# Patient Record
Sex: MALE | Race: WHITE
[De-identification: names, ages, dates, MRNs, and addresses within clinical notes are randomized per-mention and may not be internally consistent; named-entity substitution may affect disease eponyms.]

---

## 2017-04-09 ENCOUNTER — HOSPITAL ENCOUNTER (INPATIENT)
Dept: HOSPITAL 68 - ERH | Age: 67
LOS: 5 days | Discharge: NURSING, REHAB OR LONG-TERM CARE | DRG: 563 | End: 2017-04-14
Attending: INTERNAL MEDICINE | Admitting: INTERNAL MEDICINE
Payer: COMMERCIAL

## 2017-04-09 VITALS — WEIGHT: 300 LBS | BODY MASS INDEX: 44.43 KG/M2 | HEIGHT: 69 IN

## 2017-04-09 DIAGNOSIS — F32.9: ICD-10-CM
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
## 2017-04-09 DIAGNOSIS — J45.909: ICD-10-CM

## 2017-04-09 DIAGNOSIS — J96.11: ICD-10-CM

## 2017-04-09 DIAGNOSIS — N31.9: ICD-10-CM

## 2017-04-09 DIAGNOSIS — R73.9: ICD-10-CM

## 2017-04-09 DIAGNOSIS — Z87.891: ICD-10-CM

## 2017-04-09 DIAGNOSIS — Z86.718: ICD-10-CM

## 2017-04-09 DIAGNOSIS — G47.33: ICD-10-CM
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
## 2017-04-09 DIAGNOSIS — G82.20: ICD-10-CM

## 2017-04-09 DIAGNOSIS — Z86.711: ICD-10-CM

## 2017-04-09 DIAGNOSIS — E66.01: ICD-10-CM

## 2017-04-09 DIAGNOSIS — G62.9: ICD-10-CM

## 2017-04-09 DIAGNOSIS — I48.92: ICD-10-CM

## 2017-04-09 DIAGNOSIS — S82.841A: Primary | ICD-10-CM

## 2017-04-09 DIAGNOSIS — J44.9: ICD-10-CM

## 2017-04-09 DIAGNOSIS — I48.0: ICD-10-CM

## 2017-04-09 DIAGNOSIS — Z99.81: ICD-10-CM

## 2017-04-09 DIAGNOSIS — Z95.5: ICD-10-CM

## 2017-04-09 DIAGNOSIS — E78.5: ICD-10-CM

## 2017-04-09 DIAGNOSIS — I25.10: ICD-10-CM

## 2017-04-09 DIAGNOSIS — I10: ICD-10-CM

## 2017-04-09 DIAGNOSIS — I47.1: ICD-10-CM

## 2017-04-09 DIAGNOSIS — K21.9: ICD-10-CM

## 2017-04-09 LAB
ABSOLUTE GRANULOCYTE CT: 12.8 /CUMM (ref 1.4–6.5)
APTT BLD: 31 SEC (ref 25–37)
BASOPHILS # BLD: 0 /CUMM (ref 0–0.2)
BASOPHILS NFR BLD: 0 % (ref 0–2)
EOSINOPHIL # BLD: 0.4 /CUMM (ref 0–0.7)
EOSINOPHIL NFR BLD: 2.5 % (ref 0–5)
ERYTHROCYTE [DISTWIDTH] IN BLOOD BY AUTOMATED COUNT: 15.2 % (ref 11.5–14.5)
GRANULOCYTES NFR BLD: 81.6 % (ref 42.2–75.2)
HCT VFR BLD CALC: 43 % (ref 42–52)
LYMPHOCYTES # BLD: 1.4 /CUMM (ref 1.2–3.4)
MCH RBC QN AUTO: 29.1 PG (ref 27–31)
MCHC RBC AUTO-ENTMCNC: 32.4 G/DL (ref 33–37)
MCV RBC AUTO: 90.1 FL (ref 80–94)
MONOCYTES # BLD: 1.1 /CUMM (ref 0.1–0.6)
PLATELET # BLD: 213 /CUMM (ref 130–400)
PMV BLD AUTO: 8 FL (ref 7.4–10.4)
PROTHROMBIN TIME: 12.5 SEC (ref 9.4–12.5)
RED BLOOD CELL CT: 4.77 /CUMM (ref 4.7–6.1)
WBC # BLD AUTO: 15.7 /CUMM (ref 4.8–10.8)

## 2017-04-09 NOTE — ED UPPER/LOWER EXTREMITY COMPL
History of Present Illness
 
General
Chief Complaint: Foot or Ankle Injury
Stated Complaint: BIBA RIGHT ANKLE INJURY
Source: patient
Exam Limitations: no limitations
 
Vital Signs & Intake/Output
Vital Signs & Intake/Output
 Vital Signs
 
 
Date Time Temp Pulse Resp B/P Pulse O2 O2 Flow FiO2
 
     Ox Delivery Rate 
 
2239 98.0 83 18 149/86 95 Nasal 2.0L 
 
      Cannula  
 
9     95 Nasal 2.0L 
 
      Cannula  
 
2001 97.8 117 18 154/95 94   
 
 
 ED Intake and Output
 
 
 04/10 0000  1200
 
Intake Total 60 
 
Output Total  
 
Balance 60 
 
   
 
Intake, Oral 60 
 
Patient 300 lb 
 
Weight  
 
 
Allergies
Coded Allergies:
haloperidol (From HALDOL) (SWELLING 16)
heparin (SWELLING 16)
vancomycin (HIVES, SKIN CRACKES, TURNS RED, SWELLS AND PEELS 16)
warfarin (SWELLS, TURNS, RED, HIVES, SKIN CRACKS AND PEELS 16)
 
Reconcile Medications
Albuterol Sulfate (Ventolin Hfa) 90 MCG HFA.AER.AD   2 PUF INH Q4H RESPIRATORY  
(Reported)
Ascorbate Calcium (Vitamin C) 500 MG TABLET   1 TAB PO BID SUPPLEMENT  (Reported
)
Atomoxetine HCl (Strattera) 40 MG CAPSULE   1 CAP PO QAM MENTAL HEALTH  (
Reported)
Baclofen 20 MG TABLET   1 TAB PO BID MUSCLE RELAXER  (Reported)
Bisacodyl (Dulcolax) 10 MG SUPP.RECT   1 SUP RC EOD GI  (Reported)
Diltiazem HCl (Diltiazem 24HR ER) 120 MG CAP.ER.24H   1 CAP PO DAILY HEART/BP  (
Reported)
Evolocumab (Repatha Sureclick) 140 MG/ML PEN.INJCTR   1 ML SC Q2W CHOLESTEROL  (
Reported)
Fentanyl (Duragesic) 25 MCG/HOUR PATCH.TD72   1 PAT TOP Q3D BACK PAIN  (Reported
)
Furosemide 20 MG TABLET   1 TAB PO EOD DIURETIC  (Reported)
Gabapentin 600 MG TABLET   1 TAB PO TID NEUROPATHY  (Reported)
Guaifenesin (Mucinex) 600 MG TAB.ER.12H   2 TAB PO BID MUCUS  (Reported)
Lactobacillus Acidophilus (Acidophilus) 1 EACH CAPSULE   1 CAP PO BID PROBIOTIC 
(Reported)
Lorazepam (Ativan) 1 MG TABLET   1 TAB PO BID ANXIETY  (Reported)
Magnesium Oxide (Magnesium) 400 MG CAPSULE   1 CAP PO 0600 SUPPLEMENT  (Reported
)
Montelukast Sodium (Singulair) 10 MG TABLET   1 TAB PO DAILY ASTHMA  (Reported)
Multivitamin (Multi-Day Vitamins) 1 EACH TABLET   1 TAB PO DAILY SUPPLEMENT  (
Reported)
Nortriptyline HCl 10 MG CAPSULE   1 CAP PO QPM MENTAL HEALTH  (Reported)
Oxycodone HCl/Acetaminophen (Oxycodone-Acetaminophen 5-325) 5 MG-325 MG TABLET  
1-2 TAB PO Q4H PRN PAIN  (Reported)
Pantoprazole Sodium (Protonix) 40 MG TABLET.DR   1 TAB PO DAILY ACID REFLUX  (
Reported)
Potassium Chloride 20 MEQ TAB.ER.PRT   1 TAB PO DAILY SUPPLEMENT  (Reported)
Rifampin (Rifadin) 300 MG CAPSULE   1 CAP PO BID MRSA PROPHYLAXIS  (Reported)
Tiotropium Bromide (Spiriva) 18 MCG CAP.W.DEV   1 CAP INH DAILY BREATHING  (
Reported)
Vitamin E Mixed (Vitamin E) (Unknown Strength) TABLET   (Unknown Dose) PO DAILY 
SUPPLEMENT  (Reported)
 
Triage Nurses Notes Reviewed? yes
Onset: Abrupt
Duration: constant
Timing: single episode today
Severity: severe
Severity Numbers: 10
Pain/Injury Location:
Right: Ankle. 
HPI:
Patient is a 66-year-old male with past medical history of COPD on 2 L of oxygen
at all times, myocardial infarction and a MRSA spinal infection with residual 
lower extremity hemiparesis in which the patient does partially weight-bear with
assistance of a walker and uses a wheelchair on occasion due to his hemiparesis 
of his lower extremities and which TODAY he was in his normal state of health 
patient was transferring from the toilet to his wheelchair where he subsequently
fell twisting his right ankle resulting acute onset of sharp stabbing severe 
pain. 
Patient was brought in by ambulance
 Patient denies any head strike and pain is localized to the right ankle.
 
(LYLE OWENS)
 
Past History
 
Travel History
Traveled to [place] past 21 day No
 
Medical History
Any Pertinent Medical History? see below for history
Neurological: NONE
EENT: NONE
Cardiovascular: AFIB, hypertension, hyperlipidemia
Respiratory: asthma, COPD
Gastrointestinal: NONE
Hepatic: NONE
Renal: neurogenic bladder
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
Psychiatric: NONE
Endocrine: NONE
Blood Disorders: NONE
Cancer(s): NONE
GYN/Reproductive: NONE
History of MRSA: Yes
History of VRE: No
History of CDIFF: No
 
Surgical History
Surgical History: non-contributory
 
Psychosocial History
Who do you live with Significant Other
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
What is your primary language English
 
Family History
Family History, If Any:
Relation not specified for:
  *No pertinent family history
 
Hx Contributory? No
(LYLE OWENS)
 
Review of Systems
 
Review of Systems
Constitutional:
Reports: no symptoms. 
EENTM:
Reports: no symptoms. 
Respiratory:
Reports: no symptoms. 
Cardiovascular:
Reports: no symptoms. 
Gastrointestinal/Abdominal:
Reports: no symptoms. 
Genitourinary:
Reports: no symptoms. 
Musculoskeletal:
Reports: see HPI, joint pain, joint swelling. 
Skin:
Reports: no symptoms. 
Neurological/Psychological:
Reports: no symptoms. 
Hematologic/Endocrine:
Reports: no symptoms. 
Immunological:
Reports: no symptoms. 
All Other Systems: Reviewed and Negative
(LYLE OWENS)
 
Physical Exam
 
Physical Exam
General Appearance: mild distress
Neurologic/Tendon: normal sensation, normal motor functions, normal tendon 
functions, responds to pain, no evidence tendon injury, no pulse deficit
Skin: intact, normal color, warm/dry
Comments:
HEENT: Normal EENT exam,
Neck: Supple, no lymphadenopathy, normal range of motion without pain or 
tenderness
Back: Nontender, no CVA tenderness.
Cardiovascular: Tachycardia no murmurs rubs or gallops, normal JVP
Respiratory: Chest nontender. No respiratory distress.breath sounds clear to 
auscultation bilaterally
Abdomen: Soft, nontender nondistended, no appreciable organomegaly. Normal bowel
sounds. No ascites
Extremity: 
Right hip nontender normal inspection straight leg raise performed
Right knee nontender normal inspection
Right ankle noted mild deformity and swelling and point tenderness
Right foot nontender pedal pulse +2 capillary refill less than 2 seconds
Right lower extremity dermatomes intact
Neuro: Alert oriented x3, motor sensory normal, 
Skin: No appreciable rash on exposed skin, skin is warm and dry.
Psych: Mood and affect is normal, memory and judgment is normal.
(LYLE OWENS)
 
Progress
Differential Diagnosis: arterial insufficiency, compartment syndrome, contusion,
dislocation, DVT, fracture, gout, septic arthritis, sprain, tendon injury
Plan of Care:
 Orders
 
 
Procedure Date/time Status
 
Nothing by Mouth 04/10 B Active
 
Vital Signs 04/10 0038 Complete
 
Teach/Educate 04/10 0038 Active
 
Pain Treatment and Response 04/10 0038 Active
 
Nutritional Intake, Monitor 04/10 0038 Active
 
Isolation 04/10 0038 Active
 
Intake & Output 04/10 0038 Complete
 
Patient Care Conference 04/10 0038 Active
 
Activity/Ambulation 04/10 0038 Active
 
Pathway - chart 04/10 0008 Active
 
Heat/Cold Therapy 04/10 0008 Active
 
PT Evaluate & Treat 04/10 0003 Active
 
Code Status  2304 Active
 
TRC EVALUATION (GEN) 2251 Active
 
OXYGEN SETUP (GEN) 2251 Active
 
Pathway - chart 2251 Active
 
House Staff 2251 Active
 
Patient Data 2251 Active
 
Saline Lock 2239 Active
 
Misc Message 2239 Active
 
ED Holding Orders 2239 Active
 
Admit to inpatient 2239 Active
 
Vital Signs 2239 Active
 
Code Status 2239 Complete
 
Patient Data 2229 Active
 
Intake & Output 2209 Active
 
EKG 2109 Active
 
PARTIAL THROMBOPLASTIN TIME  2005 Complete
 
PROTHROMBIN TIME  2005 Complete
 
COMPREHENSIVE METABOLIC PANEL 2004 Complete
 
CBC WITHOUT DIFFERENTIAL 2003 Complete
 
TYPE & SCREEN (NOT X-MATCH) 2003 Complete
 
VTE Mechanical Prophylaxis   UNK Active
 
Vital Signs   UNK Complete
 
MISTAKE   UNK Active
 
Hemoccult   UNK Active
 
 
 Current Medications
 
 
  Sig/Viet Start time  Last
 
Medication Dose  Stop Time Status Admin
 
Acetaminophen 325 MG Q6P PRN 04/10 0015 AC 
 
(Tylenol)     
 
Hydromorphone HCl 0.4 MG Q6P PRN 04/10 0015 AC 
 
(Dilaudid)     
 
Oxycodone/ 1 TAB Q6P PRN 04/10 0015 AC 
 
Acetaminophen     
 
(Percocet)     
 
 
 Laboratory Tests
 
 
 
171:
Anion Gap 8, Estimated GFR > 60, BUN/Creatinine Ratio 26.7  H, Glucose 209  H, 
Calcium 10.1, Total Bilirubin 0.5, AST 44, ALT 80  H, Alkaline Phosphatase 77, 
Total Protein 7.1, Albumin 3.6, Globulin 3.5, Albumin/Globulin Ratio 1.0  L, PT 
12.5, INR 1.19  H, APTT 31, CBC w Diff NO MAN DIFF REQ, RBC 4.77, MCV 90.1, MCH 
29.1, RDW 15.2  H, MPV 8.0, Gran % 81.6  H, Lymphocytes % 8.7  L, Monocytes % 
7.2, Eosinophils % 2.5, Basophils % 0  L, Absolute Granulocytes 12.8  H, 
Absolute Lymphocytes 1.4, Absolute Monocytes 1.1  H, Absolute Eosinophils 0.4, 
Absolute Basophils 0, PUBS MCHC 32.4  L
No concerns of right lower extremity neurovascular compromise.  No concerns of 
dislocation, patient does have bimalleolar fracture on x-ray findings.
I discussed patient with Dr. FLORES in which no emergent surgical intervention 
is warranted in this case
Patient due to significant gait assistance necessities and fall risk and due to 
bimalleolar fracture that return to private residence in the emergency room with
be a significant detriment to the patient and further fall risk may occur.  
Discussed admission with case management who approved of admission in which 
patient will require a three-day stay and most likely have short-term 
rehabilitation
 
Posterior ankle and sugar tong splint were applied by me per he and post 
neurovascular was intact.
Patient was given IV Dilaudid for pain which he had significant resolution of 
his pain
(LAURA DOWNEY,LYLE)
Diagnostic Imaging:
Viewed by Me: Radiology Read. 
Radiology Impression: acute abnormality
Initial ED EKG: SINUS TACHYCARDIA 109 BPM
Comments:
PATIENT: DAYDAY HOOVER  MEDICAL RECORD NO: [id number]
PRESENT AGE: 66  PATIENT ACCOUNT NO: [id number]
: [birth year]  LOCATION: [facility]
ORDERING PHYSICIAN: LYLE DOWNEY  
 
  SERVICE DATE: 
EXAM TYPE: RAD - XRY-ANKLE 3 OR MORE VIEWS R
 
EXAMINATION:
XR ANKLE, RIGHT
 
CLINICAL INFORMATION:
Trauma
 
COMPARISON:
None
 
TECHNIQUE:
AP, lateral, and mortise views of the right ankle.
 
FINDINGS:
There is a displaced oblique fracture involving the distal right fibula.
There is lateral displacement of the distal fracture fragments. There is
widening of the lateral aspect of the ankle mortise. The talar dome appears
intact. There are a few fragments of bone within the ankle mortise likely
related to acute injury. There is a mildly displaced fracture involving the
medial malleolus with medial and inferior displacement of the distal fracture
fragment. There is extensive soft tissue swelling surrounding the base of the
right ankle which appears relatively hyperdense suggesting hematoma. No other
definite fractures visualized.
 
IMPRESSION:
Bimalleolar comminuted mildly fractures as described with several tiny bony
fragments within the ankle mortise.
 
DICTATED BY: FABIÁN ALVARENGA MD 
DATE/TIME DICTATED:17
:PAPITO 
(LYLE OWENS)
 
Departure
 
Departure
Disposition: STILL A PATIENT
Condition: Stable
Clinical Impression
Primary Impression: Bimalleolar fracture of right ankle
Referrals:
JUAQUIN JONES,RIGOBERTO YANG (PCP/Family)
 
Departure Forms:
Customer Survey
General Discharge Information
 
Admission Note
Spoke With:
MARK SILVEIRA MD
Documentation of Exam:
Documentation of any treatments & extenuating circumstances including Concerns 
Regarding Discharge (functional status, medication knowledge or non-compliance, 
living conditions, etc.) that warrant an admission rather than observation: [
Discussed admission with Dr. SILVEIRA who agrees with general medicine admission 
for concerns of bimalleolar fracture and fall risk.  Patient will require with 
orthopedic consultation, IV pain medication and possible short-term placement to
rehabilitation facility.  Outpatient treatment at this time would be medically 
harmful]
 
(LYLE OWENS)
 
PA/NP Co-Sign Statement
Statement:
ED Attending supervision documentation-
 
[] I saw and evaluated the patient. I have also reviewed all the pertinent lab 
results and diagnostic results. I agree with the findings and the plan of care 
as documented in the PA's/NP's documentation. 
 
[x] I have reviewed the ED Record and agree with the PA's/NP's documentation.
 
[] Additions or exceptions (if any) to the PAs/NP's note and plan are 
summarized below:
[]
 
(SUGEY JONES,GELACIO CAMPOS)
 
Procedures
 
Splinting
Location: RIGHT ANKLE
Manual Alignment Performed: No
Hand-Made Type: orthoglass
Splint: SUGAR TONG AND POSTERIOR ANKLE
Splint Applied By: splint applied by me
Pre-Proc Neuro Vasc Exam: normal
Post-Proc Neuro Vasc Exam: normal
(LYLE OWENS)
 
Critical Care Note
 
Critical Care Note
Critical Care Time: 30-74 min
(LYLE OWENS)

## 2017-04-09 NOTE — RADIOLOGY REPORT
EXAMINATION:
XR ANKLE, RIGHT
 
CLINICAL INFORMATION:
Trauma
 
COMPARISON:
None
 
TECHNIQUE:
AP, lateral, and mortise views of the right ankle.
 
FINDINGS:
There is a displaced oblique fracture involving the distal right fibula.
There is lateral displacement of the distal fracture fragments. There is
widening of the lateral aspect of the ankle mortise. The talar dome appears
intact. There are a few fragments of bone within the ankle mortise likely
related to acute injury. There is a mildly displaced fracture involving the
medial malleolus with medial and inferior displacement of the distal fracture
fragment. There is extensive soft tissue swelling surrounding the base of the
right ankle which appears relatively hyperdense suggesting hematoma. No other
definite fractures visualized.
 
IMPRESSION:
Bimalleolar comminuted mildly fractures as described with several tiny bony
fragments within the ankle mortise.

## 2017-04-10 VITALS — SYSTOLIC BLOOD PRESSURE: 160 MMHG | DIASTOLIC BLOOD PRESSURE: 83 MMHG

## 2017-04-10 VITALS — DIASTOLIC BLOOD PRESSURE: 64 MMHG | SYSTOLIC BLOOD PRESSURE: 144 MMHG

## 2017-04-10 VITALS — SYSTOLIC BLOOD PRESSURE: 174 MMHG | DIASTOLIC BLOOD PRESSURE: 88 MMHG

## 2017-04-10 VITALS — SYSTOLIC BLOOD PRESSURE: 150 MMHG | DIASTOLIC BLOOD PRESSURE: 82 MMHG

## 2017-04-10 LAB
ABSOLUTE GRANULOCYTE CT: 11.9 /CUMM (ref 1.4–6.5)
BASOPHILS # BLD: 0 /CUMM (ref 0–0.2)
BASOPHILS NFR BLD: 0.1 % (ref 0–2)
EOSINOPHIL # BLD: 0.6 /CUMM (ref 0–0.7)
EOSINOPHIL NFR BLD: 3.9 % (ref 0–5)
ERYTHROCYTE [DISTWIDTH] IN BLOOD BY AUTOMATED COUNT: 14.9 % (ref 11.5–14.5)
GRANULOCYTES NFR BLD: 77.7 % (ref 42.2–75.2)
HCT VFR BLD CALC: 38.6 % (ref 42–52)
LYMPHOCYTES # BLD: 1.8 /CUMM (ref 1.2–3.4)
MCH RBC QN AUTO: 29.4 PG (ref 27–31)
MCHC RBC AUTO-ENTMCNC: 32.7 G/DL (ref 33–37)
MCV RBC AUTO: 89.9 FL (ref 80–94)
MONOCYTES # BLD: 1 /CUMM (ref 0.1–0.6)
PLATELET # BLD: 205 /CUMM (ref 130–400)
PMV BLD AUTO: 8.4 FL (ref 7.4–10.4)
RED BLOOD CELL CT: 4.29 /CUMM (ref 4.7–6.1)
WBC # BLD AUTO: 15.3 /CUMM (ref 4.8–10.8)

## 2017-04-10 NOTE — PN- HOUSESTAFF
Subjective
Follow-up For:
Right ankle pain status post mechanical fall
Subjective:
Patient reports that his pain is better.  Currently the leg is Ace bandaged.  
Denies chest pain, palpitations, shortness of breath, dizziness, syncope
 
Patient reports he has been off of a Eliquis, for the last 3 months and his 
primary cardiologist aware. 
 
 
 
Review of Systems
Constitutional:
Reports: see HPI. 
 
Objective
Last 24 Hrs of Vital Signs/I&O
 Vital Signs
 
 
Date Time Temp Pulse Resp B/P Pulse O2 O2 Flow FiO2
 
     Ox Delivery Rate 
 
04/10 1035     93 Nasal 2.0L 
 
      Cannula  
 
04/10 08      Nasal 2.0L 
 
      Cannula  
 
04/10 08     96 Nasal 2.0L 
 
      Cannula  
 
04/10 0800     94 Nasal 2.0L 
 
      Cannula  
 
04/10 0651 97.7 104 24 160/83 96 Nasal 2.0L 
 
      Cannula  
 
04/10 0208     95 Nasal 2.0L 
 
      Cannula  
 
04/10 0142     95 Nasal 2.0L 
 
      Cannula  
 
04/10 0115 97.8 100 24 174/88 95 Nasal 2.0L 
 
      Cannula  
 
 2239 98.0 83 18 149/86 95 Nasal 2.0L 
 
      Cannula  
 
 2209     95 Nasal 2.0L 
 
      Cannula  
 
2001 97.8 117 18 154/95 94   
 
 
 Intake & Output
 
 
 04/10 1600 04/10 0800 04/10 0000
 
Intake Total  150 60
 
Output Total   
 
Balance  150 60
 
    
 
Intake, Oral  150 60
 
Patient  300 lb 300 lb
 
Weight   
 
 
 
 
Physical Exam
General Appearance: Alert, Oriented X3, Cooperative, No Acute Distress, Morbidly
obese
Skin: No Rashes
Cardiovascular: Regular Rate, Normal S1, Normal S2, No Murmurs
Lungs: Clear to Auscultation
Abdomen: Normal Bowel Sounds, Soft, No Tenderness
Neurological: Normal Speech
Extremities: No Clubbing, No Cyanosis, No Edema, right ankle Ace bandage in 
place. 
Current Medications:
 Current Medications
 
 
  Sig/Viet Start time  Last
 
Medication Dose Route Stop Time Status Admin
 
Acetaminophen 325 MG Q6P PRN 04/10 0015 AC 
 
  PO   
 
Albuterol Sulfate 3 ML EVERY 4 HRS/AWAKE 04/10 1200 AC 04/10
 
  INH   1031
 
Albuterol Sulfate 3 ML ONCE ONE 04/10 0215 DC 04/10
 
  INH 04/10 0216  0205
 
Aspirin Buffered 81 MG DAILY 04/10 1000 AC 04/10
 
  PO   09
 
Baclofen 20 MG BID 04/10 0114 AC 04/10
 
  PO   0914
 
Diltiazem HCl 120 MG DAILY 04/10 1000 AC 04/10
 
  PO   0914
 
Doxycycline Hyclate 100 MG BID 04/10 0117 AC 04/10
 
  PO   09
 
Gabapentin 600 MG Q8 04/10 0600 AC 04/10
 
  PO   09
 
Hydromorphone HCl 0.4 MG Q6P PRN 04/10 0015 DC 
 
  IV   
 
Hydromorphone HCl 1 MG ONCE ONE 2015 DC 
 
  IV 
 
Hydromorphone HCl 0 .STK-MED ONE 2009 DC 
 
  .ROUTE   
 
Lorazepam 1 MG BID 04/10 1000 AC 
 
  PO   
 
Montelukast Sodium 10 MG DAILY 04/10 1000 AC 04/10
 
  PO   09
 
Morphine Sulfate 2 MG Q6P PRN 04/10 0200 AC 04/10
 
  IV   1101
 
Nortriptyline HCl 10 MG QPM 04/10 2200 AC 
 
  PO   
 
Omeprazole 40 MG DAILY AC 04/10 0700 AC 04/10
 
  PO   0914
 
Oxycodone/ 1 TAB Q6P PRN 04/10 0015 AC 04/10
 
Acetaminophen  PO   0720
 
Rifampin 300 MG BID 04/10 011 AC 04/10
 
  PO   914
 
 
 
 
Last 24 Hrs of Lab/Stalin Results
Last 24 Hrs of Labs/Mics:
 Laboratory Tests
 
04/10/17 0800:
Anion Gap 9, Estimated GFR > 60, BUN/Creatinine Ratio 40.0  H, CBC w Diff NO MAN
DIFF REQ, RBC 4.29  L, MCV 89.9, MCH 29.4, RDW 14.9  H, MPV 8.4, Gran % 77.7  H,
Lymphocytes % 11.5  L, Monocytes % 6.8, Eosinophils % 3.9, Basophils % 0.1, 
Absolute Granulocytes 11.9  H, Absolute Lymphocytes 1.8, Absolute Monocytes 1.0 
H, Absolute Eosinophils 0.6, Absolute Basophils 0, PUBS MCHC 32.7  L
 
17:
Anion Gap 8, Estimated GFR > 60, BUN/Creatinine Ratio 26.7  H, Glucose 209  H, 
Hemoglobin A1c 5.9  H, Calcium 10.1, Total Bilirubin 0.5, AST 44, ALT 80  H, 
Alkaline Phosphatase 77, Total Protein 7.1, Albumin 3.6, Globulin 3.5, Albumin/
Globulin Ratio 1.0  L, PT 12.5, INR 1.19  H, APTT 31, CBC w Diff NO MAN DIFF REQ
, RBC 4.77, MCV 90.1, MCH 29.1, RDW 15.2  H, MPV 8.0, Gran % 81.6  H, 
Lymphocytes % 8.7  L, Monocytes % 7.2, Eosinophils % 2.5, Basophils % 0  L, 
Absolute Granulocytes 12.8  H, Absolute Lymphocytes 1.4, Absolute Monocytes 1.1 
H, Absolute Eosinophils 0.4, Absolute Basophils 0, PUBS MCHC 32.4  L
 
 
Assessment/Plan
Assessment:
66-year-old morbidly obese demand with history of stage IV COPD on 2 L oxygen, 
CAD status post stents, hypertension, MRSA bacteremia/endocarditis with T6 7 
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 
suppressive therapy with doxycycline and rifampin, PE status post IVC filter, 
obstructive sleep apnea not using CPAP, presented to the ED after a mechanical 
fall.  X-ray of the ankle showed a bimalleolar culminated fracture of ankle 
mortise. 
 
1.  Right ankle fracture status post mechanical fall
-Patient was evaluated by orthopedics today who is planning for an inpatient 
procedure probably been stable
-Cardiology and pulmonary clear the patient for procedure,suggested that he is 
at a moderate risk for complications. 
- Per cardiology he is not ambulating to calculate METS, but patient is able to 
change positions, and able to wheel himslef in the wheel chair.Unclear if he 
will need a stress test to obtain a cardiac clearance.We will await final 
recommendations from cardiology. Per his offfice records patient does not  have 
a documented arrythmia in the past and there is no indication for him to be on 
AC. 
- Continue pain management as mentioned in med list
-Patient is allergic to subcutaneous heparin (has a rash and itching) and 
warfarin.  He reports they were never able to attain a therapeutic range.  
History unclear.  He has been okay with Lovenox shots.  Patient likewise 
anticoagulation postop.  Would consider Lovenox.
 
2.  Leukocytosis: 
- Patient has been afebrile. 
- if  WBC continues to increase we will consider checking UA/chest xray if he 
has symptoms. 
 
3.  Chronic respiratory failure secondary to COPD on home oxygen
-Stable we will continue his Singulair.  Will follow-up pulmonary 
recommendation.
 
4.  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
-Currently on chronic suppression therapy with rifampin and doxycycline.  Will 
consult with primary care physician and confirm his home medications including 
his antibiotics
- Continue straight cath protocol
 
5.  Bilateral lower extremity edema
- Patient takes 20 mg of Lasix every other day.
- We will consider starting 20 mg Lasix every other day
 
6.  Hypertension
- Continue home medication
 
DVT prophylaxis with Alps
DNR/DNI
Problem List:
 1. Bimalleolar fracture of right ankle
 
 2. Paraplegia
 
 3. Hypertension
 
 4. COPD
 
Pain Ratin
Pain Location:
right ankle 
Pain Goal: Pain 4 or less
Pain Plan:
as mentioned in pain meds pathway 
Tomorrow's Labs & Rationales:
Will need labs to follow up CBC

## 2017-04-10 NOTE — ULTRASOUND REPORT
EXAMINATION:
BILATERAL LOWER EXTREMITY VENOUS ULTRASOUND
 
CLINICAL INFORMATION:
Bilateral leg swelling in a chronic paraplegic patient. Evaluate for DVT.
 
COMPARISON:
Bilateral lower extremity venous Doppler ultrasound dated 11/22/2016.
 
TECHNIQUE:
Doppler spectral analysis and color flow Doppler imaging was performed of the
lower extremities. Compression and augmentation maneuvers were performed.
 
FINDINGS:
Right lower extremity venous Doppler ultrasound: The right common femoral
vein, greater saphenous vein takeoff, and femoral vein are all normally
compressible with normal phasic changes seen with Doppler imaging. The
popliteal vein and calf veins could not be studied due to a bandage covering
the lower extremity, extending from the knee down to the ankle.
 
Left lower extremity venous Doppler ultrasound: The left common femoral vein,
greater saphenous vein takeoff, femoral vein, and popliteal vein are normally
compressible with normal augmentation responses and phasic changes seen with
Doppler imaging. The midcalf peroneal and posterior tibial veins are patent
as well.
 
No left-sided popliteal cyst is seen.
 
IMPRESSION:
 
1. Incomplete assessment of the right lower extremity due to bandage not
allowing assessment of the popliteal and calf veins. Visualized portions of
the deep veins in the right thigh are patent.
2. No evidence of deep venous thrombosis in the left lower extremity.

## 2017-04-10 NOTE — RADIOLOGY REPORT
EXAMINATION:
XR CHEST
 
CLINICAL INFORMATION:
COPD, on home O2. Preoperative clearance. Status post fall with ankle
fracture.
 
COMPARISON:
Several prior chest x-rays, most recent of which is dated 11/21/2016. The
scan of the chest dated 06/11/2014.
 
TECHNIQUE:
2 views of the chest were obtained on 4 images.
 
FINDINGS:
The cardiac size is within normal limits. Ectasia and tortuosity of the aorta
is seen.
 
Low lung volumes are seen with bibasilar volume loss and opacities.
Associated small bilateral pleural effusions are also seen. There is mild
central vascular congestion. No overt pulmonary edema is noted. No
pneumothorax is seen.
 
Osteopenia is suspected with mild compression deformity in the midthoracic
spine, poorly seen, but similar to prior CT scan.
 
IMPRESSION:
 
1. Low lung volumes with bibasilar consolidation or atelectasis.
2. Small bilateral pleural effusions.
3. Mild central vascular congestion. No overt pulmonary edema.

## 2017-04-10 NOTE — CONS- PULMONARY
General Information and HPI
 
Consulting Request
Date of Consult: 04/10/17
Requested By:
Dr. Hathaway
Reason for Consult:
COPD management
Source of Information: patient, old records
Exam Limitations: no limitations
History of Present Illness:
 The patient is a 66-year-old male with a past medical history significant for 
COPD on 2 lpm, respiratory failure in the setting of sepsis requiring prolonged 
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
spinal abscess, MI, atrial fibrillation not on anticoagulation, reflux disease, 
angioedema, dyslipidemia, hypertension, neurogenic bladder, chronic constipation
, neuropathy, anxiety, depression and history of IVC filter placement.  The 
patient was admitted with a bi-malleoar commuted fractures with several tiny 
bone fragments within the ankle, following a witnessed mechanical fall.  The 
patient is anticipating surgical intervention.  At present, the patient is awake
, alert and oriented.  He has no shortness of breath.  He has an intermittent 
cough which improves with nebulizer treatments.  He denies any wheezing, chest 
congestion, chest pain, sputum production, fever or chills.  Overall, he feels 
his respiratory status has been stable and he denies any new complaints today 
whatsoever.
 
Allergies/Medications
Allergies:
Coded Allergies:
haloperidol (From HALDOL) (SWELLING 11/21/16)
heparin (SWELLING 11/21/16)
vancomycin (HIVES, SKIN CRACKES, TURNS RED, SWELLS AND PEELS 11/21/16)
warfarin (SWELLS, TURNS, RED, HIVES, SKIN CRACKS AND PEELS 11/21/16)
 
Home Med List:
Albuterol Sulfate (Ventolin Hfa) 90 MCG HFA.AER.AD   2 PUF INH Q4H RESPIRATORY  
(Reported)
Ascorbate Calcium (Vitamin C) 500 MG TABLET   1 TAB PO BID SUPPLEMENT  (Reported
)
Aspirin (Ecotrin*) 81 MG TABLET.DR   1 TAB PO DAILY HEART  (Reported)
Atomoxetine HCl (Strattera) 40 MG CAPSULE   1 CAP PO QAM MENTAL HEALTH  (
Reported)
Baclofen 20 MG TABLET   1 TAB PO BID MUSCLE RELAXER  (Reported)
Bisacodyl (Dulcolax) 10 MG SUPP.RECT   1 SUP RC EOD GI  (Reported)
Diltiazem HCl (Diltiazem 24HR ER) 120 MG CAP.ER.24H   1 CAP PO DAILY HEART/BP  (
Reported)
Doxycycline Hyclate 100 MG CAPSULE   1 CAP PO BID infection  (Reported)
Evolocumab (Repatha Sureclick) 140 MG/ML PEN.INJCTR   1 ML SC Q2W CHOLESTEROL  (
Reported)
Furosemide 20 MG TABLET   3 TAB PO EOD DIURETIC  (Reported)
Gabapentin 600 MG TABLET   1 TAB PO TID NEUROPATHY  (Reported)
Guaifenesin (Mucinex) 600 MG TAB.ER.12H   2 TAB PO BID MUCUS  (Reported)
Lactobacillus Acidophilus (Acidophilus) 1 EACH CAPSULE   1 CAP PO BID PROBIOTIC 
(Reported)
Lorazepam (Ativan) 1 MG TABLET   1 TAB PO BID ANXIETY  (Reported)
Magnesium Oxide (Magnesium) 400 MG CAPSULE   1 CAP PO 0600 SUPPLEMENT  (Reported
)
Montelukast Sodium (Singulair) 10 MG TABLET   1 TAB PO DAILY ASTHMA  (Reported)
Multivitamin (Multi-Day Vitamins) 1 EACH TABLET   1 TAB PO DAILY SUPPLEMENT  (
Reported)
Nortriptyline HCl 10 MG CAPSULE   1 CAP PO QPM MENTAL HEALTH  (Reported)
Oxycodone HCl/Acetaminophen (Oxycodone-Acetaminophen 5-325) 5 MG-325 MG TABLET  
1-2 TAB PO Q4H PRN PAIN  (Reported)
Pantoprazole Sodium (Protonix) 40 MG TABLET.DR   1 TAB PO DAILY ACID REFLUX  (
Reported)
Potassium Chloride 20 MEQ TAB.ER.PRT   1 TAB PO DAILY SUPPLEMENT  (Reported)
Rifampin (Rifadin) 300 MG CAPSULE   1 CAP PO BID MRSA PROPHYLAXIS  (Reported)
Tiotropium Bromide (Spiriva) 18 MCG CAP.W.DEV   1 CAP INH DAILY BREATHING  (
Reported)
 
 
Review of Systems
 
Review of Systems
Constitutional:
Denies: no symptoms. 
EENTM:
Denies: no symptoms. 
Cardiovascular:
Denies: no symptoms. 
Respiratory:
Reports: cough.  Denies: hemoptysis, orthopnea, short of breath, sputum 
production, stridor, wheezing. 
GI:
Reports: constipation.  Denies: diarrhea, distention, bowel incontinence, melena
, nausea, bloody stool, changes in stool, vomiting. 
Skin:
Denies: no symptoms. 
All Other Systems: Reviewed and Negative
 
Past History
 
Travel History
Traveled to [place] past 21 day No
 
Medical History
Blood Transfusion Hx: Yes
Neurological: SPINAL INFECTION
EENT: NONE
Cardiovascular: AFIB, hypertension, hyperlipidemia
Respiratory: asthma, COPD
Gastrointestinal: NONE
Hepatic: NONE
Renal: neurogenic bladder
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
Psychiatric: NONE
Endocrine: NONE
Blood Disorders: NONE
Cancer(s): NONE
GYN/Reproductive: NONE
 
Surgical History
Surgical History: non-contributory
 
Family History
Relations & Conditions If Any:
Relation not specified for:
  *No pertinent family history
 
 
Psychosocial History
Where Do You Live? Home
Who Do You Live With? partner
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
Primary Language: English
Smoking Status: Former Smoker
ETOH Use: denies use
Illicit Drug Use: denies illicit drug use
 
Functional Ability
ADLs
Needs Assist: dressing, eating, toileting, bathing. 
Ambulation: wheelchair
IADLs
Independent: shopping, housework, finances, food prep, telephone, transportation
, medication admin. 
 
Exam & Diagnostic Data
Last 24 Hrs of Vital Signs/I&O
 Vital Signs
 
 
Date Time Temp Pulse Resp B/P Pulse O2 O2 Flow FiO2
 
     Ox Delivery Rate 
 
04/10 1035     93 Nasal 2.0L 
 
      Cannula  
 
04/10 0820      Nasal 2.0L 
 
      Cannula  
 
04/10 0820     96 Nasal 2.0L 
 
      Cannula  
 
04/10 0800     94 Nasal 2.0L 
 
      Cannula  
 
04/10 0651 97.7 104 24 160/83 96 Nasal 2.0L 
 
      Cannula  
 
04/10 0208     95 Nasal 2.0L 
 
      Cannula  
 
04/10 0142     95 Nasal 2.0L 
 
      Cannula  
 
04/10 0115 97.8 100 24 174/88 95 Nasal 2.0L 
 
      Cannula  
 
04/09 2239 98.0 83 18 149/86 95 Nasal 2.0L 
 
      Cannula  
 
04/09 2209     95 Nasal 2.0L 
 
      Cannula  
 
04/09 2001 97.8 117 18 154/95 94   
 
 
 Intake & Output
 
 
 04/10 1600 04/10 0800 04/10 0000
 
Intake Total  150 60
 
Output Total   
 
Balance  150 60
 
    
 
Intake, Oral  150 60
 
Patient  300 lb 300 lb
 
Weight   
 
 
 
 
Physical Exam
General Appearance: no apparent distress, alert, awake, comfortable
Head: atraumatic, normal appearance
Eyes:
Bilateral: PERRL. 
Neck: supple
Respiratory: decreased breath sounds, no wheezes rhonchi or rales
Cardiovascular: S1 and S2 heard, heart sounds are distant
Gastrointestinal: normal bowel sounds, soft, non-tender
Extremities: right lower extremity in a splint and dressed
Skin: intact, normal color, warm/dry
Last 48 Hrs of Labs/Stalin:
 Laboratory Tests
 
04/10/17 0800:
Anion Gap 9, Estimated GFR > 60, BUN/Creatinine Ratio 40.0  H, CBC w Diff NO MAN
DIFF REQ, RBC 4.29  L, MCV 89.9, MCH 29.4, RDW 14.9  H, MPV 8.4, Gran % 77.7  H,
Lymphocytes % 11.5  L, Monocytes % 6.8, Eosinophils % 3.9, Basophils % 0.1, 
Absolute Granulocytes 11.9  H, Absolute Lymphocytes 1.8, Absolute Monocytes 1.0 
H, Absolute Eosinophils 0.6, Absolute Basophils 0, PUBS MCHC 32.7  L
 
04/09/17 2041:
Anion Gap 8, Estimated GFR > 60, BUN/Creatinine Ratio 26.7  H, Glucose 209  H, 
Hemoglobin A1c 5.9  H, Calcium 10.1, Total Bilirubin 0.5, AST 44, ALT 80  H, 
Alkaline Phosphatase 77, Total Protein 7.1, Albumin 3.6, Globulin 3.5, Albumin/
Globulin Ratio 1.0  L, PT 12.5, INR 1.19  H, APTT 31, CBC w Diff NO MAN DIFF REQ
, RBC 4.77, MCV 90.1, MCH 29.1, RDW 15.2  H, MPV 8.0, Gran % 81.6  H, 
Lymphocytes % 8.7  L, Monocytes % 7.2, Eosinophils % 2.5, Basophils % 0  L, 
Absolute Granulocytes 12.8  H, Absolute Lymphocytes 1.4, Absolute Monocytes 1.1 
H, Absolute Eosinophils 0.4, Absolute Basophils 0, PUBS MCHC 32.4  L
 
 
Assessment/Plan
Impression/Plan:
1.  Stable respiratory status, chronic COPD which is oxygen dependent.
2.  Right ankle fracture following mechanical witnessed fall.  This will require
surgical fixation.
3.  Paraplegia secondary to previous MRSA infection of the spine.
4.  History of atrial fibrillation, not on anticoagulation.
5.  History of hypertension and hyperlipidemia.
6.  Obstructive sleep apnea, on nasal CPAP.
Recommendations:
* The patient's respiratory status appears to be stable at present.  Because of 
his COPD, the patient is at moderate risk for pulmonary complications in the 
setting of general anesthesia.  I would recommend regional anesthesia if 
possible.
* Check a PA and lateral chest x-ray.
* Continue nebulizer treatments every 4 hours as needed.
* Continue singular 10 mg daily.
* Start Symbicort 80/4.5, 2 puffs every 12 hours.
* Start Spiriva 1 inhalation every morning.
* Continue with pain control.
* Check lower extremity Dopplers to rule out DVT.
* DVT prophylaxis at all times.
* Thank you, will follow.  Please call if any questions.
 
 
Consult Acknowledgment
- Thank you for your consult request.

## 2017-04-10 NOTE — EVENT NOTE
Event Note
Event Note:
Subjective:
Nursing staff called me for patient's complaint of "not feeling well". He had 
also complained of epigastric discomfort earlier to the nurse. He felt he was 
off and cloudy.
 
Objective:
65 yo M in bed, resting comfortably, alert, oriented x3, denied chest pain, 
shortness of breath, palpitation, with vitals as: BP- 170/80, pulse- 150s 
irregular, temp 99.4, resp- 20, SpO2- 92% on 2L/min (baseline).
Normal physical examination except: Right leg posterior slab on, irregular 
tachycardia.
Specifically, no neuro deficit noted.
 
Assessment and Plan:
66-year-old gentleman with history of chronic atrial fibrillation on Cardizem 
extended-release 120 mg daily, is having atrial flutter with some irregular 
waves suggestive of atrial fibrillation with rapid ventricular response, heart 
rate around 150s.  Although the patient denied symptoms when I asked, he did 
have symptoms of chest discomfort and confusion when the nurse was assessing the
patient prior to me.
 
-Place the patient on cardiac monitor for telemetry,
-We'll transfer the patient to telemetry,
-IV Cardizem 5 mg to be pushed right now and watch for changes in cardiac rhythm
-Will follow up with cardiology if the patient becomes symptomatic, his vitals 
changes or there is no control of his rate/rhythm with the 5 mg of IV Cardizem 
that we will be pushing right now.
-Resident Dr Low well aware and has been guiding the decisions.

## 2017-04-10 NOTE — HISTORY & PHYSICAL
ITALIA CAMPO 04/10/17 0043:
General Information and HPI
MD Statement:
I have seen and personally examined DAYDAY HOOVER and documented this H&P.
 
The patient is a 66 year old M who presented with a patient stated chief 
complaint of right ankle pain status post fall.
 
Source of Information: patient, old records
Exam Limitations: no limitations
History of Present Illness:
This is a 66-year-old male with past medical history significant for COPD on 2 L
home oxygen, history of intubation, bilateral lower extremity edema on Lasix, 
hypertension, hyperlipidemia, atrial fibrillation not on anticoagulants, history
of ASTHMA, neurogenic bladder, depression, constipation, coronary artery disease
status post stent placement, IVC filter placement, MRSA infection of spine with 
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 
back pain, neuropathy, anxiety, GERD, obstructive sleep apnea not on CPAP 
presented to emergency department with chief complaint of right ankle pain 
status post witnessed mechanical fall.
 
According to the patient he was in his normal state of health, patient was 
transferring from the toilet to his wheelchair where he subsequently fell and 
twisted his right ankle resulting in acute onset of sharp stabbing severe pain, 
10 out of 10 and nonradiating. 
Patient reports hitting his head to the floor, however denied loss of 
consciousness.  Denied any lightheadedness or dizziness before the event.  He 
remembers the whole event.  Denied any headache, weakness or sensory changes.
 
Patient denied any fever, chills, chest pain, racing of heart, difficulty 
breathing,  nausea, vomiting, abdominal pain, constipation or diarrhea.  He is 
under straight cath protocol every 3-4 hours.  He does have chronic cough from 
COPD.  Denies any sick contacts or travel history.  Denies smoking, alcohol 
abuse, illicit drug use.
 
He has 2 visiting nurse aids to help him. patient does partially weight-bear 
with assistance of a walker and uses a wheelchair. 
 
He follows NICOLE Hull MD pulmonologist and Dr. lopez cardiologist.  
According to the patient he was on eliqus 5 mg twice a day for atrial 
fibrillation.  However he stopped eliqus 3 months ago as he feels he doesn't 
have atrial fibrillation.  However he is on aspirin and Cardizem.
 
He was admitted at [facility] in November 2016 for acute hypoxic 
respiratory failure secondary to COPD.
 
Allergies/Medications
Allergies:
Coded Allergies:
haloperidol (From HALDOL) (SWELLING 11/21/16)
heparin (SWELLING 11/21/16)
vancomycin (HIVES, SKIN CRACKES, TURNS RED, SWELLS AND PEELS 11/21/16)
warfarin (SWELLS, TURNS, RED, HIVES, SKIN CRACKS AND PEELS 11/21/16)
 
Compliance With Home Meds: GOOD
 
Past History
 
Travel History
Traveled to [place] past 21 day No
 
Medical History
Neurological: NONE
EENT: NONE
Cardiovascular: AFIB, hypertension, hyperlipidemia
Respiratory: asthma, COPD
Gastrointestinal: NONE
Hepatic: NONE
Renal: neurogenic bladder
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
Psychiatric: NONE
Endocrine: NONE
Blood Disorders: NONE
Cancer(s): NONE
GYN/Reproductive: NONE
History of MRSA: Yes
History of VRE: No
History of CDIFF: No
 
Surgical History
Surgical History: non-contributory
 
Past Family/Social History
 
Family History
Relations & Conditions if any
Relation not specified for:
  *No pertinent family history
 
 
Psychosocial History
Who Do You Live With? partner
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
Primary Language: English
Smoking Status: Former Smoker
ETOH Use: denies use
Illicit Drug Use: denies illicit drug use
 
Functional Ability
ADLs
Needs Assist: dressing, eating, toileting, bathing. 
Ambulation: wheelchair
IADLs
Independent: shopping, housework, finances, food prep, telephone, transportation
, medication admin. 
 
Review of Systems
 
Review of Systems
Constitutional:
Denies: chills, diaphoresis, fever, malaise, weakness, unexplained weight loss. 
EENTM:
Denies: visual changes, hearing changes, nasal congestion, nasal pain, throat 
pain. 
Cardiovascular:
Reports: peripheral edema.  Denies: chest pain, orthopena, palpitations, 
syncope. 
Respiratory:
Reports: cough, orthopnea.  Denies: hemoptysis, short of breath, sputum 
production, stridor, wheezing. 
GI:
Denies: abdominal pain, bloating, constipation, diarrhea, nausea, vomiting. 
Genitourinary:
Denies: frequency, nocturia, pain, urgency (on straight cath protocol). 
Musculoskeletal:
Reports: back pain, joint pain. 
Skin:
Denies: no symptoms. 
Neurological/Psychological:
Denies: anxiety, ataxia, confusion, depressed, dementia, headache, numbness, 
tingling, tremors. 
 
Exam & Diagnostic Data
Last 24 Hrs of Vital Signs/I&O
 Vital Signs
 
 
Date Time Temp Pulse Resp B/P Pulse O2 O2 Flow FiO2
 
     Ox Delivery Rate 
 
04/10 0208     95 Nasal 2.0L 
 
      Cannula  
 
04/10 0142     95 Nasal 2.0L 
 
      Cannula  
 
04/10 0115 97.8 100 24 174/88 95 Nasal 2.0L 
 
      Cannula  
 
04/09 2239 98.0 83 18 149/86 95 Nasal 2.0L 
 
      Cannula  
 
04/09 2209     95 Nasal 2.0L 
 
      Cannula  
 
04/09 2001 97.8 117 18 154/95 94   
 
 
 Intake & Output
 
 
 04/10 0800 04/10 0000 04/09 1600
 
Intake Total  60 
 
Output Total   
 
Balance  60 
 
    
 
Intake, Oral  60 
 
Patient 136.078 kg 136.078 kg 
 
Weight   
 
 
 
 
Physical Exam
General Appearance Alert, Oriented X3, Cooperative, No Acute Distress
Skin No Rashes, No Breakdown
HEENT Atraumatic, PERRLA, EOMI, Mucous Membr. moist/pink
Neck Supple, No JVD
Lymphatic Axillary nl, Cervical nl
Cardiovascular Normal S1, Normal S2, No Murmurs
Lungs Normal Air Movement, b/l wheezes
Abdomen Normal Bowel Sounds, Soft, No Tenderness
Neurological Normal Speech, Cranial Nerves 3-12 NL, Reflexes 2+, couldnt move 
lower ext- paraplegia
Extremities No Clubbing, No Cyanosis, No Edema
Vascular Normal Pulses
Last 24 Hrs of Labs/Stalin:
 Laboratory Tests
 
04/09/17 2041:
Anion Gap 8, Estimated GFR > 60, BUN/Creatinine Ratio 26.7  H, Glucose 209  H, 
Calcium 10.1, Total Bilirubin 0.5, AST 44, ALT 80  H, Alkaline Phosphatase 77, 
Total Protein 7.1, Albumin 3.6, Globulin 3.5, Albumin/Globulin Ratio 1.0  L, PT 
12.5, INR 1.19  H, APTT 31, CBC w Diff NO MAN DIFF REQ, RBC 4.77, MCV 90.1, MCH 
29.1, RDW 15.2  H, MPV 8.0, Gran % 81.6  H, Lymphocytes % 8.7  L, Monocytes % 
7.2, Eosinophils % 2.5, Basophils % 0  L, Absolute Granulocytes 12.8  H, 
Absolute Lymphocytes 1.4, Absolute Monocytes 1.1  H, Absolute Eosinophils 0.4, 
Absolute Basophils 0, PUBS MCHC 32.4  L
 
 
Assessment/Plan
Assessment:
This is a 66-year-old male with past medical history significant for COPD on 2 L
home oxygen, history of intubation, bilateral lower extremity edema on Lasix, 
hypertension, hyperlipidemia, atrial fibrillation not on anticoagulants, history
of ASTHMA, neurogenic bladder, depression, constipation, coronary artery disease
status post stent placement, IVC filter placement, MRSA infection of spine with 
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 
back pain, neuropathy, anxiety, GERD, obstructive sleep apnea not on CPAP 
presented to emergency department with chief complaint of right ankle pain 
status post witnessed mechanical fall.
 
Vitals on admission- afebrile, tachycardic 117, respiratory rate 18, blood 
pressure 154/95, saturating at 94 on room air.
Labs-leukocytosis 15.7.  Glucose 209, alt 80.
Ankle x-ray
Bimalleolar comminuted mildly fractures as described with several tiny bony
fragments within the ankle mortise.
 
 
Problem list
1.  Right ankle bimalleolar comminuted fracture
2.  Leukocytosis
3.  COPD on 2 L oxygen
4.  Chronic lower extremity edema
5.  Atrial fibrillation not on anticoagulation
6.  Hypertension
7.  Hyperlipidemia
8.  Neurogenic bladder
9.  Depression
10.  Constipation
11 history of MRSA spine abscess
12.  Chronic back pain
13.  Neuropathy
14.  Anxiety
15.  GERD
16.  GARRY not on CPAP
 
Right ankle bimalleolar comminuted fracture
Patient presented with acute onset of right ankle pain status post witnessed a 
mechanical fall.  10 out of 10, sharp and stabbing, nonradiating.  
Ankle x-ray showed Bimalleolar comminuted mildly fractures as described with 
several tiny bony fragments within the ankle mortise.
* Patient was admitted to general medicine floor for further management and 
rehabilitation placement
* Monitor vitals closely
* Maintain oxygen saturation above 90%
* Monitor WBC count
* Leukocytosis 15.7 on admission most possibly from stress
* Afebrile
* Pain management
* Mild pain-Tylenol
* Moderate pain-Percocet
* Severe pain IV Dilaudid
* Ortho consultation
* PT evaluation
 
 
Leukocytosis
Most likely reactive
WBC 15.7
Will check in the morning
Monitor for fevers or chills
 
Chronic lower extremity edema
usually takes Lasix 60 every other day
 
COPD
History of COPD on 2 L oxygen at home
Total respiratory care
albuterol
nebs
montelukast
 
atrial fibrillation
According to the patient he was on eliqus 5 mg twice a day for atrial 
fibrillation.  However he stopped eliqus 3 months ago as he feels he doesn't 
have atrial fibrillation. 
* Continue home dose of Cardizem
* Continue aspirin
 
Hyperlipidemia
Patient usually takes evolocumab every 2 weeks.
 
Neurogenic bladder
On straight cath protocol every few hours
 
Muscle spasms
Continue baclofen
 
History of MRSA spine abscess
Patient is on prophylaxis
Continue rifampin and doxycycline prophylaxis
 
Neuropathy
Continue gabapentin
 
Anxiety
Takes Ativan when necessary
 
 
GERD
Takes pantoprazole
 
Obstructive sleep apnea
Not on CPAP now
 
Hyperglycemia 
Sugar 219 on admission
Accu-Cheks
Insulin sliding scale
Will check HbA1c
 
 
DVT prophylaxis
DNR/DNI
Pain management
Regular diet
 
As Ranked By This Provider
Problem List:
 1. ATRIAL FIBRILATION
 
 2. Bimalleolar fracture of right ankle
 
 
Core Measures/Miscellaneous
 
Acute Coronary Syndrome
ACS Diagnosis: No
 
Cerebrovascular Accident
CVA/TIA Diagnosis: No
 
Congestive Heart Failure
CHF Diagnosis: No
 
Venous Thromboembolism
VTE Risk Factors: Age > 40, Immobility, paresis
No Mech VTE prophylaxis d/t: No contraindications
No VTE Pharm Prophylaxis d/t: No contraindications
VTE Diagnosis: No
VTE Type: NONE
VTE Confirmed by (Test): NONE
 
Severe Sepsis
Severe Sepsis Present: No
 
Septic Shock
Septic Shock Present: No
 
Miscellaneous Documentation
Attending Case Discussed With:
MARK SILVEIRA MD
 
Primary Care Physician:
RIGOBERTO REZA MD
 
Patient sees these Specialists
Pulmonologist
Cardiologist
Level of Patient Care: General Medicine
 
KALIN DING 04/10/17 0103:
General Information and HPI
 
Allergies/Medications
Home Med list
Albuterol Sulfate (Ventolin Hfa) 90 MCG HFA.AER.AD   2 PUF INH Q4H RESPIRATORY  
(Reported)
Ascorbate Calcium (Vitamin C) 500 MG TABLET   1 TAB PO BID SUPPLEMENT  (Reported
)
Aspirin (Ecotrin*) 81 MG TABLET.DR   1 TAB PO DAILY HEART  (Reported)
Atomoxetine HCl (Strattera) 40 MG CAPSULE   1 CAP PO QAM MENTAL HEALTH  (
Reported)
Baclofen 20 MG TABLET   1 TAB PO BID MUSCLE RELAXER  (Reported)
Bisacodyl (Dulcolax) 10 MG SUPP.RECT   1 SUP RC EOD GI  (Reported)
Diltiazem HCl (Diltiazem 24HR ER) 120 MG CAP.ER.24H   1 CAP PO DAILY HEART/BP  (
Reported)
Doxycycline Hyclate 100 MG CAPSULE   1 CAP PO BID infection  (Reported)
Evolocumab (Repatha Sureclick) 140 MG/ML PEN.INJCTR   1 ML SC Q2W CHOLESTEROL  (
Reported)
Furosemide 20 MG TABLET   3 TAB PO EOD DIURETIC  (Reported)
Gabapentin 600 MG TABLET   1 TAB PO TID NEUROPATHY  (Reported)
Guaifenesin (Mucinex) 600 MG TAB.ER.12H   2 TAB PO BID MUCUS  (Reported)
Lactobacillus Acidophilus (Acidophilus) 1 EACH CAPSULE   1 CAP PO BID PROBIOTIC 
(Reported)
Lorazepam (Ativan) 1 MG TABLET   1 TAB PO BID ANXIETY  (Reported)
Magnesium Oxide (Magnesium) 400 MG CAPSULE   1 CAP PO 0600 SUPPLEMENT  (Reported
)
Montelukast Sodium (Singulair) 10 MG TABLET   1 TAB PO DAILY ASTHMA  (Reported)
Multivitamin (Multi-Day Vitamins) 1 EACH TABLET   1 TAB PO DAILY SUPPLEMENT  (
Reported)
Nortriptyline HCl 10 MG CAPSULE   1 CAP PO QPM MENTAL HEALTH  (Reported)
Oxycodone HCl/Acetaminophen (Oxycodone-Acetaminophen 5-325) 5 MG-325 MG TABLET  
1-2 TAB PO Q4H PRN PAIN  (Reported)
Pantoprazole Sodium (Protonix) 40 MG TABLET.DR   1 TAB PO DAILY ACID REFLUX  (
Reported)
Potassium Chloride 20 MEQ TAB.ER.PRT   1 TAB PO DAILY SUPPLEMENT  (Reported)
Rifampin (Rifadin) 300 MG CAPSULE   1 CAP PO BID MRSA PROPHYLAXIS  (Reported)
Tiotropium Bromide (Spiriva) 18 MCG CAP.W.DEV   1 CAP INH DAILY BREATHING  (
Reported)
 
 
 
Resident Review Statement
Resident Statement: examined this patient, discussed with intern, agreed with 
intern
Other Findings:
Chief complaint: "Right ankle pain"
 
This is a 66-year-old gentleman with past medical history significant for COPD 
on 2 L home oxygen, MRSA infection of the spine on rifampin and doxycycline, 
neuropathy, GERD, GARRY not on CPAP, CAD status post stent placement, hypertension
, hyperlipidemia, bilateral lower extremity edema, atrial fibrillation not on 
anticoagulation, asthma, depression, neurogenic bladder who presents to the 
hospital for right ankle pain status post mechanical fall.  Patient stopped his 
Eliquis 3 months ago by himself and mentions that his cardiologist Dr. Lopez is 
aware.  Please see above for more details.
 
 
Vital signs on admission: Temperature 97.8, pulse 800, respiratory rate 20, 
blood pressure 170/88, oxygen saturation 95% on 2 L nasal cannula oxygen.
 
Physical exam at the time of admission: NAD, AO 3.  HEENT: H NCAT, PERRLA, EOMI
, moist mucous membrane, normal pharynx.  Neck: Supple, no JVD, no carotid 
bruit.  CV: RRR, no murmur.  Lungs: CTA BL.  Abdomen: Normal bowel sounds, soft,
ND, NT.  Extremities: No edema, Left lower extremity on ankle plastic splint.  
Posterior ankle and sugar tong splint noted on right lower extremity.  Pulses 
intact.  Neurology: Normal speech, Cranial nerves III-12 intact, normal 
reflexes.
 
 
Right ankle x-ray on admission: Bimalleolar comminuted mildly fractures as 
described with several tiny bony fragments within the ankle mortise.
 
EKG on admission: Sinus tachycardia, rate 109, no ST-T wave abnormalities, QTC 
431.
 
Echocardiogram 11/15/2010: Normal ejection fraction, mild MR, mild TR, RV 
systolic pressure 64.  Trace CT.  Mildly dilated aortic root.
 
Problem list/plan:
 
#Bimalleolar right ankle fracture: Orthopedic was consulted in the ED who wants 
to operate on the patient after a week.  For now will admit the patient to 
general medicine floor.  Adequate pain management with Tylenol, Percocet, 
Dilaudid.  Orthopedic consult.  PT evaluation.
 
#Leukocytosis: Patient had WBC of 15.7 on admission.  There is no clinical 
indication for an infection.  Likely reactive.  We will recheck levels in the 
morning.
 
#Hyperglycemia: Patient presented with glucose of 209 on admission.  No symptoms
including polyuria, polydipsia.  Accu-Cheks twice a day, Will check hemoglobin 
A1c.  
 
#GERD: Will maintain the patient on PPIs.
 
#The patient is on straight cath every 3 hours at home.  Will continue the same.
 
#Will continue with PTA Cardizem, baclofen, rifampin, doxycycline, gabapentin, 
nortriptyline, lorazepam.
 
#Please confirm patient's home medications with his PCP.
 
#DVT prophylaxis: Alps.
 
#Patient is DNI/DNR
 
#Of note, patient is allergic to warfarin and heparin.
 
 
JORDANA JONES, [facility] 04/10/17 0643:
Attending MD Review Statement
 
Attending Statement
Attending MD Statement: examined this patient, discuss w/resident/PA/NP, agreed 
w/resident/PA/NP
Attending Assessment/Plan:
67 yo morbidly obese M with h/o chronic hypoxic respiratory failure, stage IV 
COPD on 2L O2, Afib not on Eliquis anymore, CAD s/p stent, HTN, MRSA bacteremia/
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 
paraparesis now on chronic suppressive therapy with doxycycline and rifampin, PE
s/p IVC filter, GARRY not using CPAP, recently admitted for COPDE (Nov 2016); is 
here s/p mechanical fall resulting in right ankle fracture. No syncope or head 
strike. Of note, patient has been off Eliquis and Dr. Lopez (Cardiologist) is 
aware, the reason being it is unclear if he has Afib. 
VSS. Labs: WBC 15.7, bicarb 37, glucose 209, ALT 80. EKG: Sinus tachycardia. 
Ankle Xray: bimalleolar comminuted fracture of ankle mortise. 
 
1. Mechanical fall with resultant right ankle mortise fracture. GM admit, pain 
management, Ortho consult, assess timing of repair. Patient will need Cardiology
and pulmonary clearance prior to ankle repair surgery. PT eval and placement. 
Case management consult.
2. Paraparesis. Continue straight cath protocol, and chronic suppression therapy
with doxycycline and rifampin. 
3. Leukocytosis likely reactive. 
DVT ppx Alps (patient allergic to heparin). DNR/I.

## 2017-04-10 NOTE — CONS- ORTHOPEDIC
General Information and HPI
 
Consulting Request
Date of Consult: 04/10/17
Requested By:
JORDANA JONES,MARK
 
Reason for Consult:
Right ankle pain
Source of Information: patient
Exam Limitations: no limitations
History of Present Illness:
Patient is a 66-year-old who had a fall last night while transferring from the 
toilet back to his chair.  He had an inversion injury to the right ankle.  He 
had immediate pain and swelling.  He had difficulty bearing weight and was 
brought emergently to [facility].
 
Allergies/Medications
Allergies:
Coded Allergies:
haloperidol (From HALDOL) (SWELLING 11/21/16)
heparin (SWELLING 11/21/16)
vancomycin (HIVES, SKIN CRACKES, TURNS RED, SWELLS AND PEELS 11/21/16)
warfarin (SWELLS, TURNS, RED, HIVES, SKIN CRACKS AND PEELS 11/21/16)
 
Home Med List:
Albuterol Sulfate (Ventolin Hfa) 90 MCG HFA.AER.AD   2 PUF INH Q4H RESPIRATORY  
(Reported)
Ascorbate Calcium (Vitamin C) 500 MG TABLET   1 TAB PO BID SUPPLEMENT  (Reported
)
Aspirin (Ecotrin*) 81 MG TABLET.DR   1 TAB PO DAILY HEART  (Reported)
Atomoxetine HCl (Strattera) 40 MG CAPSULE   1 CAP PO QAM MENTAL HEALTH  (
Reported)
Baclofen 20 MG TABLET   1 TAB PO BID MUSCLE RELAXER  (Reported)
Bisacodyl (Dulcolax) 10 MG SUPP.RECT   1 SUP RC EOD GI  (Reported)
Diltiazem HCl (Diltiazem 24HR ER) 120 MG CAP.ER.24H   1 CAP PO DAILY HEART/BP  (
Reported)
Doxycycline Hyclate 100 MG CAPSULE   1 CAP PO BID infection  (Reported)
Evolocumab (Repatha Sureclick) 140 MG/ML PEN.INJCTR   1 ML SC Q2W CHOLESTEROL  (
Reported)
Furosemide 20 MG TABLET   3 TAB PO EOD DIURETIC  (Reported)
Gabapentin 600 MG TABLET   1 TAB PO TID NEUROPATHY  (Reported)
Guaifenesin (Mucinex) 600 MG TAB.ER.12H   2 TAB PO BID MUCUS  (Reported)
Lactobacillus Acidophilus (Acidophilus) 1 EACH CAPSULE   1 CAP PO BID PROBIOTIC 
(Reported)
Lorazepam (Ativan) 1 MG TABLET   1 TAB PO BID ANXIETY  (Reported)
Magnesium Oxide (Magnesium) 400 MG CAPSULE   1 CAP PO 0600 SUPPLEMENT  (Reported
)
Montelukast Sodium (Singulair) 10 MG TABLET   1 TAB PO DAILY ASTHMA  (Reported)
Multivitamin (Multi-Day Vitamins) 1 EACH TABLET   1 TAB PO DAILY SUPPLEMENT  (
Reported)
Nortriptyline HCl 10 MG CAPSULE   1 CAP PO QPM MENTAL HEALTH  (Reported)
Oxycodone HCl/Acetaminophen (Oxycodone-Acetaminophen 5-325) 5 MG-325 MG TABLET  
1-2 TAB PO Q4H PRN PAIN  (Reported)
Pantoprazole Sodium (Protonix) 40 MG TABLET.DR   1 TAB PO DAILY ACID REFLUX  (
Reported)
Potassium Chloride 20 MEQ TAB.ER.PRT   1 TAB PO DAILY SUPPLEMENT  (Reported)
Rifampin (Rifadin) 300 MG CAPSULE   1 CAP PO BID MRSA PROPHYLAXIS  (Reported)
Tiotropium Bromide (Spiriva) 18 MCG CAP.W.DEV   1 CAP INH DAILY BREATHING  (
Reported)
 
 
Past History
 
Medical History
Blood Transfusion Hx: Yes
Neurological: SPINAL INFECTION
EENT: NONE
Cardiovascular: AFIB, hypertension, hyperlipidemia
Respiratory: asthma, COPD
Gastrointestinal: NONE
Hepatic: NONE
Renal: neurogenic bladder
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
Psychiatric: NONE
Endocrine: NONE
Blood Disorders: NONE
Cancer(s): NONE
GYN/Reproductive: NONE
 
Surgical History
Pertinent Surgical History: non-contributory
 
Family History
Relations & Conditions If Any:
Relation not specified for:
  *No pertinent family history
 
 
Psychosocial History
Where Do You Live? Home
Who Do You Live With? partner
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
Primary Language: English
Smoking Status: Former Smoker
ETOH Use: denies use
Illicit Drug Use: denies illicit drug use
 
Functional Ability
ADLs
Needs Assist: dressing, eating, toileting, bathing. 
Ambulation: wheelchair
IADLs
Independent: shopping, housework, finances, food prep, telephone, transportation
, medication admin. 
 
Exam & Diagnostic Data
Vital Signs and I&O
On physical exam the patient is awakeVital Signs
 
 
Date Time Temp Pulse Resp B/P Pulse O2 O2 Flow FiO2
 
     Ox Delivery Rate 
 
04/10 1035     93 Nasal 2.0L 
 
      Cannula  
 
04/10 0820     96 Nasal 2.0L 
 
      Cannula  
 
04/10 0651 97.7 104 24 160/83 96 Nasal 2.0L 
 
      Cannula  
 
04/10 0208     95 Nasal 2.0L 
 
      Cannula  
 
04/10 0142     95 Nasal 2.0L 
 
      Cannula  
 
04/10 0115 97.8 100 24 174/88 95 Nasal 2.0L 
 
      Cannula  
 
04/09 2239 98.0 83 18 149/86 95 Nasal 2.0L 
 
      Cannula  
 
04/09 2209     95 Nasal 2.0L 
 
      Cannula  
 
04/09 2001 97.8 117 18 154/95 94   
 
 
 Intake & Output
 
 
 04/10 1600 04/10 0800 04/10 0000 04/09 1600 04/09 0800 04/09 0000
 
Intake Total  150 60   
 
Output Total      
 
Balance  150 60   
 
       
 
Intake, Oral  150 60   
 
Patient  300 lb 300 lb   
 
Weight      
 
 
On physical exam the patient is awake and alert and oriented.  Head and neck 
exam reveals a normocephalic atraumatic skull.  Pupils are equal round react to 
light and accommodation.  Neck exam reveals no JVD in the neck is supple.  
Abdomen is rounded soft and nontender.  Neurovascular exam reveals S1 and S2.  
The bilateral shoulders are within normal limits with full range of motion 
without pain.  Bilateral upper shoulder maser her neuro-intact.  Left lower 
extremity is grossly neuro-intact.  There's no range of motion with the hip and 
knee or ankle.  The right lotion as a splint in place.  Capillary refill on the 
right foot is less than 2 seconds.  Sensation light touch is grossly intact in 
his toes.
 
X-rays of the right ankle show a displaced bimalleolar ankle fracture.
 
Assessment/Plan
Assessment/Plan
Right bimalleolar ankle fracture.  Patient will need surgical fixation.  The 
ankle can be fixed either this Wednesday morning while the patient still an 
inpatient or we can fix it as an outpatient later in the week or early next 
week.  I will discuss these options with the medical service and with the 
patient.
 
 
Consult Acknowledgment
- Thank you for your consult request.

## 2017-04-10 NOTE — CONS- CARDIOLOGY
General Information and HPI
 
Consulting Request
Date of Consult: 04/10/17
Requested By:
JORDANA JONES,MARK
 
Reason for Consult:
cad, pre-op
Source of Information: patient, old records
History of Present Illness:
 
65 y/o Male (Cardiologist is my partner Dr. Prieto) with an extensive PMH which 
includes COPD on O2 (followed by Dr. Hull), parapalegia due to spinal 
abscess on chronic Abx, CAD with prior remote PCIs (2004/2006), reported aortic 
valve endocarditis, PAF not on AC due to fall risk and low afib burden, reported
DVT/PE with IVC filter, angioedema, HTN, HLD on Repatha, neurogenic bladder, and
depression who presents to [place] s/p mechanical fall while transferring from 
toilet to wheelchair. Not associated chest pain, dyspnea, diaphoresis, or 
palpitations. No orthopnea. Was seen by Orthopedics who suggested surgical 
correction. Denies fevers. While he is unable to ambulate he reports that he can
roll his own wheelchair with no exertional symptoms and no change in his 
exertional tolerance. 
 
 
 
Allergies/Medications
Allergies:
Coded Allergies:
haloperidol (From HALDOL) (SWELLING 11/21/16)
heparin (SWELLING 11/21/16)
vancomycin (HIVES, SKIN CRACKES, TURNS RED, SWELLS AND PEELS 11/21/16)
warfarin (SWELLS, TURNS, RED, HIVES, SKIN CRACKS AND PEELS 11/21/16)
 
Home Med List:
Albuterol Sulfate (Ventolin Hfa) 90 MCG HFA.AER.AD   2 PUF INH Q4H RESPIRATORY  
(Reported)
Ascorbate Calcium (Vitamin C) 500 MG TABLET   1 TAB PO BID SUPPLEMENT  (Reported
)
Aspirin (Ecotrin*) 81 MG TABLET.DR   1 TAB PO DAILY HEART  (Reported)
Atomoxetine HCl (Strattera) 40 MG CAPSULE   1 CAP PO QAM MENTAL HEALTH  (
Reported)
Baclofen 20 MG TABLET   1 TAB PO BID MUSCLE RELAXER  (Reported)
Bisacodyl (Dulcolax) 10 MG SUPP.RECT   1 SUP RC EOD GI  (Reported)
Diltiazem HCl (Diltiazem 24HR ER) 120 MG CAP.ER.24H   1 CAP PO DAILY HEART/BP  (
Reported)
Doxycycline Hyclate 100 MG CAPSULE   1 CAP PO BID infection  (Reported)
Evolocumab (Repatha Sureclick) 140 MG/ML PEN.INJCTR   1 ML SC Q2W CHOLESTEROL  (
Reported)
Furosemide 20 MG TABLET   3 TAB PO EOD DIURETIC  (Reported)
Gabapentin 600 MG TABLET   1 TAB PO TID NEUROPATHY  (Reported)
Guaifenesin (Mucinex) 600 MG TAB.ER.12H   2 TAB PO BID MUCUS  (Reported)
Lactobacillus Acidophilus (Acidophilus) 1 EACH CAPSULE   1 CAP PO BID PROBIOTIC 
(Reported)
Lorazepam (Ativan) 1 MG TABLET   1 TAB PO BID ANXIETY  (Reported)
Magnesium Oxide (Magnesium) 400 MG CAPSULE   1 CAP PO 0600 SUPPLEMENT  (Reported
)
Montelukast Sodium (Singulair) 10 MG TABLET   1 TAB PO DAILY ASTHMA  (Reported)
Multivitamin (Multi-Day Vitamins) 1 EACH TABLET   1 TAB PO DAILY SUPPLEMENT  (
Reported)
Nortriptyline HCl 10 MG CAPSULE   1 CAP PO QPM MENTAL HEALTH  (Reported)
Oxycodone HCl/Acetaminophen (Oxycodone-Acetaminophen 5-325) 5 MG-325 MG TABLET  
1-2 TAB PO Q4H PRN PAIN  (Reported)
Pantoprazole Sodium (Protonix) 40 MG TABLET.DR   1 TAB PO DAILY ACID REFLUX  (
Reported)
Potassium Chloride 20 MEQ TAB.ER.PRT   1 TAB PO DAILY SUPPLEMENT  (Reported)
Rifampin (Rifadin) 300 MG CAPSULE   1 CAP PO BID MRSA PROPHYLAXIS  (Reported)
Tiotropium Bromide (Spiriva) 18 MCG CAP.W.DEV   1 CAP INH DAILY BREATHING  (
Reported)
 
Current Medications:
 Current Medications
 
 
  Sig/[place] Start time  Last
 
Medication Dose Route Stop Time Status Admin
 
Acetaminophen 325 MG Q6P PRN 04/10 0015 AC 
 
  PO   
 
Albuterol Sulfate 3 ML EVERY 4 HRS/AWAKE 04/10 1200 AC 04/10
 
  INH   1031
 
Albuterol Sulfate 3 ML ONCE ONE 04/10 0215 DC 04/10
 
  INH 04/10 0216  0205
 
Aspirin Buffered 81 MG DAILY 04/10 1000 AC 04/10
 
  PO   0914
 
Baclofen 20 MG BID 04/10 0114 AC 04/10
 
  PO   0914
 
Diltiazem HCl 120 MG DAILY 04/10 1000 AC 04/10
 
  PO   0914
 
Doxycycline Hyclate 100 MG BID 04/10 0117 AC 04/10
 
  PO   0914
 
Gabapentin 600 MG Q8 04/10 0600 AC 04/10
 
  PO   0914
 
Hydromorphone HCl 0.4 MG Q6P PRN 04/10 0015 DC 
 
  IV   
 
Hydromorphone HCl 1 MG ONCE ONE 04/09 2015 DC 04/09
 
  IV 04/09 2016 2008
 
Hydromorphone HCl 0 .STK-MED ONE 04/09 2009 DC 
 
  .ROUTE   
 
Lorazepam 1 MG BID 04/10 1000 AC 
 
  PO   
 
Montelukast Sodium 10 MG DAILY 04/10 1000 AC 04/10
 
  PO   0914
 
Morphine Sulfate 2 MG Q6P PRN 04/10 0200 AC 04/10
 
  IV   1101
 
Nortriptyline HCl 10 MG QPM 04/10 2200 AC 
 
  PO   
 
Omeprazole 40 MG DAILY AC 04/10 0700 AC 04/10
 
  PO   0914
 
Oxycodone/ 1 TAB Q6P PRN 04/10 0015 AC 04/10
 
Acetaminophen  PO   0720
 
Rifampin 300 MG BID 04/10 0116 AC 04/10
 
  PO   0914
 
 
 
 
Review of Systems
Review of Systems:
Review of systems as per HPI. The remainder of a 10 point review of systems was 
reviewed and was otherwise negative.
 
Past History
 
Travel History
Traveled to [place] past 21 day No
 
Medical History
Blood Transfusion Hx: Yes
Neurological: SPINAL INFECTION
EENT: NONE
Cardiovascular: AFIB, hypertension, hyperlipidemia
Respiratory: asthma, COPD
Gastrointestinal: NONE
Hepatic: NONE
Renal: neurogenic bladder
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
Psychiatric: NONE
Endocrine: NONE
Blood Disorders: NONE
Cancer(s): NONE
GYN/Reproductive: NONE
 
Surgical History
Surgical History: non-contributory
 
Family History
Relations & Conditions If Any:
Relation not specified for:
  *No pertinent family history
 
 
Psychosocial History
Where Do You Live? Home
Who Do You Live With? partner
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
Primary Language: English
Smoking Status: Former Smoker
ETOH Use: denies use
Illicit Drug Use: denies illicit drug use
 
Functional Ability
ADLs
Needs Assist: dressing, eating, toileting, bathing. 
Ambulation: wheelchair
IADLs
Independent: shopping, housework, finances, food prep, telephone, transportation
, medication admin. 
 
Exam & Diagnostic Data
Vital Signs and I&O
Vital Signs
 
 
Date Time Temp Pulse Resp B/P Pulse O2 O2 Flow FiO2
 
     Ox Delivery Rate 
 
04/10 1035     93 Nasal 2.0L 
 
      Cannula  
 
04/10 0820      Nasal 2.0L 
 
      Cannula  
 
04/10 0820     96 Nasal 2.0L 
 
      Cannula  
 
04/10 0800     94 Nasal 2.0L 
 
      Cannula  
 
04/10 0651 97.7 104 24 160/83 96 Nasal 2.0L 
 
      Cannula  
 
04/10 0208     95 Nasal 2.0L 
 
      Cannula  
 
04/10 0142     95 Nasal 2.0L 
 
      Cannula  
 
04/10 0115 97.8 100 24 174/88 95 Nasal 2.0L 
 
      Cannula  
 
04/09 2239 98.0 83 18 149/86 95 Nasal 2.0L 
 
      Cannula  
 
04/09 2209     95 Nasal 2.0L 
 
      Cannula  
 
04/09 2001 97.8 117 18 154/95 94   
 
 
 Intake & Output
 
 
 04/10 1600 04/10 0800 04/10 0000 04/09 1600 04/09 0800 04/09 0000
 
Intake Total  150 60   
 
Output Total      
 
Balance  150 60   
 
       
 
Intake, Oral  150 60   
 
Patient  300 lb 300 lb   
 
Weight      
 
 
 
Physical Exam:
 
General: no apparent distress. Alert. Laying flat.
Eyes: No obvious scleral icterus.
HEENT: No jugular venous distention or abnormal jugular venous pulsations.
Cardiovascular: Normal intensity S1/S2.  PMI not grossly displaced.
Respiratory: Lungs clear to auscultation bilaterally.
Abdomen: Mildly distended with no guarding or rebound tenderness.
Musculoskeletal: No clubbing or cyanosis noted, right lower leg immobilized
Skin: warm
Neurologic: Chronic paraplegia
 
Labs/Stalin Results:
 Laboratory Tests
 
 
 04/10 04/09
 
 0800 2041
 
Chemistry  
 
  Sodium (137 - 145 mmol/L) 139 141
 
  Potassium (3.5 - 5.1 mmol/L) 4.2 4.3
 
  Chloride (98 - 107 mmol/L) 98 97  L
 
  Carbon Dioxide (22 - 30 mmol/L) 32  H 37  H
 
  Anion Gap (5 - 16) 9 8
 
  BUN (9 - 20 mg/dL) 24  H 24  H
 
  Creatinine (0.7 - 1.2 mg/dL) 0.6  L 0.9
 
  Estimated GFR (>60 ml/min) > 60 > 60
 
  BUN/Creatinine Ratio (7 - 25 %) 40.0  H 26.7  H
 
  Glucose (65 - 99 mg/dL)  209  H
 
  Hemoglobin A1c (4.2 - 5.8 %)  5.9  H
 
  Calcium (8.4 - 10.2 mg/dL)  10.1
 
  Total Bilirubin (0.2 - 1.3 mg/dL)  0.5
 
  AST (17 - 59 U/L)  44
 
  ALT (21 - 72 U/L)  80  H
 
  Alkaline Phosphatase (< 127 U/L)  77
 
  Total Protein (6.3 - 8.2 g/dL)  7.1
 
  Albumin (3.5 - 5.0 g/dL)  3.6
 
  Globulin (1.9 - 4.2 gm/dL)  3.5
 
  Albumin/Globulin Ratio (1.1 - 2.2 %)  1.0  L
 
Coagulation  
 
  PT (9.4 - 12.5 SEC)  12.5
 
  INR (0.90 - 1.17)  1.19  H
 
  APTT (25 - 37 SEC)  31
 
Hematology  
 
  CBC w Diff NO MAN DIFF REQ NO MAN DIFF REQ
 
  WBC (4.8 - 10.8 /CUMM) 15.3  H 15.7  H
 
  RBC (4.70 - 6.10 /CUMM) 4.29  L 4.77
 
  Hgb (14.0 - 18.0 G/DL) 12.6  L 13.9  L
 
  Hct (42 - 52 %) 38.6  L 43.0
 
  MCV (80.0 - 94.0 FL) 89.9 90.1
 
  MCH (27.0 - 31.0 PG) 29.4 29.1
 
  RDW (11.5 - 14.5 %) 14.9  H 15.2  H
 
  Plt Count (130 - 400 /CUMM) 205 213
 
  MPV (7.4 - 10.4 FL) 8.4 8.0
 
  Gran % (42.2 - 75.2 %) 77.7  H 81.6  H
 
  Lymphocytes % (20.5 - 51.1 %) 11.5  L 8.7  L
 
  Monocytes % (1.7 - 9.3 %) 6.8 7.2
 
  Eosinophils % (0 - 5 %) 3.9 2.5
 
  Basophils % (0.0 - 2.0 %) 0.1 0  L
 
  Absolute Granulocytes (1.4 - 6.5 /CUMM) 11.9  H 12.8  H
 
  Absolute Lymphocytes (1.2 - 3.4 /CUMM) 1.8 1.4
 
  Absolute Monocytes (0.10 - 0.60 /CUMM) 1.0  H 1.1  H
 
  Absolute Eosinophils (0.0 - 0.7 /CUMM) 0.6 0.4
 
  Absolute Basophils (0.0 - 0.2 /CUMM) 0 0
 
  PUBS MCHC (33.0 - 37.0 G/DL) 32.7  L 32.4  L
 
 
 
 
Diagnostic Data
EKG Results
tracing personally reviewed, shows ST at 109 with st elevations/depressions
CXR Results
none on this admission 
 
CXR 11/2016: no acute disease per report
 
 
Other Results
ankle xray
IMPRESSION:
Bimalleolar comminuted mildly fractures as described with several tiny bony
fragments within the ankle mortise.
 
last echo in our office showed normal EF
 
Assessment/Plan
Assessment/Plan
 
1. S/p mechanical fall with malleolar fx, planned for surgical intervention per 
Ortho
2. CAD with remote PCI (2004/2006)
3. COPD on O2 (followed by Dr. Hull)
4. 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
5. PAF not on AC due to fall risk and low afib burden
6. Reported DVT/PE with reported prior IVC filter
7. HLD on Repatha (?statin intolerance)
8. Remote hx of endocarditis 
9. Hx HTN
 
Planned for orthopedic intervention. No evidence of ACS. Would need a CXR prior 
to surgery. Would increase the patient's extended release Cardizem to 180 mg 
daily. No CHF by lung exam, he is laying flat without dyspnea. No change in 
baseline exertional tolerance/denies exertional symptoms (wheelchair bound but 
rolls himself at times and transfers out of wheelchair regularly). No inpatient 
ischemic testing indicated at this time. Estimated to be at moderate 
cardiopulmonary risk for planned orthopedic intervention given medical hx. Would
recommend placing the patient on telemetry for 24 hours after surgery given hx 
of PAF. We are considering outpatient LINQ implant in the future for better 
assessment of AF burden. Continue daily ASA and resume Repatha on discharge. 
Post-op DVT prophylaxis per Ortho. Dr. Hull and I discussed today and I 
spoke to medical housestaff as well, we are in agreement. 
 
James Cornelius MD [facility] 
 
 
Consult Acknowledgment
- Thank you for your consult request.

## 2017-04-10 NOTE — PN- ATT ADDEND
Attending Addendum
Attending Brief Note
Patient seen and examined, feels ok. He has some ankle pain but says that pain 
medicine helps.  She currently denies any shortness of breath or chest pain 
which he has history of chronic respiratory failure and he is home oxygen 
dependent. Patient is admitted with mechanical fall and right ankle fracture.
 
Vital Signs
 
 
Date Time Temp Pulse Resp B/P Pulse O2 O2 Flow FiO2
 
     Ox Delivery Rate 
 
04/10 1035     93 Nasal 2.0L 
 
      Cannula  
 
04/10 0820     96 Nasal 2.0L 
 
      Cannula  
 
04/10 0651 97.7 104 24 160/83 96 Nasal 2.0L 
 
      Cannula  
 
04/10 0208     95 Nasal 2.0L 
 
      Cannula  
 
04/10 0142     95 Nasal 2.0L 
 
      Cannula  
 
04/10 0115 97.8 100 24 174/88 95 Nasal 2.0L 
 
      Cannula  
 
04/09 2239 98.0 83 18 149/86 95 Nasal 2.0L 
 
      Cannula  
 
04/09 2209     95 Nasal 2.0L 
 
      Cannula  
 
04/09 2001 97.8 117 18 154/95 94   
 
 
on exam; 
aox3, nad. 
cv; s1,s2, rrr
resp; clear
abd; soft, nt, bs+
ext; trace edema
ms: rle is warpped in cast and ace wrap 
 
 Laboratory Tests
 
 
 04/10 04/09
 
 0800 2041
 
Chemistry  
 
  Sodium (137 - 145 mmol/L) 139 141
 
  Potassium (3.5 - 5.1 mmol/L) 4.2 4.3
 
  Chloride (98 - 107 mmol/L) 98 97  L
 
  Carbon Dioxide (22 - 30 mmol/L) 32  H 37  H
 
  Anion Gap (5 - 16) 9 8
 
  BUN (9 - 20 mg/dL) 24  H 24  H
 
  Creatinine (0.7 - 1.2 mg/dL) 0.6  L 0.9
 
  Estimated GFR (>60 ml/min) > 60 > 60
 
  BUN/Creatinine Ratio (7 - 25 %) 40.0  H 26.7  H
 
  Glucose (65 - 99 mg/dL)  209  H
 
  Hemoglobin A1c (4.2 - 5.8 %)  5.9  H
 
  Calcium (8.4 - 10.2 mg/dL)  10.1
 
  Total Bilirubin (0.2 - 1.3 mg/dL)  0.5
 
  AST (17 - 59 U/L)  44
 
  ALT (21 - 72 U/L)  80  H
 
  Alkaline Phosphatase (< 127 U/L)  77
 
  Total Protein (6.3 - 8.2 g/dL)  7.1
 
  Albumin (3.5 - 5.0 g/dL)  3.6
 
  Globulin (1.9 - 4.2 gm/dL)  3.5
 
  Albumin/Globulin Ratio (1.1 - 2.2 %)  1.0  L
 
Coagulation  
 
  PT (9.4 - 12.5 SEC)  12.5
 
  INR (0.90 - 1.17)  1.19  H
 
  APTT (25 - 37 SEC)  31
 
Hematology  
 
  CBC w Diff NO MAN DIFF REQ NO MAN DIFF REQ
 
  WBC (4.8 - 10.8 /CUMM) 15.3  H 15.7  H
 
  RBC (4.70 - 6.10 /CUMM) 4.29  L 4.77
 
  Hgb (14.0 - 18.0 G/DL) 12.6  L 13.9  L
 
  Hct (42 - 52 %) 38.6  L 43.0
 
  MCV (80.0 - 94.0 FL) 89.9 90.1
 
  MCH (27.0 - 31.0 PG) 29.4 29.1
 
  RDW (11.5 - 14.5 %) 14.9  H 15.2  H
 
  Plt Count (130 - 400 /CUMM) 205 213
 
  MPV (7.4 - 10.4 FL) 8.4 8.0
 
  Gran % (42.2 - 75.2 %) 77.7  H 81.6  H
 
  Lymphocytes % (20.5 - 51.1 %) 11.5  L 8.7  L
 
  Monocytes % (1.7 - 9.3 %) 6.8 7.2
 
  Eosinophils % (0 - 5 %) 3.9 2.5
 
  Basophils % (0.0 - 2.0 %) 0.1 0  L
 
  Absolute Granulocytes (1.4 - 6.5 /CUMM) 11.9  H 12.8  H
 
  Absolute Lymphocytes (1.2 - 3.4 /CUMM) 1.8 1.4
 
  Absolute Monocytes (0.10 - 0.60 /CUMM) 1.0  H 1.1  H
 
  Absolute Eosinophils (0.0 - 0.7 /CUMM) 0.6 0.4
 
  Absolute Basophils (0.0 - 0.2 /CUMM) 0 0
 
  PUBS MCHC (33.0 - 37.0 G/DL) 32.7  L 32.4  L
 
 
A/P; 65 y/o M with pmh sig for ch resp failure, COPD on 2 L home oxygen, history
of intubation, bilateral lower extremity edema on Lasix, hypertension, 
hyperlipidemia, atrial fibrillation not on anticoagulants, history of ASTHMA, 
neurogenic bladder, depression, constipation, coronary artery disease status 
post stent placement, IVC filter placement, MRSA infection of spine with T6 to 
T7 spinal abscess and paraplegia on rifampin and doxycycline prophylaxis, back 
pain, neuropathy, anxiety, GERD, obstructive sleep apnea not on CPAP, admitted 
with a mechanical fall and sustained a right ankle fracture. 
 
Orthopedic evaluation is elevated.  We need to clarify if the plan for any 
inpatient or outpatient surgery.  Patient for need cardiology and pulmonology 
evaluation for the clearance.
Patient claims that he has stopped taking Eliquis about 3 months ago as he 
discussed with his cardiologist and it was not indicated.  The balloon is his 
cardiologist.
Continue all current medications.
Pain Mx: Oxycodone and Morphine and it is adequate. 
DVT px: ALPS, patient allergic to heparin, warfarin.

## 2017-04-10 NOTE — ADMISSION CERTIFICATION
Admission Certification
 
Certification Statement
- As attending physician, I certify that at the time of
- admission, based on clinical presentation, severity of
- symptoms, need for further diagnostic testing and
- therapeutic interventions, and risk of adverse outcomes
- without in-hospital treatment, in my clinical assessment,
- this patient requires an acute hospital stay for a minimum
- of two nights or longer. I have also considered psychsocial
- factors such as support system, advanced age, financial
- issues, cognitive issues, and failed out-patient treatments,
- past re-admission history, safety of patient, and lack of
- compliance as applicable.
Specific rationale supporting this admission is:
Mechanical fall, bimalleolar right ankle fracture needs Ortho consult, possible 
repair, PT and placement.

## 2017-04-10 NOTE — EVENT NOTE
Event Note
Event Note:
Patient converted to sinus rhythm at around 9:15 pm on the monitor, he endorses 
no complaints except for the pain in the ankle, denies chest pain, palpitation, 
headache, dizziness. , /64. T 100.1
 
We discussed the case with Cardiology, Dr. Mensah on the phone and Dr. Westfall was at bedside.
 
Plan: 
- EKG
- metoprolol 25 mg PO once 
- Hold off on IV heparin as the patient reports allergies to heparin in the form
of a rash
- UA, UC, BC x2
- consider MRI of the spine in am

## 2017-04-11 VITALS — DIASTOLIC BLOOD PRESSURE: 78 MMHG | SYSTOLIC BLOOD PRESSURE: 152 MMHG

## 2017-04-11 VITALS — SYSTOLIC BLOOD PRESSURE: 128 MMHG | DIASTOLIC BLOOD PRESSURE: 62 MMHG

## 2017-04-11 VITALS — DIASTOLIC BLOOD PRESSURE: 80 MMHG | SYSTOLIC BLOOD PRESSURE: 158 MMHG

## 2017-04-11 VITALS — SYSTOLIC BLOOD PRESSURE: 146 MMHG | DIASTOLIC BLOOD PRESSURE: 88 MMHG

## 2017-04-11 LAB
ABSOLUTE GRANULOCYTE CT: 8.7 /CUMM (ref 1.4–6.5)
BASOPHILS # BLD: 0 /CUMM (ref 0–0.2)
BASOPHILS NFR BLD: 0.1 % (ref 0–2)
EOSINOPHIL # BLD: 0.6 /CUMM (ref 0–0.7)
EOSINOPHIL NFR BLD: 5.1 % (ref 0–5)
ERYTHROCYTE [DISTWIDTH] IN BLOOD BY AUTOMATED COUNT: 14.7 % (ref 11.5–14.5)
GRANULOCYTES NFR BLD: 68.6 % (ref 42.2–75.2)
HCT VFR BLD CALC: 37 % (ref 42–52)
LYMPHOCYTES # BLD: 2 /CUMM (ref 1.2–3.4)
MCH RBC QN AUTO: 29.3 PG (ref 27–31)
MCHC RBC AUTO-ENTMCNC: 32.8 G/DL (ref 33–37)
MCV RBC AUTO: 89.5 FL (ref 80–94)
MONOCYTES # BLD: 1.3 /CUMM (ref 0.1–0.6)
PLATELET # BLD: 206 /CUMM (ref 130–400)
PMV BLD AUTO: 8.8 FL (ref 7.4–10.4)
RED BLOOD CELL CT: 4.13 /CUMM (ref 4.7–6.1)
WBC # BLD AUTO: 12.6 /CUMM (ref 4.8–10.8)

## 2017-04-11 NOTE — PN- PULMONARY
Subjective
HPI/Critical Care Issues:
The patient is awake and alert.  Overnight events noted.  The patient continues 
to have right lower extremity pain.  He denies any chest pain, palpitations, 
shortness of breath, cough, sputum production, fever or chills.
 
Objective
Current Medications:
 Current Medications
 
 
  Sig/[place] Start time  Last
 
Medication Dose Route Stop Time Status Admin
 
Acetaminophen 325 MG Q6P PRN 04/10 0015 AC 04/10
 
  PO   2304
 
Albuterol Sulfate 3 ML EVERY 4 HRS/AWAKE 04/10 1200 AC 04/11
 
  INH   0621
 
Aspirin Buffered 81 MG DAILY 04/10 1000 AC 04/11
 
  PO   0922
 
Baclofen 20 MG BID 04/10 0114 AC 04/11
 
  PO   0922
 
Budesonide/ 2 PUF BID 04/10 1400 AC 04/11
 
Formoterol Fumarate  INH   0921
 
Diltiazem HCl 180 MG DAILY 04/11 1000 AC 04/11
 
  PO   0922
 
Diltiazem HCl 5 MG ONCE ONE 04/10 2015 DC 
 
  IV 04/10 2016  
 
Diltiazem HCl 120 MG DAILY 04/10 1000 DC 04/10
 
  PO   0914
 
Doxycycline Hyclate 100 MG BID 04/10 0117 AC 04/11
 
  PO   0922
 
Enoxaparin Sodium 40 MG 1800 04/10 1800 AC 
 
  SC   
 
Gabapentin 600 MG Q8 04/10 0600 AC 04/11
 
  PO   0613
 
Lorazepam 1 MG BID 04/10 1000 AC 04/10
 
  PO   1836
 
Magnesium Sulfate 1 GM .STK-MED ONE 04/10 2132 DC 
 
  IM 04/10 2133  
 
Metoprolol Tartrate 25 MG ONCE ONE 04/10 2200 DC 04/10
 
  PO 04/10 2201  2217
 
Montelukast Sodium 10 MG DAILY 04/10 1000 AC 04/11
 
  PO   0922
 
Morphine Sulfate 2 MG Q6P PRN 04/10 0200 AC 04/11
 
  IV   0825
 
Nortriptyline HCl 10 MG QPM 04/10 2200 AC 04/11
 
  PO   0012
 
Omeprazole 40 MG DAILY AC 04/10 0700 AC 04/11
 
  PO   0613
 
Oxycodone/ 1 TAB Q6P PRN 04/10 0015 AC 04/11
 
Acetaminophen  PO   0613
 
Rifampin 300 MG BID 04/10 0116 AC 04/11
 
  PO   0922
 
Tiotropium [place] 1 PUF DAILY 04/10 1400 AC 04/11
 
  INH   0921
 
 
 
 
Vital Signs & I&O
Last 24 Hrs of Vitals and I&O:
 Vital Signs
 
 
Date Time Temp Pulse Resp B/P Pulse O2 O2 Flow FiO2
 
     Ox Delivery Rate 
 
04/11 0800 98.9 91 20 152/78 93 Nasal 2.0L 
 
      Cannula  
 
04/11 0635     96 Nasal 2.0L 
 
      Cannula  
 
04/11 0011 99.8       
 
04/11 0005 99.4 104 20 128/62 93 Room Air  
 
04/11 0000     97 Nasal 2.0L 
 
      Cannula  
 
04/10 2304 100.1       
 
04/10 2217  118  144/64    
 
04/10 2120 100.1 124 20 144/64 93 Room Air  
 
04/10 2020     91 Nasal 2.0L 
 
      Cannula  
 
04/10 1655      Nasal 2.0L 
 
      Cannula  
 
04/10 1600      Nasal 2.0L 
 
      Cannula  
 
04/10 1436 98.2 100 20 150/82 92   
 
04/10 1035     93 Nasal 2.0L 
 
      Cannula  
 
 
 Intake & Output
 
 
 04/11 1600 04/11 0800 04/11 0000
 
Intake Total  480 880
 
Output Total   700
 
Balance  480 180
 
    
 
Intake, IV   20
 
Intake, Oral  480 860
 
Output, Urine   700
 
 
Physical Exam
General Appearance: no apparent distress, alert, awake, comfortable
Head: atraumatic, normal appearance
Eyes:  Bilateral: PERRL. 
Neck: supple
Respiratory: decreased breath sounds, no wheezes rhonchi or rales
Cardiovascular: S1 and S2 heard, heart sounds are distant
Gastrointestinal: normal bowel sounds, soft, non-tender
Extremities: right lower extremity in a splint and dressed
Skin: intact, normal color, warm/dry
 
Results
Last 24 Hrs of Lab Results:
 Laboratory Tests
 
04/11/17 0610:
Anion Gap 9, Estimated GFR > 60, BUN/Creatinine Ratio 34.3  H, CBC w Diff NO MAN
DIFF REQ, RBC 4.13  L, MCV 89.5, MCH 29.3, RDW 14.7  H, MPV 8.8, Gran % 68.6, 
Lymphocytes % 16.0  L, Monocytes % 10.2  H, Eosinophils % 5.1  H, Basophils % 
0.1, Absolute Granulocytes 8.7  H, Absolute Lymphocytes 2.0, Absolute Monocytes 
1.3  H, Absolute Eosinophils 0.6, Absolute Basophils 0, PUBS MCHC 32.8  L
 
04/10/17 2347:
Urinalysis MOD  H, Urine Color YEL, Urine Clarity CLEAR, Urine pH 7.0, Ur 
Specific Gravity 1.015, Urine Protein 30  H, Urine Ketones 40  H, Urine Nitrite 
NEG, Urine Bilirubin NEG@ICTO, Urine Urobilinogen 0.2, Ur Leukocyte Esterase 
TRACE  H, Ur Microscopic SEDIMENT EXAMINED, Urine RBC 5-10  H, Urine WBC 1-3  H,
Ur Epithelial Cells FEW, Urine Bacteria FEW  H, Urine Mucus MOD  H, Urine 
Hemoglobin TRACE-INTACT  H, Urine Glucose NEG
 
04/10/17 1947:
Troponin I 0.02
 
 
Diagnostic Data
CXR Findings:
1. Low lung volumes with bibasilar consolidation or atelectasis.
2. Small bilateral pleural effusions.
3. Mild central vascular congestion. No overt pulmonary edema.
 
 
Impression/Plan
 
Impression/Plan
Impression/Plan:
1.  Stable respiratory status, chronic COPD which is oxygen dependent.
2.  Right ankle fracture following mechanical witnessed fall.  This will require
surgical fixation.
3.  Paraplegia secondary to previous MRSA infection of the spine, on chronic 
doxycycline and rifampin.
4.  Paroxysmal atrial fibrillation, not on anticoagulation.
5.  History of hypertension and hyperlipidemia.
6.  Obstructive sleep apnea, on nasal CPAP.
Recommendations:
* The patient's respiratory status appears to be stable at present.  Once again,
because of his COPD, the patient is at moderate risk for pulmonary complications
in the setting of general anesthesia.  I would recommend regional anesthesia if 
possible.
* Continue nebulizer treatments every 4 hours as needed.
* Continue Singular 10 mg daily.
* Continue Symbicort 80/4.5, 2 puffs every 12 hours.
* Continue Spiriva 1 inhalation every morning.
* Continue with pain control - please add IV Tylenol PRN, give first dose now.
* DVT prophylaxis at all times.
* Await decision regarding timing of surgical intervention.

## 2017-04-11 NOTE — PN- CARDIOLOGY
Subjective
Subjective:
Patient feels well.  He denies chest pain shortness of breath palpitations.
Events of last p.m. noted.  He was to have tachycardia secondary to atrial 
flutter with 2:1 block.  He was treated with metoprolol placed on telemetry and 
has remained in sinus rhythm.
Review of Systems:
Eyes no blurred or double vision
Ears no deafness or ringing
Nose and throat no recurrent sinusitis
Lungs per history of present illness
Heart per history of present illness
Abdomen no nausea vomiting
Musculoskeletal right leg pain
Psych no anxiety or depression
Neuro without recurrent headache or seizures
Endocrine no heat or cold intolerance
 
Objective
Vital Signs and I&Os
Vital Signs
 
 
Date Time Temp Pulse Resp B/P Pulse O2 O2 Flow FiO2
 
     Ox Delivery Rate 
 
04/11 0800 98.9 91 20 152/78 93 Nasal 2.0L 
 
      Cannula  
 
04/11 0635     96 Nasal 2.0L 
 
      Cannula  
 
04/11 0011 99.8       
 
04/11 0005 99.4 104 20 128/62 93 Room Air  
 
04/11 0000     97 Nasal 2.0L 
 
      Cannula  
 
04/10 2304 100.1       
 
04/10 2217  118  144/64    
 
04/10 2120 100.1 124 20 144/64 93 Room Air  
 
04/10 2020     91 Nasal 2.0L 
 
      Cannula  
 
04/10 1655      Nasal 2.0L 
 
      Cannula  
 
04/10 1600      Nasal 2.0L 
 
      Cannula  
 
04/10 1436 98.2 100 20 150/82 92   
 
04/10 1035     93 Nasal 2.0L 
 
      Cannula  
 
 
 Intake & Output
 
 
 04/11 1600 04/11 0800 04/11 0000 04/10 1600 04/10 0800 04/10 0000
 
Intake Total  480 880 700 150 60
 
Output Total   700 650  
 
Balance  480 180 50 150 60
 
       
 
Intake, IV   20   
 
Intake, Oral  480 860 700 150 60
 
Output, Urine   700 650  
 
Patient     300 lb 300 lb
 
Weight      
 
 
 
Physical Exam:
Patient is a well-developed well-nourished male appearing in no acute distress
HEENT is unremarkable
Neck is supple there is no JVD
Lungs are clear
Heart regular rhythm S1 and S2 are normal no murmurs gallops or rubs
Abdomen bowel sounds positive
Extremities without edema right leg in Ace bandage
 
 
Current Medications:
 Current Medications
 
 
  Sig/[place] Start time  Last
 
Medication Dose Route Stop Time Status Admin
 
Acetaminophen 325 MG Q6P PRN 04/10 0015 AC 04/10
 
  PO   2304
 
Albuterol Sulfate 3 ML EVERY 4 HRS/AWAKE 04/10 1200 AC 04/11
 
  INH   0621
 
Aspirin Buffered 81 MG DAILY 04/10 1000 AC 04/11
 
  PO   0922
 
Baclofen 20 MG BID 04/10 0114 AC 04/11
 
  PO   0922
 
Budesonide/ 2 PUF BID 04/10 1400 AC 04/11
 
Formoterol Fumarate  INH   0921
 
Diltiazem HCl 180 MG DAILY 04/11 1000 AC 04/11
 
  PO   0922
 
Diltiazem HCl 5 MG ONCE ONE 04/10 2015 DC 
 
  IV 04/10 2016  
 
Diltiazem HCl 120 MG DAILY 04/10 1000 DC 04/10
 
  PO   0914
 
Doxycycline Hyclate 100 MG BID 04/10 0117 AC 04/11
 
  PO   0922
 
Enoxaparin Sodium 40 MG 1800 04/10 1800 AC 
 
  SC   
 
Gabapentin 600 MG Q8 04/10 0600 AC 04/11
 
  PO   0613
 
Lorazepam 1 MG BID 04/10 1000 AC 04/10
 
  PO   1836
 
Magnesium Sulfate 1 GM .STK-MED ONE 04/10 2132 DC 
 
  IM 04/10 2133  
 
Metoprolol Tartrate 25 MG ONCE ONE 04/10 2200 DC 04/10
 
  PO 04/10 2201  2217
 
Montelukast Sodium 10 MG DAILY 04/10 1000 AC 04/11
 
  PO   0922
 
Morphine Sulfate 2 MG Q6P PRN 04/10 0200 AC 04/11
 
  IV   0825
 
Nortriptyline HCl 10 MG QPM 04/10 2200 AC 04/11
 
  PO   0012
 
Omeprazole 40 MG DAILY AC 04/10 0700 AC 04/11
 
  PO   0613
 
Oxycodone/ 1 TAB Q6P PRN 04/10 0015 AC 04/11
 
Acetaminophen  PO   0613
 
Rifampin 300 MG BID 04/10 0116 AC 04/11
 
  PO   0922
 
Tiotropium [place] 1 PUF DAILY 04/10 1400 AC 04/11
 
  INH   0921
 
 
 
 
Results
Last 48 Hrs of Labs/Mics:
 Laboratory Tests
 
04/11/17 0610:
Anion Gap 9, Estimated GFR > 60, BUN/Creatinine Ratio 34.3  H, CBC w Diff NO MAN
DIFF REQ, RBC 4.13  L, MCV 89.5, MCH 29.3, RDW 14.7  H, MPV 8.8, Gran % 68.6, 
Lymphocytes % 16.0  L, Monocytes % 10.2  H, Eosinophils % 5.1  H, Basophils % 
0.1, Absolute Granulocytes 8.7  H, Absolute Lymphocytes 2.0, Absolute Monocytes 
1.3  H, Absolute Eosinophils 0.6, Absolute Basophils 0, PUBS MCHC 32.8  L
 
04/10/17 2347:
Urinalysis MOD  H, Urine Color YEL, Urine Clarity CLEAR, Urine pH 7.0, Ur 
Specific Gravity 1.015, Urine Protein 30  H, Urine Ketones 40  H, Urine Nitrite 
NEG, Urine Bilirubin NEG@ICTO, Urine Urobilinogen 0.2, Ur Leukocyte Esterase 
TRACE  H, Ur Microscopic SEDIMENT EXAMINED, Urine RBC 5-10  H, Urine WBC 1-3  H,
Ur Epithelial Cells FEW, Urine Bacteria FEW  H, Urine Mucus MOD  H, Urine 
Hemoglobin TRACE-INTACT  H, Urine Glucose NEG
 
04/10/17 1947:
Troponin I 0.02
 
04/10/17 0800:
Anion Gap 9, Estimated GFR > 60, BUN/Creatinine Ratio 40.0  H, CBC w Diff NO MAN
DIFF REQ, RBC 4.29  L, MCV 89.9, MCH 29.4, RDW 14.9  H, MPV 8.4, Gran % 77.7  H,
Lymphocytes % 11.5  L, Monocytes % 6.8, Eosinophils % 3.9, Basophils % 0.1, 
Absolute Granulocytes 11.9  H, Absolute Lymphocytes 1.8, Absolute Monocytes 1.0 
H, Absolute Eosinophils 0.6, Absolute Basophils 0, PUBS MCHC 32.7  L
 
04/09/17 2041:
Anion Gap 8, Estimated GFR > 60, BUN/Creatinine Ratio 26.7  H, Glucose 209  H, 
Hemoglobin A1c 5.9  H, Calcium 10.1, Total Bilirubin 0.5, AST 44, ALT 80  H, 
Alkaline Phosphatase 77, Total Protein 7.1, Albumin 3.6, Globulin 3.5, Albumin/
Globulin Ratio 1.0  L, PT 12.5, INR 1.19  H, APTT 31, CBC w Diff NO MAN DIFF REQ
, RBC 4.77, MCV 90.1, MCH 29.1, RDW 15.2  H, MPV 8.0, Gran % 81.6  H, 
Lymphocytes % 8.7  L, Monocytes % 7.2, Eosinophils % 2.5, Basophils % 0  L, 
Absolute Granulocytes 12.8  H, Absolute Lymphocytes 1.4, Absolute Monocytes 1.1 
H, Absolute Eosinophils 0.4, Absolute Basophils 0, PUBS MCHC 32.4  L
 
Telemetry personally reviewed sinus rhythm
Recent Imaging Studies:
Chest x-ray
 
IMPRESSION:
 
1. Low lung volumes with bibasilar consolidation or atelectasis.
2. Small bilateral pleural effusions.
3. Mild central vascular congestion. No overt pulmonary edema.
 
Assessment/Plan
Assessment/Plan
1. S/p mechanical fall with malleolar fx, planned for surgical intervention per 
Ortho
2. CAD with remote PCI (2004/2006)
3. COPD on O2 (followed by Dr. Hull)
4. 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
5. PAF not on AC due to fall risk and low afib burden in the past currently on 
IV heparin
6. Reported DVT/PE with reported prior IVC filter
7. HLD on Repatha (?statin intolerance)
8. Remote hx of endocarditis 
9. Hx HTN
 
Recommendations
1.  Continue current medications including increased dose of diltiazem.  If 
patient develops recurrent A. fib/flutter would start low-dose metoprolol and 
blood pressure tolerates
2.  Maintain telemetry
3.  Due to recent documented atrial flutter overnight will readdress 
anticoagulation postoperatively
4.  Patient is stable from the cardiac standpoint for surgery.  He is consider 
at least moderate risk.
Continue telemetry? Yes

## 2017-04-12 VITALS — SYSTOLIC BLOOD PRESSURE: 144 MMHG | DIASTOLIC BLOOD PRESSURE: 102 MMHG

## 2017-04-12 VITALS — DIASTOLIC BLOOD PRESSURE: 62 MMHG | SYSTOLIC BLOOD PRESSURE: 146 MMHG

## 2017-04-12 VITALS — DIASTOLIC BLOOD PRESSURE: 80 MMHG | SYSTOLIC BLOOD PRESSURE: 138 MMHG

## 2017-04-12 VITALS — DIASTOLIC BLOOD PRESSURE: 83 MMHG | SYSTOLIC BLOOD PRESSURE: 157 MMHG

## 2017-04-12 VITALS — SYSTOLIC BLOOD PRESSURE: 168 MMHG | DIASTOLIC BLOOD PRESSURE: 100 MMHG

## 2017-04-12 VITALS — SYSTOLIC BLOOD PRESSURE: 121 MMHG | DIASTOLIC BLOOD PRESSURE: 90 MMHG

## 2017-04-12 VITALS — SYSTOLIC BLOOD PRESSURE: 172 MMHG | DIASTOLIC BLOOD PRESSURE: 67 MMHG

## 2017-04-12 VITALS — DIASTOLIC BLOOD PRESSURE: 65 MMHG | SYSTOLIC BLOOD PRESSURE: 134 MMHG

## 2017-04-12 VITALS — DIASTOLIC BLOOD PRESSURE: 77 MMHG | SYSTOLIC BLOOD PRESSURE: 160 MMHG

## 2017-04-12 VITALS — SYSTOLIC BLOOD PRESSURE: 138 MMHG | DIASTOLIC BLOOD PRESSURE: 70 MMHG

## 2017-04-12 LAB
ABSOLUTE GRANULOCYTE CT: 10.7 /CUMM (ref 1.4–6.5)
BASOPHILS # BLD: 0 /CUMM (ref 0–0.2)
BASOPHILS NFR BLD: 0 % (ref 0–2)
EOSINOPHIL # BLD: 0.4 /CUMM (ref 0–0.7)
EOSINOPHIL NFR BLD: 2.6 % (ref 0–5)
ERYTHROCYTE [DISTWIDTH] IN BLOOD BY AUTOMATED COUNT: 14.3 % (ref 11.5–14.5)
GRANULOCYTES NFR BLD: 77.2 % (ref 42.2–75.2)
HCT VFR BLD CALC: 38.8 % (ref 42–52)
LYMPHOCYTES # BLD: 1.6 /CUMM (ref 1.2–3.4)
MCH RBC QN AUTO: 29.4 PG (ref 27–31)
MCHC RBC AUTO-ENTMCNC: 32.7 G/DL (ref 33–37)
MCV RBC AUTO: 89.9 FL (ref 80–94)
MONOCYTES # BLD: 1.1 /CUMM (ref 0.1–0.6)
PLATELET # BLD: 212 /CUMM (ref 130–400)
PMV BLD AUTO: 8.9 FL (ref 7.4–10.4)
RED BLOOD CELL CT: 4.32 /CUMM (ref 4.7–6.1)
WBC # BLD AUTO: 13.9 /CUMM (ref 4.8–10.8)

## 2017-04-12 NOTE — PN- HOUSESTAFF
FEDE JONES,Formerly [facility] 17 0720:
Subjective
Follow-up For:
1. Right ankle fracture status post mechanical fall
2. PAF
3. episode of SVT over night
 
Complaints: no complaints
Tele-Events Since Last Visit:
Episode of SVT at the rate of 160s overnight
Sinus tachycardia, A. fib between 
 
Subjective:
The patient is a very pleasant man, despite a bad night he was very calm and 
understood the situation.  The only complaint he had today in the morning was 
right ankle pain, 8/10, which is relieved with morphine and comes down to 6.  He
denied any chest pain, palpitations, lightheadedness, nausea vomiting currently 
even during the episode of SVT.
 
 
Review of Systems
Constitutional:
Reports: see HPI. 
EENTM:
Reports: no symptoms. 
Cardiovascular:
Reports: edema, peripheral edema.  Denies: chest pain, orthopena, palpitations. 
Respiratory:
Denies: cough, hemoptysis, orthopnea, short of breath. 
Gastrointestinal:
Reports: no symptoms. 
Musculoskeletal:
Reports: see HPI. 
 
Objective
Last 24 Hrs of Vital Signs/I&O
 Vital Signs
 
 
Date Time Temp Pulse Resp B/P Pulse O2 O2 Flow FiO2
 
     Ox Delivery Rate 
 
 0801     94 Nasal 2.0L 
 
      Cannula  
 
 0800 98.8 93 20 134/65 95 Nasal 2.0L 
 
      Cannula  
 
 0438 98.1 154 20 121/90 95 Nasal  
 
      Cannula  
 
 0309     94 Nasal 2.0L 
 
      Cannula  
 
 0250  156  138/80    
 
 0225 99.8       
 
 0225  161  144/102    
 
 0210  168  160/77    
 
 0210  169  160/77    
 
 0145  168  168/100    
 
 0145  140  157/83    
 
 0100  168  168/100    
 
 0017 98.7 107 24 172/67 96 Nasal 2.0L 
 
      Cannula  
 
 0000     96 Nasal 2.0L 
 
      Cannula  
 
 1930  150  158/80    
 
 1621 99.0 101 20 146/88 93 Nasal 1.5L 
 
      Cannula  
 
 1527     95 Nasal 2.0L 
 
      Cannula  
 
 
 Intake & Output
 
 
  1600  0800  0000
 
Intake Total  750 240
 
Output Total  350 900
 
Balance  400 -660
 
    
 
Intake, IV  700 
 
Intake, Oral  50 240
 
Number  0 0
 
Bowel   
 
Movements   
 
Output, Urine  350 900
 
 
 
 
Physical Exam
General Appearance: Alert, Oriented X3, Cooperative, Mild Distress
Cardiovascular: Regular Rate, Normal S1, Normal S2, No Murmurs, intermittently 
irregular
Lungs: Clear to Auscultation
Abdomen: Normal Bowel Sounds, Soft, No Tenderness
Neurological: Normal Speech
Extremities: No Edema, Normal Pulses, no cyanosis, right lower extremity is 
wrapped in ACE wrap
Vascular: Normal Pulses
Current Medications:
 Current Medications
 
 
  Sig/Viet Start time  Last
 
Medication Dose Route Stop Time Status Admin
 
Acetaminophen 650 MG .STK-MED ONE  0220 DC 
 
  PO  0221  
 
Acetaminophen 325 MG Q6P PRN 04/10 0015 AC 
 
  PO   0225
 
Adenosine 6 MG ONCE ONE  0145 DC 
 
  IV  0146  0145
 
Albuterol Sulfate 3 ML EVERY 4 HRS/AWAKE 04/10 1200 AC 
 
  INH   0758
 
Aspirin Buffered 81 MG DAILY 04/10 1000 AC 
 
  PO   0922
 
Baclofen 20 MG BID 04/10 0114 AC 
 
  PO   2100
 
Bisacodyl 10 MG DAILY  1215 AC 
 
  NH   1427
 
Budesonide/ 2 PUF BID 04/10 1400 AC 
 
Formoterol Fumarate  INH   2116
 
Diltiazem HCl 125 MG Q24H  0215 AC 
 
Sodium Chloride 100 ML IV   0224
 
Diltiazem HCl 5 MG ONCE ONE  0215 DC 
 
  IV PUSH  021  0210
 
Diltiazem HCl 180 MG DAILY  1000 AC 
 
  PO   0922
 
Doxycycline Hyclate 100 MG BID 04/10 0117 AC 
 
  PO   2100
 
Enoxaparin Sodium 100 MG ONCE ONE  1100 DC 
 
  SC  1101  1149
 
Enoxaparin Sodium 30 MG ONCE ONE  1100 DC 
 
  SC  1101  1149
 
Furosemide 60 MG Q48  1215 AC 
 
  PO   1427
 
Gabapentin 600 MG Q8 04/10 0600 AC 
 
  PO   0639
 
Ipratropium Bromide 2.5 ML ONCE ONE  0245 DC 04
 
  INH  0246  0249
 
Lorazepam 1 MG BID 04/10 1000 AC 
 
  PO   2116
 
Magnesium Oxide 400 MG BID  0214 AC 04
 
  PO   0223
 
Magnesium Sulfate 1 GM ONCE ONE  0215 CAN 
 
Dextrose/Water 100 ML IV  0614  
 
Metoprolol Tartrate 12.5 MG BID  0159 DC 
 
  PO   
 
Montelukast Sodium 10 MG DAILY 04/10 1000 AC 
 
  PO   0922
 
Morphine Sulfate 2 MG Q6P PRN 04/10 0200 AC 
 
  IV   0639
 
Nortriptyline HCl 10 MG QPM 04/10 2200 AC 
 
  PO   2101
 
Omeprazole 40 MG DAILY AC 04/10 0700 AC 
 
  PO   0639
 
Oxycodone/ 1 TAB Q6P PRN 04/10 0015 AC 
 
Acetaminophen  PO   1730
 
Potassium Chloride 40 MEQ ONCE ONE  0230 DC 
 
  PO  0231  0248
 
Potassium Chloride 40 MEQ ONCE ONE  0215 CAN 
 
  PO  0216  
 
Rifampin 300 MG BID 04/10 0116 AC 
 
  PO   2100
 
Sodium Chloride 1,000 ML Q13H  0000 AC 
 
  IV  1259  0000
 
Tiotropium [place] 1 PUF DAILY 04/10 1400 AC 
 
  INH   0921
 
 
 
 
Last 24 Hrs of Lab/Stalin Results
Last 24 Hrs of Labs/Mics:
 Laboratory Tests
 
17 0714:
Anion Gap 10, Estimated GFR > 60, BUN/Creatinine Ratio 30.0  H, CBC w Diff NO 
MAN DIFF REQ, RBC 4.32  L, MCV 89.9, MCH 29.4, RDW 14.3, MPV 8.9, Gran % 77.2  H
, Lymphocytes % 11.9  L, Monocytes % 8.3, Eosinophils % 2.6, Basophils % 0  L, 
Absolute Granulocytes 10.7  H, Absolute Lymphocytes 1.6, Absolute Monocytes 1.1 
H, Absolute Eosinophils 0.4, Absolute Basophils 0, PUBS MCHC 32.7  L
 
17 0126:
Anion Gap 9, Estimated GFR > 60, BUN/Creatinine Ratio 31.7  H, Magnesium 1.8
 
 
Lines/Diet/Fluids
Restraints: none
 
Assessment/Plan
Assessment:
66-year-old morbidly obese demand with history of stage IV COPD on 2 L oxygen, 
CAD status post stents, hypertension, MRSA bacteremia/endocarditis with T6 7 
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 
suppressive therapy with doxycycline and rifampin, PE status post IVC filter, 
obstructive sleep apnea not using CPAP, presented to the ED after a mechanical 
fall.  X-ray of the ankle showed a bimalleolar culminated fracture of ankle 
mortise.  Patient was seen by orthopedic who recommended repair while he is in 
the hospital.  Cardiology and pulmonary cleared patient for procedure on 17
for surgery on 2017.
 
Unfortunately overnight, the patient went into tachycardia, with the EKG showing
SVT, with a rate in 160s, after which she was given IV adenosine one time with 
no resolution of the rhythm and was eventually started on Cardizem drip that had
been running all night at 15.
 
1.  Right ankle fracture status post mechanical fall
- The patient was nothing by mouth since midnight for ankle fracture repair 
today however overnight as mentioned above he went into SVT with rate 160s and 
was started on Cardizem drip eventually.  In the morning heart rate was in 80s 
to 90s so the treatment was titrated up to 12.5.
- Patient needs to be evaluated by cardiology again and the surgery has to be 
canceled
- We will continue to monitor on telemetry
- Patient is allergic to subcutaneous heparin (has a rash and itching) and 
warfarin.  He reports they were never able to attain a therapeutic range.  
History unclear.  He has been okay with Lovenox shots.  After discussion the 
surgical PA he was started on Lovenox yesterday that is 2017 but yesterday
night's dose was held for surgery in the morning.
- We'll discuss with cardiology for restarting Lovenox as the patient is now 
going for surgery anymore and needs it due to history of paroxysmal A. fib
-We will restart anticoagulation probably Eliquis probably once he's had the 
surgery
 
2.  Episode of SVT/Paroxysmal atrial fibrillation:
- He was not on anticoagulation given his fall risk and low A. fib burden
- He was initially on general med where he developed atrial flutter and was 
transferred to telemetry, but overnight he also developed SVT with rate 160s, 
status post 1 dose of IV adenosine and is currently on Cardizem drip 
- His CHADS vasc score is 2
-Synthes on having surgery now he needs to be on anticoagulation, consider 
starting Lovenox today
 
3.  Leukocytosis: 
-13.9, which is Up from 12.6 Yesterday, no bands
- Patient has been afebrile. 
- Chest x-ray showed no evidence of infection/ UA with no evidence of infection
 
4.  Chronic respiratory failure secondary to COPD on home oxygen
- Stable we will continue his Singulair.  
- We'll continue him on Symbicort, Spiriva, Singulair
 
5.  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
- Currently on chronic suppression therapy with rifampin and doxycycline.  
- Continue straight cath protocol
 
6.  Bilateral lower extremity edema
- Patient takes 20 mg of Lasix every other day.
- Restarted 20 mg Lasix every other day
 
7.  Hypertension
- Blood pressure 134/65
- Currently on Cardizem drip
 
8. HLD: 
 - Resume Repatha on discharge
 
DVT prophylaxis Alps, will resume Lovenox
DNR/DNI
 
Problem List:
 1. COPD
 
 2. Paroxysmal atrial fibrillation
 
 3. Hypertension
 
 4. Paraplegia
 
Pain Ratin
Pain Location:
Right lower extremity
Pain Goal: Pain 4 or less
Pain Plan:
oxycodone PRN
Tomorrow's Labs & Rationales:
CBC - LEUKOCYTOSIS
BEP - MAGNESIUM, SODIUM
 
DVT/Prophylaxis: mechanical
Consulting Request: 1 
   Consulting Specialty: Cardiology
   Consulting Physician:
dr. Cornelius
   Reason for Consult: PAF, SVT
Consulting Request: 2 
   Consulting Specialty: Orthopedics
   Consulting Physician:
Dr. Nation
   Reason for Consult: right ankle fracture
 
Discharge Plan
Anticipated Discharge (Day): unknown
 
 
DOMINIQUE BILL MD 17 1528:
Attending MD Review Statement
 
Attending Statement
Attending MD Statement: examined this patient, discuss w/resident/PA/NP, agreed 
w/resident/PA/NP, reviewed EMR data (avail)
Attending Assessment/Plan:
Agree with resident assessment and plan.  Not a candidate for surgery at this 
time as patient is high risk due to SVT and rapid a-fib overnight.  Will follow 
cardiology recommendations, discontinue cardizem, start Sotalol with dose-timed 
EKGs, start Metoprolol, obtain echocardiogram.  Will speak with cardiology and 
orthopedics regarding timing of future ankle repair surgery.  Start Eliquis as 
well.

## 2017-04-12 NOTE — EVENT NOTE
Event Note
Event Note:
I was called by the nurse that the patient is having tachycardia. I advised to 
have EKG state. Patient is seen and examined at the bedside. He was denying any 
complaints including chest pain, shortness of breath, headache, weakness in the 
body, palpitation. Blood pressure was 160/80. EKG showed SVT.
 
Discussed with Dr. Westfall and Dr Cornelius with the common decision of giving
IV inj adenosin 6 mgs. We pushed IV adenosin, and than rhythum break into Af f/b
NSR and went again back to SVT. Discussed with Dr Cornelius, advised to start 
patient on Diltiazem drip. We gave inj Cardizem 5 mgs IV push followed by 
Cardizem drip @ 2.5cc/hr.
 
We'll also send BEP,K-3.6 and Mg -1.8. We gave K -40 and Mag 400mg BID. we will 
follow up with blood work up.

## 2017-04-12 NOTE — PN- PULMONARY
Subjective
HPI/Critical Care Issues:
Overnight events reviewed.  The patient had an episode of SVT and was given IV 
adenosine without improvement.  He was subsequently given an IV Cardizem push 
followed by a Cardizem drip.
 
Objective
Current Medications:
 Current Medications
 
 
  Sig/Viet Start time  Last
 
Medication Dose Route Stop Time Status Admin
 
Acetaminophen 1,000 MG ONCE ONE 04/11 1030 DC 04/11
 
  IV 04/11 1031  1148
 
Acetaminophen 325 MG Q6P PRN 04/10 0015 AC 04/12
 
  PO   0225
 
Adenosine 6 MG ONCE ONE 04/12 0145 DC 04/12
 
  IV 04/12 0146  0145
 
Albuterol Sulfate 3 ML EVERY 4 HRS/AWAKE 04/10 1200 AC 04/12
 
  INH   0758
 
Aspirin Buffered 81 MG DAILY 04/10 1000 AC 04/11
 
  PO   0922
 
Baclofen 20 MG BID 04/10 0114 AC 04/11
 
  PO   2100
 
Bisacodyl 10 MG DAILY 04/11 1215 AC 04/11
 
  DC   1427
 
Budesonide/ 2 PUF BID 04/10 1400 AC 04/11
 
Formoterol Fumarate  INH   2116
 
Diltiazem HCl 125 MG Q24H 04/12 0215 AC 04/12
 
Sodium Chloride 100 ML IV   0224
 
Diltiazem HCl 5 MG ONCE ONE 04/12 0215 DC 04/12
 
  IV PUSH 04/12 0216  0210
 
Diltiazem HCl 180 MG DAILY 04/11 1000 AC 04/11
 
  PO   0922
 
Doxycycline Hyclate 100 MG BID 04/10 0117 AC 04/11
 
  PO   2100
 
Enoxaparin Sodium 100 MG ONCE ONE 04/11 1100 DC 04/11
 
  SC 04/11 1101  1149
 
Enoxaparin Sodium 30 MG ONCE ONE 04/11 1100 DC 04/11
 
  SC 04/11 1101  1149
 
Enoxaparin Sodium 40 MG 1800 04/10 1800 DC 
 
  SC   
 
Furosemide 60 MG Q48 04/11 1215 AC 04/11
 
  PO   1427
 
Gabapentin 600 MG Q8 04/10 0600 AC 04/12
 
  PO   0639
 
Ipratropium Bromide 2.5 ML ONCE ONE 04/12 0245 DC 04/12
 
  INH 04/12 0246  0249
 
Lorazepam 1 MG BID 04/10 1000 AC 04/11
 
  PO   2116
 
Magnesium Oxide 400 MG BID 04/12 0214 AC 04/12
 
  PO   0223
 
Magnesium Sulfate 1 GM ONCE ONE 04/12 0215 CAN 
 
Dextrose/Water 100 ML IV 04/12 0614  
 
Metoprolol Tartrate 12.5 MG BID 04/12 0159 DC 
 
  PO   
 
Montelukast Sodium 10 MG DAILY 04/10 1000 AC 04/11
 
  PO   0922
 
Morphine Sulfate 2 MG Q6P PRN 04/10 0200 AC 04/12
 
  IV   0639
 
Nortriptyline HCl 10 MG QPM 04/10 2200 AC 04/11
 
  PO   2101
 
Omeprazole 40 MG DAILY AC 04/10 0700 AC 04/12
 
  PO   0639
 
Oxycodone/ 1 TAB Q6P PRN 04/10 0015 AC 04/11
 
Acetaminophen  PO   1730
 
Potassium Chloride 40 MEQ ONCE ONE 04/12 0230 DC 04/12
 
  PO 04/12 0231  0248
 
Potassium Chloride 40 MEQ ONCE ONE 04/12 0215 CAN 
 
  PO 04/12 0216  
 
Rifampin 300 MG BID 04/10 0116 AC 04/11
 
  PO   2100
 
Sodium Chloride 1,000 ML Q13H 04/12 0000 AC 04/12
 
  IV 04/12 1259  0000
 
Tiotropium [place] 1 PUF DAILY 04/10 1400 AC 04/11
 
  INH   0921
 
 
 
 
Vital Signs & I&O
Last 24 Hrs of Vitals and I&O:
 Vital Signs
 
 
Date Time Temp Pulse Resp B/P Pulse O2 O2 Flow FiO2
 
     Ox Delivery Rate 
 
04/12 0801     94 Nasal 2.0L 
 
      Cannula  
 
04/12 0800 98.8 93 20 134/65 95 Nasal 2.0L 
 
      Cannula  
 
04/12 0438 98.1 154 20 121/90 95 Nasal  
 
      Cannula  
 
04/12 0309     94 Nasal 2.0L 
 
      Cannula  
 
04/12 0250  156  138/80    
 
04/12 0225 99.8       
 
04/12 0225  161  144/102    
 
04/12 0210  168  160/77    
 
04/12 0210  169  160/77    
 
04/12 0145  168  168/100    
 
04/12 0145  140  157/83    
 
04/12 0100  168  168/100    
 
04/12 0017 98.7 107 24 172/67 96 Nasal 2.0L 
 
      Cannula  
 
04/12 0000     96 Nasal 2.0L 
 
      Cannula  
 
04/11 1930  150  158/80    
 
04/11 1621 99.0 101 20 146/88 93 Nasal 1.5L 
 
      Cannula  
 
04/11 1527     95 Nasal 2.0L 
 
      Cannula  
 
 
 Intake & Output
 
 
 04/12 1600 04/12 0800 04/12 0000
 
Intake Total  750 240
 
Output Total  350 900
 
Balance  400 -660
 
    
 
Intake, IV  700 
 
Intake, Oral  50 240
 
Number  0 0
 
Bowel   
 
Movements   
 
Output, Urine  350 900
 
 
Physical Exam
General Appearance: no apparent distress, alert, awake, comfortable
Head: atraumatic, normal appearance
Eyes:  Bilateral: PERRL. 
Neck: supple
Respiratory: decreased breath sounds, no wheezes rhonchi or rales
Cardiovascular: S1 and S2 heard, heart sounds are distant
Gastrointestinal: normal bowel sounds, soft, non-tender
Extremities: right lower extremity in a splint and dressed
Skin: intact, normal color, warm/dry
 
 
Impression/Plan
 
Impression/Plan
Impression/Plan:
1.  Stable respiratory status, chronic COPD which is oxygen dependent.
2.  Right ankle fracture following mechanical witnessed fall.  This will require
surgical fixation.
3.  Paraplegia secondary to previous MRSA infection of the spine, on chronic 
doxycycline and rifampin.
4.  PAF, SVT, not on outpatient anticoagulation. 
5.  History of hypertension and hyperlipidemia.
6.  Obstructive sleep apnea, with intolerance to nasal CPAP.
Recommendations:
* The patient's respiratory status appears to be stable at present.  Once again,
because of his COPD, the patient is at moderate risk for pulmonary complications
in the setting of general anesthesia.  I would recommend regional anesthesia if 
possible.
* Continue nebulizer treatments every 4 hours as needed.
* Continue Singular 10 mg daily.
* Continue Symbicort 80/4.5, 2 puffs every 12 hours.
* Continue Spiriva 1 inhalation every morning.
* Continue with pain control - please add IV Tylenol PRN, give first dose now.
* DVT prophylaxis at all times.
* Arrhythmia management per cardiology.
* Await decision regarding timing of surgical intervention.
* We will repeat an outpatient sleep study evaluation after discharge.

## 2017-04-13 VITALS — SYSTOLIC BLOOD PRESSURE: 140 MMHG | DIASTOLIC BLOOD PRESSURE: 76 MMHG

## 2017-04-13 VITALS — SYSTOLIC BLOOD PRESSURE: 138 MMHG | DIASTOLIC BLOOD PRESSURE: 78 MMHG

## 2017-04-13 LAB
ABSOLUTE GRANULOCYTE CT: 11.3 /CUMM (ref 1.4–6.5)
BASOPHILS # BLD: 0 /CUMM (ref 0–0.2)
BASOPHILS NFR BLD: 0 % (ref 0–2)
EOSINOPHIL # BLD: 0.8 /CUMM (ref 0–0.7)
EOSINOPHIL NFR BLD: 5.7 % (ref 0–5)
ERYTHROCYTE [DISTWIDTH] IN BLOOD BY AUTOMATED COUNT: 14.4 % (ref 11.5–14.5)
GRANULOCYTES NFR BLD: 76.6 % (ref 42.2–75.2)
HCT VFR BLD CALC: 35.6 % (ref 42–52)
LYMPHOCYTES # BLD: 1.6 /CUMM (ref 1.2–3.4)
MCH RBC QN AUTO: 29.5 PG (ref 27–31)
MCHC RBC AUTO-ENTMCNC: 33 G/DL (ref 33–37)
MCV RBC AUTO: 89.4 FL (ref 80–94)
MONOCYTES # BLD: 1 /CUMM (ref 0.1–0.6)
PLATELET # BLD: 229 /CUMM (ref 130–400)
PMV BLD AUTO: 8.9 FL (ref 7.4–10.4)
RED BLOOD CELL CT: 3.98 /CUMM (ref 4.7–6.1)
WBC # BLD AUTO: 14.7 /CUMM (ref 4.8–10.8)

## 2017-04-13 NOTE — PN- HOUSESTAFF
FEDE JONES,[facility] 17 0723:
Subjective
Follow-up For:
1. Right ankle fracture status post mechanical fall
2. PAF
3. episode of SVT over night
 
Tele-Events Since Last Visit:
NSR @ 75 - 82, no events
 
Subjective:
The patient feels well except for pain in his right ankle, 8/10, gets better 
with pain medications. He also reported he slept well last night. Denies any 
chest pains, palpitations, SOb, fever or chills.
 
 
Review of Systems
Constitutional:
Reports: see HPI. 
Cardiovascular:
Denies: chest pain, orthopena, palpitations, peripheral edema. 
Respiratory:
Reports: no symptoms. 
Gastrointestinal:
Reports: no symptoms. 
Genitourinary:
Reports: no symptoms. 
Musculoskeletal:
Reports: see HPI. 
 
Objective
Last 24 Hrs of Vital Signs/I&O
 Vital Signs
 
 
Date Time Temp Pulse Resp B/P Pulse O2 O2 Flow FiO2
 
     Ox Delivery Rate 
 
 0814     94 Nasal 2.0L 
 
      Cannula  
 
 0458     96 Nasal 2.0L 
 
      Cannula  
 
 0000     94 Nasal 2.0L 
 
      Cannula  
 
 2309 97.6 78 20 138/70 94 Nasal 2.0L 
 
      Cannula  
 
 2113  98  138/70    
 
 1600     94 Nasal 2.0L 
 
      Cannula  
 
 1530 98.7 77 20 146/62 96 Nasal 2.0L 
 
      Cannula  
 
 1527      Nasal 2.0L 
 
      Cannula  
 
 1446      Nasal 2.0L 
 
      Cannula  
 
 1421  93  134/65    
 
 
 Intake & Output
 
 
  1600  0800  0000
 
Intake Total  50 480
 
Output Total  450 200
 
Balance  -400 280
 
    
 
Intake, Oral  50 480
 
Number  0 0
 
Bowel   
 
Movements   
 
Output, Urine  450 200
 
 
 
 
Physical Exam
General Appearance: Alert, Oriented X3, Cooperative, No Acute Distress
Cardiovascular: Regular Rate, Normal S1, Normal S2, No Murmurs
Lungs: Clear to Auscultation, decreased breath sound bilaterally
Abdomen: Normal Bowel Sounds, Soft, No Tenderness
Neurological: Normal Speech, Sensation Intact, quadraplegic
Extremities: Normal Pulses, edema on the feet, right ankle and leg are wrapped 
in ACE bandage
Vascular: Normal Pulses
 
Last 24 Hrs of Lab/Stalin Results
Last 24 Hrs of Labs/Mics:
 Laboratory Tests
 
17 0620:
Anion Gap 5, Estimated GFR > 60, BUN/Creatinine Ratio 35.0  H, Magnesium 2.2, 
CBC w Diff NO MAN DIFF REQ, RBC 3.98  L, MCV 89.4, MCH 29.5, RDW 14.4, MPV 8.9, 
Gran % 76.6  H, Lymphocytes % 10.8  L, Monocytes % 6.9, Eosinophils % 5.7  H, 
Basophils % 0  L, Absolute Granulocytes 11.3  H, Absolute Lymphocytes 1.6, 
Absolute Monocytes 1.0  H, Absolute Eosinophils 0.8, Absolute Basophils 0, PUBS 
MCHC 33.0
 
 
Orders
CIWA Score (last 24 hrs):
[phone]
 
Lines/Diet/Fluids
Lines: peripheral lines
 
Assessment/Plan
Assessment:
66-year-old morbidly obese demand with history of stage IV COPD on 2 L oxygen, 
CAD status post stents, hypertension, MRSA bacteremia/endocarditis with T6 7 
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 
suppressive therapy with doxycycline and rifampin, PE status post IVC filter, 
obstructive sleep apnea not using CPAP, presented to the ED after a mechanical 
fall.  X-ray of the ankle showed a bimalleolar culminated fracture of ankle 
mortise.  Patient was seen by orthopedic who recommended repair while he is in 
the hospital.  Cardiology and pulmonary cleared patient for procedure on 17
for surgery on 2017.
 
Unfortunately overnight, the patient went into tachycardia, with the EKG showing
SVT, with a rate in 160s, after which she was given IV adenosine one time with 
no resolution of the rhythm and was eventually started on Cardizem drip that had
been running all night at 15.
 
1.  Right ankle fracture status post mechanical fall
- The patient had an event where he had narrow complex tachycardia over night on
17, his ankel surgery was cancelled
- he has remained stable since domenica past 24 hours and is cleared for surgery from
cardiac stand point per Dr. Mancia
- Spoke with Dr. Nation. The patient is NWB on right leg, stable and the surgery 
is non-urgent. Will manage by ACE wrap, immobilization and pain control for now,
and have him follow up with Dr. Nation after discharge for further planning for 
surgery
 
2.  Episode of SVT/Paroxysmal atrial fibrillation:
- The patient had an event of SVT/narrow complex tachycardia on 17 around 2
am. At that time he was given a push of adenosine followed by cardizem drip
- Currently on Sotalol 80 mg BID, with EKGs after every dose. Last EKG showed 
QTc of 452
- On Metoprolol 25 mg po BID
- Eliquis 5 mg BID
- ECHO done and shows an EF of 60-65% 
 
 
3.  Leukocytosis: 
- 14.7 today, no bands. The WBC count is trending up
- Patient has been afebrile. 
- Chest x-ray showed no evidence of infection/ UA with no evidence of infection
- Will monitor for now
 
4.  Chronic respiratory failure secondary to COPD on home oxygen
- Stable we will continue his Singulair.  
- We'll continue him on Symbicort, Spiriva, Singulair
 
5.  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
- Currently on chronic suppression therapy with rifampin and doxycycline.  
- Continue straight cath protocol
 
6.  Bilateral lower extremity edema
- Patient takes 20 mg of Lasix every other day.
- Restarted 20 mg Lasix every other day
 
7.  Hypertension
- Blood pressure 134/65
- Currently on Cardizem drip
 
8. HLD: 
 - Resume Repatha on discharge
 
DVT prophylaxis: Eliquis
DNR/DNI
 
Problem List:
 1. Bimalleolar fracture of right ankle
 
 2. Paraplegia
 
 3. Hypertension
 
 4. COPD exacerbation
 
 5. Paroxysmal atrial fibrillation
 
 6. SVT (supraventricular tachycardia)
 
Pain Ratin
Pain Location:
right ankle
Pain Goal: Pain 4 or less
Pain Plan:
IV Morphine 2 mg Q6PRN
Oxycodone 1 tab Q6P
Tomorrow's Labs & Rationales:
Not needed
DVT/Prophylaxis: pharmacological
Consulting Request: 1 
   Consulting Specialty: Orthopedics
   Consulting Physician:
Dr. Nation
   Reason for Consult: right ankle fracture
Consulting Request: 2 
   Consulting Specialty: Cardiology
   Consulting Physician:
Dr. Cornelius
   Reason for Consult: SVT and PAF
 
Discharge Plan
Discharge Disposition: STR/NH ([facility])
Stable for Discharge? Yes
Anticipated Discharge (Day): today
If Discharged Today/In 24 Hrs: enter antc discharge ord, W-10/discharge paper 
done, DC summary done, CMR done
 
 
DOMINIQUE BILL MD 17 1030:
Attending MD Review Statement
 
Attending Statement
Attending MD Statement: examined this patient, discuss w/resident/PA/NP, agreed 
w/resident/PA/NP, reviewed EMR data (avail)
Attending Assessment/Plan:
66M PMH COPD on 2L home oxygen, CAD s/p PCI, HTN, MRSA bacteremia/endocarditis 
with T6-7 spinal abscesses resulting in paraparesis on chronic suppressive 
therapy with doxycycline and rifampin, PE s/p IVC filter, GARRY not on CPAP 
admitted with mechanical fall and right ankle fracture.  Patient was scheduled 
for ORIF on  but had episode of SVT the night before requiring Adenosine 
pushes and Cardizem drip resulting in cancellation of surgery.  The patient has 
since been started on Sotalol and Metoprolol, with tentative plan for AMINTA/
cardioversion on  if SVT/a-fib persists.
 
Today the patient has no complaints and feels well.  He is in NSR on telemetry 
with no events.  EKG's done post Sotalol dosing are NSR without prolonged QTc.
 
1. Mechanical fall
2. Right bimalleolar comminuted fractures
3. Supraventricular tachycardia
4. Rapid atrial fibrillation with RVR
5. COPD
6. Chronic hypoxemic respiratory failure
7. History of mRSA spinal abscesses and endocarditis
8. GARRY
 
Plan
- Continue on telemetry
- Follow cardiology and orthopedic recommendations
- Continue Sotalol
- Decrease Metoprolol per cardiology recommendations
- Continue Eliquis
- Continue home medications
- PT evaluation

## 2017-04-13 NOTE — PN- CARDIOLOGY
Subjective
Subjective:
Telemetry reviewed.  Sinus rhythm throughout.  EKGs were recorded showed no 
evidence of QT prolongation.
 
Objective
Vital Signs and I&Os
Vital Signs
 
 
Date Time Temp Pulse Resp B/P Pulse O2 O2 Flow FiO2
 
     Ox Delivery Rate 
 
04/13 0957  76  140/76    
 
04/13 0814     94 Nasal 2.0L 
 
      Cannula  
 
04/13 0800 98.3 76 20 140/76 94 Nasal 2.0L 
 
      Cannula  
 
04/13 0458     96 Nasal 2.0L 
 
      Cannula  
 
04/13 0000     94 Nasal 2.0L 
 
      Cannula  
 
04/12 2309 97.6 78 20 138/70 94 Nasal 2.0L 
 
      Cannula  
 
04/12 2113  98  138/70    
 
04/12 1600     94 Nasal 2.0L 
 
      Cannula  
 
04/12 1530 98.7 77 20 146/62 96 Nasal 2.0L 
 
      Cannula  
 
04/12 1527      Nasal 2.0L 
 
      Cannula  
 
04/12 1446      Nasal 2.0L 
 
      Cannula  
 
04/12 1421  93  134/65    
 
 
 Intake & Output
 
 
 04/13 1600 04/13 0800 04/13 0000 04/12 1600 04/12 0800 04/12 0000
 
Intake Total  50 480 950 750 240
 
Output Total  450 200 600 350 900
 
Balance  -400 280 350 400 -660
 
       
 
Intake, IV    625 700 
 
Intake, Oral  50 480 325 50 240
 
Number  0 0 0 0 0
 
Bowel      
 
Movements      
 
Output, Urine  450 200 600 350 900
 
 
 
Physical Exam:
Gen. exam patient comfortable
Head normocephalic atraumatic
Eyes sclera anicteric conjunctiva showed no pallor extraocular muscles were 
normal
Chest lungs were clear bilaterally
Heart regular rhythm with a 1/6 systolic murmur
Abdomen soft no organomegaly bowel sounds normal
Extremities right leg in a wrap due to ankle fracture.
Neurological no gross motor or sensory deficits
Current Medications:
 Current Medications
 
 
  Sig/Viet Start time  Last
 
Medication Dose Route Stop Time Status Admin
 
Acetaminophen 650 MG .STK-MED ONE 04/12 1617 DC 
 
  PO 04/12 1618  
 
Acetaminophen 325 MG Q6P PRN 04/10 0015 AC 04/12
 
  PO   0225
 
Albuterol Sulfate 3 ML EVERY 4 HRS/AWAKE 04/10 1200 AC 04/13
 
  INH   0808
 
Apixaban 5 MG BID 04/12 1137 AC 04/13
 
  PO   0956
 
Aspirin Buffered 81 MG DAILY 04/10 1000 AC 04/13
 
  PO   0956
 
Baclofen 20 MG BID 04/10 0114 AC 04/13
 
  PO   0957
 
Bisacodyl 10 MG .STK-MED ONE 04/12 1102 DC 
 
  IN 04/12 1103  
 
Bisacodyl 10 MG DAILY 04/11 1215 AC 04/11
 
  IN   1427
 
Budesonide/ 2 PUF BID 04/10 1400 AC 04/13
 
Formoterol Fumarate  INH   0956
 
Diltiazem HCl 125 MG Q24H 04/12 0215 DC 04/12
 
Sodium Chloride 100 ML IV   0224
 
Diltiazem HCl 180 MG DAILY 04/11 1000 DC 04/12
 
  PO   1115
 
Doxycycline Hyclate 100 MG BID 04/10 0117 AC 04/13
 
  PO   0957
 
Furosemide 60 MG Q48 04/11 1215 AC 04/13
 
  PO   0956
 
Gabapentin 600 MG Q8 04/10 0600 AC 04/13
 
  PO   0636
 
Lorazepam 1 MG BID 04/10 1000 AC 04/12
 
  PO   2112
 
Magnesium Oxide 400 MG BID 04/12 0214 AC 04/13
 
  PO   0957
 
Melatonin 5 MG ONCE ONE 04/12 2100 DC 04/12
 
  PO 04/12 2101  2112
 
Metoprolol Tartrate 25 MG BID 04/12 1138 AC 04/13
 
  PO   0957
 
Montelukast Sodium 10 MG DAILY 04/10 1000 AC 04/13
 
  PO   0957
 
Morphine Sulfate 2 MG Q6P PRN 04/10 0200 AC 04/13
 
  IV   0646
 
Nortriptyline HCl 10 MG QPM 04/10 2200 AC 04/12
 
  PO   2112
 
Omeprazole 40 MG DAILY AC 04/10 0700 AC 04/13
 
  PO   0635
 
Oxycodone/ 1 TAB Q6P PRN 04/10 0015 AC 04/13
 
Acetaminophen  PO   0955
 
Rifampin 300 MG BID 04/10 0116 AC 04/13
 
  PO   0957
 
Sodium Chloride 1,000 ML Q13H 04/12 0000 DC 04/12
 
  IV 04/12 1259  0000
 
Sotalol HCl 80 MG BID 04/12 1137 AC 04/13
 
  PO   0234
 
Tiotropium [place] 1 PUF DAILY 04/10 1400 AC 04/13
 
  INH   0955
 
 
 
 
Results
Last 48 Hrs of Labs/Mics:
 Laboratory Tests
 
04/13/17 0620:
Anion Gap 5, Estimated GFR > 60, BUN/Creatinine Ratio 35.0  H, Magnesium 2.2, 
CBC w Diff NO MAN DIFF REQ, RBC 3.98  L, MCV 89.4, MCH 29.5, RDW 14.4, MPV 8.9, 
Gran % 76.6  H, Lymphocytes % 10.8  L, Monocytes % 6.9, Eosinophils % 5.7  H, 
Basophils % 0  L, Absolute Granulocytes 11.3  H, Absolute Lymphocytes 1.6, 
Absolute Monocytes 1.0  H, Absolute Eosinophils 0.8, Absolute Basophils 0, PUBS 
MCHC 33.0
 
04/12/17 0714:
Anion Gap 10, Estimated GFR > 60, BUN/Creatinine Ratio 30.0  H, CBC w Diff NO 
MAN DIFF REQ, RBC 4.32  L, MCV 89.9, MCH 29.4, RDW 14.3, MPV 8.9, Gran % 77.2  H
, Lymphocytes % 11.9  L, Monocytes % 8.3, Eosinophils % 2.6, Basophils % 0  L, 
Absolute Granulocytes 10.7  H, Absolute Lymphocytes 1.6, Absolute Monocytes 1.1 
H, Absolute Eosinophils 0.4, Absolute Basophils 0, PUBS MCHC 32.7  L
 
04/12/17 0126:
Anion Gap 9, Estimated GFR > 60, BUN/Creatinine Ratio 31.7  H, Magnesium 1.8
 
 
Assessment/Plan
Assessment/Plan
In summary this 66-year-old gentleman has the following problems
1. S/p mechanical fall with malleolar fx, planned for surgical intervention per 
Ortho
2. CAD with remote PCI (2004/2006)
3. COPD on O2 (followed by Dr. Hull)
4. 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
5. PAFl/AF 
6. Reported DVT/PE with reported prior IVC filter
7. HLD on Repatha (?statin intolerance)
8. Remote hx of endocarditis 
9. Hx HTN
 
He was started on sotalol 80 mg twice a day for paroxysmal atrial for ablation. 
QT interval appears normal.  I would continue sotalol and Elliquis,, and 
decrease metoprolol to 12.5 mg twice a day perhaps stopping this medication in 2
days since patient is on sotalol.  I believe surgical intervention could be 
considered next week after withholding anticoagulation for 48 hours prior to 
surgery
Continue telemetry? Yes

## 2017-04-13 NOTE — PATIENT DISCHARGE INSTRUCTIONS
Discharge Instructions
 
General Discharge Information
You were seen/treated for:
1. Right ankle fracture status post mechanical fall
2. PAF
3. episode of SVT
 
Watch for these problems:
Chest pain, palpitations, any bleeding from nose, in stools or urine
 
Special Instructions:
1. Please follow up with your primary care physician within a week after 
discharge
2. Please follow up with Dr. Castro 1 week after discharge
3. Please follow up with Dr. Hull after discharge
4. Please follow up with Dr. Nation after discharge. Make an appointment to 
follow up for your ankle fracture and possible planning for surgery as an out 
patient.
5. Non Weight Bearing on right leg
6. Continue with the new medications as prescribed
7. Stop Eliquis 24-hour before the surgery is scheduled. Please see cardiology 
again to get cleared and get pre-surgery clearance prior to scheduled surgery.
 
 
Diet
Continue normal diet: No
Recommended Diet: Heart Healthy
 
Activity
Full Activity/No Limits: No
Activity Limited to: No weight bearing (right leg)
Additional ACTIVITY Info:
Will require home PT
 
Acute Coronary Syndrome
 
Inclusion Criteria
At DC or during hospital stay patient has or had the following:
ACS DIAGNOSIS No
 
Discharge Core Measures
Meds if any: Prescribed or Continued at Discharge
Meds if any: NOT Prescribed or Continued at Discharge
 
Congestive Heart Failure
 
Inclusion Criteria
At DC or during hospital stay patient has or had the following:
CHF DIAGNOSIS No
 
Discharge Core Measures
Meds if any: Prescribed or Continued at Discharge
Meds if any: NOT Prescribed or Continued at Discharge
 
Cerebrovascular accident
 
Inclusion Criteria
At DC or during hospital stay patient has or had the following:
CVA/TIA Diagnosis No
 
Discharge Core Measures
Meds if any: Prescribed or Continued at Discharge
Meds if any: NOT Prescribed or Continued at Discharge
 
Venous thromboembolism
 
Inclusion Criteria
VTE Diagnosis No
VTE Type NONE
VTE Confirmed by (Test) NONE
 
Discharge Core Measures
- Per Current guidelines, there needs to be overlap
- treatment for the first 5 days of Warfarin therapy.
- If discharged on Warfarin prior to 5 days of
- overlap therapy, the patient will need to be
- assessed for post discharge needs including
- *Post discharge parental anticoagulation
- *Warfarin and/or parental anticoagulation education
- *Follow up date to check INR post discharge
At least 5 days overlap therapy as Inpatient No
Meds if any: Prescribed or Continued at Discharge
Note: Overlap Therapy is Warfarin and Anticoagulant
Meds if any: NOT Prescribed or Continued at Discharge

## 2017-04-13 NOTE — PN- PULMONARY
Subjective
HPI/Critical Care Issues:
The patient did not go for surgery yesterday due to his cardiac status.  The 
patient is currently awake and alert and reports having ongoing pain in his 
right lower extremity.  He denies any respiratory issues.  He remains on 
supplemental oxygen at 2 L/m which is chronic.
 
Objective
Current Medications:
 Current Medications
 
 
  Sig/Viet Start time  Last
 
Medication Dose Route Stop Time Status Admin
 
Acetaminophen 650 MG .STK-MED ONE 04/12 1617 DC 
 
  PO 04/12 1618  
 
Acetaminophen 325 MG Q6P PRN 04/10 0015 AC 04/12
 
  PO   0225
 
Albuterol Sulfate 3 ML EVERY 4 HRS/AWAKE 04/10 1200 AC 04/13
 
  INH   0808
 
Apixaban 5 MG BID 04/12 1137 AC 04/13
 
  PO   0956
 
Aspirin Buffered 81 MG DAILY 04/10 1000 AC 04/13
 
  PO   0956
 
Baclofen 20 MG BID 04/10 0114 AC 04/13
 
  PO   0957
 
Bisacodyl 10 MG DAILY 04/11 1215 AC 04/13
 
  NH   1054
 
Budesonide/ 2 PUF BID 04/10 1400 AC 04/13
 
Formoterol Fumarate  INH   0956
 
Diltiazem HCl 125 MG Q24H 04/12 0215 DC 04/12
 
Sodium Chloride 100 ML IV   0224
 
Diltiazem HCl 180 MG DAILY 04/11 1000 DC 04/12
 
  PO   1115
 
Doxycycline Hyclate 100 MG BID 04/10 0117 AC 04/13
 
  PO   0957
 
Furosemide 60 MG Q48 04/11 1215 AC 04/13
 
  PO   0956
 
Gabapentin 600 MG Q8 04/10 0600 AC 04/13
 
  PO   0636
 
Lorazepam 1 MG BID 04/10 1000 AC 04/12
 
  PO   2112
 
Magnesium Oxide 400 MG BID 04/12 0214 AC 04/13
 
  PO   0957
 
Melatonin 5 MG ONCE ONE 04/12 2100 DC 04/12
 
  PO 04/12 2101  2112
 
Metoprolol Tartrate 25 MG BID 04/12 1138 AC 04/13
 
  PO   0957
 
Montelukast Sodium 10 MG DAILY 04/10 1000 AC 04/13
 
  PO   0957
 
Morphine Sulfate 2 MG Q6P PRN 04/10 0200 AC 04/13
 
  IV   0646
 
Nortriptyline HCl 10 MG QPM 04/10 2200 AC 04/12
 
  PO   2112
 
Omeprazole 40 MG DAILY AC 04/10 0700 AC 04/13
 
  PO   0635
 
Oxycodone/ 1 TAB Q6P PRN 04/10 0015 AC 04/13
 
Acetaminophen  PO   0955
 
Rifampin 300 MG BID 04/10 0116 AC 04/13
 
  PO   0957
 
Sodium Chloride 1,000 ML Q13H 04/12 0000 DC 04/12
 
  IV 04/12 1259  0000
 
Sotalol HCl 80 MG BID 04/12 1137 AC 04/13
 
  PO   1054
 
Tiotropium [place] 1 PUF DAILY 04/10 1400 AC 04/13
 
  INH   0955
 
 
 
 
Vital Signs & I&O
Last 24 Hrs of Vitals and I&O:
 Vital Signs
 
 
Date Time Temp Pulse Resp B/P Pulse O2 O2 Flow FiO2
 
     Ox Delivery Rate 
 
04/13 0957  76  140/76    
 
04/13 0814     94 Nasal 2.0L 
 
      Cannula  
 
04/13 0800      Nasal 2.0L 
 
      Cannula  
 
04/13 0800 98.3 76 20 140/76 94 Nasal 2.0L 
 
      Cannula  
 
04/13 0458     96 Nasal 2.0L 
 
      Cannula  
 
04/13 0000     94 Nasal 2.0L 
 
      Cannula  
 
04/12 2309 97.6 78 20 138/70 94 Nasal 2.0L 
 
      Cannula  
 
04/12 2113  98  138/70    
 
04/12 1600     94 Nasal 2.0L 
 
      Cannula  
 
04/12 1530 98.7 77 20 146/62 96 Nasal 2.0L 
 
      Cannula  
 
04/12 1527      Nasal 2.0L 
 
      Cannula  
 
04/12 1446      Nasal 2.0L 
 
      Cannula  
 
04/12 1421  93  134/65    
 
 
 Intake & Output
 
 
 04/13 1600 04/13 0800 04/13 0000
 
Intake Total  50 480
 
Output Total  450 200
 
Balance  -400 280
 
    
 
Intake, Oral  50 480
 
Number  0 0
 
Bowel   
 
Movements   
 
Output, Urine  450 200
 
 
General Appearance: no apparent distress, alert, awake, comfortable
Head: atraumatic, normal appearance
Eyes:  Bilateral: PERRL. 
Neck: supple
Respiratory: decreased breath sounds, no wheezes rhonchi or rales
Cardiovascular: S1 and S2 heard, heart sounds are distant
Gastrointestinal: normal bowel sounds, soft, non-tender
Extremities: right lower extremity in a splint and dressed
Skin: intact, normal color, warm/dry
 
 
Results
Last 24 Hrs of Lab Results:
 Laboratory Tests
 
04/13/17 0620:
Anion Gap 5, Estimated GFR > 60, BUN/Creatinine Ratio 35.0  H, Magnesium 2.2, 
CBC w Diff NO MAN DIFF REQ, RBC 3.98  L, MCV 89.4, MCH 29.5, RDW 14.4, MPV 8.9, 
Gran % 76.6  H, Lymphocytes % 10.8  L, Monocytes % 6.9, Eosinophils % 5.7  H, 
Basophils % 0  L, Absolute Granulocytes 11.3  H, Absolute Lymphocytes 1.6, 
Absolute Monocytes 1.0  H, Absolute Eosinophils 0.8, Absolute Basophils 0, PUBS 
MCHC 33.0
 
 
Impression/Plan
 
Impression/Plan
Impression/Plan:
1.  Stable respiratory status, chronic COPD which is oxygen dependent.
2.  Right ankle fracture following mechanical witnessed fall.  This will require
surgical fixation.
3.  Paraplegia secondary to previous MRSA infection of the spine, on chronic 
doxycycline and rifampin.
4.  PAF, SVT, QT prolongation - now on metoprolol, sotalol and Eliquis. 
5.  History of hypertension and hyperlipidemia.
6.  Obstructive sleep apnea, with intolerance to nasal CPAP.
7.  Mild leukocytosis - etiology?
Recommendations:
* The patient's respiratory status appears to be stable at present.  
* Continue nebulizer treatments every 4 hours as needed.
* Continue Singular 10 mg daily.
* Continue Symbicort 80/4.5, 2 puffs every 12 hours.
* Continue Spiriva 1 inhalation every morning.
* IV Tylenol PRN.
* DVT prophylaxis at all times.
* Arrhythmia management per cardiology.
* We will repeat an outpatient sleep study evaluation after discharge.

## 2017-04-13 NOTE — ECHOCARDIOGRAM REPORT
DAYDAY HOOVER 
 
 Age:    66     :    1950      Gender:     M 
 
 MRN:    [id number] 
 
 Exam Date:     2017  
                19:06 
 
 Exam Location: 1  
 [place] 
 
 Ht (in):     69      Wt (lb):      300     BSA:    2.64 
 
 BP:          134     /     65 
 
 Ordering Physician:        JESSICA MISTRY MD 
 
 Referring Physician:       Jarvis Cornelius M.D. 
 
 Technologist:              Angelina Liz RDCS 
 
 Room Number:               172 
 
 Indications:       ARRHYTHMIAS 
 
 Rhythm: 
 
 Technical Quality: 
 
 FINDINGS 
 
 Left Ventricle 
 Normal size left ventricle. Left ventricular wall thickness mildly  
 increased. Normal left ventricular ejection fraction estimated at  
 60-65%. 
 
 Right Ventricle 
 Normal right ventricular size and function. 
 
 Right Atrium 
 Normal right atrial size. 
 
 Left Atrium 
 Normal left atrial size. 
 
 Mitral Valve 
 Mild mitral annular calcification. Trace to mild mitral  
 regurgitation. 
 
 Aortic Valve 
 Aortic valve is normal in structure and function. 
 
 Tricuspid Valve 
 Structurally normal tricuspid valve. Mild tricuspid regurgitation.  
 Right ventricular systolic pressure estimated to be elevated at  45   
 mmHg. 
 
 Pulmonic Valve 
 Pulmonic valve not well visualized, grossly normal. 
 
 Pericardium 
 No pericardial effusion. 
 
 Great Vessels 
 Normal size aortic root. 
 
 CONCLUSIONS 
 Normal left ventricular systolic function with mild LVH. Mild to  
 moderate Pulmonary hypertension. 
 
 Jorge Mancia M.D. 
 (Electronically Signed) 
 Final Date:      2017  
                  11:42 
 
 MEASUREMENTS  (Male / Female) Normal Values 
 
 2D ECHO 
 LV Diastolic Diameter PLAX        4.7 cm                4.2 - 5.9 / 3.9 - 5.3 
cm 
 LV Systolic Diameter PLAX         2.0 cm                2.1 - 4.0 cm 
 LV Fractional Shortening PLAX     57.4 %                25 - 46  % 
 LV Ejection Fraction 2D Teich     87.6 %                 
 IVS Diastolic Thickness           1.4 cm                 
 LVPW Diastolic Thickness          1.4 cm                 
 LV Relative Wall Thickness        0.6                    
 RV Internal Dim ED PLAX           2.6 cm                1.9 - 3.8 cm 
 LVOT Diameter                     2.4 cm                 
 Aortic Root Diameter              3.8 cm                 
 LA Systolic Diameter LX           2.7 cm                3.0 - 4.0 / 2.7 - 3.8 
cm 
 LA Volume                         31.0 cm              18 - 58 / 22 - 52 cm 
 Ascending Aorta Diameter          3.5 cm                 
 
 DOPPLER 
 AV Peak Velocity                  149.0 cm/s             
 AV Peak Gradient                  8.9 mmHg               
 AV Mean Velocity                  96.8 cm/s              
 AV Mean Gradient                  5.0 mmHg               
 AV Velocity Time Integral         24.8 cm                
 LVOT Peak Velocity                116.0 cm/s             
 LVOT Peak Gradient                5.4 mmHg               
 LVOT Mean Velocity                75.7 cm/s              
 LVOT Mean Gradient                3.0 mmHg               
 LVOT Velocity Time Integral       20.2 cm                
 LVOT Stroke Volume                91.4 cm               
 AV Area Cont Eq vti               3.7 cm                
 AV Area Cont Eq pk                3.5 cm                
 MV Peak Velocity                  76.4 cm/s              
 MV Peak Gradient                  2.3 mmHg               
 MV Mean Velocity                  51.9 cm/s              
 MV Mean Gradient                  1.0 mmHg               
 Mitral E Point Velocity           58.7 cm/s              
 Mitral A Point Velocity           59.7 cm/s              
 Mitral E to A Ratio               1.0                    
 MV PHT Velocity                   83.6 cm/s              
 MV Deceleration Pondera             277.0 cm/s            
 MV Pressure Half Time             90.5 ms                
 MV Area PHT                       2.4 cm                
 MV Deceleration Time              269.0 ms               
 TR Peak Velocity                  317.0 cm/s             
 TR Peak Gradient                  40.2 mmHg              
 Right Atrial Pressure             5.0 mmHg               
 Pulmonary Artery Systolic Pressu  45.2 mmHg              
 Right Ventricular Systolic Press  45.2 mmHg              
 PV Peak Velocity                  121.0 cm/s             
 PV Peak Gradient                  5.9 mmHg               
 PV Mean Velocity                  72.4 cm/s              
 PV Mean Gradient                  3.0 mmHg               
 PV Velocity Time Integral         22.6 cm                
 LV E' Lateral Velocity            9.8 cm/s               
 Mitral E to LV E' Lateral Ratio   6.0                    
 LV E' Septal Velocity             7.9 cm/s               
 Mitral E to LV E' Septal Ratio    7.4

## 2017-04-13 NOTE — DISCHARGE SUMMARY
Visit Information
 
Visit Dates
Admission Date:
04/09/17
 
Discharge Date:
04/14/17
 
 
Hospital Course
 
Course
Attending Physician:
DOMINIQUE BILL MD
 
Primary Care Physician:
RIGOBERTO REZA MD
 
Other Care Providers:
Cardiologist: Dr. Castro
Consulting Request: 1 
   Consulting Specialty: Cardiology
   Consulting Physician:
Dr. Cornelius
   Reason for Consult: SVT and PAF
Consulting Request: 2 
   Consulting Specialty: Orthopedics
   Consulting Physician:
Dr. Nation
   Reason for Consult: Right ankle fracture
Hospital Course:
66 year old man with a past medical history of COPD on 2 lpm, respiratory 
failure in the setting of sepsis requiring prolonged mechanical ventilation, 
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
term doxycyclin and rifampin, CAD with prior remote PCI in 2004 and 2006,  MRSA 
aortic valve endocarditis, PAF not on AC due to fall risk and low  A fib burden,
also allergic to warfarin and heparin, DVT/PE with IVC filter, angioedema, HTN, 
HLD on Repatha, neurogenic bladder, and depression, presented to the ED S/P 
witnessed mechanical fall while transferring from toilet to wheelchair resulting
in a bi-malleoar commuted fractures with several tiny bone fragments within the 
ankle, admitted to Tele floor fir further management.
 
Vitals on admission - afebrile, tachycardic 117, respiratory rate 18, blood 
pressure 154/95, saturating at 94 on room air.
 
Labs - leukocytosis 15.7.  Glucose 209, ALT 80.
 
Ankle x-ray:
Bimalleolar comminuted mildly fractures as described with several tiny bony 
fragments within the ankle mortise.
 
Problem List:
 
 Cardiology and pulmonary cleared patient for procedure on 4/11/17 for surgery 
on 04/12/2017.
 
Un
 
1.  Right Bi-malleoar commuted fractures S/P mechanical fall:
The patient sustained a bimaleolar commutes fracture on the right side after a 
witnessed mechanical fall whiel transferring from toilet to wheelchair. He was 
evaluated by Pulmonology and cardiology and was medically cleared for right 
ankle surgery and repair of the fracture. Unfortunately a night prior to when 
the surgery was scheduled (4/11/17), the patient went into narrow complex 
tachycardia/SVT in 160s. The surgery was cancelled and the patient maintained 
non-weight bearing status along with ACE wrap and pain management with IV 
Morphine and PO Oxycodone. The patient remains stable thereafter with no new 
event or arrythmia. Due to the non-urgent nature of the fracture, the decision 
was made by ortho to follow the patient as an outpatient and schedule him for an
elective repair after getting discharged once medically stable. And it was 
recommended to the patient to stop eliquis 48 hours prior to when the surgery 
will be scheduled and he also needs to follow up with Dr. Prieto to get cardiac 
clearance prior to getting his surgery.
 
2.  Episode of Narrow Complex Tachycardia/SVT/
Paroxysmal atrial fibrillation
Rapid A fib with RVR:
The patient was initially admitted to general medicine floor for ankle fracture 
repair, but went into rapid a Flutter while on the  floor, hence was 
transferred to Telemetry. On 4/11/17 around 2 am, he developed SVT not 
responsive to vagal maneuvers. EKG at that time showed narrow complex 
tachycardia, SVT, with rate in 160s. He recieved IV adenosine push, one time, 
with no resolution of the rhythm and was eventually started on Cardizem drip. 
Eventually he was chemically cardioverted with PO Sotalol 80 mg BID along with 
monitoring for QTc with frequent EKGs. He was also started on PO Metoprolol at 
25 mg Po BID, tapered to 12.5 Po BID the next day. Due to the need for 
anticoagulation, Eliquis 5 mg BID was initiated. ECHO showed an EF of 60-65%. 
Thereafter, he remained stable and in normal sinus rhythem with the rate between
70s to 90s.
 
3.  Leukocytosis: 
The patient had leukocytosis on admission with no signs of infection. He 
remained afebtrile with all work up negative. Chest x-ray and UA showed no 
evidence of infection. He was monitored off antibiotics
 
4.  Chronic respiratory failure secondary to oxygen dependent COPD:
Patient had a history of COPD on 2 lipm, remained stable with no acute 
excerbation of respiraory failure. He was also evaluated by pulmonology and was 
cleared for surgery. Singulair, Symbicort, Spiriva, Singulair and TRC Nebs were 
continued through his hospital stay.
 
5.  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
He was continued on chronic suppression therapy with rifampin and doxycycline; 
and straight cath protocol.
 
6.  Bilateral lower extremity edema:
No evidence of acute cardiac failure. He was continued on 20 mg of Lasix every 
other day.
 
7.  Hypertension:
Blood pressure remained well controlled throughout hospital stay.
He was continued on BP meds, initially cardizem, then Metoprolol
 
8. HLD: 
Patient takes Repatha which shall be resumed upon discharged.
 
DVT prophylaxis: Eliquis
DNR/DNI
 
Allergies:
Coded Allergies:
haloperidol (From HALDOL) (SWELLING 11/21/16)
heparin (SWELLING 11/21/16)
vancomycin (HIVES, SKIN CRACKES, TURNS RED, SWELLS AND PEELS 11/21/16)
warfarin (SWELLS, TURNS, RED, HIVES, SKIN CRACKS AND PEELS 11/21/16)
 
Pertinent Lab Results:
Laboratory Tests
 
04/13/17 0620:
Anion Gap 5, Estimated GFR > 60, BUN/Creatinine Ratio 35.0  H, Magnesium 2.2, 
CBC w Diff NO MAN DIFF REQ, RBC 3.98  L, MCV 89.4, MCH 29.5, RDW 14.4, MPV 8.9, 
Gran % 76.6  H, Lymphocytes % 10.8  L, Monocytes % 6.9, Eosinophils % 5.7  H, 
Basophils % 0  L, Absolute Granulocytes 11.3  H, Absolute Lymphocytes 1.6, 
Absolute Monocytes 1.0  H, Absolute Eosinophils 0.8, Absolute Basophils 0, PUBS 
MCHC 33.0
 
04/12/17 0714:
Anion Gap 10, Estimated GFR > 60, BUN/Creatinine Ratio 30.0  H, CBC w Diff NO 
MAN DIFF REQ, RBC 4.32  L, MCV 89.9, MCH 29.4, RDW 14.3, MPV 8.9, Gran % 77.2  H
, Lymphocytes % 11.9  L, Monocytes % 8.3, Eosinophils % 2.6, Basophils % 0  L, 
Absolute Granulocytes 10.7  H, Absolute Lymphocytes 1.6, Absolute Monocytes 1.1 
H, Absolute Eosinophils 0.4, Absolute Basophils 0, PUBS MCHC 32.7  L
 
04/12/17 0126:
Anion Gap 9, Estimated GFR > 60, BUN/Creatinine Ratio 31.7  H, Magnesium 1.8
 
04/11/17 0610:
Anion Gap 9, Estimated GFR > 60, BUN/Creatinine Ratio 34.3  H, CBC w Diff NO MAN
DIFF REQ, RBC 4.13  L, MCV 89.5, MCH 29.3, RDW 14.7  H, MPV 8.8, Gran % 68.6, 
Lymphocytes % 16.0  L, Monocytes % 10.2  H, Eosinophils % 5.1  H, Basophils % 
0.1, Absolute Granulocytes 8.7  H, Absolute Lymphocytes 2.0, Absolute Monocytes 
1.3  H, Absolute Eosinophils 0.6, Absolute Basophils 0, PUBS MCHC 32.8  L
 
04/10/17 2347:
Urinalysis MOD  H, Urine Color YEL, Urine Clarity CLEAR, Urine pH 7.0, Ur 
Specific Gravity 1.015, Urine Protein 30  H, Urine Ketones 40  H, Urine Nitrite 
NEG, Urine Bilirubin NEG@ICTO, Urine Urobilinogen 0.2, Ur Leukocyte Esterase 
TRACE  H, Ur Microscopic SEDIMENT EXAMINED, Urine RBC 5-10  H, Urine WBC 1-3  H,
Ur Epithelial Cells FEW, Urine Bacteria FEW  H, Urine Mucus MOD  H, Urine 
Hemoglobin TRACE-INTACT  H, Urine Glucose NEG
 Microbiology
04/10 2347  URINE ROUT: Urine Culture - COMP
04/10 2320  BLOOD: Blood Culture - RES
04/10 2300  BLOOD: Blood Culture - RES
 
 
 
Disposition Summary
 
Disposition
Principal Diagnosis:
1. Bi-malleoar commuted fractures S.P mechanical fall
2. Episode of Narrow Complex Supraventricular Tachycardia
3. A flutter/ Afib with RVR
 
Additional Diagnosis:
1.  Chronic respiratory failure
2.  COPD on home oxygen
3.  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
4.  Bilateral lower extremity edema:
5.  Hypertension:
6.  HLD
 
 
Discharge Disposition: home health services
 
Discharge Instructions
 
General Discharge Information
Code Status: Do Not Resucitate/Intubat
Patient's Diet:
Heart Healthy Diet
Patient's Activity:
Wheel chair bound, transferd from wheelchair to toilet seat
NON-WEIGHT BEARING status due to right ankle fracture
 
Follow-Up Instructions/Appts:
1. Please follow up with your primary care physician within a week after 
discharge
2. Please follow up with Dr. Cornelius after discharge
3. Please follow up with Dr. Hull after discharge
4. please follow up with Dr. Nation after discharge. Make an appointment to 
follow up for your ankle fracture and possible planning for surgery as an out 
patient.
5. Non Weight Bearing on right leg
6. Continue with the new medications as prescribed
7. Stop Eliquis 24-hour before the surgery is scheduled. Please see cardiology 
again to get cleared and get pre-surgery clearance prior to scheduled surgery.
 
 
Medications at Discharge
Discharge Medications:
Stop taking the following medications:
Diltiazem HCl (Diltiazem 24HR ER) 120 MG CAP.ER.24H ORAL DAILY Qty = 90
 
Continue taking these medications:
Furosemide (Furosemide) 20 MG TABLET
    3 Tablet ORAL Every other day
    Qty = 90
    Comments:
       PER PT MED LIST
 
Gabapentin (Gabapentin) 600 MG TABLET
    1 Tablet ORAL THREE TIMES DAILY
    Qty = 90
    Comments:
       PER PT MED LIST
 
Lactobacillus Acidophilus (Acidophilus) 1 EACH CAPSULE
    1 Capsule ORAL TWICE DAILY
    Comments:
       PER PT MED LIST
 
Magnesium Oxide (Magnesium) 400 MG CAPSULE
    1 Capsule ORAL 0600
    Comments:
       PER PT MED LIST
 
Multivitamin (Multi-Day Vitamins) 1 EACH TABLET
    1 Tablet ORAL DAILY
    Comments:
       PER PT MED LIST
 
Nortriptyline HCl (Nortriptyline HCl) 10 MG CAPSULE
    1 Capsule ORAL Every night
    Qty = 30
    Comments:
       PER PT MED LIST
 
Atomoxetine HCl (Strattera) 40 MG CAPSULE
    1 Capsule ORAL Every Morning
    Qty = 30
    Comments:
       PER PT MED LIST
 
Ascorbate Calcium (Vitamin C) 500 MG TABLET
    1 Tablet ORAL TWICE DAILY
    Comments:
       PER PT MED LIST
 
Oxycodone HCl/Acetaminophen (Oxycodone-Acetaminophen 5-325) 5 MG-325 MG TABLET
    1-2 Tablet ORAL Q4H as needed for PAIN
    Qty = 120
    Comments:
       PER PT MED LIST
 
Baclofen (Baclofen) 20 MG TABLET
    1 Tablet ORAL TWICE DAILY
    Qty = 150
    Comments:
       PER PT MED LIST 
 
Bisacodyl (Dulcolax) 10 MG SUPP.RECT
    1 Suppository RECTAL Every other day
    Comments:
       PER PT MED LIST
 
Potassium Chloride (Potassium Chloride) 20 MEQ TAB.ER.PRT
    1 Tablet ORAL DAILY
    Qty = 90
    Comments:
       PER PT MED LIST
 
Rifampin (Rifadin) 300 MG CAPSULE
    1 Capsule ORAL TWICE DAILY
    Comments:
       PER PT MED LIST
 
Guaifenesin (Mucinex) 600 MG TAB.ER.12H
    2 Tablet ORAL TWICE DAILY
    Comments:
       PER PT MED LIST
 
Lorazepam (Ativan) 1 MG TABLET
    1 Tablet ORAL TWICE DAILY
    Comments:
       PER PT MED LIST
 
Albuterol Sulfate (Ventolin Hfa) 90 MCG HFA.AER.AD
    2 Puff Inhale through mouth Q4H
    Comments:
       PER PT MED LIST
 
Evolocumab (Repatha Sureclick) 140 MG/ML PEN.INJCTR
    1 Milliliters Inject into fatty tissue EVERY 2 WEEKS
    Comments:
       PER PT MED LIST
 
Montelukast Sodium (Singulair) 10 MG TABLET
    1 Tablet ORAL DAILY
 
Pantoprazole Sodium (Protonix) 40 MG TABLET.DR
    1 Tablet ORAL DAILY
 
Tiotropium Bromide (Spiriva) 18 MCG CAP.W.DEV
    1 Capsule Inhale through mouth DAILY
 
Doxycycline Hyclate (Doxycycline Hyclate) 100 MG CAPSULE
    1 Capsule ORAL TWICE DAILY
    Qty = 60
 
Aspirin (Ecotrin*) 81 MG TABLET.DR
    1 Tablet ORAL DAILY
 
Start taking the following new medications:
Apixaban (Eliquis) 5 MG TABLET
    5 Milligram ORAL TWICE DAILY
    Qty = 60
    No Refills
 
Sotalol (Betapace) 80 MG TABLET
    80 Milligram ORAL TWICE DAILY
    Qty = 60
    No Refills
 
Acetaminophen (Tylenol) 325 MG TABLET
    325 Milligram ORAL EVERY SIX HOURS AS NEEDED as needed for PAIN SCALE 1-3 (
MILD)
    Days = 30
    No Refills
 
Budesonide/Formoterol Fumarate (Symbicort 80-4.5 Mcg Inhaler) 80 MCG-4.5 MCG/
ACTUATION HFA.AER.AD
    2 Puff Inhale through mouth TWICE DAILY
    Days = 30
    No Refills
 
Metoprolol Tartrate (Metoprolol Tartrate) 25 MG TABLET
    12.5 Milligram ORAL TWICE DAILY
    Days = 30
    No Refills
 
 
Copies To:
LANDY JONES,DOMINIQUE; FAUSTINO JONES,NICOLE PORTER; MARLON JONES,ELKE; OLY JONES,SONYA CAMPOS
; JUAQUIN JONES,RIGOBERTO YANG

## 2017-04-14 VITALS — SYSTOLIC BLOOD PRESSURE: 136 MMHG | DIASTOLIC BLOOD PRESSURE: 80 MMHG

## 2017-04-14 VITALS — DIASTOLIC BLOOD PRESSURE: 80 MMHG | SYSTOLIC BLOOD PRESSURE: 140 MMHG

## 2017-04-14 VITALS — DIASTOLIC BLOOD PRESSURE: 68 MMHG | SYSTOLIC BLOOD PRESSURE: 126 MMHG

## 2017-04-14 VITALS — SYSTOLIC BLOOD PRESSURE: 130 MMHG | DIASTOLIC BLOOD PRESSURE: 64 MMHG

## 2017-04-14 LAB
ABSOLUTE GRANULOCYTE CT: 9.1 /CUMM (ref 1.4–6.5)
BASOPHILS # BLD: 0 /CUMM (ref 0–0.2)
BASOPHILS NFR BLD: 0 % (ref 0–2)
EOSINOPHIL # BLD: 0.7 /CUMM (ref 0–0.7)
EOSINOPHIL NFR BLD: 5.1 % (ref 0–5)
ERYTHROCYTE [DISTWIDTH] IN BLOOD BY AUTOMATED COUNT: 14.4 % (ref 11.5–14.5)
GRANULOCYTES NFR BLD: 69.9 % (ref 42.2–75.2)
HCT VFR BLD CALC: 35.2 % (ref 42–52)
LYMPHOCYTES # BLD: 2.2 /CUMM (ref 1.2–3.4)
MCH RBC QN AUTO: 29.9 PG (ref 27–31)
MCHC RBC AUTO-ENTMCNC: 33.5 G/DL (ref 33–37)
MCV RBC AUTO: 89.3 FL (ref 80–94)
MONOCYTES # BLD: 1.1 /CUMM (ref 0.1–0.6)
PLATELET # BLD: 290 /CUMM (ref 130–400)
PMV BLD AUTO: 8.1 FL (ref 7.4–10.4)
RED BLOOD CELL CT: 3.94 /CUMM (ref 4.7–6.1)
WBC # BLD AUTO: 13 /CUMM (ref 4.8–10.8)

## 2017-04-14 NOTE — PN- HOUSESTAFF
FEDE JONES,[facility] 17 0711:
Subjective
Follow-up For:
1. Right ankle fracture status post mechanical fall
2. PAF
3. episode of SVT
Tele-Events Since Last Visit:
SR 68 - 77, no events
 
Subjective:
The patient feels well, he did not report any complaints except for a pain in 
his right ankle of 7/10. He denies any chest pain, shortness of breath, 
palpitations, N/V/D, no fever or chills. Had a BM yesterday. 
 
 
Review of Systems
Constitutional:
Reports: see HPI. 
Cardiovascular:
Reports: no symptoms. 
Respiratory:
Reports: no symptoms. 
Gastrointestinal:
Reports: no symptoms. 
Genitourinary:
Reports: no symptoms. 
Musculoskeletal:
Reports: see HPI. 
 
Objective
Last 24 Hrs of Vital Signs/I&O
 Vital Signs
 
 
Date Time Temp Pulse Resp B/P Pulse O2 O2 Flow FiO2
 
     Ox Delivery Rate 
 
 0120 98.2 73 20 126/68 96 Nasal  
 
      Cannula  
 
 0000      Nasal 2.0L 
 
      Cannula  
 
 2325  78  126/68    
 
 1610     97 Nasal 2.0L 
 
      Cannula  
 
 1530 98.2 72 20 138/78 96 Nasal 2.0L 
 
      Cannula  
 
 0957  76  140/76    
 
 0814     94 Nasal 2.0L 
 
      Cannula  
 
 0800      Nasal 2.0L 
 
      Cannula  
 
 0800 98.3 76 20 140/76 94 Nasal 2.0L 
 
      Cannula  
 
 
 Intake & Output
 
 
  0800  0000  1600
 
Intake Total  240 620
 
Output Total  200 1100
 
Balance  40 -480
 
    
 
Intake, Oral  240 620
 
Number   1
 
Bowel   
 
Movements   
 
Output, Urine  200 1100
 
 
 
 
Physical Exam
General Appearance: Alert, Oriented X3, Cooperative, No Acute Distress
Cardiovascular: Regular Rate, Normal S1, Normal S2, No Murmurs
Lungs: Clear to Auscultation
Abdomen: Normal Bowel Sounds, Soft, No Tenderness
Extremities: Normal Pulses, bilateral leg edema. Right leg in ACE wrap, left has
ALPS in place
Current Medications:
 Current Medications
 
 
  Sig/Viet Start time  Last
 
Medication Dose Route Stop Time Status Admin
 
Acetaminophen 325 MG Q6P PRN 04/10 0015 AC 
 
  PO   0225
 
Albuterol Sulfate 3 ML EVERY 4 HRS/AWAKE 04/10 1200 AC 
 
  INH   1955
 
Apixaban 5 MG BID  1137 AC 
 
  PO   2323
 
Aspirin Buffered 81 MG DAILY 04/10 1000 AC 
 
  PO   0956
 
Baclofen 20 MG BID 04/10 0114 AC 
 
  PO   2323
 
Bisacodyl 10 MG .STK-MED ONE  1044 DC 
 
  MO  1045  
 
Bisacodyl 10 MG DAILY  1215 AC 
 
  MO   1054
 
Budesonide/ 2 PUF BID 04/10 1400 AC 
 
Formoterol Fumarate  INH   2326
 
Doxycycline Hyclate 100 MG BID 04/10 0117 AC 
 
  PO   2323
 
Furosemide 60 MG Q48  1215 AC 
 
  PO   0956
 
Gabapentin 600 MG Q8 04/10 0600 AC 
 
  PO   0544
 
Lorazepam 1 MG BID 04/10 1000 AC 
 
  PO   2326
 
Magnesium Oxide 400 MG BID  0214 AC 
 
  PO   2322
 
Metoprolol Tartrate 12.5 MG BID  2200 AC 
 
  PO   2325
 
Metoprolol Tartrate 25 MG BID  1138 DC 
 
  PO   0957
 
Montelukast Sodium 10 MG DAILY 04/10 1000 AC 
 
  PO   0957
 
Morphine Sulfate 2 MG Q6P PRN 04/10 0200 AC 
 
  IV   2339
 
Nortriptyline HCl 10 MG QPM 04/10 2200 AC 
 
  PO   2325
 
Omeprazole 40 MG DAILY AC 04/10 0700 AC 
 
  PO   0544
 
Oxycodone/ 1 TAB Q6P PRN 04/10 0015 AC 
 
Acetaminophen  PO   1729
 
Rifampin 300 MG BID 04/10 0116 AC 
 
  PO   2322
 
Sotalol HCl 80 MG BID  1137 AC 
 
  PO   2324
 
Tiotropium [place] 1 PUF DAILY 04/10 1400 AC 
 
  INH   0955
 
 
 
 
Orders
Fingersticks (last 24 hrs):
[phone]
 
Lines/Diet/Fluids
Lines: peripheral lines
 
Assessment/Plan
Assessment:
66-year-old morbidly obese demand with history of stage IV COPD on 2 L oxygen, 
CAD status post stents, hypertension, MRSA bacteremia/endocarditis with T6 7 
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 
suppressive therapy with doxycycline and rifampin, PE status post IVC filter, 
obstructive sleep apnea not using CPAP, presented to the ED after a mechanical 
fall.  X-ray of the ankle showed a bimalleolar culminated fracture of ankle 
mortise.  Patient was seen by orthopedic who recommended repair while he is in 
the hospital.  Cardiology and pulmonary cleared patient for procedure on 17
for surgery on 2017.
 
Unfortunately a night prior to scheduled surgery, the patient went into SVT, 
rate in 160s, given IV adenosine x 1, with no resolution of the rhythm and was 
eventually started on Cardizem drip.
 
1.  Right ankle fracture status post mechanical fall
- The patient had an event where he had narrow complex tachycardia over night on
17, therefore his ankel surgery was cancelled
- He has remained stable since and is cleared for surgery from cardiac stand 
point per Dr. Mancia
- Spoke with Dr. Nation. The patient is NWB on right leg, stable and the surgery 
is non-urgent. Will manage by ACE wrap, immobilization and pain control for now,
and have him follow up with Dr. Nation after discharge for further planning for 
surgery. 
- Discharge planning today
 
2.  Episode of SVT/Paroxysmal atrial fibrillation:
- The patient had an event of SVT/narrow complex tachycardia on 17 around 2
am. At that time he was given a push of adenosine followed by cardizem drip
- Currently on Sotalol 80 mg BID, with EKG momitoring. Last EKG showed QTc of 
436
- On Metoprolol 12.5 mg po BID
- Eliquis 5 mg BID
- ECHO shows an EF of 60-65% 
 
 
3.  Leukocytosis: 
- Patient had WBC count in 12-14 range, has been afebrile. 
- Chest x-ray and UA with no evidence of infection
- Being monitored off Abx
 
4.  Chronic respiratory failure secondary to COPD on home oxygen
- Stable we will continue his Singulair.  
- We'll continue him on Symbicort, Spiriva, Singulair
 
5.  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
- Currently on chronic suppression therapy with rifampin and doxycycline.  
- Continue straight cath protocol
 
6.  Bilateral lower extremity edema
- Patient takes 20 mg of Lasix every other day.
- Restarted 20 mg Lasix every other day
 
7.  Hypertension
- Maintained in cardiac meds
 
8. HLD: 
 - Resume Repatha on discharge
 
DVT prophylaxis: Eliquis
DNR/DNI
 
Problem List:
 1. Bimalleolar fracture of right ankle
 
 2. SVT (supraventricular tachycardia)
 
 3. Paroxysmal atrial fibrillation
 
 4. Paraplegia
 
 5. Hypertension
 
 6. COPD exacerbation
 
Pain Ratin
Pain Location:
right ankle
Pain Goal: Pain 4 or less
Pain Plan:
PO oxycodone
Tomorrow's Labs & Rationales:
Not needed
DVT/Prophylaxis: pharmacological
Consulting Request: 1 
   Consulting Specialty: Orthopedics
   Consulting Physician:
Dr. Nation
   Reason for Consult: Right ankle fracture
Consulting Request: 2 
   Consulting Specialty: Cardiology
   Consulting Physician:
Dr. Cornelius
   Reason for Consult: SVT/PAF
Consulting Request: 3 
   Consulting Specialty: Pulmonary Disease
   Consulting Physician:
Dr. Hull
   Reason for Consult: COPD
 
Discharge Plan
Discharge Disposition: awaiting STR/NH placement
Stable for Discharge? Yes
Anticipated Discharge (Day): today
If Discharged Today/In 24 Hrs: enter antc discharge ord, W-10/discharge paper 
done, DC summary done, CMR done
 
 
DOMINIQUE BILL MD 17 1400:
Attending MD Review Statement
 
Attending Statement
Attending MD Statement: examined this patient, discuss w/resident/PA/NP, agreed 
w/resident/PA/NP, reviewed EMR data (avail)
Attending Assessment/Plan:
66M PMH COPD on 2L home oxygen, CAD s/p PCI, HTN, MRSA bacteremia/endocarditis 
with T6-7 spinal abscesses resulting in paraparesis on chronic suppressive 
therapy with doxycycline and rifampin, PE s/p IVC filter, GARRY not on CPAP 
admitted with mechanical fall and right ankle fracture.  Patient was scheduled 
for ORIF on  but had episode of SVT the night before requiring Adenosine 
pushes and Cardizem drip resulting in cancellation of surgery.  The patient has 
since been started on Sotalol and Metoprolol, with tentative plan for AMINTA/
cardioversion on  if SVT/a-fib persists.
 
Today the patient has no complaints and feels well.  He is in NSR on telemetry 
with no events.  EKG's done post Sotalol dosing are NSR without prolonged QTc.
 
1. Mechanical fall
2. Right bimalleolar comminuted fractures
3. Supraventricular tachycardia
4. Rapid atrial fibrillation with RVR
5. COPD
6. Chronic hypoxemic respiratory failure
7. History of mRSA spinal abscesses and endocarditis
8. GARRY
 
Plan
- Stable for discharge to STR
- Follow cardiology and orthopedic recommendations
- Continue Sotalol
- Follow cardiology recommendations
- Continue Eliquis
- Continue home medications
- Outpatient orthopedic and cardiology follow up

## 2017-04-14 NOTE — PN- CARDIOLOGY
Subjective
Subjective:
 
Feeling well this morning.  Offers no complaint.
 
Objective
Vital Signs and I&Os
Vital Signs
 
 
Date Time Temp Pulse Resp B/P Pulse O2 O2 Flow FiO2
 
     Ox Delivery Rate 
 
04/14 1053  70  130/64    
 
04/14 1050      Nasal 2.0L 
 
      Cannula  
 
04/14 0800     94 Nasal 2.0L 
 
      Cannula  
 
04/14 0800 98.1 70 20 140/80 94 Nasal 2.0L 
 
      Cannula  
 
04/14 0758     94 Nasal 2.0L 
 
      Cannula  
 
04/14 0120 98.2 73 20 126/68 96 Nasal  
 
      Cannula  
 
04/14 0000      Nasal 2.0L 
 
      Cannula  
 
04/13 2325  78  126/68    
 
04/13 1610     97 Nasal 2.0L 
 
      Cannula  
 
04/13 1530 98.2 72 20 138/78 96 Nasal 2.0L 
 
      Cannula  
 
 
 Intake & Output
 
 
 04/14 1600 04/14 0800 04/14 0000 04/13 1600 04/13 0800 04/13 0000
 
Intake Total  200 240 620 50 480
 
Output Total   200 1100 450 200
 
Balance  200 40 -480 -400 280
 
       
 
Intake, Oral  200 240 620 50 480
 
Number    1 0 0
 
Bowel      
 
Movements      
 
Output, Urine   200 1100 450 200
 
 
 
Physical Exam:
 
General: no apparent distress. Alert. 
Eyes: No obvious scleral icterus.
HEENT: No jugular venous distention or abnormal jugular venous pulsations.
Cardiovascular: Normal intensity S1/S2.  Regular.
Respiratory: Lungs clear to auscultation bilaterally.
Abdomen: Mildly distended with no guarding or rebound tenderness.
Musculoskeletal: No clubbing or cyanosis noted
Skin: warm
Neurologic: Chronic paraplegia
Current Medications:
 Current Medications
 
 
  Sig/Viet Start time  Last
 
Medication Dose Route Stop Time Status Admin
 
Acetaminophen 325 MG Q6P PRN 04/10 0015 AC 04/12
 
  PO   0225
 
Albuterol Sulfate 3 ML EVERY 4 HRS/AWAKE 04/10 1200 AC 04/14
 
  INH   1113
 
Apixaban 5 MG BID 04/12 1137 AC 04/14
 
  PO   1055
 
Aspirin Buffered 81 MG DAILY 04/10 1000 AC 04/14
 
  PO   1052
 
Baclofen 20 MG BID 04/10 0114 AC 04/14
 
  PO   1053
 
Bisacodyl 10 MG DAILY 04/11 1215 AC 04/13
 
  AZ   1054
 
Budesonide/ 2 PUF BID 04/10 1400 AC 04/14
 
Formoterol Fumarate  INH   1050
 
Doxycycline Hyclate 100 MG BID 04/10 0117 AC 04/14
 
  PO   1052
 
Furosemide 60 MG Q48 04/11 1215 AC 04/13
 
  PO   0956
 
Gabapentin 600 MG Q8 04/10 0600 AC 04/14
 
  PO   0544
 
Lorazepam 1 MG BID 04/10 1000 AC 04/13
 
  PO   2326
 
Magnesium Oxide 400 MG BID 04/12 0214 AC 04/14
 
  PO   1052
 
Metoprolol Tartrate 12.5 MG BID 04/13 2200 AC 04/14
 
  PO   1053
 
Metoprolol Tartrate 25 MG BID 04/12 1138 DC 04/13
 
  PO   0957
 
Montelukast Sodium 10 MG DAILY 04/10 1000 AC 04/14
 
  PO   1052
 
Morphine Sulfate 2 MG Q6P PRN 04/10 0200 DC 04/14
 
  IV   0841
 
Nortriptyline HCl 10 MG QPM 04/10 2200 AC 04/13
 
  PO   2325
 
Omeprazole 40 MG DAILY AC 04/10 0700 AC 04/14
 
  PO   0544
 
Oxycodone/ 2 TAB Q6P PRN 04/14 1100 AC 
 
Acetaminophen  PO   
 
Oxycodone/ 1 TAB Q6P PRN 04/10 0015 AC 04/13
 
Acetaminophen  PO   1729
 
Rifampin 300 MG BID 04/10 0116 AC 04/14
 
  PO   1053
 
Sotalol HCl 80 MG BID 04/12 1137 AC 04/14
 
  PO   1052
 
Tiotropium [place] 1 PUF DAILY 04/10 1400 AC 04/14
 
  INH   1050
 
 
 
 
Results
Last 48 Hrs of Labs/Mics:
 Laboratory Tests
 
04/13/17 0620:
Anion Gap 5, Estimated GFR > 60, BUN/Creatinine Ratio 35.0  H, Magnesium 2.2, 
CBC w Diff NO MAN DIFF REQ, RBC 3.98  L, MCV 89.4, MCH 29.5, RDW 14.4, MPV 8.9, 
Gran % 76.6  H, Lymphocytes % 10.8  L, Monocytes % 6.9, Eosinophils % 5.7  H, 
Basophils % 0  L, Absolute Granulocytes 11.3  H, Absolute Lymphocytes 1.6, 
Absolute Monocytes 1.0  H, Absolute Eosinophils 0.8, Absolute Basophils 0, PUBS 
MCHC 33.0
 
Recent Imaging Studies:
 
Telemetry tracings were personally reviewed and shows sinus rhythm with no 
recurrence of atrial arrhythmia
 
Echocardiogram
 CONCLUSIONS 
 Normal left ventricular systolic function with mild LVH. Mild to  
 moderate Pulmonary hypertension. 
 
Assessment/Plan
Assessment/Plan
 
1. S/p mechanical fall with malleolar fx, planned for surgical intervention per 
Ortho
2. CAD with remote PCI (2004/2006)
3. COPD on O2 (followed by Dr. Hull)
4. 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
5. PAFl/AF, now started on sotalol
6. Reported DVT/PE with reported prior IVC filter
7. HLD on Repatha (?statin intolerance)
8. Remote hx of endocarditis 
9. Hx HTN
 
The patient feels well this morning and offers no complaints.  Telemetry shows 
no evidence of recurrent atrial arrhythmias and no evidence of significant 
ventricular arrhythmia. Continue current cardiac regimen including oral 
anticoagulation, sotalol, and low-dose metoprolol. Patient should follow-up with
Dr. Prieto within one week of discharge. Will defer to Dr. Prieto about possible 
outpatient stress test prior to planned orthopedic intervention. The patient may
be a candidate for LINQ implant in the future, if he has breakthrough atrial 
arrhythmias he would be a potential candidate for ablation.
 
 
James Cornelius MD [facility]
 
 
 
 
 
 
 
 
Continue telemetry? No

## 2017-04-14 NOTE — PN- ORTHOPEDIC
Subjective
Subjective:
Pt has no complaints. Awaiting discharge to short term rehab. 
 
Objective
Vital Signs and I&Os
Vital Signs
 
 
Date Time Temp Pulse Resp B/P Pulse O2 O2 Flow FiO2
 
     Ox Delivery Rate 
 
04/14 0800     94 Nasal 2.0L 
 
      Cannula  
 
04/14 0800 98.1 70 20 140/80 94 Nasal 2.0L 
 
      Cannula  
 
04/14 0758     94 Nasal 2.0L 
 
      Cannula  
 
04/14 0120 98.2 73 20 126/68 96 Nasal  
 
      Cannula  
 
04/14 0000      Nasal 2.0L 
 
      Cannula  
 
04/13 2325  78  126/68    
 
04/13 1610     97 Nasal 2.0L 
 
      Cannula  
 
04/13 1530 98.2 72 20 138/78 96 Nasal 2.0L 
 
      Cannula  
 
 
 Intake & Output
 
 
 04/14 1600 04/14 0800 04/14 0000 04/13 1600 04/13 0800 04/13 0000
 
Intake Total  200 240 620 50 480
 
Output Total   200 1100 450 200
 
Balance  200 40 -480 -400 280
 
       
 
Intake, Oral  200 240 620 50 480
 
Number    1 0 0
 
Bowel      
 
Movements      
 
Output, Urine   200 1100 450 200
 
 
 
Physical Exam:
Gen: Awake, alert. NAD. 
Ext: RLE is in posterior splint with ankle at 90 degrees. Toes mildly edematous,
but sensation is intact. Good cap refill. Ice in place. 
 
Assessment/Plan
Assessment/Plan
Pt is a 67 yo M who sustained a right bimalleolar ankle fracture after a fall. 
He has been cleared for surgery from a medical standpoint, however Dr. Nation 
would like to wait until later next week. 
 
Recommendations: Ok for pt to be discharged to short term rehab and follow up 
with Dr. Nation in the office early next week, as he discussed with intern 
yesterday. Posterior splint and ace wrap should be left in place. Please keep pt
nonweightbearing. Elevate and ice to reduce swelling. Anticipate OR later next 
week.

## 2017-04-14 NOTE — PN- PULMONARY
Subjective
HPI/Critical Care Issues:
The patient is awake and alert.  There were no overnight events reported.
 
Objective
Current Medications:
 Current Medications
 
 
  Sig/Viet Start time  Last
 
Medication Dose Route Stop Time Status Admin
 
Acetaminophen 325 MG Q6P PRN 04/10 0015 AC 04/12
 
  PO   0225
 
Albuterol Sulfate 3 ML EVERY 4 HRS/AWAKE 04/10 1200 AC 04/14
 
  INH   0756
 
Apixaban 5 MG BID 04/12 1137 AC 04/13
 
  PO   2323
 
Aspirin Buffered 81 MG DAILY 04/10 1000 AC 04/13
 
  PO   0956
 
Baclofen 20 MG BID 04/10 0114 AC 04/13
 
  PO   2323
 
Bisacodyl 10 MG .STK-MED ONE 04/13 1044 DC 
 
  CA 04/13 1045  
 
Bisacodyl 10 MG DAILY 04/11 1215 AC 04/13
 
  CA   1054
 
Budesonide/ 2 PUF BID 04/10 1400 AC 04/13
 
Formoterol Fumarate  INH   2326
 
Doxycycline Hyclate 100 MG BID 04/10 0117 AC 04/13
 
  PO   2323
 
Furosemide 60 MG Q48 04/11 1215 AC 04/13
 
  PO   0956
 
Gabapentin 600 MG Q8 04/10 0600 AC 04/14
 
  PO   0544
 
Lorazepam 1 MG BID 04/10 1000 AC 04/13
 
  PO   2326
 
Magnesium Oxide 400 MG BID 04/12 0214 AC 04/13
 
  PO   2322
 
Metoprolol Tartrate 12.5 MG BID 04/13 2200 AC 04/13
 
  PO   2325
 
Metoprolol Tartrate 25 MG BID 04/12 1138 DC 04/13
 
  PO   0957
 
Montelukast Sodium 10 MG DAILY 04/10 1000 AC 04/13
 
  PO   0957
 
Morphine Sulfate 2 MG Q6P PRN 04/10 0200 AC 04/14
 
  IV   0841
 
Nortriptyline HCl 10 MG QPM 04/10 2200 AC 04/13
 
  PO   2325
 
Omeprazole 40 MG DAILY AC 04/10 0700 AC 04/14
 
  PO   0544
 
Oxycodone/ 1 TAB Q6P PRN 04/10 0015 AC 04/13
 
Acetaminophen  PO   1729
 
Rifampin 300 MG BID 04/10 0116 AC 04/13
 
  PO   2322
 
Sotalol HCl 80 MG BID 04/12 1137 AC 04/13
 
  PO   2324
 
Tiotropium [place] 1 PUF DAILY 04/10 1400 AC 04/13
 
  INH   0955
 
 
 
 
Vital Signs & I&O
Last 24 Hrs of Vitals and I&O:
 Vital Signs
 
 
Date Time Temp Pulse Resp B/P Pulse O2 O2 Flow FiO2
 
     Ox Delivery Rate 
 
04/14 0800     94 Nasal 2.0L 
 
      Cannula  
 
04/14 0800 98.1 70 20 140/80 94 Nasal 2.0L 
 
      Cannula  
 
04/14 0758     94 Nasal 2.0L 
 
      Cannula  
 
04/14 0120 98.2 73 20 126/68 96 Nasal  
 
      Cannula  
 
04/14 0000      Nasal 2.0L 
 
      Cannula  
 
04/13 2325  78  126/68    
 
04/13 1610     97 Nasal 2.0L 
 
      Cannula  
 
04/13 1530 98.2 72 20 138/78 96 Nasal 2.0L 
 
      Cannula  
 
 
 Intake & Output
 
 
 04/14 1600 04/14 0800 04/14 0000
 
Intake Total  200 240
 
Output Total   200
 
Balance  200 40
 
    
 
Intake, Oral  200 240
 
Output, Urine   200
 
 
General Appearance: no apparent distress, alert, awake, comfortable
Head: atraumatic, normal appearance
Eyes:  Bilateral: PERRL. 
Neck: supple
Respiratory: decreased breath sounds, no wheezes rhonchi or rales
Cardiovascular: S1 and S2 heard, heart sounds are distant
Gastrointestinal: normal bowel sounds, soft, non-tender
Extremities: right lower extremity in a splint and dressed
Skin: intact, normal color, warm/dry
 
 
 
Impression/Plan
 
Impression/Plan
Impression/Plan:
1.  Stable respiratory status, chronic COPD which is oxygen dependent.
2.  Right ankle fracture following mechanical witnessed fall.  This will require
surgical fixation.
3.  Paraplegia secondary to previous MRSA infection of the spine, on chronic 
doxycycline and rifampin.
4.  PAF, SVT, QT prolongation - now on metoprolol, sotalol and Eliquis.  No 
overnight telemetry events reported.
5.  History of hypertension and hyperlipidemia.
6.  Obstructive sleep apnea, with intolerance to nasal CPAP.
7.  Mild leukocytosis - etiology?
Recommendations:
* The patient's respiratory status appears to be stable at present.  
* Check a follow-up CBC.
* Continue nebulizer treatments every 4 hours as needed.
* Continue Singular 10 mg daily.
* Continue Symbicort 80/4.5, 2 puffs every 12 hours.
* Continue Spiriva 1 inhalation every morning.
* IV Tylenol PRN.
* DVT prophylaxis at all times.
* Arrhythmia management per cardiology.
* We will repeat an outpatient sleep study evaluation after discharge.
* Await cardiac clearance and surgical intervention.

## 2017-05-22 ENCOUNTER — HOSPITAL ENCOUNTER (EMERGENCY)
Dept: HOSPITAL 68 - ERH | Age: 67
End: 2017-05-22
Payer: COMMERCIAL

## 2017-05-22 VITALS — BODY MASS INDEX: 28.25 KG/M2 | HEIGHT: 67 IN | WEIGHT: 180 LBS

## 2017-05-22 VITALS — DIASTOLIC BLOOD PRESSURE: 66 MMHG | SYSTOLIC BLOOD PRESSURE: 118 MMHG

## 2017-05-22 DIAGNOSIS — R29.810: ICD-10-CM

## 2017-05-22 DIAGNOSIS — I48.91: ICD-10-CM

## 2017-05-22 DIAGNOSIS — J18.9: Primary | ICD-10-CM

## 2017-05-22 DIAGNOSIS — J44.9: ICD-10-CM

## 2017-05-22 DIAGNOSIS — N39.0: ICD-10-CM

## 2017-05-22 DIAGNOSIS — Z79.01: ICD-10-CM

## 2017-05-22 DIAGNOSIS — Z99.81: ICD-10-CM

## 2017-05-22 LAB
ABSOLUTE GRANULOCYTE CT: 8 /CUMM (ref 1.4–6.5)
BASOPHILS # BLD: 0 /CUMM (ref 0–0.2)
BASOPHILS NFR BLD: 0.2 % (ref 0–2)
EOSINOPHIL # BLD: 1.3 /CUMM (ref 0–0.7)
EOSINOPHIL NFR BLD: 10.6 % (ref 0–5)
ERYTHROCYTE [DISTWIDTH] IN BLOOD BY AUTOMATED COUNT: 16.3 % (ref 11.5–14.5)
GRANULOCYTES NFR BLD: 66 % (ref 42.2–75.2)
HCT VFR BLD CALC: 41.3 % (ref 42–52)
LYMPHOCYTES # BLD: 2.2 /CUMM (ref 1.2–3.4)
MCH RBC QN AUTO: 29.8 PG (ref 27–31)
MCHC RBC AUTO-ENTMCNC: 32.7 G/DL (ref 33–37)
MCV RBC AUTO: 91 FL (ref 80–94)
MONOCYTES # BLD: 0.7 /CUMM (ref 0.1–0.6)
PLATELET # BLD: 244 /CUMM (ref 130–400)
PMV BLD AUTO: 9.1 FL (ref 7.4–10.4)
RED BLOOD CELL CT: 4.54 /CUMM (ref 4.7–6.1)
WBC # BLD AUTO: 12.2 /CUMM (ref 4.8–10.8)

## 2017-05-22 NOTE — RADIOLOGY REPORT
EXAMINATION:
XR PORTABLE CHEST
 
CLINICAL INFORMATION:
Shortness of breath
 
COMPARISON:
04/10/2017
 
TECHNIQUE:
Portable frontal view of the chest was obtained.
 
FINDINGS:
Low lung volumes. Persistent hazy left basilar opacity likely associated with
a small pleural effusion and associated airspace disease. No pneumothorax.
The cardiomediastinal silhouette is unchanged. No acute osseous abnormality.
 
IMPRESSION:
Hazy left basilar opacity is similar to previous. This likely represents a
combination of small pleural effusion with atelectasis/pneumonia.

## 2017-05-22 NOTE — ED AMS/SEIZURE/WEAK/DIZZY
History of Present Illness
 
General
Chief Complaint: Altered Mental Status
Stated Complaint: BIBA FOR ?CONFUSION FROM [facility] OFFICE
Source: patient, family, old records, W10
Exam Limitations: no limitations
 
Vital Signs & Intake/Output
Vital Signs & Intake/Output
 Vital Signs
 
 
Date Time Temp Pulse Resp B/P B/P Pulse O2 O2 Flow FiO2
 
     Mean Ox Delivery Rate 
 
05/22 1617 96.8 80 20 118/66  97 Nasal 2.0L 
 
       Cannula  
 
05/22 1445      94 Nasal 2.0L 
 
       Cannula  
 
05/22 1305 96.6 89 19 110/68  98 Nasal 2.0L 
 
       Cannula  
 
 
Allergies
Coded Allergies:
haloperidol (From HALDOL) (SWELLING 11/21/16)
heparin (SWELLING 11/21/16)
vancomycin (HIVES, SKIN CRACKES, TURNS RED, SWELLS AND PEELS 11/21/16)
warfarin (SWELLS, TURNS, RED, HIVES, SKIN CRACKS AND PEELS 11/21/16)
 
Reconcile Medications
Acetaminophen (Tylenol) 325 MG TABLET   325 MG PO Q6P PRN PAIN SCALE 1-3 (MILD)
Albuterol Sulfate 2.5 MG/3 ML (0.083 %) VIAL.NEB   1 Vial INH/SOL Q4P PRN 
SHORTNESS OF BREATH  (Reported)
Apixaban (Eliquis) 5 MG TABLET   5 MG PO BID atrial fibrillation
Ascorbate Calcium (Vitamin C) 500 MG TABLET   1 TAB PO BID SUPPLEMENT  (Reported
)
Aspirin (Ecotrin*) 81 MG TABLET.DR   1 TAB PO DAILY HEART  (Reported)
Atomoxetine HCl (Strattera) 40 MG CAPSULE   1 CAP PO QAM MENTAL HEALTH  (
Reported)
Baclofen 20 MG TABLET   1 TAB PO BID MUSCLE RELAXER  (Reported)
Bisacodyl (Dulcolax) 10 MG SUPP.RECT   1 SUP RC EOD GI  (Reported)
Budesonide/Formoterol Fumarate (Symbicort 80-4.5 Mcg Inhaler) 80 MCG-4.5 MCG/
ACTUATION HFA.AER.AD   2 PUF INH BID COPD
Doxycycline Hyclate 100 MG CAPSULE   1 CAP PO BID infection  (Reported)
Evolocumab (Repatha Sureclick) 140 MG/ML PEN.INJCTR   1 ML SC Q2W CHOLESTEROL  (
Reported)
Furosemide 20 MG TABLET   3 TAB PO Q48 DIURETIC  (Reported)
Gabapentin 600 MG TABLET   1 TAB PO TID NEUROPATHY  (Reported)
Guaifenesin (Mucinex) 600 MG TAB.ER.12H   2 TAB PO BID MUCUS  (Reported)
Lactobacillus Acidophilus (Acidophilus) 1 EACH CAPSULE   1 CAP PO BID PROBIOTIC 
(Reported)
Lorazepam (Ativan) 1 MG TABLET   1 TAB PO BID ANXIETY  (Reported)
Magnesium Oxide (Magnesium) 400 MG CAPSULE   1 CAP PO 0600 SUPPLEMENT  (Reported
)
Metoprolol Tartrate 25 MG TABLET   12.5 MG PO BID heart rate
Montelukast Sodium (Singulair) 10 MG TABLET   1 TAB PO DAILY ASTHMA  (Reported)
Multivitamin (Multi-Day Vitamins) 1 EACH TABLET   1 TAB PO DAILY SUPPLEMENT  (
Reported)
Nortriptyline HCl 10 MG CAPSULE   1 CAP PO QPM MENTAL HEALTH  (Reported)
Omeprazole 20 MG CAPSULE.DR   1 CAP PO DAILY GI  (Reported)
Oxycodone HCl/Acetaminophen (Oxycodone-Acetaminophen 5-325) 5 MG-325 MG TABLET  
1-2 TAB PO Q4H PRN PAIN  (Reported)
Pantoprazole Sodium (Protonix) 40 MG TABLET.DR   1 TAB PO DAILY ACID REFLUX  (
Reported)
Potassium Chloride 20 MEQ TAB.ER.PRT   1 TAB PO DAILY SUPPLEMENT  (Reported)
Rifampin (Rifadin) 300 MG CAPSULE   1 CAP PO BID MRSA PROPHYLAXIS  (Reported)
Sotalol (Betapace) 80 MG TABLET   80 MG PO BID AARYTHMIAS
Tiotropium Bromide (Spiriva) 18 MCG CAP.W.DEV   1 CAP INH DAILY BREATHING  (
Reported)
Trazodone HCl 50 MG TABLET   1 TAB PO QPM PRN SLEEP  (Reported)
 
Triage Note:
65 YO MALE BIBA FROM CARDIOLOGIST APPT. PER EMS,
 THEY WERE CALLED BECAUSE PT SEEMED ALTERED. ON EMS
 ARRIVAL PT A&O X3. ON ARRIVAL TO ER PT ALERT AND
 ORIENTED. STATES HE IS AT [facility] FOR
 REHAB D/T BROKEN R FOOT X1 MONTH AGO. STATES HE IS
 DUE TO GO HOME [place]. ARRIVES WITH PRADO IN
 PLACE, STATES THEY PLACED IT WHEN HE BROKE HIS
 FOOT, STATES HE NORMALLY STRAIGHT CATH'S HIMESELF
 AT HOME. PT ALSO NOTED WITH NONPRODUCTIVE COUGH
 STATES THAT HAS BEEN THERE FOR "AWHILE" PT ON 2L
 OXYGEN VIA NC AT BASELINE. PT HAS NO COMPLINANTS.
Triage Nurses Notes Reviewed? yes
Onset: Abrupt
Duration: day(s):
Timing: recent history
No Modifying Factors: none
HPI:
66-year-old male comes into emergency room for further evaluation of supposedly 
increased confusion and slurring of words and increased weakness and fatigue is 
been going on for the past 3 weeks.  Patient is currently coming from nursing 
facility.  He is supposed to be discharged tomorrow.  Patient had a recent 
fracture in his right lower extremity as well as new onset A. fib.  On Eliquis. 
He denies any chest pain.  He reports that he's been short of breath with 
coughing.  This is his normal baseline on oxygen.  Sees Dr. Hull.  Denies 
vomiting.  Denies any other system symptoms.  
(JAXSON DAWSON)
 
Past History
 
Travel History
Traveled to [place] past 21 day No
 
Medical History
Any Pertinent Medical History? see below for history
Neurological: SPINAL INFECTION
EENT: NONE
Cardiovascular: AFIB, hypertension, hyperlipidemia
Respiratory: asthma, COPD, OXYGEN DEPEND 2L
Gastrointestinal: NONE
Hepatic: NONE
Renal: neurogenic bladder, PRADO IN PLACE
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
Psychiatric: NONE
Endocrine: NONE
Blood Disorders: NONE
Cancer(s): NONE
GYN/Reproductive: NONE
History of MRSA: Yes
History of VRE: No
History of CDIFF: No
Influenza Vaccine: 10/01/16
 
Surgical History
Surgical History: non-contributory
 
Psychosocial History
Who do you live with Significant Other
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
What is your primary language English
Tobacco Use: Never used
 
Family History
Family History, If Any:
Relation not specified for:
  *No pertinent family history
 
Hx Contributory? No
(JAXSON DAWSON)
 
Review of Systems
 
Review of Systems
Constitutional:
Reports: see HPI. 
EENTM:
Reports: no symptoms. 
Respiratory:
Reports: see HPI. 
Cardiovascular:
Reports: no symptoms. 
GI:
Reports: no symptoms. 
Genitourinary:
Reports: see HPI. 
Musculoskeletal:
Reports: no symptoms. 
Skin:
Reports: no symptoms. 
Neurological/Psychological:
Reports: no symptoms. 
Hematologic/Endocrine:
Reports: no symptoms. 
Immunologic/Allergic:
Reports: no symptoms. 
All Other Systems: Reviewed and Negative
(JAXSON DAWSON)
 
Physical Exam
 
Physical Exam
General Appearance: alert, awake, comfortable
Head: atraumatic, normal appearance
Eyes:
Bilateral: normal appearance, PERRL, EOMI. 
Ears, Nose, Throat: normal pharynx, normal ENT inspection
Neck: normal inspection
Respiratory: no respiratory distress, decreased breath sounds
Cardiovascular: regular rate/rhythm
Gastrointestinal: soft
Back: normal inspection
Extremities: limited range of motion
Neurologic/Psych: awake, alert, oriented x 3, normal gait, normal mood/affect
Skin: intact, normal color
 
Core Measures
ACS in differential dx? No
CVA/TIA Diagnosis: No
Severe Sepsis Present: No
Septic Shock Present: No
(GENOVEVA DOWNEY,JAXSON)
 
Progress
Differential Diagnosis: arrythmia, alcohol intoxication, benign positional 
vertigo, CVA/stroke, dehydration, encephalitis, electrolyte imbalance, GI bleed,
hypoglycemia, hypoxia, intracranial Hem., intracranial mass/tumor, meningitis, 
pneumonia, presyncope, sepsis, UTI/pyelo
Plan of Care:
 Orders
 
 
Procedure Date/time Status
 
LACTIC ACID 05/22 1616 Active
 
CULTURE,URINE 05/22 1316 Active
 
BLOOD CULTURE 05/22 1316 Active
 
URINALYSIS 05/22 1316 Complete
 
TROPONIN LEVEL 05/22 1316 Complete
 
LACTIC ACID 05/22 1316 Complete
 
COMPREHENSIVE METABOLIC PANEL 05/22 1316 Complete
 
CBC WITHOUT DIFFERENTIAL 05/22 1316 Complete
 
B-TYPE NATRIURETIC PEP (BNP) 05/22 1316 Complete
 
EKG 05/22 1316 Active
 
 
 Laboratory Tests
 
 
 
05/22/17 1505:
Urine Color YEL, Urine Clarity TURBD  H, Urine pH >= 9.0  H, Ur Specific Gravity
<= 1.005, Urine Protein >=300  H, Urine Ketones NEG, Urine Nitrite POS  H, Urine
Bilirubin NEG, Urine Urobilinogen 1.0, Ur Leukocyte Esterase MOD  H, Ur 
Microscopic SEDIMENT EXAMINED, Urine RBC PACKD  H, Urine WBC 5-10  H, Urine 
Crystals 3+ TRIP PHOS  H, Urine Bacteria PACKD  H, Micro UA Comment MORE INFO:  
H, Urine Hemoglobin SMALL  H, Urine Glucose NEG
 
05/22/17 1425:
Anion Gap 8, Estimated GFR > 60, BUN/Creatinine Ratio 36.0  H, Glucose 91, 
Lactic Acid 0.9, Calcium 9.3, Total Bilirubin 0.6, AST 33, ALT 57, Alkaline 
Phosphatase 91, Troponin I < 0.01, Pro-B-Natriuretic Pept 408  H, Total Protein 
6.5, Albumin 3.2  L, Globulin 3.3, Albumin/Globulin Ratio 1.0  L, CBC w Diff NO 
MAN DIFF REQ, RBC 4.54  L, MCV 91.0, MCH 29.8, RDW 16.3  H, MPV 9.1, Gran % 66.0
, Lymphocytes % 17.8  L, Monocytes % 5.4, Eosinophils % 10.6  H, Basophils % 0.2
, Absolute Granulocytes 8.0  H, Absolute Lymphocytes 2.2, Absolute Monocytes 0.7
 H, Absolute Eosinophils 1.3, Absolute Basophils 0, PUBS MCHC 32.7  L
 Microbiology
05/22 1505  URINE ROUT: Urine Culture - RECD
05/22 1425  BLOOD: Blood Culture - RECD
05/22 1415  BLOOD: Blood Culture - RECD
 
Diagnostic Imaging:
Viewed by Me: Radiology Read.  Discussed w/RAD: Radiology Read. 
Radiology Impression:   SERVICE DATE: 05/22/ EXAM TYPE: RAD - XRY-
PORTABLE CHEST XRAY EXAMINATION: XR PORTABLE CHEST CLINICAL INFORMATION: 
Shortness of breath COMPARISON: 04/10/2017 TECHNIQUE: Portable frontal view of 
the chest was obtained. FINDINGS: Low lung volumes. Persistent hazy left basilar
opacity likely associated with a small pleural effusion and associated airspace 
disease. No pneumothorax. The cardiomediastinal silhouette is unchanged. No 
acute osseous abnormality. IMPRESSION: Hazy left basilar opacity is similar to 
previous. This likely represents a combination of small pleural effusion with 
atelectasis/pneumonia. DICTATED BY: RILEY GARZA MD  DATE/TIME DICTATED:05/
22/17 / 1407 :RAD.TESFAYE  DATE/TIME TRANSCRIBED:05/22/17 / 1407 
CONFIDENTIAL, DO NOT COPY WITHOUT APPROPRIATE AUTHORIZATION
Initial ED EKG: normal intervals, normal p-waves, normal QRS complex, normal 
sinus rhythm, rate (84)
Comments:
5/22/2017 5:17:09 PM
 
Patient clinically looks well.  He does not appear to be any type of distress.  
He is alert and oriented with no slurring of words here.  There is no loss of 
consciousness during these events.  They do not sound like they are TIA/stroke 
related.   one of the nurses in the emergency room spoke with the nursing 
facility and they report they have not witnessed any of these episodes and he's 
been completely fine.  He is due to get discharge tomorrow.  Case discussed with
Dr. de los santos.  Patient understands and agrees with plan of care.  Reevaluated 
multiple times.  He was instructed that the patient be started on Levaquin.  
This will cover possible UTI versus pneumonia.  Waiting on urine culture.
(JAXSON DAWSON)
 
Departure
 
Departure
Disposition: ACUTE REHAB FACILITY
Condition: Stable
Clinical Impression
Primary Impression: Pneumonia
Secondary Impressions: UTI (urinary tract infection)
Referrals:
JUAQUIN JONES,RIGOBERTO YANG (PCP/Family)
 
Additional Instructions:
Patient to be started on oral Levaquin 750 for 5 days.  Have urine culture 
followed up.  Have patient rechecked by primary care doctor or . nursing 
facility within the next 48 hours.  Return if any other concerns worsening 
symptoms.
 
Please go over all results of today's visit with your primary care doctor.  
Contact your primary care doctor to let them know you were here in the emergency
room.  There may be nonspecific findings which may not be related to your visit 
today here in the emergency room but may require further evaluation and chronic 
monitoring by your primary care doctor.  If you had a laceration today the 
chance of foreign body always remains. You should follow-up with your primary 
care doctor for recheck in 3-5 days for a wound check.  If you had an x-ray done
there is a chance that a fracture could have been missed on initial read and you
should follow-up with your primary care doctor for repeat x-rays if symptoms 
persist.  If your blood pressure was elevated here in the emergency room please 
have rechecked by her primary care doctor within the next 48 hours by your 
primary care doctor.  If you were prescribed a narcotic here in the emergency 
room or any type of controlled substances you're not allowed to drive while 
taking this medication or operate any type of heavy machinery.  Narcotics can 
make you feel lightheaded dizziness nausea and can cause constipation.  You may 
need to  a stool softener.  Thank you for choosing [facility] 
emergency room.  Please return to the emergency room immediately if you have any
other concerns worsening of symptoms.
 
Departure Forms:
Customer Survey
General Discharge Information
(JAXSON DAWSON)
 
PA/NP Co-Sign Statement
Statement:
ED Attending supervision documentation-
 
[X] I saw and evaluated the patient. I have also reviewed all the pertinent lab 
results and diagnostic results. I agree with the findings and the plan of care 
as documented in the PA's/NP's documentation. 
 
[X] I have reviewed the ED Record and agree with the PA's/NP's documentation.
 
[] Additions or exceptions (if any) to the PAs/NP's note and plan are 
summarized below:
[]
 
(OJ JONES,CASTILLO)

## 2017-07-18 ENCOUNTER — HOSPITAL ENCOUNTER (INPATIENT)
Dept: HOSPITAL 68 - ERH | Age: 67
LOS: 7 days | Discharge: HOME HEALTH | DRG: 190 | End: 2017-07-25
Attending: INTERNAL MEDICINE | Admitting: INTERNAL MEDICINE
Payer: COMMERCIAL

## 2017-07-18 VITALS — BODY MASS INDEX: 34.66 KG/M2 | WEIGHT: 233.99 LBS | HEIGHT: 69 IN

## 2017-07-18 DIAGNOSIS — G47.33: ICD-10-CM

## 2017-07-18 DIAGNOSIS — T42.4X5A: ICD-10-CM

## 2017-07-18 DIAGNOSIS — E66.2: ICD-10-CM

## 2017-07-18 DIAGNOSIS — N31.9: ICD-10-CM

## 2017-07-18 DIAGNOSIS — T43.215A: ICD-10-CM

## 2017-07-18 DIAGNOSIS — J98.11: ICD-10-CM

## 2017-07-18 DIAGNOSIS — G82.20: ICD-10-CM

## 2017-07-18 DIAGNOSIS — J44.0: Primary | ICD-10-CM

## 2017-07-18 DIAGNOSIS — Z99.81: ICD-10-CM

## 2017-07-18 DIAGNOSIS — I48.91: ICD-10-CM

## 2017-07-18 DIAGNOSIS — J15.6: ICD-10-CM

## 2017-07-18 DIAGNOSIS — Z79.01: ICD-10-CM

## 2017-07-18 DIAGNOSIS — I10: ICD-10-CM

## 2017-07-18 DIAGNOSIS — E87.2: ICD-10-CM

## 2017-07-18 DIAGNOSIS — Z87.01: ICD-10-CM

## 2017-07-18 DIAGNOSIS — E78.5: ICD-10-CM

## 2017-07-18 DIAGNOSIS — J44.1: ICD-10-CM

## 2017-07-18 DIAGNOSIS — R41.82: ICD-10-CM

## 2017-07-18 DIAGNOSIS — F32.9: ICD-10-CM

## 2017-07-18 DIAGNOSIS — N39.498: ICD-10-CM

## 2017-07-18 DIAGNOSIS — I25.2: ICD-10-CM

## 2017-07-18 DIAGNOSIS — J96.22: ICD-10-CM

## 2017-07-18 LAB
ABSOLUTE GRANULOCYTE CT: 10.2 /CUMM (ref 1.4–6.5)
BASOPHILS # BLD: 0 /CUMM (ref 0–0.2)
BASOPHILS NFR BLD: 0.1 % (ref 0–2)
EOSINOPHIL # BLD: 0.4 /CUMM (ref 0–0.7)
EOSINOPHIL NFR BLD: 2.7 % (ref 0–5)
ERYTHROCYTE [DISTWIDTH] IN BLOOD BY AUTOMATED COUNT: 15.2 % (ref 11.5–14.5)
GRANULOCYTES NFR BLD: 75.6 % (ref 42.2–75.2)
HCT VFR BLD CALC: 38.5 % (ref 42–52)
LYMPHOCYTES # BLD: 2.2 /CUMM (ref 1.2–3.4)
MCH RBC QN AUTO: 28.9 PG (ref 27–31)
MCHC RBC AUTO-ENTMCNC: 32 G/DL (ref 33–37)
MCV RBC AUTO: 90.3 FL (ref 80–94)
MONOCYTES # BLD: 0.8 /CUMM (ref 0.1–0.6)
PLATELET # BLD: 382 /CUMM (ref 130–400)
PMV BLD AUTO: 7.5 FL (ref 7.4–10.4)
RED BLOOD CELL CT: 4.27 /CUMM (ref 4.7–6.1)
WBC # BLD AUTO: 13.5 /CUMM (ref 4.8–10.8)

## 2017-07-18 NOTE — NUR
MOVED TO ROOM 8, SETTLED, CALL LIGHT AND PO FLUIDS IN REACH ALONG WITH SPUTUM
CUP.  REPORT GIVEN TO NIGHT SHIFT, PT AWARE THAT HE WILL BE HELD OVER R/T NO
AVAILABLE CONTACT ISO BEDS UPSTAIRS.

## 2017-07-18 NOTE — NUR
BIBA FROM HOME. REPORTS HE CALLED DR LANZA TO
REPORT ?UTI SX (ODOR AND "LEAKING" INCONTINENCE
WHICH PT THEN REPORTS HAS BECOME CHRONIC; PT WITH
HX OF NEUROGENIC BLADDER WITH PREVIOUS CHRONIC
PRADO NOW REMOVED AND STRAIGHT CATHS SELF
[id number]).  REPORTS MD DID NOT ORDER ANY OUTPT
LABS, "TOLD ME I NEEDED ADMISSION AND HE WANTS TO
TAKE ME TO THE OR TO TAKE A LOOK IN MY BLADDER ON
THURSDAY."  PT HAD AN OLD PRESCRIPTION FOR CIPRO
WITH 1 REFILL LEFT WHICH HE BEGAN TAKING.  HAS
RECENTLY BEEN TX FOR PNA, STILL WITH OCC COUGH AND
LOW GRADE FEVERS.

## 2017-07-18 NOTE — NUR
PT CLEANED/CHANGED (INCONT OF URINE).  ST CATH PER PT REQUEST  CC CLEAR
YELLOW. PT REQUESTING ORDER BE FOR PRN/PER PT REQUEST AS HE HAS NO CONSISTANT
SCHEDULE AND IS ABLE TO TELL WHEN HE NEEDS ST CATH, REPORTS HE WAKES AT NIGHT
WHEN CATH IS NEEDED AS WELL.  ORDER OBTAINED.  SKIN INTACT.  MEDICATED PER
EMAR.  PT HAS HAD SPUTUM CUP FOR SEVERAL HOURS, UNABLE TO EXPECTORATE SAMPLE,
AWARE OF NEED AND WILL ATTEMPT AS ABLE.  AWAITING BED ASSIGNMENT. PO FLUIDS
PROVIDED, PT ATE DINNER BROUGHT BY SIGNIFICANT OTHER. ALSO STATES SIGNIFICANT
OTHER, OLY, IS HIS POA AND MAY RECIEVE UPDATES AT ANY TIME WITH ANY
INFORMATION OLY REQUESTS.

## 2017-07-18 NOTE — HISTORY & PHYSICAL
LYNEN JONES,AR 07/18/17 2225:
General Information and HPI
MD Statement:
I have seen and personally examined DAYDAY MANDUJANO and documented this H&P.
 
The patient is a 66 year old M who presented with a patient stated chief 
complaint of [referred by Dr. Dykes].
 
Source of Information: patient
Exam Limitations: no limitations
History of Present Illness:
pt is 66 year obese male with a history of COPD (O2 2L at home), bilateral lower
extremity edema treated with every other day lasix dosing, muscle spasms, GARRY, 
HTN, hyperlipidemia, history of atrial fibrillation on Eliquis, asthma, 
neurogenic bladder, depression, chronic constipation, CAD s/p stent placement, 
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 
rifampin and doxycycline. He was sent to the ED by Dr. Dykes, his urologist.  
He c/o urinary leakage.  The pt uses a straight catheter, and has never 
experienced leakage between catheterizations before.  The pt also reports that 
recently his urine has been malodorous.   He denies any pain or discomfort 
assocaited with urination.  He also calims that several weeks ago he had penile 
discharge which has since resolved. The pt also reports that since his discharge
in May 2017 for pneumonia he has continued to experience a dry productive cough 
that has not improved.  Pt reports having fever, chills, night sweats and denies
SOB, chest pain, and dyspnea.
 
Allergies/Medications
Allergies:
Coded Allergies:
heparin (SWELLING 07/18/17)
vancomycin (HIVES, SKIN CRACKES, TURNS RED, SWELLS AND PEELS 07/18/17)
 
Home Med list
Albuterol Sulfate 2.5 MG/3 ML (0.083 %) VIAL.NEB   1 Vial INH/SOL Q4P PRN 
SHORTNESS OF BREATH  (Reported)
Albuterol Sulfate (Ventolin Hfa) 90 MCG HFA.AER.AD   2 PUF INH AD PRN COPD  (
Reported)
Apixaban (Eliquis) 5 MG TABLET   5 MG PO BID atrial fibrillation
Ascorbate Calcium (Vitamin C) 500 MG TABLET   1 TAB PO BID SUPPLEMENT  (Reported
)
Atomoxetine HCl (Strattera) 40 MG CAPSULE   1 CAP PO QAM MENTAL HEALTH  (
Reported)
Baclofen 20 MG TABLET   1 TAB PO BID MUSCLE RELAXER  (Reported)
Bisacodyl (Dulcolax) 10 MG SUPP.RECT   1 SUP RC EOD GI  (Reported)
Budesonide/Formoterol Fumarate (Symbicort 80-4.5 Mcg Inhaler) 80 MCG-4.5 MCG/
ACTUATION HFA.AER.AD   2 PUF INH BID COPD
Cannabis (Marijuana Oil) (Unknown Strength) AMP   (Unknown Dose) INH DAILY PAIN 
(Reported)
Doxycycline Hyclate 100 MG CAPSULE   1 CAP PO BID infection  (Reported)
Evolocumab (Repatha Sureclick) 140 MG/ML PEN.INJCTR   1 ML SC Q2W CHOLESTEROL  (
Reported)
Furosemide 20 MG TABLET   3 TAB PO Q48 DIURETIC  (Reported)
Gabapentin 600 MG TABLET   1 TAB PO TID NEUROPATHY  (Reported)
Guaifenesin (Mucinex) 600 MG TAB.ER.12H   2 TAB PO BID MUCUS  (Reported)
Lactobacillus Acidophilus (Acidophilus) 1 EACH CAPSULE   1 CAP PO BID PROBIOTIC 
(Reported)
Lorazepam (Ativan) 1 MG TABLET   1 TAB PO BID PRN ANXIETY  (Reported)
Magnesium Oxide (Magnesium) 400 MG CAPSULE   1 CAP PO 0600 SUPPLEMENT  (Reported
)
Metoprolol Tartrate 25 MG TABLET   12.5 MG PO BID heart rate
Montelukast Sodium (Singulair) 10 MG TABLET   1 TAB PO DAILY ASTHMA  (Reported)
Multivitamin (Multi-Day Vitamins) 1 EACH TABLET   1 TAB PO DAILY SUPPLEMENT  (
Reported)
Nortriptyline HCl 10 MG CAPSULE   1 CAP PO QPM MENTAL HEALTH  (Reported)
Omeprazole 20 MG CAPSULE.DR   1 CAP PO DAILY GI  (Reported)
Oxycodone HCl/Acetaminophen (Oxycodone-Acetaminophen 5-325) 5 MG-325 MG TABLET  
1-2 TAB PO Q4H PRN PAIN  (Reported)
Pantoprazole Sodium (Protonix) 40 MG TABLET.DR   1 TAB PO DAILY ACID REFLUX  (
Reported)
Potassium Chloride 20 MEQ TAB.ER.PRT   1 TAB PO DAILY SUPPLEMENT  (Reported)
Rifampin (Rifadin) 300 MG CAPSULE   1 CAP PO BID MRSA PROPHYLAXIS  (Reported)
Sotalol (Betapace) 80 MG TABLET   80 MG PO BID AARYTHMIAS
Tiotropium Bromide (Spiriva) 18 MCG CAP.W.DEV   1 CAP INH DAILY BREATHING  (
Reported)
Trazodone HCl 50 MG TABLET   1 TAB PO QPM PRN SLEEP  (Reported)
Umeclidinium Bromide (Incruse Ellipta) (Unknown Strength) BLST.W.DEV   (Unknown 
Dose) UNKNOWN  (Reported)
 
 
Past History
 
Travel History
Traveled to [place] past 21 day No
 
Medical History
Neurological: C6-7 ABSCESS
EENT: NONE
Cardiovascular: AFIB, hypertension, hyperlipidemia, myocardial infarction
Respiratory: asthma, COPD, OXYGEN DEPEND 2L
Gastrointestinal: NONE
Hepatic: NONE
Renal: neurogenic bladder
Musculoskeletal: PARAPLEGIA R/T ABSCESS
Psychiatric: NONE
Endocrine: NONE
Blood Disorders: NONE
Cancer(s): NONE
GYN/Reproductive: NONE
History of MRSA: Yes
History of VRE: No
History of CDIFF: No
Isolation History: Contact
Influenza Vaccine: 10/01/16
 
Surgical History
Surgical History: non-contributory
 
Past Family/Social History
 
Family History
Relations & Conditions if any
Relation not specified for:
  *No pertinent family history
 
 
Psychosocial History
Where do you live? Home
Who Do You Live With? partner
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
Primary Language: English
Smoking Status: Former Smoker
ETOH Use: denies use
Illicit Drug Use: marijuana
 
Functional Ability
ADLs
Needs Assist: dressing, eating, toileting, bathing. 
Ambulation: wheelchair
IADLs
Independent: shopping, housework, finances, food prep, telephone, transportation
, medication admin. 
 
Review of Systems
 
Review of Systems
Constitutional:
Reports: see HPI. 
EENTM:
Reports: no symptoms. 
Cardiovascular:
Reports: edema.  Denies: chest pain. 
Respiratory:
Reports: cough, sputum production, wheezing.  Denies: hemoptysis. 
GI:
Reports: no symptoms. 
Genitourinary:
Reports: see HPI. 
Musculoskeletal:
Reports: no symptoms. 
Skin:
Reports: no symptoms. 
Neurological/Psychological:
Reports: no symptoms. 
Hematologic/Endocrine:
Reports: no symptoms. 
Immunologic/Allergic:
Reports: no symptoms. 
 
Exam & Diagnostic Data
Last 24 Hrs of Vital Signs/I&O
 Vital Signs
 
 
Date Time Temp Pulse Resp B/P B/P Pulse O2 O2 Flow FiO2
 
     Mean Ox Delivery Rate 
 
07/19 0806 98.1 90 20 177/77  99 Nasal 2.0L 
 
       Cannula  
 
07/19 0550 97.2 95 17 178/83  95 Nasal 2.0L 
 
       Cannula  
 
07/19 0149 97.8 95 16 177/77  94 Nasal 2.0L 
 
       Cannula  
 
07/18 2249 97.7 106 20 153/67  96 Nasal 2.0L 
 
       Cannula  
 
07/18 2143      96 Nasal 2.0L 
 
       Cannula  
 
07/18 2058 98.9 104 20 177/81  98 Nasal 2.0L 
 
       Cannula  
 
07/18 1742 97.9 100 18 148/69  98 Nasal 2.0L 
 
       Cannula  
 
 
 Intake & Output
 
 
 07/19 1600 07/19 0800 07/19 0000
 
Intake Total   
 
Output Total   225
 
Balance   -225
 
    
 
Output, Urine   225
 
Patient   252 lb
 
Weight   
 
Weight   Bed scale
 
Measurement   
 
Method   
 
 
 
 
Physical Exam
General Appearance Alert, Oriented X3, Cooperative, No Acute Distress
Skin No Rashes, No Breakdown, No Significant Lesion
Skin Temp/Moisture Exam: Warm/Dry
Sepsis Skin Exam (color): Normal for Ethnicity
HEENT Atraumatic, PERRLA, EOMI, Mucous Membr. moist/pink
Neck Supple, No JVD, +2 Carotid Pulse wo Bruit
Lymphatic Cervical nl
Cardiovascular Regular Rate, Normal S1, Normal S2, No Murmurs
Lungs B/L expiratory wheezing
Abdomen Normal Bowel Sounds, Soft, No Hepatospenomegaly, No Masses, mild TTP
Neurological Normal Speech, Cranial Nerves 3-12 NL
Extremities trace edema of the lower extremities B/L
Vascular Normal Pulses, Pulses Symmetrical
Sepsis Peripheral Pulse Location: Dorsalis Pedis
Sepsis Peripheral Pulse Exam: Normal
Sepsis Cap Refill Exam: <2 Sec
 
Diagnostic Data
CXR Results
IMPRESSION:
Retrocardiac opacification and obscuration of the left hemidiaphragm
suspicious for a left lower lobe infiltrate.
 
Recommendation is for a followup chest series to be obtained following treatment
and/or resolution of symptoms to assure resolution of this appearance.
 
Assessment/Plan
Assessment:
pt is 66 year obese male with a history of COPD , bilateral lower extremity 
edema, HTN, hyperlipidemia, history of atrial fibrillation on Eliquis, asthma, 
neurogenic bladder, depression, chronic constipation, muscle spasms, GARRY, CAD s/
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 
abscess. He presented to the ED complaining of urinary leakage, malodorous urine
, persistent cough since his last admission for pneumonia, and fever.
 
#CAP
CXR shows left lower lobe infiltrate, cough has not resolved since admission and
treatment for previous pneumonia. WBC: 13.5. Previously culture positive for 
psuedomonas
- Ceftazidime Q8
- F/u cultures and change antibiotics as needed
- ID consult
 
#COPD exacerbation
- prednisone taper 40 x 2 days, 30 x 2 days, 20 x 2 days, 10 x 2 days
- Pulmonary consult with Dr. Hull
- azithromycin for antiinflamatory properties
- O2 nasal canula as needed
- TRC consult
- Albuterol
- Montelukast 10 mg daily
- f/u cbc
 
#possible UTI
UA significant for UrWBC 10-15, LE small, trace proteins, negative ntirites. Pt 
has hx of neurogenic bladder with frequent straight catheterizations (3-5 times 
daily)
-consider urine culture if pt spikes fever
- bladder this scan q8 if residual volume is 350cc, straight cath.
- Follow-up urine cultures.  
- urology consult with Dr. Dykes, cystoscopy if recommended
 
#history of muscle spasms
-c/w baclofen
 
#history of AFIB
- c/w eloquis BID
 
#chronic lower extremity edema
-c/w every other day lasix regimen. Next dose 7/19
 
#history of hyperlipidemia
- c/w IM evolocumab every two weeks. Next dose 7/23
 
#history of GARRY
- pt refuses CPAP
  
 
#DVT prophylaxis
- eliquis
 
#Code status
DNR/DNI 
 
As Ranked By This Provider
Problem List:
 1. COPD exacerbation
 
 2. Pneumonia
 
 3. UTI (urinary tract infection)
 
 
Core Measures/Miscellaneous
 
Acute Coronary Syndrome
ACS Diagnosis: No
 
Cerebrovascular Accident
CVA/TIA Diagnosis: No
 
Congestive Heart Failure
CHF Diagnosis: No
 
VTE (View Protocol)
VTE Risk Factors: Age > 40, Immobility, paresis, Obesity
[facility] prophylaxis d/t: No contraindications
No VTE Pharm Prophylaxis d/t: No contraindications
VTE Diagnosis: No
VTE Type: NONE
VTE Confirmed by (Test): NONE
 
Sepsis (View Protocol)
Severe Sepsis Present: No
 
Septic Shock
Septic Shock Present: No
 
Miscellaneous Documentation
Attending Case Discussed With:
MARK SILVEIRA MD
 
Primary Care Physician:
RIGOBERTO REZA MD
 
Patient sees these Specialists
Dr. Della Dykes
Level of Patient Care: General Medicine
Consults Needed: 1 
   Consulting Specialty: Pulmonary Disease
Consults Needed: 2 
   Consulting Specialty: Urology
 
 
COREY MONTOYA MD 07/19/17 0527:
Resident Review Statement
Resident Statement: examined this patient, discussed with intern, agreed with 
intern
Other Findings:
Mr Mandujano is a 66 year old gentleman with past medical history of COPD on 2 L 
home oxygen, bilateral lower extremity (on Lasix Q48), hypertension, 
hyperlipidemia, history of atrial fibrillation on Eliquis, history of asthma, 
history of neurogenic bladder, depression, chronic constipation, coronary artery
disease status post stent placement, IVC filter placement, infectious of MRSA of
the spine and paraplegia on rifampin and doxycycline for chronic suppression 
therapy who presented to the emergency department complaining of fatigue, a 
cough, and a fever.  Since patient discharge in May 2017 he has continued to 
experience a dry productive cough.  
In addition to the above, the patient also compalins of increased urinary 
frequency and urinary leakage.  Patient states that he visited the office of Dr. Martin who recommended that he come to the emergency department for additional 
workup.  He normally straight cath's himself. The patient also states that in 
addition to the above he has noted that his urine has a distinct odor to it.  
 
He also endorses decreased appetite. 
Patient follows up with Dr. Hull
Patient follows up with Dr. Ayala his cardiologist.
Patients Urologist is Dr Dykes.
 
R: Patient did endorse fever, chills and night sweats.  He denied any chest pain
, shortness of breath, wheezing or dyspnea and dyspnea on exertion.
 
E: 
HEENT: extraocular motion intact, no nystagmus. Pupils equally round and 
reactive to light and accommodation. Nose is atraumatic. External auditory canal
and Tympanic membranes clear. Pharynx normal. No swelling or edema. No Cervical 
lymphadenopathy. On Supplemental O2.  
Neck: Supple, no lymphadenopathy, normal range of motion without pain or 
tenderness
Back: Nontender.
Cardiovascular: Regular rate and rhythm no murmurs rubs or gallops.
Respiratory: Chest nontender. Expiratory Wheezing Bilaterally. 
Abdomen: Soft, tender with light palpation. nondistended, no appreciable 
organomegaly. Bowel sounds normal. No ascites, no rebound or guarding.
Extremity: No edema, no calf tenderness to palpation, normal and equal pulses.
Neuro: Alert oriented to person and place,motor sensory normal,  CN II to XII 
wnl. 
 
L:White cell count 13.5.  H&H 12.3 and 38.5.  Platelets 382.  Electrolytes 
sodium 134, potassium 4.2, BUN/creatinine 14 and 0.6 respectively.  CL 96.  HCO3
33.
Small amount of leukocyte esterase on urine.
 
 
I: 
EXAM TYPE: RAD - XRY-CHEST XRAY, PA AND LATERAL
IMPRESSION:
Retrocardiac opacification and obscuration of the left hemidiaphragm
suspicious for a left lower lobe infiltrate.
 
Recommendation is for a followup chest series to be obtained following
treatment and/or resolution of symptoms to assure resolution of this
appearance.
 
A/P
Mr Mandujano is a 66 year old gentleman extensive past medical history of who 
presented to the emergency department complaining of fatigue, a cough, and a 
fever. 
 
#CAP with synergistic exacerbation of COPD.
Admit the patient to general med.
IV ceftazidime every 8 hours.  Patient has a history of pseudomonal growth July 2013.  Sensitive to ceftazidime, ciprofloxacin, gentamicin and meropenem.
Follow-up cultures tailor antibiotics appropriately.
Infectious diseases consultation.
* Exacerbation of COPD.
We will treat the patient for COPD exacerbation with a prednisone taper. 40 * 2,
30 * 2, 20 * 2, 10*2 
Obtain pulmonology consultation with patient's, pulmonologist NICOLE Hull MD.
Patient will also benefit from IV azithromycin for antiinflamatory properties 
and atypical viral coverage. 
Supplemental oxygen as needed.  
TRC Nebs  
Albuterol Sulfate.  
Montelukast 10 MG Daily.  
Repeat CBC in a.m.
 
History of neurogenic bladder and possible UTI.
Patient spikes may consider repeating urine culture.
Bladder this scan every 8 hours if residual volumes are greater than 350 
straight cath.
Follow-up urine cultures.  Obtain urology consult with Dr. Dykes.  Patient may 
be eligible for cystoscope.
 
#History of Paraparesis
Conitue chronic suppressive therapy with doxycycline and Rifampin
 
History of muscle spasms.  
Continue Baclofen. 
 
History of atrial fibrillation 
Continue Eliquis twice a day.  
 
Chronic Lower Extremity Edema 
Continue Lasix every 48 hours.  Next of 7/19/2017.  
 
History of Hyperlipidemia 
Continue IM shot of Evolocumab Every Two Weeks, Next shot, due Sunday. 
 
Patient does have a history of obstructive sleep apnea however denies use of 
CPAP adamantly.  
 
Consider PT eval in a.m.
 
DVT prophylaxis Eliquis.
 
Code 
DNR/DNI 
 
 
 
JORDANA JONES, \[facility]\"" 07/19/17 0643:
Attending MD Review Statement
 
Attending Statement
Attending MD Statement: examined this patient, discuss w/resident/PA/NP, agreed 
w/resident/PA/NP
Attending Assessment/Plan:
67 yo morbidly obese M with h/o chronic hypoxic respiratory failure, stage IV 
COPD on 2L O2, Afib on Eliquis, CAD s/p stent, HTN, MRSA bacteremia/ 
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 
paraparesis now on chronic suppressive therapy with doxycycline and rifampin, 
neurogenic bladder on straight cath protocol, recurrent UTI, DVT/PE s/p IVC 
filter, GARRY not using CPAP, recently admitted for right ankle fracture/ Afib 
with RVR (April 2017 discharged to [facility]); is here with c/o persistent cough, 
fatigue and subjective fever. He notes foul smell to his urine and has noted 
urine leaking, despite of him doing the straight cath protocol. He was recently 
treated with Levaquin for pneumonia (May 2017 while at [facility]) and for a UTI with 
Cipro (July 10th). He reports that Dr. Dykes asked him to come to the ER for 
treatment of his UTI.
VSS. Exam: AAO, in mild respiratory distress, Chest b/l reduced air entry, 
expiratory wheeze++, scattered rhonchi+, Heart S1S2 regular, Abd soft, NT, LE: 
paraplegic, contractures. Labs: WBC 13.5, bicarb 33, UA trace protein and 
ketones, small LE, WBC 10-15. CXR s/o retrocardiac opacification and LLL 
infiltrate. 
 
1. Community acquired pneumonia with COPD exacerbation. GM admit, TRC nebs, 
sputum culture, urine legionella and strep pneumo Ag, gentle hydration, IV 
Ceftaz (grown pseudomonas in sputum in the past) and azithro, he is already on 
doxy/rifampin (chronic suppression). Rapid prednisone taper and nebs. He needs 
Pulm consult (Dr. Hull). ID consult to help with antibiotic management.
2. Recurrent UTI. His urine is not very impressive. Patient states, dr. Dykes 
plans to scope him on Thursday. Will give courtesy call to Dr. Dykes.
DVT ppx Eliquis. DNR/I.

## 2017-07-18 NOTE — RADIOLOGY REPORT
EXAMINATION:
CHEST 2 VIEWS
 
CLINICAL INFORMATION:
Cough, shortness of breath.
 
COMPARISON:
05/22/2017.
 
TECHNIQUE:
PA and lateral views of the chest were obtained.
 
FINDINGS:
The cardiac silhouette is not enlarged. The mediastinal and hilar contours
are unremarkable. There is obscuration of the left hemidiaphragm and faint
retrocardiac opacification. The osseous structures are unremarkable.
 
IMPRESSION:
Retrocardiac opacification and obscuration of the left hemidiaphragm
suspicious for a left lower lobe infiltrate.
 
Recommendation is for a followup chest series to be obtained following
treatment and/or resolution of symptoms to assure resolution of this
appearance.

## 2017-07-18 NOTE — NUR
ST CATH FOR URINE SAMPLE, 50CC OBTAINED AND TRIO SENT TO LAB.  PT ALERT WITH NO
COMPLAINTS PRESENTLY WHILE AWAITING RESULTS.

## 2017-07-18 NOTE — ED GENERAL ADULT
History of Present Illness
 
General
Chief Complaint: Male Genitourinary Problems
Stated Complaint: BIBA FOR ?UTI
Source: patient, old records
Exam Limitations: no limitations
 
Vital Signs & Intake/Output
Vital Signs & Intake/Output
 Vital Signs
 
 
Date Time Temp Pulse Resp B/P B/P Pulse O2 O2 Flow FiO2
 
     Mean Ox Delivery Rate 
 
 2307 98.6 76 16 144/70  94   
 
 2221    150/64     
 
 1927      94 Nasal 2.0L 
 
       Cannula  
 
 1600      93 Nasal 2.0L 
 
       Cannula  
 
 1521 97.9 78 16 148/68  93 Nasal 2.0L 
 
       Cannula  
 
 0925  74  126/70     
 
 0925      95 Nasal 2.0L 
 
       Cannula  
 
 0910      95 Nasal 2.0L 
 
       Cannula  
 
 0800      93 Nasal 2.0L 
 
       Cannula  
 
 0743 98.1 74 20 127/71  91 Nasal 2.0L 
 
       Cannula  
 
 0000       Nasal 2.0L 
 
       Cannula  
 
 2350 98.1 81 22 156/73  93 Nasal 2.0L 
 
       Cannula  
 
 
 ED Intake and Output
 
 
  0000  1200
 
Intake Total 2400 800
 
Output Total 4150 1125
 
Balance -1750 -325
 
   
 
Intake,  800
 
Intake, Oral 1600 
 
Number 0 
 
Bowel  
 
Movements  
 
Output, Urine 4150 1125
 
 
Allergies
Coded Allergies:
heparin (SWELLING 17)
vancomycin (HIVES, SKIN CRACKES, TURNS RED, SWELLS AND PEELS 17)
 
Triage Note:
BIBA FROM HOME. REPORTS HE CALLED DR LANZA TO
 REPORT ?UTI SX (ODOR AND "LEAKING" INCONTINENCE
 WHICH PT THEN REPORTS HAS BECOME CHRONIC; PT WITH
 HX OF NEUROGENIC BLADDER WITH PREVIOUS CHRONIC
 PRADO NOW REMOVED AND STRAIGHT CATHS SELF
 [id number]).  REPORTS MD DID NOT ORDER ANY OUTPT
 LABS, "TOLD ME I NEEDED ADMISSION AND HE WANTS TO
 TAKE ME TO THE OR TO TAKE A LOOK IN MY BLADDER ON
 THURSDAY."  PT HAD AN OLD PRESCRIPTION FOR CIPRO
 WITH 1 REFILL LEFT WHICH HE BEGAN TAKING.  HAS
 RECENTLY BEEN TX FOR PNA, STILL WITH OCC COUGH AND
 LOW GRADE FEVERS.
Triage Nurses Notes Reviewed? yes
Onset: Gradual
Duration: constant, getting worse
Timing: recent history
Injury Environment: home
Severity: moderate
Severity Numbers: 6
HPI:
Patient is a 66-year-old male with past medical history of COPD currently on 2 L
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 
chronically on long-term doxycycline and rifampin, CAD, endocarditis, paroxysmal
atrial fibrillation not on anticoagulation, DVT-PE with IVC filter, hypertension
, hyperlipidemia and neurogenic bladder where patient self catheterizations and 
chronic UTIs who presents emergency room in which patient approximate 4 weeks 
ago had left [facility].  Patient was transferred from [facility] during 
admission for right ankle fracture where he received approximately one month of 
rehabilitation where he states while in rehabilitation he developed pneumonia 
and since his discharge while he lives at home in a private residence he's been 
complaining of not feeling well, fevers noted at home between 101-103, 
persistent cough and generalized weakness and fatigue.  
Denies any headache chest pain and arm pain jaw pain nausea vomiting.  Patient 
also has concerns of urine tract infection
(LAURA DOWNEY,LYLE)
Reconcile Medications
Albuterol Sulfate 2.5 MG/3 ML (0.083 %) VIAL.NEB   1 Vial INH/SOL Q4P PRN 
SHORTNESS OF BREATH  (Reported)
Albuterol Sulfate (Ventolin Hfa) 90 MCG HFA.AER.AD   2 PUF INH AD PRN COPD  (
Reported)
Apixaban (Eliquis) 5 MG TABLET   5 MG PO BID atrial fibrillation
Ascorbate Calcium (Vitamin C) 500 MG TABLET   1 TAB PO BID SUPPLEMENT  (Reported
)
Atomoxetine HCl (Strattera) 40 MG CAPSULE   1 CAP PO QAM MENTAL HEALTH  (
Reported)
Baclofen 20 MG TABLET   1 TAB PO BID MUSCLE RELAXER  (Reported)
Bisacodyl (Dulcolax) 10 MG SUPP.RECT   1 SUP RC EOD GI  (Reported)
Budesonide/Formoterol Fumarate (Symbicort 80-4.5 Mcg Inhaler) 80 MCG-4.5 MCG/
ACTUATION HFA.AER.AD   2 PUF INH BID COPD
Cannabis (Marijuana Oil) (Unknown Strength) AMP   (Unknown Dose) INH DAILY PAIN 
(Reported)
Doxycycline Hyclate 100 MG CAPSULE   1 CAP PO BID infection  (Reported)
Evolocumab (Repatha Sureclick) 140 MG/ML PEN.INJCTR   1 ML SC Q2W CHOLESTEROL  (
Reported)
Furosemide 20 MG TABLET   3 TAB PO Q48 DIURETIC  (Reported)
Gabapentin 600 MG TABLET   1 TAB PO TID NEUROPATHY  (Reported)
Guaifenesin (Mucinex) 600 MG TAB.ER.12H   2 TAB PO BID MUCUS  (Reported)
Lactobacillus Acidophilus (Acidophilus) 1 EACH CAPSULE   1 CAP PO BID PROBIOTIC 
(Reported)
Lorazepam (Ativan) 1 MG TABLET   1 TAB PO BID PRN ANXIETY  (Reported)
Magnesium Oxide (Magnesium) 400 MG CAPSULE   1 CAP PO 0600 SUPPLEMENT  (Reported
)
Metoprolol Tartrate 25 MG TABLET   12.5 MG PO BID heart rate
Montelukast Sodium (Singulair) 10 MG TABLET   1 TAB PO DAILY ASTHMA  (Reported)
Multivitamin (Multi-Day Vitamins) 1 EACH TABLET   1 TAB PO DAILY SUPPLEMENT  (
Reported)
Nortriptyline HCl 10 MG CAPSULE   1 CAP PO QPM MENTAL HEALTH  (Reported)
Omeprazole 20 MG CAPSULE.DR   1 CAP PO DAILY GI  (Reported)
Oxycodone HCl/Acetaminophen (Oxycodone-Acetaminophen 5-325) 5 MG-325 MG TABLET  
1-2 TAB PO Q4H PRN PAIN  (Reported)
Pantoprazole Sodium (Protonix) 40 MG TABLET.DR   1 TAB PO DAILY ACID REFLUX  (
Reported)
Potassium Chloride 20 MEQ TAB.ER.PRT   1 TAB PO DAILY SUPPLEMENT  (Reported)
Rifampin (Rifadin) 300 MG CAPSULE   1 CAP PO BID MRSA PROPHYLAXIS  (Reported)
Sotalol (Betapace) 80 MG TABLET   80 MG PO BID AARYTHMIAS
Tiotropium Bromide (Spiriva) 18 MCG CAP.W.DEV   1 CAP INH DAILY BREATHING  (
Reported)
Trazodone HCl 50 MG TABLET   1 TAB PO QPM PRN SLEEP  (Reported)
Umeclidinium Bromide (Incruse Ellipta) (Unknown Strength) BLST.W.DEV   (Unknown 
Dose) UNKNOWN  (Reported)
 
(RAGHAVENDRA JONES,DEVON THOMAS)
 
Past History
 
Travel History
Traveled to [place] past 21 day No
 
Medical History
Any Pertinent Medical History? see below for history
Neurological: C6-7 ABSCESS
EENT: NONE
Cardiovascular: AFIB, hypertension, hyperlipidemia, myocardial infarction
Respiratory: asthma, COPD, OXYGEN DEPEND 2L
Gastrointestinal: NONE
Hepatic: NONE
Renal: neurogenic bladder
Musculoskeletal: PARAPLEGIA R/T ABSCESS
Psychiatric: NONE
Endocrine: NONE
Blood Disorders: NONE
Cancer(s): NONE
GYN/Reproductive: NONE
History of MRSA: Yes
History of VRE: No
History of CDIFF: No
Isolation History: Contact
Influenza Vaccine: 10/01/16
 
Surgical History
Surgical History: non-contributory
 
Psychosocial History
Who do you live with Significant Other
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
What is your primary language English
Tobacco Use: Quit >30 days ago
Daily Tobacco Use Amount/Type: => 5 Cigarettes daily
 
Family History
Family History, If Any:
Relation not specified for:
  *No pertinent family history
 
Hx Contributory? No
(LYLE OWENS)
 
Review of Systems
 
Review of Systems
Constitutional:
Reports: see HPI, fever, weakness. 
EENTM:
Reports: no symptoms. 
Respiratory:
Reports: see HPI, cough. 
Cardiovascular:
Reports: no symptoms. 
GI:
Reports: no symptoms. 
Genitourinary:
Reports: no symptoms. 
Musculoskeletal:
Reports: no symptoms. 
Skin:
Reports: no symptoms. 
Neurological/Psychological:
Reports: no symptoms. 
Hematologic/Endocrine:
Reports: no symptoms. 
Immunologic/Allergic:
Reports: no symptoms. 
All Other Systems: Reviewed and Negative
(LYLE OWENS)
 
Physical Exam
 
Physical Exam
General Appearance: no apparent distress, alert, comfortable
Respiratory: no respiratory distress, quiet respiration, decreased breath sounds
Comments:
HEENT: Normal EENT exam, extraocular motion intact, no nystagmus. Pupils equally
round and reactive to light and accommodation. Nose is atraumatic. External 
auditory canal and Tympanic membranes clear. Pharynx normal. No swelling or 
edema. 
Neck: Supple, no lymphadenopathy, normal range of motion without pain or 
tenderness
Back: Nontender, no CVA tenderness.
Cardiovascular: Regular rate and rhythms no murmurs rubs or gallops, normal JVP
Abdomen: Soft, nontender nondistended, no appreciable organomegaly. Normal bowel
sounds. No ascites
Extremity: No edema, no calf tenderness to palpation, normal and equal pulses.
Neuro: Alert oriented x3, motor sensory normal, 
Skin: No appreciable rash on exposed skin, skin is warm and dry.
Psych: Mood and affect is normal, memory and judgment is normal.
 
Core Measures
ACS in differential dx? No
CVA/TIA Diagnosis: No
Severe Sepsis Present: No
Septic Shock Present: No
(LYLE OWENS)
 
Progress
Differential Diagnoses
I considered the following diagnoses in my evaluation of the patient: [Pneumonia
, upper respiratory infection, UTI, sepsis, pneumothorax, PE, myocardial 
infarction, CHF, bronchitis]
 
Plan of Care:
 Orders
 
 
Procedure Date/time Status
 
CBC WITHOUT DIFFERENTIAL  0600 Active
 
BASIC ELECTROLYTES PLUS BUN&CR  0600 Active
 
LOWER RESPIRATORY CULTURE  1614 Active
 
Turn and Reposition  1140 Active
 
Skin Integrity Protocol  1139 Active
 
Prado, Insertion/Removal/Asses  1000 Active
 
Change service to  0739 Active
 
 
 Current Medications
 
 
  Sig/Viet Start time  Last
 
Medication Dose  Stop Time Status Admin
 
Prednisone 10 MG DAILY  1000 AC 
 
    1001  
 
Prednisone 20 MG DAILY  1000 AC 
 
    1001  
 
Prednisone 30 MG DAILY  1000 AC  1001  0925
 
Metoprolol Tartrate 12.5 MG BID  1200 AC 
 
(Lopressor)     2221
 
Furosemide 60 MG Q48  1000 AC 
 
(Lasix)     1034
 
Sotalol HCl 80 MG BID  1000 AC 
 
(Betapace)     2221
 
Nortriptyline HCl 10 MG QPM  2200 AC 
 
(Aventyl 10MG -      222
 
Pamelor Cap)     
 
Albuterol Sulfate 3 ML EVERY 4 HRS/AWAKE  1600 AC 
 
(Proventil)     1922
 
Bisacodyl 10 MG DAILY PRN  1400 AC 
 
(Dulcolax Supp)     1731
 
Guaifenesin 1,200 MG BID  1000 AC 
 
(Mucinex)     2220
 
Montelukast Sodium 10 MG DAILY  1000 AC 
 
(Singulair)     0925
 
Multivitamins  1 TAB DAILY  1000 AC 
 
Therapeutic     0925
 
(Theragran-M      
 
Vitamins Tabs)     
 
Rifampin 300 MG DAILY  1000 AC 
 
(Rifadin-Rimactane      0925
 
300MG Cap)     
 
Tiotropium Bromide 1 PUF DAILY  1000 AC 
 
(Spiriva)     0926
 
Gabapentin 600 MG Q8H  0930 AC 
 
(Neurontin)     1619
 
Omeprazole 20 MG DAILY AC  0700 AC 
 
(Prilosec)     0605
 
Trazodone HCl 50 MG QPM PRN  2345 AC 
 
(Desyrel)     2226
 
Albuterol Sulfate 2 PUF Q6P PRN  2330 AC 
 
(Ventolin)     1731
 
Albuterol Sulfate 3 ML Q4P PRN  2330 AC 
 
(Proventil)     1028
 
Lorazepam 1 MG BID PRN  2330 AC 
 
(Ativan)     
 
Baclofen 20 MG BID  2327 AC 
 
(Lioresal 10MG      2221
 
Tablet)     
 
Doxycycline Hyclate 100 MG BID 2327 AC 
 
(Vibramycin)     2220
 
Apixaban 5 MG BID  232 AC 
 
(Eliquis)     2221
 
 
 Laboratory Tests
 
 
 
17 0705:
Anion Gap 5, Estimated GFR > 60, BUN/Creatinine Ratio 30.0  H, CBC w Diff NO MAN
DIFF REQ, RBC 4.01  L, MCV 90.8, MCH 29.3, RDW 15.4  H, MPV 8.2, Gran % 75.6  H,
Lymphocytes % 14.7  L, Monocytes % 6.7, Eosinophils % 2.8, Basophils % 0.2, 
Absolute Granulocytes 11.2  H, Absolute Lymphocytes 2.2, Absolute Monocytes 1.0 
H, Absolute Eosinophils 0.4, Absolute Basophils 0, PUBS MCHC 32.2  L
 Microbiology
 161  LOWER RESP: Respiratory Culture - RECD
1619  LOWER RESP: Gram Stain - RECD
 
On initial examination patient had decreased breath sounds.  Patient was 
afebrile however has leukocytosis.  Due to patient's comorbidities I discussed 
patient with Dr. REZA in which he advised patient to be admitted for 
concerns of immunocompromise state, fevers and COPD history and concerns of 
pneumonia on chest x-ray.
(LAURA DOWNEY,LYLE)
Diagnostic Imaging:
Viewed by Me: Radiology Read. 
CXR Impression: SEE COMMENTS
Initial ED EKG: none
Comments:
PATIENT: DAYDAY HOOVER  MEDICAL RECORD NO: [id number]
PRESENT AGE: 66  PATIENT ACCOUNT NO: [id number]
: [birth year]  LOCATION: [facility]
ORDERING PHYSICIAN: LYLE DOWNEY  
 
  SERVICE DATE: 
EXAM TYPE: RAD - XRY-CHEST XRAY, PA AND LATERAL
 
EXAMINATION:
CHEST 2 VIEWS
 
CLINICAL INFORMATION:
Cough, shortness of breath.
 
COMPARISON:
2017.
 
TECHNIQUE:
PA and lateral views of the chest were obtained.
 
FINDINGS:
The cardiac silhouette is not enlarged. The mediastinal and hilar contours
are unremarkable. There is obscuration of the left hemidiaphragm and faint
retrocardiac opacification. The osseous structures are unremarkable.
 
IMPRESSION:
Retrocardiac opacification and obscuration of the left hemidiaphragm
suspicious for a left lower lobe infiltrate.
 
Recommendation is for a followup chest series to be obtained following
treatment and/or resolution of symptoms to assure resolution of this
appearance.
 
DICTATED BY: JILL COFFEY MD 
DATE/TIME DICTATED:17
:RAD.[email or web address] 
DATE/TIME TRANSCRIBED:17
 
(LYLE OWENS)
 
Departure
 
Departure
Disposition: STILL A PATIENT
Condition: Stable
Clinical Impression
Primary Impression: Pneumonia
Secondary Impressions: COPD (chronic obstructive pulmonary disease)
Referrals:
RIGOBERTO REZA MD (PCP/Family)
 
Departure Forms:
Customer Survey
General Discharge Information
 
Admission Note
Spoke With:
MARK SILVEIRA MD
Documentation of Exam:
Documentation of any treatments & extenuating circumstances including Concerns 
Regarding Discharge (functional status, medication knowledge or non-compliance, 
living conditions, etc.) that warrant an admission rather than observation: [
Discussed admission with  who agrees with general medicine admission 
for concerns of COPD and pneumonia.  Patient requires IV antibiotics, pulmonary 
consultation, IV steroids and repeat nebulizer treatments.  Outpatient treatment
at this time would be medically harmful]
 
(LYLE OWENS)
 
PA/NP Co-Sign Statement
Statement:
ED Attending supervision documentation-
 
[x] I saw and evaluated the patient. I have also reviewed all the pertinent lab 
results and diagnostic results. I agree with the findings and the plan of care 
as documented in the PA's/NP's documentation. 
 
[] I have reviewed the ED Record and agree with the PA's/NP's documentation.
 
[] Additions or exceptions (if any) to the PAs/NP's note and plan are 
summarized below:
[]
 
(RAGHAVENDRA JONES,DEVON THOMAS)
 
Critical Care Note
 
Critical Care Note
Critical Care Time: non-applicable
(LYLE OWENS)

## 2017-07-19 VITALS — SYSTOLIC BLOOD PRESSURE: 161 MMHG | DIASTOLIC BLOOD PRESSURE: 97 MMHG

## 2017-07-19 VITALS — SYSTOLIC BLOOD PRESSURE: 144 MMHG | DIASTOLIC BLOOD PRESSURE: 79 MMHG

## 2017-07-19 LAB
ABSOLUTE GRANULOCYTE CT: 12.2 /CUMM (ref 1.4–6.5)
BASOPHILS # BLD: 0 /CUMM (ref 0–0.2)
BASOPHILS NFR BLD: 0.1 % (ref 0–2)
EOSINOPHIL # BLD: 0 /CUMM (ref 0–0.7)
EOSINOPHIL NFR BLD: 0.2 % (ref 0–5)
ERYTHROCYTE [DISTWIDTH] IN BLOOD BY AUTOMATED COUNT: 15.9 % (ref 11.5–14.5)
GRANULOCYTES NFR BLD: 84.6 % (ref 42.2–75.2)
HCT VFR BLD CALC: 37.3 % (ref 42–52)
LYMPHOCYTES # BLD: 1.7 /CUMM (ref 1.2–3.4)
MCH RBC QN AUTO: 28.9 PG (ref 27–31)
MCHC RBC AUTO-ENTMCNC: 31.9 G/DL (ref 33–37)
MCV RBC AUTO: 90.7 FL (ref 80–94)
MONOCYTES # BLD: 0.4 /CUMM (ref 0.1–0.6)
PLATELET # BLD: 382 /CUMM (ref 130–400)
PMV BLD AUTO: 7.2 FL (ref 7.4–10.4)
RED BLOOD CELL CT: 4.11 /CUMM (ref 4.7–6.1)
WBC # BLD AUTO: 14.4 /CUMM (ref 4.8–10.8)

## 2017-07-19 NOTE — NUR
PT REQUESTING DUCOLAX SUPP "IT'S HARD FOR ME TO GO, SO I USE THE
SUPPOSITORIES". SPOKE WITH DR ERIC KLEIN, WILL ORDER REQUESTED MED.

## 2017-07-19 NOTE — ADMISSION CERTIFICATION
Admission Certification
 
Certification Statement
- As attending physician, I certify that at the time of
- admission, based on clinical presentation, severity of
- symptoms, need for further diagnostic testing and
- therapeutic interventions, and risk of adverse outcomes
- without in-hospital treatment, in my clinical assessment,
- this patient requires an acute hospital stay for a minimum
- of two nights or longer. I have also considered psychsocial
- factors such as support system, advanced age, financial
- issues, cognitive issues, and failed out-patient treatments,
- past re-admission history, safety of patient, and lack of
- compliance as applicable.
Specific rationale supporting this admission is:
Community acquired pneumonia, COPD exacerbation.

## 2017-07-19 NOTE — NUR
STRAIGHT CATH FOR 150ML DARK YELLOW CLEAR URINE. RESTING COMFORTABLY AT THIS
TIME. IV ZITHROMAX COMPLETED TOLERATED VERY WELL. IVF'S NS AT 100ML/HR INFUSING
WITHOUT DIFFICULTY AT THIS TIME.

## 2017-07-19 NOTE — PN- HOUSESTAFF
Subjective
Follow-up For:
COPD exacerbation.
Subjective:
I have seen and examined the patient.  The patient was eating comfortably in the
bed.  He is paraplegic history of spinal abscess. He continues to have a 
moderate cough with scant sputum production and mild to moderate difficulty 
breathing.  He denies nausea vomiting chest pain and palpitations.
 
Review of Systems
Constitutional:
Denies: diaphoresis, fever. 
Cardiovascular:
Denies: chest pain, palpitations. 
Respiratory:
Reports: cough, short of breath, sputum production. 
Gastrointestinal:
Reports: no symptoms. 
Musculoskeletal:
Reports: no symptoms. 
 
Objective
Last 24 Hrs of Vital Signs/I&O
 Vital Signs
 
 
Date Time Temp Pulse Resp B/P B/P Pulse O2 O2 Flow FiO2
 
     Mean Ox Delivery Rate 
 
 1535       Nasal 2.0L 
 
       Cannula  
 
 1038      95 Nasal 2.0L 
 
       Cannula  
 
 0806 98.1 90 20 177/77  99 Nasal 2.0L 
 
       Cannula  
 
 0550 97.2 95 17 178/83  95 Nasal 2.0L 
 
       Cannula  
 
 0149 97.8 95 16 177/77  94 Nasal 2.0L 
 
       Cannula  
 
 2249 97.7 106 20 153/67  96 Nasal 2.0L 
 
       Cannula  
 
 2143      96 Nasal 2.0L 
 
       Cannula  
 
 2058 98.9 104 20 177/81  98 Nasal 2.0L 
 
       Cannula  
 
 1742 97.9 100 18 148/69  98 Nasal 2.0L 
 
       Cannula  
 
 
 Intake & Output
 
 
  1600  0800  0000
 
Intake Total 250  
 
Output Total 450  225
 
Balance -200  -225
 
    
 
Intake,   
 
Output, Urine 450  225
 
Patient   252 lb
 
Weight   
 
Weight   Bed scale
 
Measurement   
 
Method   
 
 
 
 
Physical Exam
General Appearance: Alert, Oriented X3, Cooperative, No Acute Distress
Skin: No Rashes, No Breakdown
Cardiovascular: Normal S1, Normal S2, No Murmurs
Lungs: Few scattered crepitation with expiratory prolongation and mild wheeze.  
There is no respiratory distress. 
Abdomen: Normal Bowel Sounds, Soft, No Tenderness
Extremities: No Clubbing, No Cyanosis
Current Medications:
 Current Medications
 
 
  Sig/Viet Start time  Last
 
Medication Dose Route Stop Time Status Admin
 
Albuterol Sulfate 3 ML EVERY 4 HRS/AWAKE  1600 AC 
 
  INH   1553
 
Albuterol Sulfate 2 PUF Q6P PRN  2330 AC 
 
  INH   1430
 
Albuterol Sulfate 3 ML Q4P PRN  2330 AC 
 
  INH   1028
 
Albuterol Sulfate 3 ML ONCE ONE  2115 DC 
 
  INH  211  2138
 
Apixaban 5 MG BID  2325 AC 
 
  PO   0938
 
Azithromycin 500 MG DAILY  1000 AC 
 
Sodium Chloride 250 ML IV   0938
 
Baclofen 20 MG BID  2327 AC 
 
  PO   0938
 
Bisacodyl 0 .STK-MED ONE  1425 DC 
 
  AL   
 
Bisacodyl 10 MG DAILY PRN  1400 AC 
 
  AL   1425
 
Cefazolin Sodium 1,000 MG ONCE ONE  2145 CAN 
 
  IV  2146  
 
Ceftazidime 0 .STK-MED ONE  1320 DC 
 
  .ROUTE   
 
Ceftazidime 1,000 MG Q8  0600 AC 
 
  IV   1350
 
Ceftazidime 0 .STK-MED ONE  0552 DC 
 
  .ROUTE   
 
Ceftazidime 0 .STK-MED ONE  2216 DC 
 
  .ROUTE   
 
Ceftazidime 1,000 MG ONCE ONE  2145 DC 
 
  IV  2146  2235
 
Doxycycline Hyclate 100 MG BID  2327 AC 
 
  PO   0938
 
Doxycycline Hyclate 0 .STK-MED ONE  2213 DC 
 
  PO   
 
Doxycycline Hyclate 100 MG ONCE ONE  2145 DC 
 
  PO  2146  2225
 
Furosemide 60 MG Q48  1000 AC 
 
  PO   
 
Gabapentin 600 MG Q8H  0930 AC 
 
  PO   0938
 
Gabapentin 600 MG Q8  2328 DC 
 
  PO   0814
 
Guaifenesin 1,200 MG BID  1000 AC 
 
  PO   0938
 
Ipratropium Bromide 2.5 ML ONCE ONE  2115 DC 
 
  INH  211  2138
 
Lorazepam 1 MG BID PRN  2330 AC 
 
  PO   
 
Methylprednisolone 0 .STK-MED ONE  2216 DC 
 
  .ROUTE   
 
Methylprednisolone 125 MG ONCE ONE  2145 DC 
 
  IV  2146  2230
 
Montelukast Sodium 10 MG DAILY  1000 AC 
 
  PO   0938
 
Multivitamins  1 TAB DAILY  1000 AC 
 
Therapeutic  PO   0938
 
Nortriptyline HCl 10 MG QPM  2200 AC 
 
  PO   
 
Omeprazole 0 .STK-MED ONE  0716 DC 
 
  PO   
 
Omeprazole 20 MG DAILY AC  0700 AC 
 
  PO   0710
 
Prednisone 10 MG DAILY  1000 AC 
 
  PO  1001  
 
Prednisone 20 MG DAILY  1000 AC 
 
  PO  1001  
 
Prednisone 30 MG DAILY  1000 AC 
 
  PO  1001  
 
Prednisone 40 MG DAILY  1000 DC 
 
  PO   
 
Prednisone 40 MG DAILY  1000 CAN 
 
  PO  0959  
 
Prednisone 40 MG DAILY  1000 AC 
 
  PO  1001  0938
 
Rifampin 300 MG DAILY  1000 AC 
 
  PO   0938
 
Sodium Chloride 1,000 ML .Q10H  2345 AC 
 
  IV   0938
 
Tiotropium Dysart 1 PUF DAILY  1000 AC 
 
  INH   0938
 
Trazodone HCl 50 MG QPM PRN  2345 AC 
 
  PO   
 
 
 
 
Last 24 Hrs of Lab/Stalin Results
Last 24 Hrs of Labs/Mics:
 Laboratory Tests
 
17 0557:
Anion Gap 6, Estimated GFR > 60, BUN/Creatinine Ratio 28.0  H, CBC w Diff NO MAN
DIFF REQ, RBC 4.11  L, MCV 90.7, MCH 28.9, RDW 15.9  H, MPV 7.2  L, Gran % 84.6 
H, Lymphocytes % 12.0  L, Monocytes % 3.1, Eosinophils % 0.2, Basophils % 0.1, 
Absolute Granulocytes 12.2  H, Absolute Lymphocytes 1.7, Absolute Monocytes 0.4,
Absolute Eosinophils 0, Absolute Basophils 0, PUBS MCHC 31.9  L
 
17:
Anion Gap 5, Estimated GFR > 60, BUN/Creatinine Ratio 23.3, Glucose 93, Calcium 
9.7, Total Bilirubin 0.5, AST 28, ALT 45, Alkaline Phosphatase 82, Total Protein
6.7, Albumin 3.3  L, Globulin 3.4, Albumin/Globulin Ratio 1.0  L
 
17:
CBC w Diff NO MAN DIFF REQ, RBC 4.27  L, MCV 90.3, MCH 28.9, RDW 15.2  H, MPV 
7.5, Gran % 75.6  H, Lymphocytes % 16.0  L, Monocytes % 5.6, Eosinophils % 2.7, 
Basophils % 0.1, Absolute Granulocytes 10.2  H, Absolute Lymphocytes 2.2, 
Absolute Monocytes 0.8  H, Absolute Eosinophils 0.4, Absolute Basophils 0, PUBS 
MCHC 32.0  L
 
17 1800:
Urine Color YEL, Urine Clarity CLEAR, Urine pH 6.0, Ur Specific Gravity >= 1.030
, Urine Protein TRACE  H, Urine Ketones TRACE  H, Urine Nitrite NEG, Urine 
Bilirubin NEG, Urine Urobilinogen 0.2, Ur Leukocyte Esterase SMALL  H, Ur 
Microscopic SEDIMENT EXAMINED, Urine RBC 1-3, Urine WBC 10-15  H, Ur Epithelial 
Cells MOD  H, Hyaline Casts FEW  H, Urine Mucus MOD  H, Urine Hemoglobin TRACE-
LYSED  H, Urine Glucose NEG
 Microbiology
728  URINE ROUT: Legionella Antigen - COLB
728  URINE ROUT: Streptococcus pneumoniae Antigen (M - COLB
2023  BLOOD: Blood Culture - RES
1957  BLOOD: Blood Culture - RES
1935  LOWER RESP: Respiratory Culture - CAN
                Cancelled: SPECIMEN NOT RECEIVED IN LABORATORY
1935  LOWER RESP: Gram Stain - CAN
                Cancelled: SPECIMEN NOT RECEIVED IN LABORATORY
1800  URINE ROUT: Urine Culture - RES
 
 
 
Assessment/Plan
Assessment:
The pt is 66 year obese male with a history of COPD , bilateral lower extremity 
edema, HTN, hyperlipidemia, history of atrial fibrillation on Eliquis, asthma, 
neurogenic bladder, depression, chronic constipation, muscle spasms, GARRY, CAD s/
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 
abscess. He presented to the ED complaining of urinary leakage, malodorous urine
, persistent cough since his last admission for pneumonia, and fever.
 
CAP:CXR shows left lower lobe infiltrate, cough has not resolved since admission
and treatment for previous pneumonia. WBC: 13.5. Previously culture positive for
psuedomonas
* Continue Ceftaz and azithromycin
* Follow cultures
* Awaiting ID recommendations
 
COPD exacerbation
* Continue prednisone taper 40 x 2 days, 30 x 2 days, 20 x 2 days, 10 x 2 days
* Pulmonary consult placed with Dr. Hull
* Azithromycin for antiinflamatory properties
* O2 nasal canula as needed
* TRC consult
* Albuterol
* Montelukast 10 mg daily
* f/u cbc
 
possible UTI
UA significant for UrWBC 10-15, LE small, trace proteins, negative ntirites. Pt 
has hx of neurogenic bladder with frequent straight catheterizations (3-5 times 
daily)
* consider urine culture if pt spikes fever
* bladder this scan q8 if residual volume is 350cc, straight cath.
* Follow-up urine cultures.  
* urology consult with Dr. Dykes, cystoscopy if recommended
 
history of muscle spasms
* Continue baclofen
 
history of AFIB
* Continue eloquis BID
 
chronic lower extremity edema
* Continue every other day lasix regimen. Next dose 
 
history of hyperlipidemia
* Continue IM evolocumab every two weeks. Next dose 
 
Patient does have a history of obstructive sleep apnea however denies use of 
CPAP adamantly
Problem List:
 1. COPD (chronic obstructive pulmonary disease)
 
 2. COPD exacerbation
 
Pain Ratin
Pain Location:
none
Pain Goal: Pain 4 or less
Pain Plan:
n/a
Tomorrow's Labs & Rationales:
cbc
bep
DVT/Prophylaxis:  eliquis

## 2017-07-19 NOTE — NUR
PT REQUESTING STRAIGHT CATH. 200ML OBTAINED AT THIS TIME. BED CHANGED AND
[facility] PROVIDED FOR URINARY LEAKING PT REPORTS "THIS IS NORMAL." PT PROVIDED
WITH DINNER TRAY AND REPOSITIONED FOR COMFORT. OFFERS NO COMPLAINTS AT THIS
TIME. CALL BELL WITHIN REACH, NURSING WILL CONTINUE TO MONITOR.

## 2017-07-19 NOTE — CONS- INFECT DISEASE
General Information and HPI
 
Consulting Request
Date of Consult: 07/19/17
Requested By:
JORDANA JONES,MARK
 
Reason for Consult:
Abx advice
Source of Information: patient, primary team
Exam Limitations: clinical condition
History of Present Illness:
66 year obese male with a history of COPD (O2 2L at home), bilateral lower 
extremity edema, muscle spasms, GARRY, HTN, hyperlipidemia, atrial fibrillation on
Eliquis, asthma, neurogenic bladder, depression, chronic constipation, CAD s/p 
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 
abscess treated with rifampin and doxycycline was sent to the ED by Dr. Dykes, 
his urologist.  He c/o urinary leakage.  The pt uses a straight catheter, and 
has never experienced leakage between catheterizations before.  The pt also 
reports that recently his urine has been malodorous.   He denies any pain or 
discomfort with urination.  He reported that several weeks ago he had penile 
discharge which has since resolved. Has chronic dry productive cough since May 
2017. 
 
Allergies/Medications
Allergies:
Coded Allergies:
heparin (SWELLING 07/18/17)
vancomycin (HIVES, SKIN CRACKES, TURNS RED, SWELLS AND PEELS 07/18/17)
 
Home Med List:
Albuterol Sulfate 2.5 MG/3 ML (0.083 %) VIAL.NEB   1 Vial INH/SOL Q4P PRN 
SHORTNESS OF BREATH  (Reported)
Albuterol Sulfate (Ventolin Hfa) 90 MCG HFA.AER.AD   2 PUF INH AD PRN COPD  (
Reported)
Apixaban (Eliquis) 5 MG TABLET   5 MG PO BID atrial fibrillation
Ascorbate Calcium (Vitamin C) 500 MG TABLET   1 TAB PO BID SUPPLEMENT  (Reported
)
Atomoxetine HCl (Strattera) 40 MG CAPSULE   1 CAP PO QAM MENTAL HEALTH  (
Reported)
Baclofen 20 MG TABLET   1 TAB PO BID MUSCLE RELAXER  (Reported)
Bisacodyl (Dulcolax) 10 MG SUPP.RECT   1 SUP RC EOD GI  (Reported)
Budesonide/Formoterol Fumarate (Symbicort 80-4.5 Mcg Inhaler) 80 MCG-4.5 MCG/
ACTUATION HFA.AER.AD   2 PUF INH BID COPD
Cannabis (Marijuana Oil) (Unknown Strength) AMP   (Unknown Dose) INH DAILY PAIN 
(Reported)
Doxycycline Hyclate 100 MG CAPSULE   1 CAP PO BID infection  (Reported)
Evolocumab (Repatha Sureclick) 140 MG/ML PEN.INJCTR   1 ML SC Q2W CHOLESTEROL  (
Reported)
Furosemide 20 MG TABLET   3 TAB PO Q48 DIURETIC  (Reported)
Gabapentin 600 MG TABLET   1 TAB PO TID NEUROPATHY  (Reported)
Guaifenesin (Mucinex) 600 MG TAB.ER.12H   2 TAB PO BID MUCUS  (Reported)
Lactobacillus Acidophilus (Acidophilus) 1 EACH CAPSULE   1 CAP PO BID PROBIOTIC 
(Reported)
Lorazepam (Ativan) 1 MG TABLET   1 TAB PO BID PRN ANXIETY  (Reported)
Magnesium Oxide (Magnesium) 400 MG CAPSULE   1 CAP PO 0600 SUPPLEMENT  (Reported
)
Metoprolol Tartrate 25 MG TABLET   12.5 MG PO BID heart rate
Montelukast Sodium (Singulair) 10 MG TABLET   1 TAB PO DAILY ASTHMA  (Reported)
Multivitamin (Multi-Day Vitamins) 1 EACH TABLET   1 TAB PO DAILY SUPPLEMENT  (
Reported)
Nortriptyline HCl 10 MG CAPSULE   1 CAP PO QPM MENTAL HEALTH  (Reported)
Omeprazole 20 MG CAPSULE.DR   1 CAP PO DAILY GI  (Reported)
Oxycodone HCl/Acetaminophen (Oxycodone-Acetaminophen 5-325) 5 MG-325 MG TABLET  
1-2 TAB PO Q4H PRN PAIN  (Reported)
Pantoprazole Sodium (Protonix) 40 MG TABLET.DR   1 TAB PO DAILY ACID REFLUX  (
Reported)
Potassium Chloride 20 MEQ TAB.ER.PRT   1 TAB PO DAILY SUPPLEMENT  (Reported)
Rifampin (Rifadin) 300 MG CAPSULE   1 CAP PO BID MRSA PROPHYLAXIS  (Reported)
Sotalol (Betapace) 80 MG TABLET   80 MG PO BID AARYTHMIAS
Tiotropium Bromide (Spiriva) 18 MCG CAP.W.DEV   1 CAP INH DAILY BREATHING  (
Reported)
Trazodone HCl 50 MG TABLET   1 TAB PO QPM PRN SLEEP  (Reported)
Umeclidinium Bromide (Incruse Ellipta) (Unknown Strength) BLST.W.DEV   (Unknown 
Dose) UNKNOWN  (Reported)
 
Current Medications:
 Current Medications
 
 
  Sig/Viet Start time  Last
 
Medication Dose Route Stop Time Status Admin
 
Albuterol Sulfate 3 ML EVERY 4 HRS/AWAKE 07/19 1600 AC 07/19
 
  INH   2041
 
Albuterol Sulfate 2 PUF Q6P PRN 07/18 2330 AC 07/19
 
  INH   1430
 
Albuterol Sulfate 3 ML Q4P PRN 07/18 2330 AC 07/19
 
  INH   1028
 
Albuterol Sulfate 3 ML ONCE ONE 07/18 2115 DC 07/18
 
  INH 07/18 2116  2138
 
Apixaban 5 MG BID 07/18 2325 AC 07/19
 
  PO   0938
 
Azithromycin 500 MG DAILY 07/19 1000 AC 07/19
 
Sodium Chloride 250 ML IV   0938
 
Baclofen 20 MG BID 07/18 2327 AC 07/19
 
  PO   0938
 
Bisacodyl 0 .STK-MED ONE 07/19 1425 DC 
 
  AK   
 
Bisacodyl 10 MG DAILY PRN 07/19 1400 AC 07/19
 
  AK   1425
 
Cefazolin Sodium 1,000 MG ONCE ONE 07/18 2145 CAN 
 
  IV 07/18 2146  
 
Ceftazidime 0 .STK-MED ONE 07/19 1320 DC 
 
  .ROUTE   
 
Ceftazidime 1,000 MG Q8 07/19 0600 AC 07/19
 
  IV   1350
 
Ceftazidime 0 .STK-MED ONE 07/19 0552 DC 
 
  .ROUTE   
 
Ceftazidime 0 .STK-MED ONE 07/18 2216 DC 
 
  .ROUTE   
 
Ceftazidime 1,000 MG ONCE ONE 07/18 2145 DC 07/18
 
  IV 07/18 2146  2235
 
Doxycycline Hyclate 100 MG BID 07/18 2327 AC 07/19
 
  PO   0938
 
Doxycycline Hyclate 0 .STK-MED ONE 07/18 2213 DC 
 
  PO   
 
Doxycycline Hyclate 100 MG ONCE ONE 07/18 2145 DC 07/18
 
  PO 07/18 2146  2225
 
Furosemide 60 MG Q48 07/20 1000 AC 
 
  PO   
 
Gabapentin 600 MG Q8H 07/19 0930 AC 07/19
 
  PO   2043
 
Gabapentin 600 MG Q8 07/18 2328 DC 07/19
 
  PO   0814
 
Guaifenesin 1,200 MG BID 07/19 1000 AC 07/19
 
  PO   0938
 
Ipratropium Bromide 2.5 ML ONCE ONE 07/18 2115 DC 07/18
 
  INH 07/18 2116  2138
 
Lorazepam 1 MG BID PRN 07/18 2330 AC 
 
  PO   
 
Methylprednisolone 0 .STK-MED ONE 07/18 2216 DC 
 
  .ROUTE   
 
Methylprednisolone 125 MG ONCE ONE 07/18 2145 DC 07/18
 
  IV 07/18 2146  2230
 
Montelukast Sodium 10 MG DAILY 07/19 1000 AC 07/19
 
  PO   0938
 
Multivitamins  1 TAB DAILY 07/19 1000 AC 07/19
 
Therapeutic  PO   0938
 
Nortriptyline HCl 10 MG QPM 07/19 2200 AC 
 
  PO   
 
Omeprazole 0 .STK-MED ONE 07/19 0716 DC 
 
  PO   
 
Omeprazole 20 MG DAILY AC 07/19 0700 AC 07/19
 
  PO   0710
 
Prednisone 10 MG DAILY 07/25 1000 AC 
 
  PO 07/26 1001  
 
Prednisone 20 MG DAILY 07/23 1000 AC 
 
  PO 07/24 1001  
 
Prednisone 30 MG DAILY 07/21 1000 AC 
 
  PO 07/22 1001  
 
Prednisone 40 MG DAILY 07/19 1000 DC 
 
  PO   
 
Prednisone 40 MG DAILY 07/19 1000 CAN 
 
  PO 07/27 0959  
 
Prednisone 40 MG DAILY 07/19 1000 AC 07/19
 
  PO 07/20 1001  0938
 
Rifampin 300 MG DAILY 07/19 1000 AC 07/19
 
  PO   0938
 
Sodium Chloride 1,000 ML .Q10H 07/18 2345 AC 07/19
 
  IV   2043
 
Tiotropium [place] 1 PUF DAILY 07/19 1000 AC 07/19
 
  INH   0938
 
Trazodone HCl 50 MG QPM PRN 07/18 2345 AC 
 
  PO   
 
 
 
 
Past History
 
Travel History
Traveled to [place] past 21 day No
 
Medical History
Neurological: C6-7 ABSCESS
EENT: NONE
Cardiovascular: AFIB, hypertension, hyperlipidemia, myocardial infarction
Respiratory: asthma, COPD, OXYGEN DEPEND 2L
Gastrointestinal: NONE
Hepatic: NONE
Renal: neurogenic bladder
Musculoskeletal: PARAPLEGIA R/T ABSCESS
Psychiatric: NONE
Endocrine: NONE
Blood Disorders: NONE
Cancer(s): NONE
GYN/Reproductive: NONE
History of MRSA: Yes
History of VRE: No
History of CDIFF: No
Isolation History: Contact
Influenza Vaccine: 10/01/16
 
Surgical History
Surgical History: non-contributory
 
Family History
Relations & Conditions If Any:
Relation not specified for:
  *No pertinent family history
 
 
Psychosocial History
Where Do You Live? Home
Who Do You Live With? partner
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
Primary Language: English
Smoking Status: Former Smoker
ETOH Use: denies use
Illicit Drug Use: marijuana
 
Functional Ability
ADLs
Needs Assist: dressing, eating, toileting, bathing. 
Ambulation: wheelchair
IADLs
Independent: shopping, housework, finances, food prep, telephone, transportation
, medication admin. 
 
Review of Systems
Comments
12 points reviewed as noted, otherwise negative.
Pt reports having fever, chills, night sweats and denies SOB, chest pain, and 
dyspnea.
 
Exam & Diagnostic Data
Last 24 Hrs of Vital Signs/I&O
 Vital Signs
 
 
Date Time Temp Pulse Resp B/P B/P Pulse O2 O2 Flow FiO2
 
     Mean Ox Delivery Rate 
 
07/19 2042      94 Nasal 2.0L 
 
       Cannula  
 
07/19 1705 98.5 89 18 173/76  97 Room Air Room Air 
 
07/19 1535       Nasal 2.0L 
 
       Cannula  
 
07/19 1038      95 Nasal 2.0L 
 
       Cannula  
 
07/19 0806 98.1 90 20 177/77  99 Nasal 2.0L 
 
       Cannula  
 
07/19 0550 97.2 95 17 178/83  95 Nasal 2.0L 
 
       Cannula  
 
07/19 0149 97.8 95 16 177/77  94 Nasal 2.0L 
 
       Cannula  
 
07/18 2249 97.7 106 20 153/67  96 Nasal 2.0L 
 
       Cannula  
 
07/18 2143      96 Nasal 2.0L 
 
       Cannula  
 
07/18 2058 98.9 104 20 177/81  98 Nasal 2.0L 
 
       Cannula  
 
 
 Intake & Output
 
 
 07/19 1600 07/19 0800 07/19 0000
 
Intake Total 250  
 
Output Total 450  225
 
Balance -200  -225
 
    
 
Intake,   
 
Output, Urine 450  225
 
Patient   252 lb
 
Weight   
 
Weight   Bed scale
 
Measurement   
 
Method   
 
 
 
 
Physical Exam
Other Physical Findings:
General Appearance Alert, Oriented X3, Cooperative, No Acute Distress
Skin No Rashes, No Breakdown, No Significant Lesion
Skin Temp/Moisture Exam: Warm/Dry
Sepsis Skin Exam (color): Normal for Ethnicity
HEENT Atraumatic, PERRLA, EOMI, Mucous Membr. moist/pink
Neck Supple, No JVD, +2 Carotid Pulse wo Bruit
Lymphatic Cervical nl
Cardiovascular Regular Rate, Normal S1, Normal S2, No Murmurs
Lungs B/L expiratory wheezing
Abdomen Normal Bowel Sounds, Soft, No Hepatospenomegaly, No Masses, mild TTP
Neurological Normal Speech, Cranial Nerves 3-12 NL
Extremities trace edema of the lower extremities B/L
Vascular Normal Pulses, Pulses Symmetrical
Last 24 Hours of Lab Results:
 Laboratory Tests
 
 
 07/19
 
 0557
 
Chemistry 
 
  Sodium (137 - 145 mmol/L) 139
 
  Potassium (3.5 - 5.1 mmol/L) 4.6
 
  Chloride (98 - 107 mmol/L) 99
 
  Carbon Dioxide (22 - 30 mmol/L) 34  H
 
  Anion Gap (5 - 16) 6
 
  BUN (9 - 20 mg/dL) 14
 
  Creatinine (0.7 - 1.2 mg/dL) 0.5  L
 
  Estimated GFR (>60 ml/min) > 60
 
  BUN/Creatinine Ratio (7 - 25 %) 28.0  H
 
Hematology 
 
  CBC w Diff NO MAN DIFF REQ
 
  WBC (4.8 - 10.8 /CUMM) 14.4  H
 
  RBC (4.70 - 6.10 /CUMM) 4.11  L
 
  Hgb (14.0 - 18.0 G/DL) 11.9  L
 
  Hct (42 - 52 %) 37.3  L
 
  MCV (80.0 - 94.0 FL) 90.7
 
  MCH (27.0 - 31.0 PG) 28.9
 
  RDW (11.5 - 14.5 %) 15.9  H
 
  Plt Count (130 - 400 /CUMM) 382
 
  MPV (7.4 - 10.4 FL) 7.2  L
 
  Gran % (42.2 - 75.2 %) 84.6  H
 
  Lymphocytes % (20.5 - 51.1 %) 12.0  L
 
  Monocytes % (1.7 - 9.3 %) 3.1
 
  Eosinophils % (0 - 5 %) 0.2
 
  Basophils % (0.0 - 2.0 %) 0.1
 
  Absolute Granulocytes (1.4 - 6.5 /CUMM) 12.2  H
 
  Absolute Lymphocytes (1.2 - 3.4 /CUMM) 1.7
 
  Absolute Monocytes (0.10 - 0.60 /CUMM) 0.4
 
  Absolute Eosinophils (0.0 - 0.7 /CUMM) 0
 
  Absolute Basophils (0.0 - 0.2 /CUMM) 0
 
  PUBS MCHC (33.0 - 37.0 G/DL) 31.9  L
 
 
 
Last 24 Hours of Stalin Results:
 
     SPEC #:  [id number]     BHUPINDER: 07/19/    STATUS: COLB
                               RECD:         -       SUBM DR: LINDSAY JONES,KORY   
           
     SOURCE:  URINE ROUT       ENTR: 07/19/   OTHR DR: JORDANA JONES,
MARK         
     SPDESC:  MARTINE REZA MD,RIGOBERTO YANG           
     ORDERED: SPN URINE AG                                                      
     
 
     COMMENT: Has patient received Pneumovax in past 5 days? N            
 
 
       Procedure                        Result
 
SPN URINE AG                        PENDING RECEIPT
 
 
Diagnostic Data
Recent Imaging Findings:
 
  SERVICE DATE: 07/18/
EXAM TYPE: RAD - XRY-CHEST XRAY, PA AND LATERAL
 
EXAMINATION:
CHEST 2 VIEWS
 
CLINICAL INFORMATION:
Cough, shortness of breath.
 
COMPARISON:
05/22/2017.
 
TECHNIQUE:
PA and lateral views of the chest were obtained.
 
FINDINGS:
The cardiac silhouette is not enlarged. The mediastinal and hilar contours
are unremarkable. There is obscuration of the left hemidiaphragm and faint
retrocardiac opacification. The osseous structures are unremarkable.
 
IMPRESSION:
Retrocardiac opacification and obscuration of the left hemidiaphragm
suspicious for a left lower lobe infiltrate.
 
Recommendation is for a followup chest series to be obtained following
treatment and/or resolution of symptoms to assure resolution of this
appearance.
 
DICTATED BY: JILL COFFEY MD 
DATE/TIME DICTATED:07/18/17 / 2000
:RAD.[facility] 
DATE/TIME TRANSCRIBED:07/18/17 / 2000
 
CONFIDENTIAL, DO NOT COPY WITHOUT APPROPRIATE AUTHORIZATION.
 
 <Electronically signed in Other Vendor System>                                 
          
                                           SIGNED BY: JILL COFFEY MD 07/18/17 2005
 
 
 
 
Assessment/Plan
 
Assessment/Plan
Impression:
66 year obese male with a history of COPD (O2 2L at home), bilateral lower 
extremity edema, muscle spasms, GARRY, HTN, hyperlipidemia, atrial fibrillation on
Eliquis, asthma, h/o UTI's, neurogenic bladder, depression, chronic constipation
, CAD s/p stent placement, IVC filter placement,paraplegia secondary to T6-7 
spinal MRSA abscess on suppressive antibiotic regimen with rifampin and 
doxycycline admitted with:
 
COPD exacerbation; eval LLL pneumonia; h/o Ps.aeruginosa (pansensitive) lung 
infection;
clinically improved since admission
R/O UTI
Leukocytosis
 
Suggestion:
1. F/u culture results to further guide antibiotic treatment; currently treated 
empirically with iv Ceftazidime (dose 1 gm q 8 h). 
2. Cont suppressive abx treatment with rifampin/doxycycline (to review old MR).
3. Trend CBC, BMP, LFT's. ESR/CRP in am.
 
 
Consult Acknowledgment
- Thank you for your consult request.

## 2017-07-19 NOTE — NUR
PT ARRIVED FROM ER VIA STRETCHER, A&OX3, BLE PARALYSIS, SKIN INTACT, RT FOOT
EDEMA. STRAIGHT CATHED PER PT REQUEST FOR 300ML CLEAR YELLOW URINE. /97,
P 136, 97% ON 2L NC. INTERN ON CALL, AR BATISTA, NOTIFIED. AWAITING NEW
ORDERS. WILL MONITOR.

## 2017-07-19 NOTE — NUR
AM MEDS GIVEN AS ORDERED, TOLERATED VERY WELL. STRAIGHT CATH ON REQUEST 
YELLOW URINE. PT REQUESTING RESP TX, SPOKE WITH CHRIS, WILL BE DOWN ASAP, PT
UPDATED. CONGESTED COUGH NOTED, NO COMPLAINTS AT THIS TIME.

## 2017-07-19 NOTE — NUR
PER MOD DR. TONE ELLIOTT CAN BE REACHED   AND INTERN DR AR BATISTA CAN BE REACHED  REGARDING PT CARE OVERNIGHT. PT REQUEST FOR HOME
MEDICATION SUBCUTANEOUS REPATHA FOR HIGH CHOLESTEROL DISCUSSED WITH MOD AND
PLAN FOR PT FAMILY TO BRING IT IN TO BE CHECKED BY PHARMACY WAS CLEARED BY MOD.
INTERN ERIC WILL ASSUME CARE OF PATIENT AT 0730 AND CAN BE REACHED .

## 2017-07-20 VITALS — DIASTOLIC BLOOD PRESSURE: 80 MMHG | SYSTOLIC BLOOD PRESSURE: 142 MMHG

## 2017-07-20 VITALS — DIASTOLIC BLOOD PRESSURE: 73 MMHG | SYSTOLIC BLOOD PRESSURE: 156 MMHG

## 2017-07-20 VITALS — SYSTOLIC BLOOD PRESSURE: 146 MMHG | DIASTOLIC BLOOD PRESSURE: 82 MMHG

## 2017-07-20 VITALS — DIASTOLIC BLOOD PRESSURE: 77 MMHG | SYSTOLIC BLOOD PRESSURE: 154 MMHG

## 2017-07-20 LAB
ABSOLUTE GRANULOCYTE CT: 9.7 /CUMM (ref 1.4–6.5)
BASOPHILS # BLD: 0 /CUMM (ref 0–0.2)
BASOPHILS NFR BLD: 0.3 % (ref 0–2)
EOSINOPHIL # BLD: 0.3 /CUMM (ref 0–0.7)
EOSINOPHIL NFR BLD: 2.1 % (ref 0–5)
ERYTHROCYTE [DISTWIDTH] IN BLOOD BY AUTOMATED COUNT: 15.7 % (ref 11.5–14.5)
GRANULOCYTES NFR BLD: 74.9 % (ref 42.2–75.2)
HCT VFR BLD CALC: 34.4 % (ref 42–52)
LYMPHOCYTES # BLD: 2.1 /CUMM (ref 1.2–3.4)
MCH RBC QN AUTO: 29.5 PG (ref 27–31)
MCHC RBC AUTO-ENTMCNC: 32.8 G/DL (ref 33–37)
MCV RBC AUTO: 89.9 FL (ref 80–94)
MONOCYTES # BLD: 0.8 /CUMM (ref 0.1–0.6)
PLATELET # BLD: 340 /CUMM (ref 130–400)
PMV BLD AUTO: 7.8 FL (ref 7.4–10.4)
RED BLOOD CELL CT: 3.83 /CUMM (ref 4.7–6.1)
WBC # BLD AUTO: 12.9 /CUMM (ref 4.8–10.8)

## 2017-07-20 NOTE — PN- HOUSESTAFF
Subjective
Follow-up For:
COPD exacerbation.
CAP
Subjective:
I have seen and examined the patient.  The patient was lying comfortably in the 
bed.  The patient has been afebrile.  The patient states his symptoms are 
improving.  He denies any chest pain fever or palpitations.  He continues to 
have a moderate cough with scant sputum production and mild to moderate 
difficulty breathing.  Patient has been requiring straight cath more frequently 
overnight and in the morning Dr. Dykes was resting him while we were rounding 
the attending
 
Review of Systems
Constitutional:
Denies: chills, diaphoresis, fever. 
EENTM:
Reports: no symptoms. 
Cardiovascular:
Denies: chest pain, edema, orthopena. 
Respiratory:
Reports: cough, short of breath, sputum production. 
Gastrointestinal:
Reports: no symptoms. 
Genitourinary:
Reports: no symptoms. 
 
Objective
Last 24 Hrs of Vital Signs/I&O
 Vital Signs
 
 
Date Time Temp Pulse Resp B/P B/P Pulse O2 O2 Flow FiO2
 
     Mean Ox Delivery Rate 
 
 1435  80  142/80     
 
 1430 98.3 80 20 142/80  93 Nasal 2.0L 
 
       Cannula  
 
 1134      97 Nasal 2.0L 
 
       Cannula  
 
 0800      94 Nasal 2.0L 
 
       Cannula  
 
 0752 97.6 83 20 154/77  96 Nasal  
 
       Cannula  
 
 0645      96 Nasal 2.0L 
 
       Cannula  
 
 0129 99.3 90 20 146/82  95 Nasal 2.0L 
 
       Cannula  
 
 0005  140  144/79     
 
 2353  140 28 144/79     
 
 2340 98.0 140 21 161/97  98   
 
 2304      97 Nasal 2.0L 
 
       Cannula  
 
 2210 99.0 104 18 145/73  97 Nasal 2.0L 
 
       Cannula  
 
 2042      94 Nasal 2.0L 
 
       Cannula  
 
 1705 98.5 89 18 173/76  97 Nasal 2.0L 
 
       Cannula  
 
 
 Intake & Output
 
 
  1600  0800  0000
 
Intake Total 1800 800 
 
Output Total 3400 1125 700
 
Balance -1600 -325 -700
 
    
 
Intake,  800 
 
Intake, Oral 1000  
 
Number 0  
 
Bowel   
 
Movements   
 
Output, Urine 3400 1125 700
 
Patient   233 lb
 
Weight   
 
Weight   Reported by Patient
 
Measurement   
 
Method   
 
 
 
 
Physical Exam
General Appearance: Alert, Oriented X3, Cooperative
Skin: No Rashes, No Breakdown
HEENT: Atraumatic
Neck: Supple
Cardiovascular: Regular Rate, Normal S1, Normal S2
Lungs: Clear to Auscultation, Normal Air Movement
Abdomen: Normal Bowel Sounds, Soft, No Tenderness
Neurological: Normal Speech
Extremities: No Tenderness/Swelling
Current Medications:
 Current Medications
 
 
  Sig/Viet Start time  Last
 
Medication Dose Route Stop Time Status Admin
 
Albuterol Sulfate 3 ML EVERY 4 HRS/AWAKE  1600 AC 
 
  INH   1131
 
Albuterol Sulfate 2 PUF Q6P PRN  2330 AC 
 
  INH   1036
 
Albuterol Sulfate 3 ML Q4P PRN  2330 AC 
 
  INH   1028
 
Apixaban 5 MG BID  2325 AC 
 
  PO   0951
 
Azithromycin 500 MG DAILY  1000 AC 
 
Sodium Chloride 250 ML IV   0948
 
Baclofen 20 MG BID  2327 AC 
 
  PO   0951
 
Bisacodyl 10 MG DAILY PRN  1400 AC 
 
  MS   1425
 
Ceftazidime 0 .STK-MED ONE  2224 DC 
 
  .ROUTE   
 
Ceftazidime 1,000 MG Q8  0600 AC 
 
  IV   1326
 
Doxycycline Hyclate 100 MG BID  2327 AC 
 
  PO   0950
 
Furosemide 60 MG Q48  1000 AC 
 
  PO   1034
 
Gabapentin 600 MG Q8H  0930 AC 
 
  PO   0951
 
Guaifenesin 1,200 MG BID  1000 AC 
 
  PO   0951
 
Lorazepam 1 MG BID PRN  2330 AC 
 
  PO   
 
Metoprolol Tartrate 12.5 MG BID  1200 AC 
 
  PO   1435
 
Metoprolol Tartrate 12.5 MG BID  1000 DC 
 
  PO   
 
Metoprolol Tartrate 12.5 MG ONCE ONE  2345 DC 
 
  PO  2346  0005
 
Montelukast Sodium 10 MG DAILY  1000 AC 
 
  PO   0951
 
Multivitamins  1 TAB DAILY  1000 AC 
 
Therapeutic  PO   0950
 
Nortriptyline HCl 10 MG QPM  2200 AC 
 
  PO   2214
 
Omeprazole 20 MG DAILY AC  0700 AC 
 
  PO   0713
 
Prednisone 10 MG DAILY  1000 AC 
 
  PO  1001  
 
Prednisone 20 MG DAILY  1000 AC 
 
  PO  1001  
 
Prednisone 30 MG DAILY  1000 AC 
 
  PO  1001  
 
Prednisone 40 MG DAILY  1000 DC 
 
  PO  1001  0951
 
Rifampin 300 MG DAILY  1000 AC 
 
  PO   0951
 
Sodium Chloride 1,000 ML .Q10H  2345 DC 
 
  IV   0752
 
Sotalol HCl 80 MG BID  1000 AC 
 
  PO   1034
 
Tiotropium [place] 1 PUF DAILY  1000 AC 
 
  INH   0953
 
Trazodone HCl 50 MG .STK-MED ONE  0212 DC 
 
  PO  0213  
 
Trazodone HCl 50 MG QPM PRN  2345 AC 
 
  PO   0211
 
 
 
 
Last 24 Hrs of Lab/Stalin Results
Last 24 Hrs of Labs/Mics:
 Laboratory Tests
 
17:
Anion Gap 4  L, Estimated GFR > 60, BUN/Creatinine Ratio 22.0, Total Bilirubin 
0.4, Direct Bilirubin 0.4, AST 35, ALT 53, Alkaline Phosphatase 71, C-Reactive 
Prot, Quant 4.8  H, C-React Prot High Sens > 15.0  H, Total Protein 6.0  L, 
Albumin 2.9  L, CBC w Diff NO MAN DIFF REQ, RBC 3.83  L, MCV 89.9, MCH 29.5, RDW
15.7  H, MPV 7.8, Gran % 74.9, Lymphocytes % 16.2  L, Monocytes % 6.5, 
Eosinophils % 2.1, Basophils % 0.3, Absolute Granulocytes 9.7  H, Absolute 
Lymphocytes 2.1, Absolute Monocytes 0.8  H, Absolute Eosinophils 0.3, Absolute 
Basophils 0, PUBS MCHC 32.8  L, ESR Westergren 55  H
 Microbiology
915  URINE ROUT: Urine Culture - RECD
15  URINE ROUT: Legionella Antigen - COMP
15  URINE ROUT: Streptococcus pneumoniae Antigen (M - COMP
 
 
 
Assessment/Plan
Assessment:
The pt is 66 year obese male with a history of COPD , bilateral lower extremity 
edema, HTN, hyperlipidemia, history of atrial fibrillation on Eliquis, asthma, 
neurogenic bladder, depression, chronic constipation, muscle spasms, GARRY, CAD s/
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 
abscess. He presented to the ED complaining of urinary leakage, malodorous urine
, persistent cough since his last admission for pneumonia, and fever.
 
CAP:CXR shows left lower lobe infiltrate, cough has not resolved since admission
and treatment for previous pneumonia. WBC:12.9. Previously culture positive for 
psuedomonas
* Continue Ceftaz and azithromycin as per  ID recommendations
* Follow cultures no growth after 1 day
* Urine strep and Legionella negative
 
COPD exacerbation
* Continue prednisone taper 40 x 2 days, 30 x 2 days, 20 x 2 days, 10 x 2 days
* Pulmonary consult placed with Dr. Hull, CT recommended but given clinical 
improvement, patient being afebrile , and decreased white cell count, it will be
of limited diagnostic value
* Azithromycin for antinflamatory properties
* O2 nasal canula as needed
* TRC consult
* Albuterol
* Montelukast 10 mg daily
* f/u cbc
 
possible UTI
UA significant for UrWBC 10-15, LE small, trace proteins, negative ntirites. Pt 
has hx of neurogenic bladder with frequent straight catheterizations (3-5 times 
daily)
* Follow-up urine cultures.  
* Being followed by Dr. Dykes
 
HTN patient's heart rate and blood pressure has been towards the higher side 
overnight.  We have started hypertensive medication
* Metoprolol 12.5mg BID
* Sotalol 80 mg BID
* Continue Lasix 50 MG Q4
 
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
Cont suppressive abx treatment with rifampin/doxycycline
 
history of muscle spasms
* Continue baclofen
 
history of AFIB
* Continue eloquis BID
 
chronic lower extremity edema
* Continue every other day lasix regimen. Next dose 
 
history of hyperlipidemia
* Continue IM evolocumab every two weeks. Next dose 
 
Patient does have a history of obstructive sleep apnea however denies use of 
CPAP adamantly
Problem List:
 1. Paraplegia
 
 2. Hypertension
 
 3. COPD exacerbation
 
 4. Pneumonia
 
Pain Ratin
Pain Location:
NONE
Pain Goal: Pain 4 or less
Pain Plan:
N/A
Tomorrow's Labs & Rationales:
CBC
DVT/Prophylaxis: ELIQUIS
Consulting Request:
   Consulting Specialty: Urology

## 2017-07-20 NOTE — PN- ATT ADDEND
Attending Addendum
Attending Brief Note
Patient seen and examined in the emergency room.  Plan of care discussed with 
the medical team and the patient.  Available lab work and radiology test reports
were reviewed.  Patient appears comfortable and pleasant and denies any chest 
pain fever chills nausea or vomiting.  He continues to have a moderate cough 
with scant sputum production and mild to moderate Difficulty breathing.
 Vital Signs
 
 
Date Time Temp Pulse Resp B/P B/P Pulse O2 O2 Flow FiO2
 
     Mean Ox Delivery Rate 
 
07/20 0752 97.6 83 20 154/77  96 Nasal  
 
       Cannula  
 
07/20 0645      96 Nasal 2.0L 
 
       Cannula  
 
07/20 0129 99.3 90 20 146/82  95 Nasal 2.0L 
 
       Cannula  
 
07/20 0005  140  144/79     
 
07/19 2353  140 28 144/79     
 
07/19 2340 98.0 140 21 161/97  98   
 
07/19 2304      97 Nasal 2.0L 
 
       Cannula  
 
07/19 2210 99.0 104 18 145/73  97 Nasal 2.0L 
 
       Cannula  
 
07/19 2042      94 Nasal 2.0L 
 
       Cannula  
 
07/19 1705 98.5 89 18 173/76  97 Nasal 2.0L 
 
       Cannula  
 
07/19 1535       Nasal 2.0L 
 
       Cannula  
 
 
 Intake & Output
 
 
 07/20 1600 07/20 0800 07/20 0000
 
Intake Total  800 
 
Output Total  1125 700
 
Balance  -325 -700
 
    
 
Intake, IV  800 
 
Output, Urine  1125 700
 
Patient   233 lb
 
Weight   
 
Weight   Reported by Patient
 
Measurement   
 
Method   
 
 
Exam:
General: Patient awake alert oriented without any distress 
CVS: S1 plus S2 without any murmur or gallops 
Chest: Few scattered crepitation with expiratory prolongation and mild wheeze.  
There is no respiratory distress. 
Abdomen: Soft nontender, bowel sound present, no guarding or rebound 
CNS: Awake alert oriented with paraparesis follows command  appropriately 
Extremities: No edema; no clubbing or cyanosis noted
Any Castro has been inserted today.
 Laboratory Tests
 
 
 07/20
 
 0720
 
Chemistry 
 
  Sodium (137 - 145 mmol/L) 137
 
  Potassium (3.5 - 5.1 mmol/L) 4.0
 
  Chloride (98 - 107 mmol/L) 100
 
  Carbon Dioxide (22 - 30 mmol/L) 33  H
 
  Anion Gap (5 - 16) 4  L
 
  BUN (9 - 20 mg/dL) 11
 
  Creatinine (0.7 - 1.2 mg/dL) 0.5  L
 
  Estimated GFR (>60 ml/min) > 60
 
  BUN/Creatinine Ratio (7 - 25 %) 22.0
 
  Total Bilirubin (0.2 - 1.3 mg/dL) 0.4
 
  Direct Bilirubin (< 0.4 mg/dL) 0.4
 
  AST (17 - 59 U/L) 35
 
  ALT (21 - 72 U/L) 53
 
  Alkaline Phosphatase (< 127 U/L) 71
 
  C-Reactive Prot, Quant (<1.0 mg/dL) 4.8  H
 
  C-React Prot High Sens (1.0 - 3.0 mg/L) > 15.0  H
 
  Total Protein (6.3 - 8.2 g/dL) 6.0  L
 
  Albumin (3.5 - 5.0 g/dL) 2.9  L
 
Hematology 
 
  CBC w Diff NO MAN DIFF REQ
 
  WBC (4.8 - 10.8 /CUMM) 12.9  H
 
  RBC (4.70 - 6.10 /CUMM) 3.83  L
 
  Hgb (14.0 - 18.0 G/DL) 11.3  L
 
  Hct (42 - 52 %) 34.4  L
 
  MCV (80.0 - 94.0 FL) 89.9
 
  MCH (27.0 - 31.0 PG) 29.5
 
  RDW (11.5 - 14.5 %) 15.7  H
 
  Plt Count (130 - 400 /CUMM) 340
 
  MPV (7.4 - 10.4 FL) 7.8
 
  Gran % (42.2 - 75.2 %) 74.9
 
  Lymphocytes % (20.5 - 51.1 %) 16.2  L
 
  Monocytes % (1.7 - 9.3 %) 6.5
 
  Eosinophils % (0 - 5 %) 2.1
 
  Basophils % (0.0 - 2.0 %) 0.3
 
  Absolute Granulocytes (1.4 - 6.5 /CUMM) 9.7  H
 
  Absolute Lymphocytes (1.2 - 3.4 /CUMM) 2.1
 
  Absolute Monocytes (0.10 - 0.60 /CUMM) 0.8  H
 
  Absolute Eosinophils (0.0 - 0.7 /CUMM) 0.3
 
  Absolute Basophils (0.0 - 0.2 /CUMM) 0
 
  PUBS MCHC (33.0 - 37.0 G/DL) 32.8  L
 
  ESR Westergren (0 - 10 MM) Pending
 
 
 Microbiology
 
 
Date/Time Procedure - Status
 
Source Growth
 
07/20 0915 Urine Culture - RECD
 
URINE ROUT 
 
07/20 0015 Legionella Antigen - COMP
 
URINE ROUT 
 
07/20 0015 Streptococcus pneumoniae Antigen (M - COMP
 
URINE ROUT 
 
 
Chest x-ray July 18
Retrocardiac opacification and obscuration of the left hemidiaphragm
suspicious for a left lower lobe infiltrate. Recommendation is for a followup 
chest series to be obtained following treatment and/or resolution of symptoms to
assure resolution of this
appearance.
 
Assessment
* Community acquired pneumonia 
* COPD exacerbation
* Hypoxia
* Paraparesis
* Neurogenic bladder
* History of atrial fibrillation
* His hyperlipidemia
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
Plan
* Continue Ceftaz and azithromycin
* Given clinical improvement, patient being afebrile , and decreased white cell 
count , at this point I do not feel the need for CT chest.  No the patient was 
never febrile in the hospital.
* Continue prednisone taper
* Await culture reports
* Pulmonary consult
* Dr. Dykes was present in the room at the time.

## 2017-07-20 NOTE — EVENT NOTE
**See Addendum**
Event Note
Event Note:
S: received a call from nurse at 23:25 that pt's BP was 162/97 and p was 136
B: pt is a 65 yo male admitted for pnuemonia and COPD.  Pt has been hypertensive
for the duration of his admission 
AR
I went to assess, the pt. Upon retaking the vitals his bp dropped to 140/79 with
a pulse of 140.  The pt was in no acute distress, and asymptomatic.  He denied 
headache, chest pain, palpitations, changes in vision, SOB, or diaphoresis.  
After discussing with my resident Dr. Mcgovern we ordered a one time dose of 
metoprolol 12.5 mg, which is his home medication. Since he has respiratory 
issues, we watched for respiratory distress after giving beta blocker, and did 
not record any. Patient was asymptomatic all this time.
 
Update:
at 01:29 /82, pulse 90
will discuss BP control with AM team

## 2017-07-20 NOTE — PN- INFECT DX
Subjective
Subjective:
No productive cough. Feels much better. No fever.
Fair appetite.
 
Review of Systems
Comments:
12 points reviewed as noted, otherwise negative.
 
Objective
Last 24 Hrs of Vital Signs/I&O
 Vital Signs
 
 
Date Time Temp Pulse Resp B/P B/P Pulse O2 O2 Flow FiO2
 
     Mean Ox Delivery Rate 
 
07/20 1642      95 Nasal 2.0L 
 
       Cannula  
 
07/20 1600      94 Nasal 2.0L 
 
       Cannula  
 
07/20 1435  80  142/80     
 
07/20 1430 98.3 80 20 142/80  93 Nasal 2.0L 
 
       Cannula  
 
07/20 1134      97 Nasal 2.0L 
 
       Cannula  
 
07/20 0800      94 Nasal 2.0L 
 
       Cannula  
 
07/20 0752 97.6 83 20 154/77  96 Nasal  
 
       Cannula  
 
07/20 0645      96 Nasal 2.0L 
 
       Cannula  
 
07/20 0129 99.3 90 20 146/82  95 Nasal 2.0L 
 
       Cannula  
 
07/20 0005  140  144/79     
 
07/19 2353  140 28 144/79     
 
07/19 2340 98.0 140 21 161/97  98   
 
07/19 2304      97 Nasal 2.0L 
 
       Cannula  
 
07/19 2210 99.0 104 18 145/73  97 Nasal 2.0L 
 
       Cannula  
 
07/19 2042      94 Nasal 2.0L 
 
       Cannula  
 
 
 Intake & Output
 
 
 07/20 1600 07/20 0800 07/20 0000
 
Intake Total 1800 800 
 
Output Total 3400 1125 700
 
Balance -1600 -325 -700
 
    
 
Intake,  800 
 
Intake, Oral 1000  
 
Number 0  
 
Bowel   
 
Movements   
 
Output, Urine 3400 1125 700
 
Patient   233 lb
 
Weight   
 
Weight   Reported by Patient
 
Measurement   
 
Method   
 
 
 
 
Physical Exam
Other Physical Findings:
General Appearance , Cooperative, No Acute Distress
Skin No Rashes
HEENT Atraumatic, PERRLA, EOMI, Mucous Membr. moist/pink
Neck Supple, No JVD, +2 Carotid Pulse wo Bruit
Lymphatic Cervical nl
Cardiovascular Regular Rate, Normal S1, Normal S2, No Murmurs
Lungs B/L expiratory wheezing
Abdomen Normal Bowel Sounds, Soft, protuberant
Neurological Normal Speech, Cranial Nerves 3-12 NL, 
Alert, Oriented X3
Extremities ++ edema of the lower extremities B/L (L>R)
Vascular Normal Pulses, Pulses Symmetrical
 
Results
Last 24 Hours of Lab Results:
 Laboratory Tests
 
 
 07/20
 
 0720
 
Chemistry 
 
  Sodium (137 - 145 mmol/L) 137
 
  Potassium (3.5 - 5.1 mmol/L) 4.0
 
  Chloride (98 - 107 mmol/L) 100
 
  Carbon Dioxide (22 - 30 mmol/L) 33  H
 
  Anion Gap (5 - 16) 4  L
 
  BUN (9 - 20 mg/dL) 11
 
  Creatinine (0.7 - 1.2 mg/dL) 0.5  L
 
  Estimated GFR (>60 ml/min) > 60
 
  BUN/Creatinine Ratio (7 - 25 %) 22.0
 
  Total Bilirubin (0.2 - 1.3 mg/dL) 0.4
 
  Direct Bilirubin (< 0.4 mg/dL) 0.4
 
  AST (17 - 59 U/L) 35
 
  ALT (21 - 72 U/L) 53
 
  Alkaline Phosphatase (< 127 U/L) 71
 
  C-Reactive Prot, Quant (<1.0 mg/dL) 4.8  H
 
  C-React Prot High Sens (1.0 - 3.0 mg/L) > 15.0  H
 
  Total Protein (6.3 - 8.2 g/dL) 6.0  L
 
  Albumin (3.5 - 5.0 g/dL) 2.9  L
 
Hematology 
 
  CBC w Diff NO MAN DIFF REQ
 
  WBC (4.8 - 10.8 /CUMM) 12.9  H
 
  RBC (4.70 - 6.10 /CUMM) 3.83  L
 
  Hgb (14.0 - 18.0 G/DL) 11.3  L
 
  Hct (42 - 52 %) 34.4  L
 
  MCV (80.0 - 94.0 FL) 89.9
 
  MCH (27.0 - 31.0 PG) 29.5
 
  RDW (11.5 - 14.5 %) 15.7  H
 
  Plt Count (130 - 400 /CUMM) 340
 
  MPV (7.4 - 10.4 FL) 7.8
 
  Gran % (42.2 - 75.2 %) 74.9
 
  Lymphocytes % (20.5 - 51.1 %) 16.2  L
 
  Monocytes % (1.7 - 9.3 %) 6.5
 
  Eosinophils % (0 - 5 %) 2.1
 
  Basophils % (0.0 - 2.0 %) 0.3
 
  Absolute Granulocytes (1.4 - 6.5 /CUMM) 9.7  H
 
  Absolute Lymphocytes (1.2 - 3.4 /CUMM) 2.1
 
  Absolute Monocytes (0.10 - 0.60 /CUMM) 0.8  H
 
  Absolute Eosinophils (0.0 - 0.7 /CUMM) 0.3
 
  Absolute Basophils (0.0 - 0.2 /CUMM) 0
 
  PUBS MCHC (33.0 - 37.0 G/DL) 32.8  L
 
  ESR Westergren (0 - 10 MM) 55  H
 
 
 
Last 24 Hours of Stalin Results:
 
     SPEC #:  [id number]    BHUPINDER: 07/18/    STATUS: RES 
                               RECD: 07/18/   SUBM DR: LYLE OWENS  
           
     SOURCE:  BLOOD            ENTR: 07/18/   [facility] DR: RIGOBERTO REZA MD           
     SPDESC:  1ST/VENOUS                                                        
           
     ORDERED: BLOOD CULTURE                                                     
     
 
 
 
       Procedure                        Result
 
> BLOOD CULTURE REPORT  Preliminary                                07/19/
      
        No growth after 1 day incubation.  Specimen is examined
        continuously for 5 days before final report unless culture
        becomes positive.                                      
 
Recent Imaging Studies:
SERVICE DATE: 07/18/
EXAM TYPE: RAD - XRY-CHEST XRAY, PA AND LATERAL
 
EXAMINATION:
CHEST 2 VIEWS
 
CLINICAL INFORMATION:
Cough, shortness of breath.
 
COMPARISON:
05/22/2017.
 
TECHNIQUE:
PA and lateral views of the chest were obtained.
 
FINDINGS:
The cardiac silhouette is not enlarged. The mediastinal and hilar contours
are unremarkable. There is obscuration of the left hemidiaphragm and faint
retrocardiac opacification. The osseous structures are unremarkable.
 
IMPRESSION:
Retrocardiac opacification and obscuration of the left hemidiaphragm
suspicious for a left lower lobe infiltrate.
 
Recommendation is for a followup chest series to be obtained following
treatment and/or resolution of symptoms to assure resolution of this
appearance.
 
DICTATED BY: JILL COFFEY MD 
DATE/TIME DICTATED:07/18/17 / 2000
:RAD.TESFAYE 
DATE/TIME TRANSCRIBED:07/18/17 / 2000
 
CONFIDENTIAL, DO NOT COPY WITHOUT APPROPRIATE AUTHORIZATION.
 
 
Assessment/Plan
Impression:
65 yo morbidly obese male with GARRY, AFIB w/ RVR, stage IV COPD on 2L O2, Afib on
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 
suppressive therapy with doxycycline and rifampin admitted 7/18/17.
 
Eval recurrent UTI; h/o neurogenic bladder on straight cath protocol, recetly 
treated w/ Cipro;
of note UC no growth at 2 d.
COPD exacerbation; eval LLL pneumonia; h/o Ps.aeruginosa (pansensitive) lung 
infection;
clinically improved since admission
Leukocytosis
 
Suggestion:
1. F/u culture results to further guide antibiotic treatment; currently treated 
empirically with iv Ceftazidime dose 1 gm q 8 h D #2. Would d/c azithromycin.
2. Cont suppressive abx treatment with rifampin/doxycycline.
3. Trend CBC, BMP.
4. CT chest eval pneumonia.

## 2017-07-20 NOTE — CONS- PULMONARY
[place] JONES,[facility] 07/20/17 1118:
General Information and HPI
 
Consulting Request
Date of Consult: 07/20/17
Requested By:
primary team
Reason for Consult:
Productive cough and and dyspnea since May
Source of Information: patient, old records
Exam Limitations: no limitations
History of Present Illness:
65 yo morbidly obese male with stage IV COPD on 2L O2, Afib on Eliquis, CAD s/p 
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 
never drained resulting in paraparesis now on chronic suppressive therapy with 
doxycycline and rifampin, neurogenic bladder on straight cath protocol, 
recurrent UTI, DVT/PE s/p IVC filter, GARRY not using CPAP, recently admitted for 
right ankle fracture/ Afib with RVR ([facility] is here with
c/o persistent cough, fatigue and subjective fever. He notes foul smell to his 
urine and has noted urine leaking, despite of him doing the straight cath 
protocol. He was recently treated with Levaquin for pneumonia (May 2017 while at
[facility] hospital) and for a UTI with Cipro (July 10th). He reports that Dr. Dykes asked him 
to come to the ER to further address his urinary symptom.
 
Urinalysis shows trace leukocytosis, WBCs of 15, with a negative urine nitrate. 
Patient denies dysuria, hematuria, or increased frequency.  Patient had 
leukocytosis that improved since admission was still elevated.  C-reactive 
protein is elevated.  ESR still pending.
 
Chest x-ray impression: Retrocardiac opacification and obscuration of the left 
hemidiaphragm
suspicious for a left lower lobe infiltrate.
 
Allergies/Medications
Allergies:
Coded Allergies:
heparin (SWELLING 07/18/17)
vancomycin (HIVES, SKIN CRACKES, TURNS RED, SWELLS AND PEELS 07/18/17)
 
Home Med List:
Albuterol Sulfate 2.5 MG/3 ML (0.083 %) VIAL.NEB   1 Vial INH/SOL Q4P PRN 
SHORTNESS OF BREATH  (Reported)
Albuterol Sulfate (Ventolin Hfa) 90 MCG HFA.AER.AD   2 PUF INH AD PRN COPD  (
Reported)
Apixaban (Eliquis) 5 MG TABLET   5 MG PO BID atrial fibrillation
Ascorbate Calcium (Vitamin C) 500 MG TABLET   1 TAB PO BID SUPPLEMENT  (Reported
)
Atomoxetine HCl (Strattera) 40 MG CAPSULE   1 CAP PO QAM MENTAL HEALTH  (
Reported)
Baclofen 20 MG TABLET   1 TAB PO BID MUSCLE RELAXER  (Reported)
Bisacodyl (Dulcolax) 10 MG SUPP.RECT   1 SUP RC EOD GI  (Reported)
Budesonide/Formoterol Fumarate (Symbicort 80-4.5 Mcg Inhaler) 80 MCG-4.5 MCG/
ACTUATION HFA.AER.AD   2 PUF INH BID COPD
Cannabis (Marijuana Oil) 1 amp AMP   1 TAB PO SEE ADMIN CRITERIA PAIN  (Reported
)
     10 MG IN AM
     20 MG AT PM
     DOSE THIS MEDICATION PER THE PROVIDER WHO IS PRESCRIBING IT FOR YOU.
Doxycycline Hyclate 100 MG CAPSULE   1 CAP PO BID infection  (Reported)
Evolocumab (Repatha Sureclick) 140 MG/ML PEN.INJCTR   1 ML SC Q2W CHOLESTEROL  (
Reported)
Furosemide 20 MG TABLET   3 TAB PO Q48 DIURETIC  (Reported)
Gabapentin 300 MG CAPSULE   1 TAB PO Q8H PRN NEUROPATHIC PAIN
Guaifenesin (Mucinex) 600 MG TAB.ER.12H   2 TAB PO BID MUCUS  (Reported)
Lactobacillus Acidophilus (Acidophilus) 1 EACH CAPSULE   1 CAP PO BID PROBIOTIC 
(Reported)
Lorazepam (Ativan) 0.5 MG TABLET   1 TAB PO DAILY PRN ANXIETY
Magnesium Oxide (Magnesium) 400 MG CAPSULE   1 CAP PO 0600 SUPPLEMENT  (Reported
)
Metoprolol Tartrate 25 MG TABLET   12.5 MG PO BID heart rate
Montelukast Sodium (Singulair) 10 MG TABLET   1 TAB PO DAILY ASTHMA  (Reported)
Multivitamin (Multi-Day Vitamins) 1 EACH TABLET   1 TAB PO DAILY SUPPLEMENT  (
Reported)
Nortriptyline HCl 10 MG CAPSULE   1 CAP PO QPM MENTAL HEALTH  (Reported)
Omeprazole 20 MG CAPSULE.DR   1 CAP PO DAILY GI  (Reported)
Oxycodone HCl/Acetaminophen (Oxycodone-Acetaminophen 5-325) 5 MG-325 MG TABLET  
1-2 TAB PO Q4H PRN PAIN  (Reported)
Pantoprazole Sodium (Protonix) 40 MG TABLET.DR   1 TAB PO DAILY ACID REFLUX  (
Reported)
Potassium Chloride 20 MEQ TAB.ER.PRT   1 TAB PO DAILY SUPPLEMENT  (Reported)
Prednisone 10 MG TABLET   1 TAB PO SEE ADMIN CRITERIA COPD
     SEE [facility] DISCHARGE INSTRUCTIONS
Rifampin (Rifadin) 300 MG CAPSULE   1 CAP PO BID MRSA PROPHYLAXIS  (Reported)
Sotalol (Betapace) 80 MG TABLET   80 MG PO BID AARYTHMIAS
Tiotropium Bromide (Spiriva) 18 MCG CAP.W.DEV   1 CAP INH DAILY BREATHING  (
Reported)
Trazodone HCl 50 MG TABLET   0.5 TAB PO DAILY PRN INSOMNIA
 
 
Review of Systems
 
Review of Systems
Constitutional:
Reports: fever, malaise.  Denies: chills, weakness, unexplained weight loss. 
EENTM:
Reports: no symptoms. 
Cardiovascular:
Denies: chest pain, edema, orthopena, palpitations, syncope. 
Respiratory:
Reports: cough, short of breath, sputum production.  Denies: hemoptysis, 
orthopnea, stridor, wheezing. 
GI:
Denies: abdominal pain, constipation, diarrhea, nausea, vomiting. 
Genitourinary:
Denies: dysuria, frequency, hematuria. 
 
Past History
 
Travel History
Traveled to [place] past 21 day No
 
Medical History
Blood Transfusion Hx: Yes
Neurological: C6-7 ABSCESS PARAPLEGIA
EENT: NONE
Cardiovascular: AFIB, hypertension, hyperlipidemia, myocardial infarction
Respiratory: asthma, COPD, OXYGEN DEPEND 2L
Gastrointestinal: NONE
Hepatic: NONE
Renal: neurogenic bladder
Musculoskeletal: PARAPLEGIA R/T ABSCESS
Psychiatric: NONE
Endocrine: NONE
Blood Disorders: NONE
Cancer(s): NONE
GYN/Reproductive: NONE
 
Surgical History
Surgical History: non-contributory
 
Family History
Relations & Conditions If Any:
Relation not specified for:
  *No pertinent family history
 
 
Psychosocial History
Where Do You Live? Home
Who Do You Live With? partner
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
Primary Language: English
Smoking Status: Former Smoker
ETOH Use: denies use
Illicit Drug Use: marijuana
 
Functional Ability
ADLs
Needs Assist: dressing, eating, toileting, bathing. 
Ambulation: wheelchair
IADLs
Independent: shopping, housework, finances, food prep, telephone, transportation
, medication admin. 
 
Exam & Diagnostic Data
Last 24 Hrs of Vital Signs/I&O
 Vital Signs
 
 
Date Time Temp Pulse Resp B/P B/P Pulse O2 O2 Flow FiO2
 
     Mean Ox Delivery Rate 
 
07/20 1134      97 Nasal 2.0L 
 
       Cannula  
 
07/20 0800      94 Nasal 2.0L 
 
       Cannula  
 
07/20 0752 97.6 83 20 154/77  96 Nasal  
 
       Cannula  
 
07/20 0645      96 Nasal 2.0L 
 
       Cannula  
 
07/20 0129 99.3 90 20 146/82  95 Nasal 2.0L 
 
       Cannula  
 
07/20 0005  140  144/79     
 
07/19 2353  140 28 144/79     
 
07/19 2340 98.0 140 21 161/97  98   
 
07/19 2304      97 Nasal 2.0L 
 
       Cannula  
 
07/19 2210 99.0 104 18 145/73  97 Nasal 2.0L 
 
       Cannula  
 
07/19 2042      94 Nasal 2.0L 
 
       Cannula  
 
07/19 1705 98.5 89 18 173/76  97 Nasal 2.0L 
 
       Cannula  
 
07/19 1535       Nasal 2.0L 
 
       Cannula  
 
 
 Intake & Output
 
 
 07/20 1600 07/20 0800 07/20 0000
 
Intake Total  800 
 
Output Total  1125 700
 
Balance  -325 -700
 
    
 
Intake, IV  800 
 
Output, Urine  1125 700
 
Patient   105.687 kg
 
Weight   
 
Weight   Reported by Patient
 
Measurement   
 
Method   
 
 
 
 
Physical Exam
General Appearance: no apparent distress, alert, awake, comfortable
Head: atraumatic, normal appearance
Eyes:
Bilateral: normal appearance, PERRL, EOMI. 
Ears, Nose, Throat: normal pharynx
Respiratory: chest non-tender, rhonchi (scattered ), rales (scattered), 
diminished airentry over lungs bilaterllay
Cardiovascular: regular rate/rhythm
Gastrointestinal: soft, non-tender
Last 48 Hrs of Labs/Stalin:
 Laboratory Tests
 
07/20/17 0720:
Anion Gap 4  L, Estimated GFR > 60, BUN/Creatinine Ratio 22.0, Total Bilirubin 
0.4, Direct Bilirubin 0.4, AST 35, ALT 53, Alkaline Phosphatase 71, C-Reactive 
Prot, Quant 4.8  H, C-React Prot High Sens > 15.0  H, Total Protein 6.0  L, 
Albumin 2.9  L, CBC w Diff NO MAN DIFF REQ, RBC 3.83  L, MCV 89.9, MCH 29.5, RDW
15.7  H, MPV 7.8, Gran % 74.9, Lymphocytes % 16.2  L, Monocytes % 6.5, 
Eosinophils % 2.1, Basophils % 0.3, Absolute Granulocytes 9.7  H, Absolute 
Lymphocytes 2.1, Absolute Monocytes 0.8  H, Absolute Eosinophils 0.3, Absolute 
Basophils 0, PUBS MCHC 32.8  L, ESR Westergren Pending
 
07/19/17 0557:
Anion Gap 6, Estimated GFR > 60, BUN/Creatinine Ratio 28.0  H, CBC w Diff NO MAN
DIFF REQ, RBC 4.11  L, MCV 90.7, MCH 28.9, RDW 15.9  H, MPV 7.2  L, Gran % 84.6 
H, Lymphocytes % 12.0  L, Monocytes % 3.1, Eosinophils % 0.2, Basophils % 0.1, 
Absolute Granulocytes 12.2  H, Absolute Lymphocytes 1.7, Absolute Monocytes 0.4,
Absolute Eosinophils 0, Absolute Basophils 0, PUBS MCHC 31.9  L
 
07/18/17 1958:
Anion Gap 5, Estimated GFR > 60, BUN/Creatinine Ratio 23.3, Glucose 93, Calcium 
9.7, Total Bilirubin 0.5, AST 28, ALT 45, Alkaline Phosphatase 82, Total Protein
6.7, Albumin 3.3  L, Globulin 3.4, Albumin/Globulin Ratio 1.0  L
 
07/18/17 1917:
CBC w Diff NO MAN DIFF REQ, RBC 4.27  L, MCV 90.3, MCH 28.9, RDW 15.2  H, MPV 
7.5, Gran % 75.6  H, Lymphocytes % 16.0  L, Monocytes % 5.6, Eosinophils % 2.7, 
Basophils % 0.1, Absolute Granulocytes 10.2  H, Absolute Lymphocytes 2.2, 
Absolute Monocytes 0.8  H, Absolute Eosinophils 0.4, Absolute Basophils 0, PUBS 
MCHC 32.0  L
 
07/18/17 1800:
Urine Color YEL, Urine Clarity CLEAR, Urine pH 6.0, Ur Specific Gravity >= 1.030
, Urine Protein TRACE  H, Urine Ketones TRACE  H, Urine Nitrite NEG, Urine 
Bilirubin NEG, Urine Urobilinogen 0.2, Ur Leukocyte Esterase SMALL  H, Ur 
Microscopic SEDIMENT EXAMINED, Urine RBC 1-3, Urine WBC 10-15  H, Ur Epithelial 
Cells MOD  H, Hyaline Casts FEW  H, Urine Mucus MOD  H, Urine Hemoglobin TRACE-
LYSED  H, Urine Glucose NEG
 Microbiology
07/20 0015  URINE ROUT: Legionella Antigen - COMP
07/20 0015  URINE ROUT: Streptococcus pneumoniae Antigen (M - COMP
 
 
 
Assessment/Plan
Impression/Plan:
This is a patient with extensive past medical history including but not limited 
to COPD on 2 L oxygen at home, recurrent UTI secondary to repeated cath 
insertion, recurrent pneumonia who was admitted to a nursing home for 22 days 
and discharged on beginning of June.  Patient reported he was treated for 
pneumonia and UTI at the nursing facility with no significant improvement on his
cough.  Patient is afebrile but with leukocytosis (on steroid).  He said report 
cough occasionally productive of light green sputum.  Currently on ceftazidime 
and azithromycin.
 
Problems
* Hospital-acquired pneumonia
* COPD on 2 L oxygen
* History of neurogenic bladder with recurrent UTI
* paraplegia secondary to T6-7 spinal MRSA abscess on suppressive antibiotic 
regimen with rifampin and doxycycline
* GARRY/asthma
* atrial fibrillation on Eliquis
 
Plan
* Order CT chest without IV contrast
* TRC/nebulizer
* Continue inhalers as prescribed
* Continue steroid taper
* Continue antibiotics for hospital-acquired pneumonia as ID
 
 
 
Consult Acknowledgment
- Thank you for your consult request.
 
FAUSTINO JONES,NICOLE PORTER 07/20/17 1214:
Assessment/Plan
Recommendations:
I have personally seen and examined the patient, and agree with the assessment 
and plan as detailed above.  Would recommend checking a CT of the chest, non-
contrast for further evaluation.   Will discuss antibiotic regimen with ID and 
primary team as well.
 
 
Consult Acknowledgment
- Thank you for your consult request.

## 2017-07-21 VITALS — SYSTOLIC BLOOD PRESSURE: 144 MMHG | DIASTOLIC BLOOD PRESSURE: 70 MMHG

## 2017-07-21 VITALS — DIASTOLIC BLOOD PRESSURE: 68 MMHG | SYSTOLIC BLOOD PRESSURE: 148 MMHG

## 2017-07-21 VITALS — DIASTOLIC BLOOD PRESSURE: 71 MMHG | SYSTOLIC BLOOD PRESSURE: 127 MMHG

## 2017-07-21 LAB
ABSOLUTE GRANULOCYTE CT: 11.2 /CUMM (ref 1.4–6.5)
BASOPHILS # BLD: 0 /CUMM (ref 0–0.2)
BASOPHILS NFR BLD: 0.2 % (ref 0–2)
EOSINOPHIL # BLD: 0.4 /CUMM (ref 0–0.7)
EOSINOPHIL NFR BLD: 2.8 % (ref 0–5)
ERYTHROCYTE [DISTWIDTH] IN BLOOD BY AUTOMATED COUNT: 15.4 % (ref 11.5–14.5)
GRANULOCYTES NFR BLD: 75.6 % (ref 42.2–75.2)
HCT VFR BLD CALC: 36.4 % (ref 42–52)
LYMPHOCYTES # BLD: 2.2 /CUMM (ref 1.2–3.4)
MCH RBC QN AUTO: 29.3 PG (ref 27–31)
MCHC RBC AUTO-ENTMCNC: 32.2 G/DL (ref 33–37)
MCV RBC AUTO: 90.8 FL (ref 80–94)
MONOCYTES # BLD: 1 /CUMM (ref 0.1–0.6)
PLATELET # BLD: 342 /CUMM (ref 130–400)
PMV BLD AUTO: 8.2 FL (ref 7.4–10.4)
RED BLOOD CELL CT: 4.01 /CUMM (ref 4.7–6.1)
WBC # BLD AUTO: 14.9 /CUMM (ref 4.8–10.8)

## 2017-07-21 NOTE — PN- ATT ADDEND
Attending Addendum
Attending Brief Note
Patient seen and examined in the emergency room.  Plan of care discussed with 
the medical team and the patient.  Available lab work and radiology test reports
were reviewed.  Patient appears comfortable and pleasant and denies any chest 
pain fever chills nausea or vomiting.  He continues to have a moderate cough 
with scant sputum production and mild to moderate Difficulty breathing. Castro 
was inserted yesterday.  No new complaints today.  Pt is not producing sputum. 
 Vital Signs
 
 
Date Time Temp Pulse Resp B/P B/P Pulse O2 O2 Flow FiO2
 
     Mean Ox Delivery Rate 
 
07/21 0743 98.1 74 20 127/71  91 Nasal 2.0L 
 
       Cannula  
 
07/21 0000       Nasal 2.0L 
 
       Cannula  
 
07/20 2350 98.1 81 22 156/73  93 Nasal 2.0L 
 
       Cannula  
 
07/20 2121  81  156/78     
 
07/20 1642      95 Nasal 2.0L 
 
       Cannula  
 
07/20 1600      94 Nasal 2.0L 
 
       Cannula  
 
07/20 1435  80  142/80     
 
07/20 1430 98.3 80 20 142/80  93 Nasal 2.0L 
 
       Cannula  
 
07/20 1134      97 Nasal 2.0L 
 
       Cannula  
 
 
 Intake & Output
 
 
 07/21 1600 07/21 0800 07/21 0000
 
Intake Total  600 600
 
Output Total 250  750
 
Balance -250 600 -150
 
    
 
Intake, Oral  600 600
 
Output, Urine 250  750
 
 
Exam:
General: Patient awake alert oriented without any distress 
CVS: S1 plus S2 without any murmur or gallops 
Chest: Few scattered crepitation with expiratory prolongation and mild wheeze.  
There is no respiratory distress. 
Abdomen: Soft nontender, bowel sound present, no guarding or rebound 
CNS: Awake alert oriented with paraparesis follows command  appropriately 
Extremities: No edema; no clubbing or cyanosis noted
Any Castro has been inserted today.
 Laboratory Tests
 
 
 07/21
 
 0705
 
Chemistry 
 
  Sodium (137 - 145 mmol/L) 136  L
 
  Potassium (3.5 - 5.1 mmol/L) 3.8
 
  Chloride (98 - 107 mmol/L) 96  L
 
  Carbon Dioxide (22 - 30 mmol/L) 35  H
 
  Anion Gap (5 - 16) 5
 
  BUN (9 - 20 mg/dL) 15
 
  Creatinine (0.7 - 1.2 mg/dL) 0.5  L
 
  Estimated GFR (>60 ml/min) > 60
 
  BUN/Creatinine Ratio (7 - 25 %) 30.0  H
 
Hematology 
 
  CBC w Diff NO MAN DIFF REQ
 
  WBC (4.8 - 10.8 /CUMM) 14.9  H
 
  RBC (4.70 - 6.10 /CUMM) 4.01  L
 
  Hgb (14.0 - 18.0 G/DL) 11.7  L
 
  Hct (42 - 52 %) 36.4  L
 
  MCV (80.0 - 94.0 FL) 90.8
 
  MCH (27.0 - 31.0 PG) 29.3
 
  RDW (11.5 - 14.5 %) 15.4  H
 
  Plt Count (130 - 400 /CUMM) 342
 
  MPV (7.4 - 10.4 FL) 8.2
 
  Gran % (42.2 - 75.2 %) 75.6  H
 
  Lymphocytes % (20.5 - 51.1 %) 14.7  L
 
  Monocytes % (1.7 - 9.3 %) 6.7
 
  Eosinophils % (0 - 5 %) 2.8
 
  Basophils % (0.0 - 2.0 %) 0.2
 
  Absolute Granulocytes (1.4 - 6.5 /CUMM) 11.2  H
 
  Absolute Lymphocytes (1.2 - 3.4 /CUMM) 2.2
 
  Absolute Monocytes (0.10 - 0.60 /CUMM) 1.0  H
 
  Absolute Eosinophils (0.0 - 0.7 /CUMM) 0.4
 
  Absolute Basophils (0.0 - 0.2 /CUMM) 0
 
  PUBS MCHC (33.0 - 37.0 G/DL) 32.2  L
 
 
Blood cultures remain negative so far.
 
CT chest
1. Obstructive lung disease with bibasilar dependent areas of lung
parenchymal consolidation, left greater than right. Findings may be related
to atelectasis with possible superimposed pneumonia in left lung base.
Borderline mediastinal lymph nodes are likely reactive.
2. Small left pleural effusion.
3. Platelike atelectasis or scarring in the lingula.
4. Severe coronary artery calcifications.
5. Osteopenia with chronic 50% wedge compression deformity of T7 with partial
fusion, focal kyphosis and secondary degenerative change as discussed above.
 
 
Assessment
* Hospital-acquired pneumonia suspected gram-negative such as Pseudomonas 
* COPD exacerbation
* Hypoxia
* Persistently elevated WBC count despite  IV antibiotics
* Bilateral atelectasis
* Paraparesis
* Neurogenic bladder
* History of atrial fibrillation
* His hyperlipidemia
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
Plan
* Continue Ceftaz - infectious disease notes reviewed
* Continue prednisone taper
* Await culture reports
* Pulmonary consult note reviewed

## 2017-07-21 NOTE — PN- INFECT DX
Subjective
Subjective:
Lying comfortably in the bed.  He says that he feels much better.  There has 
been no overnight acute events.  No fever.  He denie chest pain or shortness of 
breath at rest. 
 
Review of Systems
Comments:
12 points reviewed as noted, otherwise negative.
 
Objective
Last 24 Hrs of Vital Signs/I&O
 Vital Signs
 
 
Date Time Temp Pulse Resp B/P B/P Pulse O2 O2 Flow FiO2
 
     Mean Ox Delivery Rate 
 
07/21 1521 97.9 78 16 148/68  93 Nasal 2.0L 
 
       Cannula  
 
07/21 0925  74  126/70     
 
07/21 0925      95 Nasal 2.0L 
 
       Cannula  
 
07/21 0910      95 Nasal 2.0L 
 
       Cannula  
 
07/21 0800      93 Nasal 2.0L 
 
       Cannula  
 
07/21 0743 98.1 74 20 127/71  91 Nasal 2.0L 
 
       Cannula  
 
07/21 0000       Nasal 2.0L 
 
       Cannula  
 
07/20 2350 98.1 81 22 156/73  93 Nasal 2.0L 
 
       Cannula  
 
07/20 2121  81  156/78     
 
07/20 1642      95 Nasal 2.0L 
 
       Cannula  
 
07/20 1600      94 Nasal 2.0L 
 
       Cannula  
 
 
 Intake & Output
 
 
 07/21 1600 07/21 0800 07/21 0000
 
Intake Total 600 600 600
 
Output Total 1100  750
 
Balance -500 600 -150
 
    
 
Intake, Oral 600 600 600
 
Output, Urine 1100  750
 
 
 
 
Physical Exam
Other Physical Findings:
General Appearance Elevated BMI , Cooperative, No Acute Distress
Skin No Rashes, Pale
HEENT Atraumatic, EOMI, Mucous Membr. moist/pink
Neck Supple, No ULKE
Lymphatic Cervical nl
Cardiovascular S1 S2 present
Lungs BS present, no rales, few b/l rhonchi, no wheezing
Abdomen Protuberant Bowel Sounds, Soft, protuberant
Neurological Normal Speech, Cranial Nerves 3-12 NL, 
Alert, Oriented X3
Extremities +1 edema of the lower extremities B/L (L>R);
improved from previous day
 
Results
Last 24 Hours of Lab Results:
 Laboratory Tests
 
 
 07/21
 
 0705
 
Chemistry 
 
  Sodium (137 - 145 mmol/L) 136  L
 
  Potassium (3.5 - 5.1 mmol/L) 3.8
 
  Chloride (98 - 107 mmol/L) 96  L
 
  Carbon Dioxide (22 - 30 mmol/L) 35  H
 
  Anion Gap (5 - 16) 5
 
  BUN (9 - 20 mg/dL) 15
 
  Creatinine (0.7 - 1.2 mg/dL) 0.5  L
 
  Estimated GFR (>60 ml/min) > 60
 
  BUN/Creatinine Ratio (7 - 25 %) 30.0  H
 
Hematology 
 
  CBC w Diff NO MAN DIFF REQ
 
  WBC (4.8 - 10.8 /CUMM) 14.9  H
 
  RBC (4.70 - 6.10 /CUMM) 4.01  L
 
  Hgb (14.0 - 18.0 G/DL) 11.7  L
 
  Hct (42 - 52 %) 36.4  L
 
  MCV (80.0 - 94.0 FL) 90.8
 
  MCH (27.0 - 31.0 PG) 29.3
 
  RDW (11.5 - 14.5 %) 15.4  H
 
  Plt Count (130 - 400 /CUMM) 342
 
  MPV (7.4 - 10.4 FL) 8.2
 
  Gran % (42.2 - 75.2 %) 75.6  H
 
  Lymphocytes % (20.5 - 51.1 %) 14.7  L
 
  Monocytes % (1.7 - 9.3 %) 6.7
 
  Eosinophils % (0 - 5 %) 2.8
 
  Basophils % (0.0 - 2.0 %) 0.2
 
  Absolute Granulocytes (1.4 - 6.5 /CUMM) 11.2  H
 
  Absolute Lymphocytes (1.2 - 3.4 /CUMM) 2.2
 
  Absolute Monocytes (0.10 - 0.60 /CUMM) 1.0  H
 
  Absolute Eosinophils (0.0 - 0.7 /CUMM) 0.4
 
  Absolute Basophils (0.0 - 0.2 /CUMM) 0
 
  PUBS MCHC (33.0 - 37.0 G/DL) 32.2  L
 
 
 
Last 24 Hours of Stalin Results:
 
     SPEC #:  [id number]     BHUPINDER: 07/20/    STATUS: COMP
                               RECD: 07/20/   [facility] DR: LINDSAY JONES,KORY   
           
     SOURCE:  URINE ROUT       ENTR: 07/19/   OT DR: JORDANA JONES,
MARK         
     SPDESC:  MARTINE REZA MD,RIGOBERTO YANG           
     ORDERED: SPN URINE AG                                                      
     
 
     COMMENT: Has patient received Pneumovax in past 5 days? N            
              TRIO                                                        
 
 
       Procedure                        Result
 
> STREP PNEUMO URINARY ANTIGEN  Final                              07/20/
      
 
        NEGATIVE FOR STREP PNEUMONIAE BACTERIAL AG, MAY BE BELOW
        DETECTION LIMIT.
 
 
Recent Imaging Studies:
CT chest 7/20:
IMPRESSION:
1. Obstructive lung disease with bibasilar dependent areas of lung
parenchymal consolidation, left greater than right. Findings may be related
to atelectasis with possible superimposed pneumonia in left lung base.
Borderline mediastinal lymph nodes are likely reactive.
2. Small left pleural effusion.
3. Platelike atelectasis or scarring in the lingula.
4. Severe coronary artery calcifications.
5. Osteopenia with chronic 50% wedge compression deformity of T7 with partial
fusion, focal kyphosis and secondary degenerative change as discussed above.
 
DICTATED BY: HARRY JONES,ELVIRA PURCELL 
DATE/TIME DICTATED:07/21/17 / 0723
:RAD.TESFAYE 
DATE/TIME TRANSCRIBED:07/21/17 / 0723
 
Assessment/Plan
Impression:
65 yo morbidly obese male with GARRY, AFIB w/ RVR, stage IV COPD on 2L O2, Afib on
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 
suppressive therapy with doxycycline and rifampin admitted 7/18/17.
 
Eval recurrent UTI; h/o neurogenic bladder on straight cath protocol, recetly 
treated w/ Cipro;
of note UC no growth to date.
COPD exacerbation; eval LLL pneumonia; h/o Ps.aeruginosa (pansensitive) lung 
infection;
clinically improved since admission
Leukocytosis; WBC trending up (likely related to steroid tx)
 
Suggestion:
1. F/u culture results to further guide antibiotic treatment; currently treated 
empirically with iv Azithromycin/Ceftazidime D #3/5; as clinically much improved
could also d/c iv abx today and observe off iv abx (f/u pulm recom).
2. Cont suppressive abx treatment with rifampin/doxycycline. MRI thoracic spine 
to eval residual abscess if patient has persistent leukocytosis after tapering 
off steroids.
3. Trend CBC, BMP.

## 2017-07-21 NOTE — CT SCAN REPORT
EXAMINATION:
CT CHEST WITHOUT CONTRAST
 
CLINICAL INFORMATION:
Shortness of breath. Left lower lobe pneumonia with previous scarring.
 
COMPARISON:
Chest x-ray dated 07/18/2017. CT scan of the chest dated 06/11/2014.
 
TECHNIQUE:
Multidetector volumetric CT imaging of the chest was obtained noncontrast.
Sagittal and coronal reformations were obtained.
 
DLP:
802.46 mGy-cm.
 
FINDINGS:
 
LUNGS: Small left-sided pleural effusion. Prominent subpleural fat seen
bilaterally. Small calcified granuloma seen in the left lower lobe (series 4,
image 300). No suspicious pulmonary nodule or mass seen. Central airways
patent. Moderate centrilobular emphysema and mild paraseptal emphysema.
Volume loss in both lower lobes is seen. Dependent areas of patchy lung
parenchymal consolidation, left greater than right, most consistent with
dependent atelectasis. Superimposed pneumonia, especially in the left lung
base cannot be excluded. Linear bandlike atelectasis or scarring seen in the
lingula.
 
CARDIOVASCULAR STRUCTURES: Aortic and heart size normal. Small pericardial
effusion. Severe coronary artery calcifications and mild aortic
calcifications.
 
LYMPHATIC STRUCTURES: Borderline enlarged right lower paratracheal lymph node
is seen, measuring 1 cm in short axis. Other subcentimeter sized prevascular,
right and left paratracheal, and subcarinal and axillary lymph nodes are seen
without pathologic enlargement. No definite hilar lymph nodes.
 
THYROID GLAND: Unremarkable to the extent included.
 
UPPER ABDOMEN: Included portions of the solid organs in the upper abdomen
within normal limits.
 
BONES: Extensive loose bodies seen within the left shoulder joint with
secondary moderate degenerative changes in the glenohumeral joint. Osteopenia
with approximately 50% anterior wedge compression deformity of T7 vertebral
body again seen, unchanged. Secondary partial fusion along the anterior and
posterior vertebral margins and facets seen at T6-T7 with mild focal kyphosis
at this level. Mild S-shaped scoliosis also seen. Moderate vertebral endplate
spurring seen in the mid and lower thoracic spine.
 
IMPRESSION:
1. Obstructive lung disease with bibasilar dependent areas of lung
parenchymal consolidation, left greater than right. Findings may be related
to atelectasis with possible superimposed pneumonia in left lung base.
Borderline mediastinal lymph nodes are likely reactive.
2. Small left pleural effusion.
3. Platelike atelectasis or scarring in the lingula.
4. Severe coronary artery calcifications.
5. Osteopenia with chronic 50% wedge compression deformity of T7 with partial
fusion, focal kyphosis and secondary degenerative change as discussed above.

## 2017-07-21 NOTE — PN- HOUSESTAFF
Subjective
Follow-up For:
COPD
CAP
 
Subjective:
I have seen and examined the patient.  The patient was lying comfortably in the 
bed.  He says that he feels much better.  There has been no overnight acute 
events.  He has been afebrile so far.  He denies any chest pain palpitations or 
shortness of breath.  Today NICOLE Hull MD is going to see him.
 
Review of Systems
Constitutional:
Reports: no symptoms.  Denies: diaphoresis, fever. 
Cardiovascular:
Denies: chest pain, edema. 
Respiratory:
Reports: cough (improving), sputum production. 
Gastrointestinal:
Denies: abdominal pain, bloating, constipation. 
Genitourinary:
Reports: no symptoms. 
Skin:
Reports: no symptoms. 
 
Objective
Last 24 Hrs of Vital Signs/I&O
 Vital Signs
 
 
Date Time Temp Pulse Resp B/P B/P Pulse O2 O2 Flow FiO2
 
     Mean Ox Delivery Rate 
 
 0925  74  126/70     
 
 0925      95 Nasal 2.0L 
 
       Cannula  
 
 0910      95 Nasal 2.0L 
 
       Cannula  
 
 0800      93 Nasal 2.0L 
 
       Cannula  
 
 0743 98.1 74 20 127/71  91 Nasal 2.0L 
 
       Cannula  
 
 0000       Nasal 2.0L 
 
       Cannula  
 
 2350 98.1 81 22 156/73  93 Nasal 2.0L 
 
       Cannula  
 
 2121  81  156/78     
 
 1642      95 Nasal 2.0L 
 
       Cannula  
 
 1600      94 Nasal 2.0L 
 
       Cannula  
 
 1435  80  142/80     
 
 1430 98.3 80 20 142/80  93 Nasal 2.0L 
 
       Cannula  
 
 1134      97 Nasal 2.0L 
 
       Cannula  
 
 
 Intake & Output
 
 
  1600  0800  0000
 
Intake Total  600 600
 
Output Total 250  750
 
Balance -250 600 -150
 
    
 
Intake, Oral  600 600
 
Output, Urine 250  750
 
 
 
 
Physical Exam
General Appearance: Alert, Oriented X3, Cooperative
HEENT: Atraumatic
Neck: Supple
Cardiovascular: Normal S1, Normal S2, No Murmurs
Lungs: Few scattered crepitation with expiratory prolongation and mild wheeze.  
There is no respiratory distress. 
Abdomen: Normal Bowel Sounds, Soft, No Tenderness
Neurological: Normal Speech
Extremities: No Clubbing, No Cyanosis
Current Medications:
 Current Medications
 
 
  Sig/[place] Start time  Last
 
Medication Dose Route Stop Time Status Admin
 
Albuterol Sulfate 3 ML EVERY 4 HRS/AWAKE  1600 AC 
 
  INH   0904
 
Albuterol Sulfate 2 PUF Q6P PRN  2330 AC 
 
  INH   1036
 
Albuterol Sulfate 3 ML Q4P PRN  2330 AC 
 
  INH   1028
 
Apixaban 5 MG BID  2325 AC 
 
  PO   0924
 
Azithromycin 500 MG DAILY  1000 DC 
 
Sodium Chloride 250 ML IV   0948
 
Baclofen 20 MG BID  2327 AC 
 
  PO   0924
 
Bisacodyl 10 MG DAILY PRN  1400 AC 
 
  WV   1425
 
Ceftazidime 1,000 MG Q8  0600 AC 
 
  IV   0605
 
Doxycycline Hyclate 100 MG BID  2327 AC 
 
  PO   0925
 
Furosemide 60 MG Q48  1000 AC 
 
  PO   1034
 
Gabapentin 600 MG Q8H  0930 AC 
 
  PO   0924
 
Guaifenesin 1,200 MG BID  1000 AC 
 
  PO   0925
 
Lorazepam 1 MG BID PRN  2330 AC 
 
  PO   
 
Metoprolol Tartrate 12.5 MG BID  1200 AC 
 
  PO   0925
 
Metoprolol Tartrate 12.5 MG BID  1000 DC 
 
  PO   
 
Montelukast Sodium 10 MG DAILY  1000 AC 
 
  PO   0925
 
Multivitamins  1 TAB DAILY  1000 AC 
 
Therapeutic  PO   0925
 
Nortriptyline HCl 10 MG QPM  2200 AC 
 
  PO   2121
 
Omeprazole 20 MG DAILY AC  0700 AC 
 
  PO   0605
 
Oxycodone/ 1 TAB ONCE ONE  2145 DC 
 
Acetaminophen  PO  2146  2147
 
Prednisone 10 MG DAILY  1000 AC 
 
  PO  1001  
 
Prednisone 20 MG DAILY  1000 AC 
 
  PO  1001  
 
Prednisone 30 MG DAILY  1000 AC 
 
  PO  1001  0925
 
Rifampin 300 MG DAILY  1000 AC 
 
  PO   0925
 
Sodium Chloride 1,000 ML .Q10H  2345 DC 
 
  IV   0752
 
Sotalol HCl 80 MG BID  1000 AC 
 
  PO   0924
 
Tiotropium [place] 1 PUF DAILY 07/19 1000 AC 
 
  INH   0926
 
Trazodone HCl 50 MG .STK-MED ONE  0105 DC 
 
  PO  0106  
 
Trazodone HCl 50 MG QPM PRN  2345 AC 
 
  PO   0105
 
 
 
 
Last 24 Hrs of Lab/Stalin Results
Last 24 Hrs of Labs/Mics:
 Laboratory Tests
 
17 0705:
Anion Gap 5, Estimated GFR > 60, BUN/Creatinine Ratio 30.0  H, CBC w Diff NO MAN
DIFF REQ, RBC 4.01  L, MCV 90.8, MCH 29.3, RDW 15.4  H, MPV 8.2, Gran % 75.6  H,
Lymphocytes % 14.7  L, Monocytes % 6.7, Eosinophils % 2.8, Basophils % 0.2, 
Absolute Granulocytes 11.2  H, Absolute Lymphocytes 2.2, Absolute Monocytes 1.0 
H, Absolute Eosinophils 0.4, Absolute Basophils 0, PUBS MCHC 32.2  L
 
 
Assessment/Plan
Assessment:
The pt is 66 year obese male with a history of COPD , bilateral lower extremity 
edema, HTN, hyperlipidemia, history of atrial fibrillation on Eliquis, asthma, 
neurogenic bladder, depression, chronic constipation, muscle spasms, GARRY, CAD s/
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 
abscess. He presented to the ED complaining of urinary leakage, malodorous urine
, persistent cough since his last admission for pneumonia, and fever.
 
CT findings 
1. Obstructive lung disease with bibasilar dependent areas of lung parenchymal 
consolidation, left greater than right. Findings may be related to atelectasis 
with possible superimposed pneumonia in left lung base.Borderline mediastinal 
lymph nodes are likely reactive.
2. Small left pleural effusion.
3. Platelike atelectasis or scarring in the lingula.
4. Severe coronary artery calcifications.
5. Osteopenia with chronic 50% wedge compression deformity of T7 with partial
fusion, focal kyphosis and secondary degenerative change as discussed above.
 
Suspected gram-negative pneumonia :CXR in ED showed left lower lobe infiltrate, 
cough has not resolved since admission and treatment for previous 
pneumonia.Previously culture positive for psuedomonas. Pt has persistently 
elevated WBC count despite  IV antibiotics
* CT scan findings above bibasiler atelectasis with possible superimposed 
pneumonia LL lung base
* Continue Ceftazidime dose 1 gm q 8 h Day 3
* Azithromycin discontinued as per  ID recommendations
* Follow cultures no growth after 1 day
* Urine strep and Legionella negative
 
COPD exacerbation
* Continue prednisone taper 40 x 2 days, 30 x 2 days, 20 x 2 days, 10 x 2 days
* O2 nasal canula as needed
* TRC consult
* Albuterol
* Montelukast 10 mg daily
* f/u cbc
 
possible UTI
UA significant for UrWBC 10-15, LE small, trace proteins, negative ntirites. Pt 
has hx of neurogenic bladder with frequent straight catheterizations (3-5 times 
daily)
* Follow-up urine cultures.  
* Being followed by Dr. Dykes
 
HTN patient's heart rate and blood pressure has been towards the higher side 
overnight.  We have started hypertensive medication
* Metoprolol 12.5mg BID
* Sotalol 80 mg BID
* Continue Lasix 50 MG Q4
 
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
* Cont suppressive abx treatment with rifampin/doxycycline
 
history of muscle spasms
* Continue baclofen
 
history of AFIB
* Continue eloquis BID
 
chronic lower extremity edema
* Continue every other day lasix regimen. Next dose 
 
history of hyperlipidemia
* Continue IM evolocumab every two weeks. Next dose 
 
Patient does have a history of obstructive sleep apnea however denies use of 
CPAP adamantly
Problem List:
 1. COPD
 
 2. Gram-negative pneumonia
 
Pain Ratin
Pain Location:
/a
Pain Goal: Pain 4 or less
Pain Plan:
none
Tomorrow's Labs & Rationales:
cbc bep
Consulting Request:
   Consulting Specialty: Urology

## 2017-07-22 VITALS — DIASTOLIC BLOOD PRESSURE: 86 MMHG | SYSTOLIC BLOOD PRESSURE: 142 MMHG

## 2017-07-22 VITALS — DIASTOLIC BLOOD PRESSURE: 82 MMHG | SYSTOLIC BLOOD PRESSURE: 165 MMHG

## 2017-07-22 VITALS — SYSTOLIC BLOOD PRESSURE: 160 MMHG | DIASTOLIC BLOOD PRESSURE: 81 MMHG

## 2017-07-22 LAB
ABSOLUTE GRANULOCYTE CT: 11.1 /CUMM (ref 1.4–6.5)
BASOPHILS # BLD: 0 /CUMM (ref 0–0.2)
BASOPHILS NFR BLD: 0 % (ref 0–2)
EOSINOPHIL # BLD: 0.3 /CUMM (ref 0–0.7)
EOSINOPHIL NFR BLD: 2 % (ref 0–5)
ERYTHROCYTE [DISTWIDTH] IN BLOOD BY AUTOMATED COUNT: 15.1 % (ref 11.5–14.5)
GRANULOCYTES NFR BLD: 78.5 % (ref 42.2–75.2)
HCT VFR BLD CALC: 36 % (ref 42–52)
LYMPHOCYTES # BLD: 2 /CUMM (ref 1.2–3.4)
MCH RBC QN AUTO: 29.1 PG (ref 27–31)
MCHC RBC AUTO-ENTMCNC: 32.3 G/DL (ref 33–37)
MCV RBC AUTO: 89.9 FL (ref 80–94)
MONOCYTES # BLD: 0.8 /CUMM (ref 0.1–0.6)
PLATELET # BLD: 334 /CUMM (ref 130–400)
PMV BLD AUTO: 8 FL (ref 7.4–10.4)
RED BLOOD CELL CT: 4 /CUMM (ref 4.7–6.1)
WBC # BLD AUTO: 14.2 /CUMM (ref 4.8–10.8)

## 2017-07-22 NOTE — PN- HOUSESTAFF
Subjective
Follow-up For:
COPD 
pneumonia
Subjective:
I have seen and examined the patient.  The patient was lying comfortably in the 
bed.  He was on 2 L oxygen nasal cannula.  He says that he is feeling much 
better.  He wanted his cardiologist to follow up with him in the hospital.  I 
have given a courtesy call to Dr. Day's cardiology yesterday.  He denies any
chest pain palpitations fever or chills.
 
Review of Systems
Constitutional:
Denies: chills, diaphoresis, malaise. 
EENTM:
Reports: no symptoms. 
Cardiovascular:
Denies: chest pain, edema, orthopena. 
Respiratory:
Reports: cough, short of breath, sputum production. 
Gastrointestinal:
Reports: no symptoms. 
Genitourinary:
Reports: no symptoms. 
Musculoskeletal:
Reports: no symptoms. 
 
Objective
Last 24 Hrs of Vital Signs/I&O
 Vital Signs
 
 
Date Time Temp Pulse Resp B/P B/P Pulse O2 O2 Flow FiO2
 
     Mean Ox Delivery Rate 
 
 0910  68  150/78     
 
 0800      96 Nasal 2.0L 
 
       Cannula  
 
 0745       Nasal 2.0L 
 
       Cannula  
 
 0703 98.5 73 20 165/82  99   
 
 0000       Nasal 2.0L 
 
       Cannula  
 
 2307 98.6 76 16 144/70  94   
 
 2221    150/64     
 
 1927      94 Nasal 2.0L 
 
       Cannula  
 
 1600      93 Nasal 2.0L 
 
       Cannula  
 
 1521 97.9 78 16 148/68  93 Nasal 2.0L 
 
       Cannula  
 
 
 Intake & Output
 
 
  1600  0800  0000
 
Intake Total  240 1050
 
Output Total  325 450
 
Balance  -85 600
 
    
 
Intake, Oral  240 1050
 
Number   2
 
Bowel   
 
Movements   
 
Output, Urine  325 450
 
 
 
 
Physical Exam
General Appearance: Alert, Oriented X3, Cooperative
Skin: No Rashes, No Breakdown
Neck: Supple
Cardiovascular: Normal S1, Normal S2, No Murmurs
Lungs: Clear to Auscultation, Normal Air Movement
Abdomen: Normal Bowel Sounds, Soft, No Tenderness
Extremities: paraplegia
Current Medications:
 Current Medications
 
 
  Sig/[place] Start time  Last
 
Medication Dose Route Stop Time Status Admin
 
Acetaminophen 650 MG Q6P PRN  0245 AC 
 
  PO   0236
 
Albuterol Sulfate 3 ML EVERY 4 HRS/AWAKE  1600 AC 
 
  INH   1132
 
Albuterol Sulfate 2 PUF Q6P PRN  2330 AC 
 
  INH   1731
 
Albuterol Sulfate 3 ML Q4P PRN  2330 AC 
 
  INH   1028
 
Apixaban 5 MG BID  2325 AC 
 
  PO   0908
 
Baclofen 20 MG BID  2327 AC 
 
  PO   0909
 
Bisacodyl 10 MG DAILY PRN  1400 AC 
 
  ME   1731
 
Ceftazidime 1,000 MG Q8  0600 DC 
 
  IV   1314
 
Doxycycline Hyclate 100 MG BID  2327 AC 
 
  PO   0911
 
Furosemide 60 MG Q48  1000 AC 
 
  PO   0911
 
Gabapentin 600 MG Q8H  0930 AC 
 
  PO   0908
 
Guaifenesin 1,200 MG BID  1000 AC 
 
  PO   0910
 
Lorazepam 1 MG BID PRN  2330 AC 
 
  PO   0345
 
Metoprolol Tartrate 12.5 MG BID  1200 AC 
 
  PO   0910
 
Montelukast Sodium 10 MG DAILY  1000 AC 
 
  PO   0911
 
Multivitamins  1 TAB DAILY  1000 AC 
 
Therapeutic  PO   0911
 
Nortriptyline HCl 10 MG QPM  2200 AC 
 
  PO   2221
 
Omeprazole 20 MG DAILY AC  0700 AC 
 
  PO   0546
 
Prednisone 10 MG DAILY  1000 AC 
 
  PO  1001  
 
Prednisone 20 MG DAILY  1000 AC 
 
  PO  1001  
 
Prednisone 30 MG DAILY  1000 DC 
 
  PO  1001  0911
 
Rifampin 300 MG DAILY  1000 AC 
 
  PO   0911
 
Sotalol HCl 80 MG BID  1000 AC 
 
  PO   0908
 
Tiotropium [place] 1 PUF DAILY  1000 AC 
 
  INH   0912
 
Trazodone HCl 50 MG .STK-MED ONE 2226 DC 
 
  PO  2227  
 
Trazodone HCl 50 MG QPM PRN  2345 AC 
 
  PO   2226
 
 
 
 
Last 24 Hrs of Lab/Stalni Results
Last 24 Hrs of Labs/Mics:
 Laboratory Tests
 
17 0628:
Anion Gap 5, Estimated GFR > 60, BUN/Creatinine Ratio 36.0  H, CBC w Diff NO MAN
DIFF REQ, RBC 4.00  L, MCV 89.9, MCH 29.1, RDW 15.1  H, MPV 8.0, Gran % 78.5  H,
Lymphocytes % 14.1  L, Monocytes % 5.4, Eosinophils % 2.0, Basophils % 0  L, 
Absolute Granulocytes 11.1  H, Absolute Lymphocytes 2.0, Absolute Monocytes 0.8 
H, Absolute Eosinophils 0.3, Absolute Basophils 0, PUBS MCHC 32.3  L
 Microbiology
 161  LOWER RESP: Respiratory Culture - RES
                GRAM POSITIVE COCCI
1619  LOWER RESP: Gram Stain - RES
 
 
 
Assessment/Plan
Assessment:
The pt is 66 year obese male with a history of COPD , bilateral lower extremity 
edema, HTN, hyperlipidemia, history of atrial fibrillation on Eliquis, asthma, 
neurogenic bladder, depression, chronic constipation, muscle spasms, GARRY, CAD s/
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 
abscess. He presented to the ED complaining of urinary leakage, malodorous urine
, persistent cough since his last admission for pneumonia, and fever.
 
CT findings 
1. Obstructive lung disease with bibasilar dependent areas of lung parenchymal 
consolidation, left greater than right. Findings may be related to atelectasis 
with possible superimposed pneumonia in left lung base.Borderline mediastinal 
lymph nodes are likely reactive.
2. Small left pleural effusion.
3. Platelike atelectasis or scarring in the lingula.
4. Severe coronary artery calcifications.
5. Osteopenia with chronic 50% wedge compression deformity of T7 with partial
fusion, focal kyphosis and secondary degenerative change as discussed above.
 
Hospital-acquired pneumonia :CXR in ED showed left lower lobe infiltrate, cough 
has not resolved since admission and treatment for previous pneumonia.Previously
culture positive for psuedomonas. Pt has persistently elevated WBC count despite
 IV antibiotics
* CT scan findings above bibasiler atelectasis with possible superimposed 
pneumonia LL lung base
* Discontinue Ceftazidime as per ID recommendations
* Azithromycin discontinued as per  ID recommendations
* Lower respiratory cultures showed mixed rosa maria after 1 day and growth of gram-
positive cocci. Holding off antibiotics for now as per ID continue with doxy and
rifampin suppression therapy
* Urine strep and Legionella negative
 
COPD exacerbation
* Continue prednisone taper 40 x 2 days, 30 x 2 days, 20 x 2 days, 10 x 2 days
* O2 nasal canula as needed
* TRC consult
* Albuterol
* Montelukast 10 mg daily
* f/u cbc
 
possible UTI
UA significant for UrWBC 10-15, LE small, trace proteins, negative ntirites. Pt 
has hx of neurogenic bladder with frequent straight catheterizations (3-5 times 
daily)
* Foleys catheter in place
* Follow-up urine cultures.  
* Being followed by Dr. Dykes
 
HTN patient's heart rate and blood pressure has been towards the higher side 
overnight.  We have started hypertensive medication
* Metoprolol 12.5mg BID
* Sotalol 80 mg BID
* Continue Lasix 50 MG Q4
 
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
* Cont suppressive abx treatment with rifampin/doxycycline
* If white count stays persistently high after steroid taper, gait and MRI 
thoracic spine to evaluate residual abscess
 
history of muscle spasms
* Continue baclofen
 
history of AFIB
* Continue eloquis BID
 
chronic lower extremity edema
* Continue every other day lasix regimen. Next dose 
 
history of hyperlipidemia
* Continue IM evolocumab every two weeks. Next dose 
 
Patient does have a history of obstructive sleep apnea however denies use of 
CPAP adamantly
Problem List:
 1. COPD exacerbation
 
 2. Pneumonia due to gram-negative bacteria
 
Pain Ratin
Pain Location:
n/a
Pain Goal: Pain 4 or less
Pain Plan:
none
Tomorrow's Labs & Rationales:
cbc bep
Consulting Request:
   Consulting Specialty: Pulmonary Disease
   Consulting Physician:
NICOLE Hull MD

## 2017-07-22 NOTE — PN- INFECT DX
Subjective
Subjective:
C/o persistent cough.
No fever or chills.
 
Review of Systems
Comments:
12 points reviewed as noted, otherwise negative.
 
Objective
Last 24 Hrs of Vital Signs/I&O
 Vital Signs
 
 
Date Time Temp Pulse Resp B/P B/P Pulse O2 O2 Flow FiO2
 
     Mean Ox Delivery Rate 
 
07/22 0910  68  150/78     
 
07/22 0800      96 Nasal 2.0L 
 
       Cannula  
 
07/22 0745       Nasal 2.0L 
 
       Cannula  
 
07/22 0703 98.5 73 20 165/82  99   
 
07/22 0000       Nasal 2.0L 
 
       Cannula  
 
07/21 2307 98.6 76 16 144/70  94   
 
07/21 2221    150/64     
 
07/21 1927      94 Nasal 2.0L 
 
       Cannula  
 
07/21 1600      93 Nasal 2.0L 
 
       Cannula  
 
07/21 1521 97.9 78 16 148/68  93 Nasal 2.0L 
 
       Cannula  
 
 
 Intake & Output
 
 
 07/22 1600 07/22 0800 07/22 0000
 
Intake Total  240 1050
 
Output Total  325 450
 
Balance  -85 600
 
    
 
Intake, Oral  240 1050
 
Number   2
 
Bowel   
 
Movements   
 
Output, Urine  325 450
 
Patient 234 lb  
 
Weight   
 
 
 
 
Physical Exam
Other Physical Findings:
General Appearance Elevated BMI , Cooperative, No Acute Distress
Skin No Rashes, Pale
HEENT Atraumatic, EOMI, Mucous Membr. moist/pink
Neck Supple, No LUKE
Lymphatic Cervical nl
Cardiovascular S1 S2 present
Lungs BS present, no rales, few b/l rhonchi, no wheezing
Abdomen Protuberant Bowel Sounds, Soft, protuberant
Neurological Normal Speech, Cranial Nerves 3-12 NL, 
Alert, Oriented X3
Extremities +1 edema of the lower extremities B/L (L>R)
 
Results
Last 24 Hours of Lab Results:
 Laboratory Tests
 
 
 07/22 0628
 
Chemistry 
 
  Sodium (137 - 145 mmol/L) 136  L
 
  Potassium (3.5 - 5.1 mmol/L) 4.4
 
  Chloride (98 - 107 mmol/L) 98
 
  Carbon Dioxide (22 - 30 mmol/L) 33  H
 
  Anion Gap (5 - 16) 5
 
  BUN (9 - 20 mg/dL) 18
 
  Creatinine (0.7 - 1.2 mg/dL) 0.5  L
 
  Estimated GFR (>60 ml/min) > 60
 
  BUN/Creatinine Ratio (7 - 25 %) 36.0  H
 
Hematology 
 
  CBC w Diff NO MAN DIFF REQ
 
  WBC (4.8 - 10.8 /CUMM) 14.2  H
 
  RBC (4.70 - 6.10 /CUMM) 4.00  L
 
  Hgb (14.0 - 18.0 G/DL) 11.6  L
 
  Hct (42 - 52 %) 36.0  L
 
  MCV (80.0 - 94.0 FL) 89.9
 
  MCH (27.0 - 31.0 PG) 29.1
 
  RDW (11.5 - 14.5 %) 15.1  H
 
  Plt Count (130 - 400 /CUMM) 334
 
  MPV (7.4 - 10.4 FL) 8.0
 
  Gran % (42.2 - 75.2 %) 78.5  H
 
  Lymphocytes % (20.5 - 51.1 %) 14.1  L
 
  Monocytes % (1.7 - 9.3 %) 5.4
 
  Eosinophils % (0 - 5 %) 2.0
 
  Basophils % (0.0 - 2.0 %) 0  L
 
  Absolute Granulocytes (1.4 - 6.5 /CUMM) 11.1  H
 
  Absolute Lymphocytes (1.2 - 3.4 /CUMM) 2.0
 
  Absolute Monocytes (0.10 - 0.60 /CUMM) 0.8  H
 
  Absolute Eosinophils (0.0 - 0.7 /CUMM) 0.3
 
  Absolute Basophils (0.0 - 0.2 /CUMM) 0
 
  PUBS MCHC (33.0 - 37.0 G/DL) 32.3  L
 
 
 
Last 24 Hours of Stalin Results:
EC #:  [id number]     BHUPINDER: 07/21/    STATUS: RES 
                               RECD: 07/21/   SUBM DR: LEONCIO JONES,[place]      
           
     SOURCE:  LOWER RESP       ENTR: 07/21/   [facility] DR: MARK SILVEIRA MD         
     SPDESC:  SPUTUM                                          JUAQUIN JONES,RIGOBERTO YANG           
     ORDERED: LOWER RESPIRATO                                                   
     
 
 
 
       Procedure                        Result
 
> GRAM STAIN  Preliminary                                          07/22/
      
        SQUAMOUS CELLS              RARE
 
> LOWER RESPIRATORY CULTURE  Preliminary                           07/22/
      
        Mixed rosa maria after 1 day
         
        Light growth of:
         
        GRAM POSITIVE COCCI
        Identification and sensitivities to follow
Recent Imaging Studies:
Reviewed.
 
Assessment/Plan
Impression:
65 yo morbidly obese male with GARRY, AFIB w/ RVR, stage IV COPD on 2L O2, Afib on
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 
suppressive therapy with doxycycline and rifampin admitted 7/18/17.
 
Eval recurrent UTI; h/o neurogenic bladder on straight cath protocol, recetly 
treated w/ Cipro;
of note UC no growth to date.
COPD exacerbation; eval LLL pneumonia; h/o Ps.aeruginosa (pansensitive) lung 
infection;
clinically improved since admission
Leukocytosis; WBC trending up (likely related to steroid tx)
 
Suggestion:
1. F/u culture results to further guide antibiotic treatment; currently treated 
empirically with iv Azithromycin/Ceftazidime D #4/5; as clinically improved can 
d/c iv abx; of note sputum cx mixed rosa maria; GPC to be identified. Please call if 
fever or hypotension.
2. Cont suppressive abx treatment with rifampin/doxycycline. MRI thoracic spine 
as OP to eval residual abscess if patient has persistent leukocytosis after 
tapering off steroids.
3. Trend CBC, BMP.

## 2017-07-22 NOTE — PN- ATT ADDEND
Attending Addendum
Attending Brief Note
Patient seen and examined.  Plan of care discussed with the medical team and the
patient.  Available lab work and radiology test reports were reviewed.  Patient 
appears comfortable and pleasant and denies any chest pain fever chills nausea 
or vomiting.  He continues to have a moderate cough with scant sputum production
and mild  Difficulty breathing. Castro intact.  No new complaints today.  Pt is 
now producing scant sputum. 
 
Assessment
* Hospital-acquired pneumonia suspected gram-negative such as Pseudomonas, 
currently stable; note sputum culture is growing GPCs.  
* COPD exacerbation
* Hypoxia
* Persistently elevated WBC count despite  IV antibiotics
* Bilateral atelectasis
* Paraparesis
* Neurogenic bladder
* History of atrial fibrillation
* His hyperlipidemia
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
Plan
* Continue Ceftaz - infectious disease notes reviewed. Await final sputum 
culture. pt clinically improved but may need change in abx given GPC in sputum. 
* Continue prednisone taper
* Await culture reports
* Pulmonary consult note reviewed
 
Exam:
General: Patient awake alert oriented without any distress 
CVS: S1 plus S2 without any murmur or gallops 
Chest: Few scattered crepitation with expiratory prolongation and mild wheeze.  
There is no respiratory distress. 
Abdomen: Soft nontender, bowel sound present, no guarding or rebound 
CNS: Awake alert oriented with paraparesis follows command  appropriately 
Extremities: No edema; no clubbing or cyanosis noted
Castro intact. 
 
 Laboratory Tests
 
 
 07/22
 
 0628
 
Chemistry 
 
  Sodium (137 - 145 mmol/L) 136  L
 
  Potassium (3.5 - 5.1 mmol/L) 4.4
 
  Chloride (98 - 107 mmol/L) 98
 
  Carbon Dioxide (22 - 30 mmol/L) 33  H
 
  Anion Gap (5 - 16) 5
 
  BUN (9 - 20 mg/dL) 18
 
  Creatinine (0.7 - 1.2 mg/dL) 0.5  L
 
  Estimated GFR (>60 ml/min) > 60
 
  BUN/Creatinine Ratio (7 - 25 %) 36.0  H
 
Hematology 
 
  CBC w Diff NO MAN DIFF REQ
 
  WBC (4.8 - 10.8 /CUMM) 14.2  H
 
  RBC (4.70 - 6.10 /CUMM) 4.00  L
 
  Hgb (14.0 - 18.0 G/DL) 11.6  L
 
  Hct (42 - 52 %) 36.0  L
 
  MCV (80.0 - 94.0 FL) 89.9
 
  MCH (27.0 - 31.0 PG) 29.1
 
  RDW (11.5 - 14.5 %) 15.1  H
 
  Plt Count (130 - 400 /CUMM) 334
 
  MPV (7.4 - 10.4 FL) 8.0
 
  Gran % (42.2 - 75.2 %) 78.5  H
 
  Lymphocytes % (20.5 - 51.1 %) 14.1  L
 
  Monocytes % (1.7 - 9.3 %) 5.4
 
  Eosinophils % (0 - 5 %) 2.0
 
  Basophils % (0.0 - 2.0 %) 0  L
 
  Absolute Granulocytes (1.4 - 6.5 /CUMM) 11.1  H
 
  Absolute Lymphocytes (1.2 - 3.4 /CUMM) 2.0
 
  Absolute Monocytes (0.10 - 0.60 /CUMM) 0.8  H
 
  Absolute Eosinophils (0.0 - 0.7 /CUMM) 0.3
 
  Absolute Basophils (0.0 - 0.2 /CUMM) 0
 
  PUBS MCHC (33.0 - 37.0 G/DL) 32.3  L
 
 
 Microbiology
 
 
Date/Time Procedure - Status
 
Source Growth
 
07/21 1619 Respiratory Culture - RES
 
LOWER RESP GRAM POSITIVE COCCI
 
07/21 1619 Gram Stain - RES
 
LOWER RESP 
 
 
 Vital Signs
 
 
Date Time Temp Pulse Resp B/P B/P Pulse O2 O2 Flow FiO2
 
     Mean Ox Delivery Rate 
 
07/22 0910  68  150/78     
 
07/22 0745       Nasal 2.0L 
 
       Cannula  
 
07/22 0703 98.5 73 20 165/82  99   
 
07/22 0000       Nasal 2.0L 
 
       Cannula  
 
07/21 2307 98.6 76 16 144/70  94   
 
07/21 2221    150/64     
 
07/21 1927      94 Nasal 2.0L 
 
       Cannula  
 
07/21 1600      93 Nasal 2.0L 
 
       Cannula  
 
07/21 1521 97.9 78 16 148/68  93 Nasal 2.0L 
 
       Cannula  
 
 
 Intake & Output
 
 
 07/22 1600 07/22 0800 07/22 0000
 
Intake Total  240 1050
 
Output Total  325 450
 
Balance  -85 600
 
    
 
Intake, Oral  240 1050
 
Number   2
 
Bowel   
 
Movements   
 
Output, Urine  325 450

## 2017-07-23 VITALS — SYSTOLIC BLOOD PRESSURE: 146 MMHG | DIASTOLIC BLOOD PRESSURE: 86 MMHG

## 2017-07-23 VITALS — SYSTOLIC BLOOD PRESSURE: 120 MMHG | DIASTOLIC BLOOD PRESSURE: 70 MMHG

## 2017-07-23 VITALS — SYSTOLIC BLOOD PRESSURE: 114 MMHG | DIASTOLIC BLOOD PRESSURE: 80 MMHG

## 2017-07-23 LAB
ABSOLUTE GRANULOCYTE CT: 11.6 /CUMM (ref 1.4–6.5)
BASOPHILS # BLD: 0 /CUMM (ref 0–0.2)
BASOPHILS NFR BLD: 0.2 % (ref 0–2)
EOSINOPHIL # BLD: 0.2 /CUMM (ref 0–0.7)
EOSINOPHIL NFR BLD: 1.5 % (ref 0–5)
ERYTHROCYTE [DISTWIDTH] IN BLOOD BY AUTOMATED COUNT: 15.1 % (ref 11.5–14.5)
GRANULOCYTES NFR BLD: 77.3 % (ref 42.2–75.2)
HCT VFR BLD CALC: 36.5 % (ref 42–52)
LYMPHOCYTES # BLD: 2.2 /CUMM (ref 1.2–3.4)
MCH RBC QN AUTO: 29 PG (ref 27–31)
MCHC RBC AUTO-ENTMCNC: 31.8 G/DL (ref 33–37)
MCV RBC AUTO: 91.1 FL (ref 80–94)
MONOCYTES # BLD: 1 /CUMM (ref 0.1–0.6)
PLATELET # BLD: 338 /CUMM (ref 130–400)
PMV BLD AUTO: 7.9 FL (ref 7.4–10.4)
RED BLOOD CELL CT: 4.01 /CUMM (ref 4.7–6.1)
WBC # BLD AUTO: 15 /CUMM (ref 4.8–10.8)

## 2017-07-23 NOTE — PN- PULMONARY
Subjective
HPI/Critical Care Issues:
Rapid response called this am for lethargy. 
Patient alert and oriented x 3 however sleepy. 
Hemodynamics and blood glucose were non-diagnostic. 
ABG performed showing 7.34/70.
No cp, afebrile, 96% on 2LNC. 
 
Objective
Current Medications:
 Current Medications
 
 
  Sig/[place] Start time  Last
 
Medication Dose Route Stop Time Status Admin
 
Acetaminophen 650 MG Q6P PRN 07/22 0245 AC 07/23
 
  PO   0640
 
Albuterol Sulfate 3 ML EVERY 4 HRS/AWAKE 07/19 1600 AC 07/23
 
  INH   0725
 
Albuterol Sulfate 2 PUF Q6P PRN 07/18 2330 AC 07/21
 
  INH   1731
 
Albuterol Sulfate 3 ML Q4P PRN 07/18 2330 AC 07/19
 
  INH   1028
 
Apixaban 5 MG BID 07/18 2325 AC 07/22
 
  PO   2215
 
Baclofen 20 MG BID 07/18 2327 AC 07/22
 
  PO   2215
 
Bisacodyl 10 MG DAILY PRN 07/19 1400 AC 07/21
 
  MS   1731
 
Doxycycline Hyclate 100 MG BID 07/18 2327 AC 07/22
 
  PO   2215
 
Furosemide 60 MG Q48 07/20 1000 AC 07/22
 
  PO   0911
 
Gabapentin 600 MG Q8H 07/19 0930 AC 07/23
 
  PO   0043
 
Guaifenesin 1,200 MG BID 07/19 1000 AC 07/22
 
  PO   2215
 
Lorazepam 1 MG BID PRN 07/18 2330 AC 07/22
 
  PO   2216
 
Metoprolol Tartrate 12.5 MG BID 07/20 1200 AC 07/22
 
  PO   2216
 
Montelukast Sodium 10 MG DAILY 07/19 1000 AC 07/22
 
  PO   0911
 
Multivitamins  1 TAB DAILY 07/19 1000 AC 07/22
 
Therapeutic  PO   0911
 
Nortriptyline HCl 10 MG QPM 07/19 2200 AC 07/22
 
  PO   2216
 
Omeprazole 20 MG DAILY AC 07/19 0700 AC 07/23
 
  PO   0736
 
Prednisone 10 MG DAILY 07/25 1000 AC 
 
  PO 07/26 1001  
 
Prednisone 20 MG DAILY 07/23 1000 AC 
 
  PO 07/24 1001  
 
Rifampin 300 MG DAILY 07/19 1000 AC 07/22
 
  PO   0911
 
Sotalol HCl 80 MG BID 07/20 1000 AC 07/22
 
  PO   2215
 
Tiotropium [place] 1 PUF DAILY 07/19 1000 AC 07/22
 
  INH   0912
 
Trazodone HCl 50 MG .STK-MED ONE 07/22 2210 DC 
 
  PO 07/22 2211  
 
Trazodone HCl 50 MG QPM PRN 07/18 2345 AC 07/22
 
  PO   2216
 
 
 
 
Vital Signs & I&O
Last 24 Hrs of Vitals and I&O:
 Vital Signs
 
 
Date Time Temp Pulse Resp B/P B/P Pulse O2 O2 Flow FiO2
 
     Mean Ox Delivery Rate 
 
07/23 0726      96 Nasal 2.0L 
 
       Cannula  
 
07/23 0715 98.1 72 20 120/70  90   
 
07/23 0000      92 Nasal 2.0L 
 
       Cannula  
 
07/22 2302 98.0 78 18 142/86  97 Nasal 2.0L 
 
       Cannula  
 
07/22 2216 97.1 79 18 138/67     
 
07/22 1645      96 Nasal 2.0L 
 
       Cannula  
 
07/22 1454 97.7 77 20 160/81  91 Room Air  
 
 
 Intake & Output
 
 
 07/23 1600 07/23 0800 07/23 0000
 
Intake Total  120 450
 
Output Total  350 350
 
Balance  -230 100
 
    
 
Intake, Oral  120 450
 
Output, Urine  350 350
 
 
 
 
Exam
Other Physical Findings:
gen alert, drowsy
heent large neck
cvs s1, s2
lungs rare rhonchi
abd obese
ext no edema
 
Results
Last 24 Hrs of Lab Results:
 Laboratory Tests
 
07/23/17 1025:
pH 7.34  L, pCO2 70 *H, pO2 81, HCO3 36  H, ABG O2 Sat (Measured) 94.0  L, P-50 
(Temp Corrected) Y, Carboxyhemoglobin 1.2  L, O2 Concentration % 2L, Temperature
98.1, O2 Delivery Method N/C, Phlebotomy Draw Site RIGHT RADIAL
 
07/23/17 0735:
Anion Gap 5, Estimated GFR > 60, BUN/Creatinine Ratio 34.0  H, Phosphorus 2.6, 
Magnesium 2.0, CBC w Diff NO MAN DIFF REQ, RBC 4.01  L, MCV 91.1, MCH 29.0, RDW 
15.1  H, MPV 7.9, Gran % 77.3  H, Lymphocytes % 14.4  L, Monocytes % 6.6, 
Eosinophils % 1.5, Basophils % 0.2, Absolute Granulocytes 11.6  H, Absolute 
Lymphocytes 2.2, Absolute Monocytes 1.0  H, Absolute Eosinophils 0.2, Absolute 
Basophils 0, PUBS MCHC 31.8  L
 
 
Impression/Plan
 
Impression/Plan
Impression/Plan:
Impression
66 year old man
 
* HCAP
* exacerbation of COPD
* acute hypercarbic respiratory failure secondary to COPD and possibley 
underlying OHS
* atelectasis
 
Plan
-bipap trial for 2 hrs
-repeat abg at that time
-if does well, then check abg in am on room air to evaluate for co2 retention 
and need for bipap, if >60 co2 can be considered for nocturnal bipap tx
-cont current abx
-would give solumedrol 40mg iv once and reassess, cont otherwise taper
-TRC/Nebs
 
DVT prophylaxis at all times

## 2017-07-23 NOTE — PN- HOUSESTAFF
Assessment/Plan
Assessment:
The pt is 66 year obese male with a history of COPD , bilateral lower extremity 
edema, HTN, hyperlipidemia, history of atrial fibrillation on Eliquis, asthma, 
neurogenic bladder, depression, chronic constipation, muscle spasms, GARRY, CAD s/
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 
abscess. He presented to the ED complaining of urinary leakage, malodorous urine
, persistent cough since his last admission for pneumonia, and fever.
 
CT findings 7/12
1. Obstructive lung disease with bibasilar dependent areas of lung parenchymal 
consolidation, left greater than right. Findings may be related to atelectasis 
with possible superimposed pneumonia in left lung base.Borderline mediastinal 
lymph nodes are likely reactive.
2. Small left pleural effusion.
3. Platelike atelectasis or scarring in the lingula.
4. Severe coronary artery calcifications.
5. Osteopenia with chronic 50% wedge compression deformity of T7 with partial
fusion, focal kyphosis and secondary degenerative change as discussed above.
 
Hospital-acquired pneumonia :CXR in ED showed left lower lobe infiltrate, cough 
has not resolved since admission and treatment for previous pneumonia.Previously
culture positive for psuedomonas. Pt has persistently elevated WBC count despite
 IV antibiotics
* CT scan findings above bibasiler atelectasis with possible superimposed 
pneumonia LL lung base
* Discontinue Ceftazidime as per ID recommendations
* Azithromycin discontinued as per  ID recommendations
* Lower respiratory cultures showed mixed rosa maria after 1 day and growth of gram-
positive cocci. Holding off antibiotics for now as per ID continue with doxy and
rifampin suppression therapy
* Urine strep and Legionella negative
 
COPD exacerbation
* Continue prednisone taper 40 x 2 days, 30 x 2 days, 20 x 2 days, 10 x 2 days
* O2 nasal canula as needed
* TRC consult
* Albuterol
* Montelukast 10 mg daily
* f/u cbc
 
possible UTI
UA significant for UrWBC 10-15, LE small, trace proteins, negative ntirites. Pt 
has hx of neurogenic bladder with frequent straight catheterizations (3-5 times 
daily)
* Foleys catheter in place
* Follow-up urine cultures.  
* Being followed by Dr. Dykes
 
HTN patient's heart rate and blood pressure has been towards the higher side 
overnight.  We have started hypertensive medication
* Metoprolol 12.5mg BID
* Sotalol 80 mg BID
* Continue Lasix 50 MG Q4
 
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
* Cont suppressive abx treatment with rifampin/doxycycline
* If white count stays persistently high after steroid taper, gait and MRI 
thoracic spine to evaluate residual abscess
 
history of muscle spasms
* Continue baclofen
 
history of AFIB
* Continue eloquis BID
 
chronic lower extremity edema
* Continue every other day lasix regimen. Next dose 7/19
 
history of hyperlipidemia
* Continue IM evolocumab every two weeks. Next dose 7/23
 
Patient does have a history of obstructive sleep apnea however denies use of 
CPAP adamantly
 
Consulting Request:
   Consulting Specialty: Pulmonary Disease
   Consulting Physician:
NICOLE Hull MD

## 2017-07-23 NOTE — PN- INFECT DX
Subjective
Subjective:
No fever. Rapid response called this am for lethargy;
clinically improved while on BiPAP. 
Denies cough. Feels hungry.
 
Review of Systems
Comments:
12 points reviewed as noted, otherwise negative.
 
Objective
Last 24 Hrs of Vital Signs/I&O
 Vital Signs
 
 
Date Time Temp Pulse Resp B/P B/P Pulse O2 O2 Flow FiO2
 
     Mean Ox Delivery Rate 
 
07/23 1123       Nasal 2.0L 
 
       Cannula  
 
07/23 0726      96 Nasal 2.0L 
 
       Cannula  
 
07/23 0715 98.1 72 20 120/70  90   
 
07/23 0000      92 Nasal 2.0L 
 
       Cannula  
 
07/22 2302 98.0 78 18 142/86  97 Nasal 2.0L 
 
       Cannula  
 
07/22 2216 97.1 79 18 138/67     
 
07/22 1645      96 Nasal 2.0L 
 
       Cannula  
 
07/22 1454 97.7 77 20 160/81  91 Room Air  
 
 
 Intake & Output
 
 
 07/23 1600 07/23 0800 07/23 0000
 
Intake Total  120 450
 
Output Total  350 350
 
Balance  -230 100
 
    
 
Intake, Oral  120 450
 
Output, Urine  350 350
 
 
 
 
Physical Exam
Other Physical Findings:
General Appearance Elevated BMI , Cooperative, No Acute Distress
Skin No Rashes, Pale
HEENT Atraumatic, EOMI, Mucous Membr. moist/pink
Neck Supple, No LUKE
Lymphatic Cervical nl
Cardiovascular S1 S2 present
Lungs BS present, no rales, few b/l rhonchi, no wheezing
Abdomen Protuberant Bowel Sounds, Soft, protuberant
Neurological Normal Speech, Cranial Nerves 3-12 NL, 
Alert, Oriented X3
Extremities +edema of the lower extremities B/L (L>R)
 
Results
Last 24 Hours of Lab Results:
 Laboratory Tests
 
 
 07/23 07/23
 
 1025 0735
 
Blood Gas  
 
  pH (7.35 - 7.45 PH) 7.34  L 
 
  pCO2 (35 - 45 TORR) 70 *H 
 
  pO2 (80 - 100 TORR) 81 
 
  HCO3 (21 - 28 MEQ/L) 36  H 
 
  ABG O2 Sat (Measured) (>96.0 %) 94.0  L 
 
  P-50 (Temp Corrected) Y 
 
  Carboxyhemoglobin (1.5 - 5.0 %) 1.2  L 
 
  O2 Concentration % 2L 
 
  Temperature (97.0 - 100.0 FARH) 98.1 
 
  O2 Delivery Method N/C 
 
Chemistry  
 
  Sodium (137 - 145 mmol/L)  135  L
 
  Potassium (3.5 - 5.1 mmol/L)  4.0
 
  Chloride (98 - 107 mmol/L)  94  L
 
  Carbon Dioxide (22 - 30 mmol/L)  36  H
 
  Anion Gap (5 - 16)  5
 
  BUN (9 - 20 mg/dL)  17
 
  Creatinine (0.7 - 1.2 mg/dL)  0.5  L
 
  Estimated GFR (>60 ml/min)  > 60
 
  BUN/Creatinine Ratio (7 - 25 %)  34.0  H
 
  Phosphorus (2.5 - 4.5 mg/dL)  2.6
 
  Magnesium (1.6 - 2.3 mg/dL)  2.0
 
Hematology  
 
  CBC w Diff  NO MAN DIFF REQ
 
  WBC (4.8 - 10.8 /CUMM)  15.0  H
 
  RBC (4.70 - 6.10 /CUMM)  4.01  L
 
  Hgb (14.0 - 18.0 G/DL)  11.6  L
 
  Hct (42 - 52 %)  36.5  L
 
  MCV (80.0 - 94.0 FL)  91.1
 
  MCH (27.0 - 31.0 PG)  29.0
 
  RDW (11.5 - 14.5 %)  15.1  H
 
  Plt Count (130 - 400 /CUMM)  338
 
  MPV (7.4 - 10.4 FL)  7.9
 
  Gran % (42.2 - 75.2 %)  77.3  H
 
  Lymphocytes % (20.5 - 51.1 %)  14.4  L
 
  Monocytes % (1.7 - 9.3 %)  6.6
 
  Eosinophils % (0 - 5 %)  1.5
 
  Basophils % (0.0 - 2.0 %)  0.2
 
  Absolute Granulocytes (1.4 - 6.5 /CUMM)  11.6  H
 
  Absolute Lymphocytes (1.2 - 3.4 /CUMM)  2.2
 
  Absolute Monocytes (0.10 - 0.60 /CUMM)  1.0  H
 
  Absolute Eosinophils (0.0 - 0.7 /CUMM)  0.2
 
  Absolute Basophils (0.0 - 0.2 /CUMM)  0
 
  PUBS MCHC (33.0 - 37.0 G/DL)  31.8  L
 
Miscellaneous  
 
  Phlebotomy Draw Site RIGHT RADIAL 
 
 
 
Last 24 Hours of Stalin Results:
seamusnt  : DAYDAY HOOVER                Acct: [id number]   DR: LEONCIO JONES,[place]         
     
    Birthdate: [birth year]  Age/Sex: 66/M       Unit: [id number]   Loc: [facility]        [phone]        
    Status   : ADM IN    
 
 
     SPEC #:  [id number]     BHUPINDER: 07/21/    STATUS: RES 
                               RECD: 07/21/   SUBM DR: LEONCIO JONES,[place]      
           
     SOURCE:  LOWER RESP       ENTR: 07/21/   OTHR DR: JORDANA JONES,
[facility]         
     SPDESC:  SPUTUM                                          JUAQUIN JONES,RIGOBERTO YANG           
     ORDERED: LOWER RESPIRATO                                                   
     
 
 
 
       Procedure                        Result
 
> GRAM STAIN  Final                                                07/22/
      
        WHITE BLOOD CELLS           RARE
        SQUAMOUS CELLS              RARE
        GRAM POSITIVE COCCI         RARE
        GRAM NEGATIVE COCCI         FEW
        GRAM POSITIVE RODS          RARE
        OTHER                       RARE BUDDING YEAST
 
> LOWER RESPIRATORY CULTURE  Preliminary                           07/23/
      
        Mixed rosa maria after 2 dayS
         
        Light growth of:
         
        GRAM POSITIVE COCCI
        Identification and sensitivities to follow
         
        Scant growth of :
        YEAST
 
 
 
 
Recent Imaging Studies:
SERVICE DATE: 07/23/
EXAM TYPE: RAD - XRY-PORTABLE CHEST XRAY
 
EXAMINATION:
XR PORTABLE CHEST
 
CLINICAL INFORMATION:
Altered mental status. Somnolence.
 
COMPARISON:
Chest done on 07/18/2017.
 
TECHNIQUE:
Portable frontal view of the chest was obtained.
 
FINDINGS:
Low lung volume is present bilaterally. Persistent stable nonspecific
bibasilar airspace disease is present. This may represent hypoventilatory,
atelectatic changes, infiltrate, pleural parenchymal scar or combination
thereof. The remainder of the aerated lung fields appear unremarkable. The
cardiomediastinal silhouette is mildly to moderately enlarged. No
radiographic evidence of CHF or definite pleural effusion, unchanged.
 
IMPRESSION:
No significant change since 07/18/2017.
 
DICTATED BY: FRANK ANDRES MD 
DATE/TIME DICTATED:07/23/17 / 1154
:RAD.TESFAYE 
DATE/TIME TRANSCRIBED:07/23/17 / 1154
 
 
Assessment/Plan
Impression:
65 yo morbidly obese male with GARRY, AFIB w/ RVR, stage IV COPD on 2L O2, Afib on
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 
suppressive therapy with doxycycline and rifampin admitted 7/18/17.
 
Eval recurrent UTI; h/o neurogenic bladder on straight cath protocol, recetly 
treated w/ Cipro;
of note UC no growth to date.
COPD exacerbation; eval LLL pneumonia; h/o Ps.aeruginosa (pansensitive) lung 
infection;
sputum cx + mixed rosa maria + GPC ID and sensitivity pending; iv abx (Ceftaz/azithro
) stopped previous day as CT chest negative pneumonia; repeat CXR results from 
today noted
Leukocytosis; WBC trending up (likely related to steroid tx vs lung infection)
 
Suggestion:
1. Repeat sputum cx today. Please call if fever or hypotension.
2. Cont suppressive abx treatment with rifampin/doxycycline. MRI thoracic spine 
as OP to eval residual abscess if patient has persistent leukocytosis after 
tapering off steroids.
3. Trend CBC, BMP.
4. F/u pulm recommendations.

## 2017-07-23 NOTE — RADIOLOGY REPORT
EXAMINATION:
XR PORTABLE CHEST
 
CLINICAL INFORMATION:
Altered mental status. Somnolence.
 
COMPARISON:
Chest done on 07/18/2017.
 
TECHNIQUE:
Portable frontal view of the chest was obtained.
 
FINDINGS:
Low lung volume is present bilaterally. Persistent stable nonspecific
bibasilar airspace disease is present. This may represent hypoventilatory,
atelectatic changes, infiltrate, pleural parenchymal scar or combination
thereof. The remainder of the aerated lung fields appear unremarkable. The
cardiomediastinal silhouette is mildly to moderately enlarged. No
radiographic evidence of CHF or definite pleural effusion, unchanged.
 
IMPRESSION:
No significant change since 07/18/2017.

## 2017-07-23 NOTE — NUR
1010 PATIENT DROWSY/AROUSABLE, THEN DRIFTS OUT OF CONSCIOUSNESS. ALERT TO
     SELF, STATES "I'M AT [facility]" WHEN ASKED. EQUALY STRONG HAND
     GRASPS, POSITIVE SENSATION AND MOTION TO BILAT FEET, NO DRIFT NOTED TO
     ARMS, DENIES FACIAL DROOP, VSS AS CHARTED. RAPID RESPONSE CALLED.
     RESIDENT LASHELL AND OTHER MEDICAL STAFF PRESENT IN THE ROOM, RESPIRATORY
     THERAPY PRESENT AND DRAWIONG ABG'S. EKG DONE AS ORDERED. PARIENT PLACED
     IN BIPAP BY RESPIRATORY THERAPIST.
 
1130 PATIENT REMAINS ON BIPAP, A+O X3, APPROPRIATE ANSWERS.
 
1330 RESPIRATORY THERAPY IN THE RROM WITH THE PATIENT, DRAWING NEW ABS'S,
     PATIENT REMAINS ON BIPAP, NO S/O DISTRESS NOTED.

## 2017-07-23 NOTE — PATIENT DISCHARGE INSTRUCTIONS
Discharge Instructions
 
General Discharge Information
You were seen/treated for:
COPD
Pneumonia
Watch for these problems:
fever
increasing shortness of breath
increasing cough or sputum production
drowsiness
 
Special Instructions:
1. Follow up with PCP in one week
2. Repeat MRI of the thoracolumbar spine
3. Follow up with Della in one week
4. Consider sleep study and cpap vs bipap
5. Beware of drowsy meds like Ativan
6. Continue steriod taper
7. Keep marquez in place and follow up with Dr. Dykes for urodynamic studies
 
 
Diet
Recommended Diet: Heart Healthy
 
Activity
Activity Self Limited: Yes
 
Acute Coronary Syndrome
 
Inclusion Criteria
At DC or during hospital stay patient has or had the following:
ACS DIAGNOSIS No
 
Discharge Core Measures
Meds if any: Prescribed or Continued at Discharge
Meds if any: NOT Prescribed or Continued at Discharge
 
Congestive Heart Failure
 
Inclusion Criteria
At DC or during hospital stay patient has or had the following:
CHF DIAGNOSIS No
 
Discharge Core Measures
Meds if any: Prescribed or Continued at Discharge
Meds if any: NOT Prescribed or Continued at Discharge
 
Cerebrovascular accident
 
Inclusion Criteria
At DC or during hospital stay patient has or had the following:
CVA/TIA Diagnosis No
 
Discharge Core Measures
Meds if any: Prescribed or Continued at Discharge
Meds if any: NOT Prescribed or Continued at Discharge
 
Venous thromboembolism
 
Inclusion Criteria
VTE Diagnosis No
VTE Type NONE
VTE Confirmed by (Test) NONE
 
Discharge Core Measures
- Per Current guidelines, there needs to be overlap
- treatment for the first 5 days of Warfarin therapy.
- If discharged on Warfarin prior to 5 days of
- overlap therapy, the patient will need to be
- assessed for post discharge needs including
- *Post discharge parental anticoagulation
- *Warfarin and/or parental anticoagulation education
- *Follow up date to check INR post discharge
At least 5 days overlap therapy as Inpatient No
Meds if any: Prescribed or Continued at Discharge
Note: Overlap Therapy is Warfarin and Anticoagulant
Meds if any: NOT Prescribed or Continued at Discharge

## 2017-07-23 NOTE — PN- ATT ADDEND
Attending Addendum
Attending Brief Note
Patient seen and examined.  Plan of care discussed with the medical team and the
patient.  Available lab work and radiology test reports were reviewed.  Patient 
appears comfortable and pleasant and denies any chest pain fever chills nausea 
or vomiting.  He continues to have a moderate cough with scant sputum production
and mild  Difficulty breathing. Castro intact.  No new complaints today.  Pt is 
now producing scant sputum. 
 
Assessment
* Hospital-acquired pneumonia suspected gram-negative such as Pseudomonas, 
currently stable; note sputum culture is growing GPCs.  Ceftaz was discontinued 
yesterday as per ID recommendation
* COPD exacerbation
* Hypoxia
* Persistently elevated WBC count despite  IV antibiotics
* Bilateral atelectasis
* Paraparesis
* Neurogenic bladder
* History of atrial fibrillation
* His hyperlipidemia
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
Plan
* Continue rifampin and doxycycline.  nfectious disease notes reviewed. Await 
final sputum culture.  This most likely is colonization.  
* Continue prednisone taper
* Await culture reports
* Plan for discharge tomorrow
 
Exam:
General: Patient awake alert oriented without any distress 
CVS: S1 plus S2 without any murmur or gallops 
Chest: Few scattered crepitation with expiratory prolongation and mild wheeze.  
There is no respiratory distress. 
Abdomen: Soft nontender, bowel sound present, no guarding or rebound 
CNS: Awake alert oriented with paraparesis follows command  appropriately 
Extremities: No edema; no clubbing or cyanosis noted
Castro intact. 
 
 Laboratory Tests
 
 
 07/23
 
 0735
 
Chemistry 
 
  Sodium Pending
 
  Potassium Pending
 
  Chloride Pending
 
  Carbon Dioxide Pending
 
  Anion Gap Pending
 
  BUN Pending
 
  Creatinine Pending
 
  BUN/Creatinine Ratio Pending
 
Hematology 
 
  CBC w Diff Pending
 
  WBC Pending
 
  RBC Pending
 
  Hgb Pending
 
  Hct Pending
 
  MCV Pending
 
  MCH Pending
 
  RDW Pending
 
  Plt Count Pending
 
  MPV Pending
 
  PUBS MCHC Pending
 
 
 Vital Signs
 
 
Date Time Temp Pulse Resp B/P B/P Pulse O2 O2 Flow FiO2
 
     Mean Ox Delivery Rate 
 
07/23 0726      96 Nasal 2.0L 
 
       Cannula  
 
07/23 0715 98.1 72 20 120/70  90   
 
07/23 0000      92 Nasal 2.0L 
 
       Cannula  
 
07/22 2302 98.0 78 18 142/86  97 Nasal 2.0L 
 
       Cannula  
 
07/22 2216 97.1 79 18 138/67     
 
07/22 1645      96 Nasal 2.0L 
 
       Cannula  
 
07/22 1454 97.7 77 20 160/81  91 Room Air  
 
07/22 0910  68  150/78     
 
 
 Intake & Output
 
 
 07/23 1600 07/23 0800 07/23 0000
 
Intake Total   450
 
Output Total  350 350
 
Balance  -350 100
 
    
 
Intake, Oral   450
 
Output, Urine  350 350

## 2017-07-23 NOTE — PN- HOUSESTAFF
Subjective
Follow-up For:
COPD
pneumonia
Subjective:
I have seen and examined the patient.  The patient was lying comfortably in the 
bed.  He was on 2 L oxygen nasal cannula There were no overnight acute events. 
He continues to have a moderate cough with scant sputum production and mild  
Difficulty breathing.  He believes that he is improving.  He denies any fever or
chills chest pain or palpitations.
 
Review of Systems
Constitutional:
Denies: chills, fever. 
Cardiovascular:
Denies: chest pain, edema, palpitations. 
Respiratory:
Reports: cough, short of breath, sputum production. 
Gastrointestinal:
Reports: no symptoms. 
Genitourinary:
Reports: no symptoms. 
Skin:
Reports: no symptoms, see HPI, cysts, change in skin color, change in hair/nails
, dryness, erythema, jaundice, lesions, lymphangitis, lumps, moles, rash. 
 
Objective
Last 24 Hrs of Vital Signs/I&O
 Vital Signs
 
 
Date Time Temp Pulse Resp B/P B/P Pulse O2 O2 Flow FiO2
 
     Mean Ox Delivery Rate 
 
 1123       Nasal 2.0L 
 
       Cannula  
 
 0726      96 Nasal 2.0L 
 
       Cannula  
 
 0715 98.1 72 20 120/70  90   
 
 0000      92 Nasal 2.0L 
 
       Cannula  
 
 2302 98.0 78 18 142/86  97 Nasal 2.0L 
 
       Cannula  
 
 2216 97.1 79 18 138/67     
 
 1645      96 Nasal 2.0L 
 
       Cannula  
 
 1454 97.7 77 20 160/81  91 Room Air  
 
 
 Intake & Output
 
 
  1600  0800  0000
 
Intake Total  120 450
 
Output Total  350 350
 
Balance  -230 100
 
    
 
Intake, Oral  120 450
 
Output, Urine  350 350
 
 
 
 
Physical Exam
General Appearance: Alert, Oriented X3, Cooperative
Skin: No Rashes, No Significant Lesion
Cardiovascular: Normal S1, Normal S2
Lungs: scattered ronchi
Abdomen: Normal Bowel Sounds, Soft, No Tenderness
Neurological: Normal Speech
Current Medications:
 Current Medications
 
 
  Sig/Viet Start time  Last
 
Medication Dose Route Stop Time Status Admin
 
Acetaminophen 650 MG Q6P PRN  0245 AC 
 
  PO   0640
 
Albuterol Sulfate 3 ML EVERY 4 HRS/AWAKE  1600 AC 
 
  INH   1123
 
Albuterol Sulfate 2 PUF Q6P PRN  2330 AC 
 
  INH   1731
 
Albuterol Sulfate 3 ML Q4P PRN  2330 AC 
 
  INH   1028
 
Apixaban 5 MG BID  2325 AC 
 
  PO   2215
 
Baclofen 20 MG BID  2327 AC 
 
  PO   2215
 
Bisacodyl 10 MG DAILY PRN  1400 AC 
 
  WI   1731
 
Doxycycline Hyclate 100 MG BID  2327 AC 
 
  PO   2215
 
Furosemide 60 MG Q48  1000 AC 
 
  PO   0911
 
Gabapentin 600 MG Q8H  0930 AC 
 
  PO   0043
 
Guaifenesin 1,200 MG BID  1000 AC 
 
  PO   2215
 
Lorazepam 1 MG BID PRN  2330 AC 
 
  PO   2216
 
Methylprednisolone 40 MG ONCE ONE  1115 DC 
 
  IV  1116  1145
 
Metoprolol Tartrate 12.5 MG BID  1200 AC 
 
  PO   2216
 
Montelukast Sodium 10 MG DAILY  1000 AC 
 
  PO   0911
 
Multivitamins  1 TAB DAILY  1000 AC 
 
Therapeutic  PO   0911
 
Nortriptyline HCl 10 MG QPM  2200 AC 
 
  PO   2216
 
Omeprazole 20 MG DAILY AC  0700 AC 
 
  PO   0736
 
Prednisone 10 MG DAILY  1000 AC 
 
  PO  1001  
 
Prednisone 20 MG DAILY  1000 AC 
 
  PO  1001  
 
Rifampin 300 MG DAILY  1000 AC 
 
  PO   0911
 
Sotalol HCl 80 MG BID  1000 AC 
 
  PO   2215
 
Tiotropium [place] 1 PUF DAILY  1000 AC 
 
  INH   0912
 
Trazodone HCl 50 MG .STK-MED ONE  2210 DC 
 
  PO  2211  
 
Trazodone HCl 50 MG QPM PRN  2345 AC 
 
  PO   2216
 
 
 
 
Last 24 Hrs of Lab/Stalin Results
Last 24 Hrs of Labs/Mics:
 Laboratory Tests
 
17 1325:
pH 7.32  L, pCO2 73 *H, pO2 72  L, HCO3 37  H, ABG O2 Sat (Measured) 93.0  L, P-
50 (Temp Corrected) Y, Carboxyhemoglobin 0.7  L, O2 Concentration % 35, 
Temperature 98.1, Respiration Rate 18, O2 Delivery Method BIPAP, Vent Mode ST, 
Expiratory Pressure 6, Inspiratory Pressure 12, Phlebotomy Draw Site RIGHT 
RADIAL
 
17 1025:
pH 7.34  L, pCO2 70 *H, pO2 81, HCO3 36  H, ABG O2 Sat (Measured) 94.0  L, P-50 
(Temp Corrected) Y, Carboxyhemoglobin 1.2  L, O2 Concentration % 2L, Temperature
98.1, O2 Delivery Method N/C, Phlebotomy Draw Site RIGHT RADIAL
 
17 0735:
Anion Gap 5, Estimated GFR > 60, BUN/Creatinine Ratio 34.0  H, Phosphorus 2.6, 
Magnesium 2.0, CBC w Diff NO MAN DIFF REQ, RBC 4.01  L, MCV 91.1, MCH 29.0, RDW 
15.1  H, MPV 7.9, Gran % 77.3  H, Lymphocytes % 14.4  L, Monocytes % 6.6, 
Eosinophils % 1.5, Basophils % 0.2, Absolute Granulocytes 11.6  H, Absolute 
Lymphocytes 2.2, Absolute Monocytes 1.0  H, Absolute Eosinophils 0.2, Absolute 
Basophils 0, PUBS MCHC 31.8  L
 
 
Assessment/Plan
Assessment:
The pt is 66 year obese male with a history of COPD , bilateral lower extremity 
edema, HTN, hyperlipidemia, history of atrial fibrillation on Eliquis, asthma, 
neurogenic bladder, depression, chronic constipation, muscle spasms, GARRY, CAD s/
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 
abscess. He presented to the ED complaining of urinary leakage, malodorous urine
, persistent cough since his last admission for pneumonia, and fever.
 
CT findings 
1. Obstructive lung disease with bibasilar dependent areas of lung parenchymal 
consolidation, left greater than right. Findings may be related to atelectasis 
with possible superimposed pneumonia in left lung base.Borderline mediastinal 
lymph nodes are likely reactive.
2. Small left pleural effusion.
3. Platelike atelectasis or scarring in the lingula.
4. Severe coronary artery calcifications.
5. Osteopenia with chronic 50% wedge compression deformity of T7 with partial
fusion, focal kyphosis and secondary degenerative change as discussed above.
 
Hospital-acquired pneumonia :CXR in ED showed left lower lobe infiltrate, cough 
has not resolved since admission and treatment for previous pneumonia.Previously
culture positive for psuedomonas. Pt has persistently elevated WBC count despite
 IV antibiotics
* CT scan findings above bibasiler atelectasis with possible superimposed 
pneumonia LL lung base
* Lower respiratory cultures showed mixed rosa maria after 1 day and growth of gram-
positive cocci. Holding off antibiotics for now as per ID continue with doxy and
rifampin suppression therapy
* Urine strep and Legionella negative
 
COPD exacerbation
* Continue prednisone taper 10 x 2 days
* O2 nasal canula as needed
* TRC consult
* Albuterol
* Montelukast 10 mg daily
* f/u cbc
 
possible UTI
UA significant for UrWBC 10-15, LE small, trace proteins, negative ntirites. Pt 
has hx of neurogenic bladder with frequent straight catheterizations (3-5 times 
daily)
* Foleys catheter in place
* Follow-up urine cultures.  
* Being followed by Dr. Dykes
 
HTN patient's heart rate and blood pressure has been towards the higher side 
overnight.  We have started hypertensive medication
* Metoprolol 12.5mg BID
* Sotalol 80 mg BID
* Continue Lasix 50 MG Q4
 
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
* Cont suppressive abx treatment with rifampin/doxycycline
* If white count stays persistently high after steroid taper, gait and MRI 
thoracic spine to evaluate residual abscess
 
history of muscle spasms
* Continue baclofen
 
history of AFIB
* Continue eloquis BID
 
chronic lower extremity edema
* Continue every other day lasix regimen. Next dose 
 
history of hyperlipidemia
* Continue IM evolocumab every two weeks. Next dose 
 
Patient does have a history of obstructive sleep apnea however denies use of 
CPAP adamantly
Problem List:
 1. COPD exacerbation
 
Pain Ratin
Pain Location:
none
Pain Goal: Pain 4 or less
Pain Plan:
n/a
Tomorrow's Labs & Rationales:
cbc
bep
abg
Consulting Request:
   Consulting Specialty: Pulmonary Disease
   Consulting Physician:
NICOLE Hull MD

## 2017-07-23 NOTE — EVENT NOTE
Event Note
Event Note:
 
S: heard overhead announcement of rapid response in 213 around 10.30
B: Mr. Lee Mandujano is a 65 yo male admitted for pnuemonia and COPD was found to
be unresponsive
 
AR
I went to assess, the pt.  Mr. Lee Mandujano is my patient on 2L home oxygen 
nasal cannula I had seen and examined him early in the morning around 6 AM when 
he was doing ok. Rapid response was called on him because he was unresponsive.  
His partner alerted the nurses when he thought that he was not making sense and 
was unresponsive.
 
Dr. Maharaj and Dr. Kumar were already there and they were assessing the 
patient.  The patient was alert 3.  He he said that he is very drowsy and wants
to sleep.  His morning labs were reviewed and ABG EKG and chest x-ray were 
ordered.
 
I performed a physical examination on him.  He was alert and oriented 3.  He 
was following my commands.  There was no drooping of face on one side.  
Sensations were intact.  Motor strength was 5 out of 5 in upper extremities.  He
is paraplegic.  The Babinski was negative.
 
His ABG showed PCO2 of 72.  He was likely retaining carbon dioxide because of 
his underlying COPD.  His chest x-ray was same and his EKG showed did not show 
any changes.  He was started on BiPAP.  
 
We are going to repeat ABGs in the p.m. for now we are going to keep and keep 
him on BiPAP and later assess if he can we be weaned down to nasal cannula 
oxygen

## 2017-07-24 VITALS — DIASTOLIC BLOOD PRESSURE: 80 MMHG | SYSTOLIC BLOOD PRESSURE: 138 MMHG

## 2017-07-24 VITALS — DIASTOLIC BLOOD PRESSURE: 80 MMHG | SYSTOLIC BLOOD PRESSURE: 142 MMHG

## 2017-07-24 VITALS — SYSTOLIC BLOOD PRESSURE: 147 MMHG | DIASTOLIC BLOOD PRESSURE: 85 MMHG

## 2017-07-24 VITALS — DIASTOLIC BLOOD PRESSURE: 82 MMHG | SYSTOLIC BLOOD PRESSURE: 160 MMHG

## 2017-07-24 LAB
ABSOLUTE GRANULOCYTE CT: 13.1 /CUMM (ref 1.4–6.5)
BASOPHILS # BLD: 0 /CUMM (ref 0–0.2)
BASOPHILS NFR BLD: 0.3 % (ref 0–2)
EOSINOPHIL # BLD: 0.3 /CUMM (ref 0–0.7)
EOSINOPHIL NFR BLD: 1.5 % (ref 0–5)
ERYTHROCYTE [DISTWIDTH] IN BLOOD BY AUTOMATED COUNT: 15.4 % (ref 11.5–14.5)
GRANULOCYTES NFR BLD: 79.1 % (ref 42.2–75.2)
HCT VFR BLD CALC: 37.8 % (ref 42–52)
LYMPHOCYTES # BLD: 2.1 /CUMM (ref 1.2–3.4)
MCH RBC QN AUTO: 29.1 PG (ref 27–31)
MCHC RBC AUTO-ENTMCNC: 32.1 G/DL (ref 33–37)
MCV RBC AUTO: 90.7 FL (ref 80–94)
MONOCYTES # BLD: 1.1 /CUMM (ref 0.1–0.6)
PLATELET # BLD: 338 /CUMM (ref 130–400)
PMV BLD AUTO: 8.1 FL (ref 7.4–10.4)
RED BLOOD CELL CT: 4.17 /CUMM (ref 4.7–6.1)
WBC # BLD AUTO: 16.6 /CUMM (ref 4.8–10.8)

## 2017-07-24 NOTE — PN- HOUSESTAFF
Subjective
Follow-up For:
COPD exacerbation
Subjective:
I have seen and examined the patient.  The patient was lying comfortably in the 
bed.  He is currently on BiPAP and was on it whole night. Yesterday a rapid 
response was called on him because he was very lethargic, he was alert oriented 

Right now he is feels much better.  There were no overnight acute events.  He 
denies any chest pain or palpitations.
 
Review of Systems
Constitutional:
Denies: chills, diaphoresis, fever. 
EENTM:
Reports: no symptoms. 
Cardiovascular:
Denies: chest pain, palpitations. 
Respiratory:
Reports: cough, short of breath, sputum production. 
Gastrointestinal:
Reports: no symptoms. 
Genitourinary:
Reports: no symptoms. 
 
Objective
Last 24 Hrs of Vital Signs/I&O
 Vital Signs
 
 
Date Time Temp Pulse Resp B/P B/P Pulse O2 O2 Flow FiO2
 
     Mean Ox Delivery Rate 
 
 0833      92 Nasal 3.0L 
 
       Cannula  
 
 0827  72    93   
 
 0737 97.4 75 20 160/82  94   
 
 0614  76    97   
 
 0000       BIPAP  
 
 0000  71 18 147/85  95 Nasal 4.0L 
 
       Cannula  
 
 2252  89    93   
 
 2247 98.9 81 22 114/80  99 BIPAP  
 
 2150  81  114/80     
 
 1918  81    97   
 
 1717      96 BIPAP 35% 
 
 1659  95    97   
 
 1600      95 BIPAP 35% 
 
 1450 97.5 85 20 146/86  94 BIPAP  
 
 1123       Nasal 2.0L 
 
       Cannula  
 
 
 Intake & Output
 
 
  1600  0800  0000
 
Intake Total  480 240
 
Output Total  350 250
 
Balance  130 -10
 
    
 
Intake, Oral  480 240
 
Number   1
 
Bowel   
 
Movements   
 
Output, Urine  350 250
 
 
 
 
Physical Exam
General Appearance: Alert, Oriented X3, Cooperative
Skin: No Rashes, No Breakdown
Cardiovascular: Normal S1, Normal S2
Lungs: scattered ronchi and few wheezes with prolonged expiration
Abdomen: Normal Bowel Sounds, Soft, No Tenderness
Neurological: Normal Speech
Extremities: paraplegic
Current Medications:
 Current Medications
 
 
  Sig/[place] Start time  Last
 
Medication Dose Route Stop Time Status Admin
 
Acetaminophen 650 MG Q6P PRN  0245 AC 
 
  PO   0640
 
Albuterol Sulfate 3 ML EVERY 4 HRS/AWAKE  1600 AC 
 
  INH   0827
 
Albuterol Sulfate 2 PUF Q6P PRN  2330 AC 
 
  INH   1731
 
Albuterol Sulfate 3 ML Q4P PRN  2330 AC 
 
  INH   1028
 
Apixaban 5 MG BID  2325 AC 
 
  PO   2150
 
Baclofen 20 MG BID  2327 AC 
 
  PO   2149
 
Bisacodyl 10 MG DAILY PRN  1400 AC 
 
  WA   1731
 
Doxycycline Hyclate 100 MG BID  2327 AC 
 
  PO   2150
 
Furosemide 60 MG Q48  1000 AC 
 
  PO   0911
 
Gabapentin 300 MG Q8H  1730 AC 
 
  PO   0211
 
Gabapentin 600 MG Q8H  0930 DC 
 
  PO   0043
 
Guaifenesin 1,200 MG BID  1000 AC 
 
  PO   2150
 
Lorazepam 1 MG BID PRN  2330 AC 
 
  PO   2150
 
Methylprednisolone 40 MG ONCE ONE  1515 CAN 
 
  IV  1516  
 
Methylprednisolone 40 MG ONCE ONE  1115 DC 
 
  IV  1116  1145
 
Metoprolol Tartrate 12.5 MG BID  1200 AC 
 
  PO   2150
 
Montelukast Sodium 10 MG DAILY  1000 AC 
 
  PO   0911
 
Multivitamins  1 TAB DAILY  1000 AC 
 
Therapeutic  PO   0911
 
Nortriptyline HCl 10 MG QPM  2200 AC 
 
  PO   2150
 
Omeprazole 20 MG DAILY AC  0700 AC 
 
  PO   0604
 
Prednisone 10 MG DAILY  1000 AC 
 
  PO  1001  
 
Prednisone 20 MG DAILY  1000 AC 
 
  PO  1001  
 
Rifampin 300 MG DAILY  1000 AC 
 
  PO   0911
 
Sotalol HCl 80 MG BID  1000 AC 
 
  PO   2150
 
Tiotropium [place] 1 PUF DAILY  1000 AC 
 
  INH   0912
 
Trazodone HCl 50 MG .STK-MED ONE  2145 DC 
 
  PO  2146  
 
Trazodone HCl 50 MG QPM PRN  2345 AC 
 
  PO   2150
 
 
 
 
Last 24 Hrs of Lab/Stalin Results
Last 24 Hrs of Labs/Mics:
 Laboratory Tests
 
17 0710:
Anion Gap 5, Estimated GFR > 60, BUN/Creatinine Ratio 32.0  H, CBC w Diff 
Pending, WBC Pending, RBC Pending, Hgb Pending, Hct Pending, MCV Pending, MCH 
Pending, RDW Pending, Plt Count Pending, MPV Pending, PUBS MCHC Pending
 
17 1625:
pH 7.36, pCO2 67 *H, pO2 76  L, HCO3 37  H, ABG O2 Sat (Measured) 95.0  L, 
Carboxyhemoglobin 1.0  L, O2 Concentration % 35%, O2 Delivery Method BIPAP, 
Phlebotomy Draw Site RIGHT RADIAL
 
17 1325:
pH 7.32  L, pCO2 73 *H, pO2 72  L, HCO3 37  H, ABG O2 Sat (Measured) 93.0  L, P-
50 (Temp Corrected) Y, Carboxyhemoglobin 0.7  L, O2 Concentration % 35, 
Temperature 98.1, Respiration Rate 18, O2 Delivery Method BIPAP, Vent Mode ST, 
Expiratory Pressure 6, Inspiratory Pressure 12, Phlebotomy Draw Site RIGHT 
RADIAL
 
17 1025:
pH 7.34  L, pCO2 70 *H, pO2 81, HCO3 36  H, ABG O2 Sat (Measured) 94.0  L, P-50 
(Temp Corrected) Y, Carboxyhemoglobin 1.2  L, O2 Concentration % 2L, Temperature
98.1, O2 Delivery Method N/C, Phlebotomy Draw Site RIGHT RADIAL
 Microbiology
834  LOWER RESP: Respiratory Culture - ORD
834  LOWER RESP: Gram Stain - ORD
 
 
 
Assessment/Plan
Assessment:
The pt is 66 year obese male with a history of COPD , bilateral lower extremity 
edema, HTN, hyperlipidemia, history of atrial fibrillation on Eliquis, asthma, 
neurogenic bladder, depression, chronic constipation, muscle spasms, GARRY, CAD s/
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 
abscess. He presented to the ED complaining of urinary leakage, malodorous urine
, persistent cough since his last admission for pneumonia, and fever.
 
CT findings 
1. Obstructive lung disease with bibasilar dependent areas of lung parenchymal 
consolidation, left greater than right. Findings may be related to atelectasis 
with possible superimposed pneumonia in left lung base.Borderline mediastinal 
lymph nodes are likely reactive.
2. Small left pleural effusion.
3. Platelike atelectasis or scarring in the lingula.
4. Severe coronary artery calcifications.
5. Osteopenia with chronic 50% wedge compression deformity of T7 with partial
fusion, focal kyphosis and secondary degenerative change as discussed above.
 
Hospital-acquired pneumonia sputum positive for enterococcus and yeast.:CXR in 
ED showed left lower lobe infiltrate, cough has not resolved since admission and
treatment for previous pneumonia.Previously culture positive for psuedomonas. Pt
has persistently elevated WBC count despite  IV antibiotics
* CT scan findings above bibasiler atelectasis with possible superimposed 
pneumonia LL lung base
* Lower respiratory cultures showed mixed rosa maria after 1 day and growth of gram-
positive cocci likely colonization
* Holding off antibiotics for now as per ID. continue with doxy and rifampin 
suppression therapy
* Urine strep and Legionella negative
 
COPD exacerbation on  rapid response was called because the patient was 
lethargic he was alert and oriented 3.  ABG showed respiratory acidosis with 
PCo2 of72 the patient was put on BiPAP.  Repeat ABG shows PCO2 of 67.
* An ABG has been ordered for this morning.  We will check the results and then 
consider giving the patient a trial off BiPAP.
* Monitor respiratory status closely.
* Discontinue Ativan, avoid oversedation.
* Continue prednisone taper 10 x 2 days
* TRC 
* Albuterol
* Montelukast 10 mg daily
 
Possible UTI
UA significant for UrWBC 10-15, LE small, trace proteins, negative ntirites. Pt 
has hx of neurogenic bladder with frequent straight catheterizations (3-5 times 
daily)
* Foleys catheter in place
* Follow-up urine cultures no growth
 
HTN patient's heart rate and blood pressure has been towards the higher side 
overnight.  We have started hypertensive medication
* Metoprolol 12.5mg BID
* Sotalol 80 mg BID
* Continue Lasix 50 MG Q4
 
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
* Cont suppressive abx treatment with rifampin/doxycycline
* If white count stays persistently high after steroid taper, gait and MRI 
thoracic spine to evaluate residual abscess
 
history of muscle spasms
* Continue baclofen
 
history of AFIB
* Continue eloquis BID
 
chronic lower extremity edema
* Continue every other day lasix regimen. Next dose 
 
Hyperlipidemia
* Continue IM evolocumab every two weeks. Next dose 
 
obstructive sleep apnea
Patient does have a history of obstructive sleep apnea 
* however denies use of CPAP adamantly
Problem List:
 1. Pneumonia
 
 2. Gram-negative pneumonia
 
 3. COPD (chronic obstructive pulmonary disease)
 
Pain Ratin
Pain Location:
n/a
Pain Goal: Pain 4 or less
Pain Plan:
n/a
Tomorrow's Labs & Rationales:
cbc bep
Consulting Request:
   Consulting Specialty: Pulmonary Disease
   Consulting Physician:
NICOLE Hull MD

## 2017-07-24 NOTE — PN- PULMONARY
Subjective
HPI/Critical Care Issues:
Weekend events noted.  On Friday, the patient was awake and alert without any 
symptoms.  He was feeling back to his baseline and was anticipating discharge 
and Saturday.  Saturday evening, the patient received both Ativan, which was 
newly ordered for increased anxiety, and his usual dose of trazodone.  Sunday 
morning, rapid response was called as the patient was noted to be lethargic.  An
ABG demonstrated mild respiratory acidosis.  The patient was placed on BiPAP.  
He is feeling improved this morning, however he did get the combination of 
Ativan and trazodone once again last night.  He denies any cough, chest 
congestion, sputum production, fever or chills.  His wheezing has improved.  
Chest x-ray showed low lung volumes, and persistent bilateral airspace disease.
 
Objective
Current Medications:
 Current Medications
 
 
  Sig/[place] Start time  Last
 
Medication Dose Route Stop Time Status Admin
 
Acetaminophen 650 MG Q6P PRN 07/22 0245 AC 07/23
 
  PO   0640
 
Albuterol Sulfate 3 ML EVERY 4 HRS/AWAKE 07/19 1600 AC 07/24
 
  INH   0827
 
Albuterol Sulfate 2 PUF Q6P PRN 07/18 2330 AC 07/21
 
  INH   1731
 
Albuterol Sulfate 3 ML Q4P PRN 07/18 2330 AC 07/19
 
  INH   1028
 
Apixaban 5 MG BID 07/18 2325 AC 07/23
 
  PO   2150
 
Baclofen 20 MG BID 07/18 2327 AC 07/23
 
  PO   2149
 
Bisacodyl 10 MG DAILY PRN 07/19 1400 AC 07/21
 
  PA   1731
 
Doxycycline Hyclate 100 MG BID 07/18 2327 AC 07/23
 
  PO   2150
 
Furosemide 60 MG Q48 07/20 1000 AC 07/22
 
  PO   0911
 
Gabapentin 300 MG Q8H 07/23 1730 AC 07/24
 
  PO   0211
 
Gabapentin 600 MG Q8H 07/19 0930 DC 07/23
 
  PO   0043
 
Guaifenesin 1,200 MG BID 07/19 1000 AC 07/23
 
  PO   2150
 
Lorazepam 1 MG BID PRN 07/18 2330 AC 07/23
 
  PO   2150
 
Methylprednisolone 40 MG ONCE ONE 07/23 1515 CAN 
 
  IV 07/23 1516  
 
Methylprednisolone 40 MG ONCE ONE 07/23 1115 DC 07/23
 
  IV 07/23 1116  1145
 
Metoprolol Tartrate 12.5 MG BID 07/20 1200 AC 07/23
 
  PO   2150
 
Montelukast Sodium 10 MG DAILY 07/19 1000 AC 07/22
 
  PO   0911
 
Multivitamins  1 TAB DAILY 07/19 1000 AC 07/22
 
Therapeutic  PO   0911
 
Nortriptyline HCl 10 MG QPM 07/19 2200 AC 07/23
 
  PO   2150
 
Omeprazole 20 MG DAILY AC 07/19 0700 AC 07/24
 
  PO   0604
 
Prednisone 10 MG DAILY 07/25 1000 AC 
 
  PO 07/26 1001  
 
Prednisone 20 MG DAILY 07/23 1000 AC 
 
  PO 07/24 1001  
 
Rifampin 300 MG DAILY 07/19 1000 AC 07/22
 
  PO   0911
 
Sotalol HCl 80 MG BID 07/20 1000 AC 07/23
 
  PO   2150
 
Tiotropium [place] 1 PUF DAILY 07/19 1000 AC 07/22
 
  INH   0912
 
Trazodone HCl 50 MG .ST-MED ONE 07/23 2145 DC 
 
  PO 07/23 2146  
 
Trazodone HCl 50 MG QPM PRN 07/18 2345 AC 07/23
 
  PO   2150
 
 
 
 
Vital Signs & I&O
Last 24 Hrs of Vitals and I&O:
 Vital Signs
 
 
Date Time Temp Pulse Resp B/P B/P Pulse O2 O2 Flow FiO2
 
     Mean Ox Delivery Rate 
 
07/24 0827  72    93   
 
07/24 0737 97.4 75 20 160/82  94   
 
07/24 0614  76    97   
 
07/24 0000       BIPAP  
 
07/24 0000  71 18 147/85  95 Nasal 4.0L 
 
       Cannula  
 
07/23 2252  89    93   
 
07/23 2247 98.9 81 22 114/80  99 BIPAP  
 
07/23 2150  81  114/80     
 
07/23 1918  81    97   
 
07/23 1717      96 BIPAP 35% 
 
07/23 1659  95    97   
 
07/23 1600      95 BIPAP 35% 
 
07/23 1450 97.5 85 20 146/86  94 BIPAP  
 
07/23 1123       Nasal 2.0L 
 
       Cannula  
 
 
 Intake & Output
 
 
 07/24 1600 07/24 0800 07/24 0000
 
Intake Total  480 240
 
Output Total  350 250
 
Balance  130 -10
 
    
 
Intake, Oral  480 240
 
Number   1
 
Bowel   
 
Movements   
 
Output, Urine  350 250
 
 
 
 
Exam
General Appearance: alert, awake, comfortable, on BIPAP
Head: atraumatic, normal appearance
Neck: supple
Respiratory: quiet respiration, decreased breath sounds, mminimal wheezing
Cardiovascular: irregularly irregular
Abdomen: normal bowel sounds, soft, non-tender
Extremities: trace chronic bilateral edema
Skin: intact, normal color, warm/dry
 
Results
Last 24 Hrs of Lab Results:
 Laboratory Tests
 
07/24/17 0710:
Sodium Pending, Potassium Pending, Chloride Pending, Carbon Dioxide Pending, 
Anion Gap Pending, BUN Pending, Creatinine Pending, BUN/Creatinine Ratio Pending
, CBC w Diff Pending, WBC Pending, RBC Pending, Hgb Pending, Hct Pending, MCV 
Pending, MCH Pending, RDW Pending, Plt Count Pending, MPV Pending, PUBS MCHC 
Pending
 
07/23/17 1625:
pH 7.36, pCO2 67 *H, pO2 76  L, HCO3 37  H, ABG O2 Sat (Measured) 95.0  L, 
Carboxyhemoglobin 1.0  L, O2 Concentration % 35%, O2 Delivery Method BIPAP, 
Phlebotomy Draw Site RIGHT RADIAL
 
07/23/17 1325:
pH 7.32  L, pCO2 73 *H, pO2 72  L, HCO3 37  H, ABG O2 Sat (Measured) 93.0  L, P-
50 (Temp Corrected) Y, Carboxyhemoglobin 0.7  L, O2 Concentration % 35, 
Temperature 98.1, Respiration Rate 18, O2 Delivery Method BIPAP, Vent Mode ST, 
Expiratory Pressure 6, Inspiratory Pressure 12, Phlebotomy Draw Site RIGHT 
RADIAL
 
07/23/17 1025:
pH 7.34  L, pCO2 70 *H, pO2 81, HCO3 36  H, ABG O2 Sat (Measured) 94.0  L, P-50 
(Temp Corrected) Y, Carboxyhemoglobin 1.2  L, O2 Concentration % 2L, Temperature
98.1, O2 Delivery Method N/C, Phlebotomy Draw Site RIGHT RADIAL
 
 
Diagnostic Data
CT Scan Findings:
1. Obstructive lung disease with bibasilar dependent areas of lung parenchymal 
consolidation, left greater than right. Findings may be related
to atelectasis with possible superimposed pneumonia in left lung base. 
Borderline mediastinal lymph nodes are likely reactive.
2. Small left pleural effusion.
3. Platelike atelectasis or scarring in the lingula.
4. Severe coronary artery calcifications.
5. Osteopenia with chronic 50% wedge compression deformity of T7 with partial 
fusion, focal kyphosis and secondary degenerative change as discussed above.
 
 
Impression/Plan
 
Impression/Plan
Impression/Plan:
1.  Acute on chronic hypercarbic respiratory failure, secondary to oversedation,
noting the patient received both trazodone and newly ordered Ativan prior to 
this event.  He also has a component of underlying obesity hypoventilation 
syndrome however he has been intolerant to nasal CPAP in the past.
2.  Healthcare associated pneumonia, sputum positive for enterococcus and yeast.
3.  Exacerbation of COPD.
4.  Atelectasis.
5.  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
6.  Morbid obesity.
7.  Atrial fibrillation, on Eliquis.
8.  Chronic leukocytosis secondary to steroid therapy.
Recommendations:
* An ABG has been ordered for this morning.  We will check the results and then 
consider giving the patient a trial off BiPAP.
* Monitor respiratory status closely.
* Discontinue Ativan, avoid oversedation.
* Continue nebs/total respiratory care.
* Continue slow prednisone taper.
* Continue suppressive antibiotic therapy with rifampin and doxycycline.
* Please discuss results of current sputum culture with ID this morning, noting 
his sputum culture from 07/21/2017 is positive for enterococcus.  Will need to 
discuss possible treatment.
* DVT prophylaxis - on Eliquis.
* Continue all supportive care.

## 2017-07-24 NOTE — DISCHARGE SUMMARY
Visit Information
 
Visit Dates
Admission Date:
07/18/17
 
Discharge Date:
07/25/17
 
 
Hospital Course
 
Course
Attending Physician:
LEONCIO JONES,[place]
 
Primary Care Physician:
RIGOBERTO REZA MD
 
Consulting Request:
   Consulting Specialty: Pulmonary Disease
   Consulting Physician:
NICOLE Hull MD
Hospital Course:
The pt is 66 year obese male with a history of COPD , bilateral lower extremity 
edema, HTN, hyperlipidemia, history of atrial fibrillation on Eliquis, asthma, 
neurogenic bladder, depression, chronic constipation, muscle spasms, GARRY, CAD s/
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 
abscess. He was sent in by urologist Dr. Dykes to the ED. Pt c/o of urinary 
leakage, malodorous urine, persistent cough since his last admission for 
pneumonia, and fever.
 
ED workup
Vitals on admission: Temperature 97.9 heart rate 100 respiratory rate 18 blood 
pressure 148/69 O2 sats 98 nasal cannula 2 L
White cell count of 14.4 H&H 11.9 and 37.3.  BUN 14 and Cr 0.5
Trops negative
 
CXR Results Retrocardiac opacification and obscuration of the left hemidiaphragm
suspicious for a left lower lobe infiltrate.
--------------------------------------------------------------------------------
------
He was treated for following conditions
 
Hospital-acquired pneumonia sputum positive for enterococcus:  Pt complained 
that cough had not resolved since last admission and treatment for previous 
pneumonia.Previously culture positive for psuedomonas. He is on chronic Doxy and
rifampin suppression for a hx of spinal abscess. Despite that has persistent 
white count and SOB. Note pt is d/c on steriod taper.
* CT scan findings above bibasiler atelectasis with possible superimposed 
pneumonia LL lung base see results of CT
* he was initially started on azithromycin and Ceftaz (4days) and later 
antibiotics were stopped as he clinically improved
* Lower respiratory cultures showed mixed rosa maria and growth of gram-positive 
cocci likely colonization
* Urine strep and Legionella negative
 
COPD exacerbation on 7/20 rapid response was called because the patient was 
lethargic. he was alert and oriented 3.  ABG showed respiratory acidosis with 
PCo2 of 72 the patient was put on BiPAP.  Repeat ABG shows PCO2 of 67.
* Overnight pulse oximetry was done with oxygen sat ranging from 99-92 on 2 L 
NC.  Patient refuses BiPAP adamantly
* He is being discharged on steroid taper 20mg x4 days 10mg x4 days continue on 
0.5 till appointment with .  
* Symibort,TRC ,Albuterol, Montelukast 10 mg daily
* Ativan dose decreased to 0.5 MG PRN and trazodone was decreased to 25 MG daily
to avoid excessive sedation and lethargy. Pt encouraged to avoid all sedating 
meds.
 
UTI-resolved: UA significant for Ur WBC 10-15, LE small, trace proteins, 
negative ntirites. Pt has hx of neurogenic bladder with frequent straight 
catheterizations (3-5 times daily)
* urine cultures no growth
* The patient is being d/c with Castro and will follow up with Dr. Dykes for 
urodynamic studies.
 
HTN patient's heart rate and blood pressure has been towards the higher side 180
/80.  We have started hypertensive medication
* Metoprolol 12.5mg BID
* Sotalol 80 mg BID
* Continue Lasix 50 MG Q4
 
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
* Cont suppressive abx treatment with rifampin/doxycycline 
* Schedule MRI thoracic spine to evaluate residual abscess and eval if abx are 
still necessary.
 
history of muscle spasms
* Continue baclofen
 
history of AFIB
* Continue eloquis BID
 
chronic lower extremity edema
* Continue every other day lasix regimen. Next dose 7/19
 
Hyperlipidemia
* Continue IM evolocumab every two weeks. Next dose 7/23
 
obstructive sleep apnea
Patient does have a history of obstructive sleep apnea 
* refuses BiPAP or CPAP adamantly
Allergies:
Coded Allergies:
heparin (SWELLING 07/18/17)
vancomycin (HIVES, SKIN CRACKES, TURNS RED, SWELLS AND PEELS 07/18/17)
 
Pertinent Lab Results:
CT findings 7/12
1. Obstructive lung disease with bibasilar dependent areas of lung parenchymal 
consolidation, left greater than right. Findings may be related to atelectasis 
with possible superimposed pneumonia in left lung base.Borderline mediastinal 
lymph nodes are likely reactive.
2. Small left pleural effusion.
3. Platelike atelectasis or scarring in the lingula.
4. Severe coronary artery calcifications.
5. Osteopenia with chronic 50% wedge compression deformity of T7 with partial
fusion, focal kyphosis and secondary degenerative change as discussed above.
 
Disposition Summary
 
Disposition
Principal Diagnosis:
COPD exacerbation
Pneumonia
Additional Diagnosis:
paraplegia
Discharge Disposition: home health services
 
Discharge Instructions
 
General Discharge Information
Code Status: Do Not Resucitate/Intubat
Patient's Diet:
Heart Healthy
Patient's Activity:
self limited
Follow-Up Instructions/Appts:
Follow-up with your PCP
Follow-up with Dr. Hull
Repeat MRI of thoracolumbar spine
Needs BiPAP
Finish ur steriod taper
 
Medications at Discharge
Discharge Medications:
Stop taking the following medications:
Gabapentin (Gabapentin) 600 MG TABLET ORAL THREE TIMES DAILY Qty = 90
 
Lorazepam (Ativan) 1 MG TABLET ORAL TWICE DAILY as needed for ANXIETY 
 
Trazodone HCl (Trazodone HCl) 50 MG TABLET ORAL Every night as needed for SLEEP 
 
Continue taking these medications:
Furosemide (Furosemide) 20 MG TABLET
    3 Tablet ORAL EVERY 48 HOURS (Every 2 days)
    Qty = 90
    Comments:
       Last Taken: 7/24/17
             Time: 10:04 AM
 
Lactobacillus Acidophilus (Acidophilus) 1 EACH CAPSULE
    1 Capsule ORAL TWICE DAILY
    Comments:
       NOT GIVEN THIS ADMISSION
 
Magnesium Oxide (Magnesium) 400 MG CAPSULE
    1 Capsule ORAL 0600
    Comments:
       NOT GIVEN THIS ADMISSION
 
Multivitamin (Multi-Day Vitamins) 1 EACH TABLET
    1 Tablet ORAL DAILY
    Comments:
       Last Taken: 7/25/17
             Time: 8:57 AM
 
Nortriptyline HCl (Nortriptyline HCl) 10 MG CAPSULE
    1 Capsule ORAL Every night
    Qty = 30
    Comments:
       Last Taken: 7/24/17
             Time: 9:24 PM
 
Atomoxetine HCl (Strattera) 40 MG CAPSULE
    1 Capsule ORAL Every Morning
    Qty = 30
    Comments:
       NOT GIVEN THIS ADMISSION
 
Ascorbate Calcium (Vitamin C) 500 MG TABLET
    1 Tablet ORAL TWICE DAILY
    Comments:
       NOT GIVEN THIS ADMISSION
           
 
Oxycodone HCl/Acetaminophen (Oxycodone-Acetaminophen 5-325) 5 MG-325 MG TABLET
    1-2 Tablet ORAL Q4H as needed for PAIN
    Qty = 120
    Comments:
       NOT GIVEN THIS ADMISSION
 
Baclofen (Baclofen) 20 MG TABLET
    1 Tablet ORAL TWICE DAILY
    Qty = 150
    Comments:
       Last Taken: 7/25/17
             Time: 9:01 AM
 
Bisacodyl (Dulcolax) 10 MG SUPP.RECT
    1 Suppository RECTAL Every other day
    Comments:
       Last Taken: 7/24/17
             Time: 6:35 PM
 
Potassium Chloride (Potassium Chloride) 20 MEQ TAB.ER.PRT
    1 Tablet ORAL DAILY
    Qty = 90
    Comments:
       NOT GIVEN THIS ADMISSION
 
Rifampin (Rifadin) 300 MG CAPSULE
    1 Capsule ORAL TWICE DAILY
    Comments:
       Last Taken: 7/25/17
             Time: 8:57 AM
 
Guaifenesin (Mucinex) 600 MG TAB.ER.12H
    2 Tablet ORAL TWICE DAILY
    Comments:
       Last Taken: 7/25/17
             Time: 8:56 AM
         
 
Albuterol Sulfate (Albuterol Sulfate) 2.5 MG/3 ML (0.083 %) VIAL.NEB
    1 Vial Inhale Solution EVERY 4 HOURS AS NEEDED as needed for SHORTNESS OF 
BREATH
    Comments:
       Last Taken: 7/19/17
             Time: 10:28 AM
 
Evolocumab (Repatha Sureclick) 140 MG/ML PEN.INJCTR
    1 Milliliters Inject into fatty tissue EVERY 2 WEEKS
    Comments:
       NOT GIVEN THIS ADMISSION
 
Montelukast Sodium (Singulair) 10 MG TABLET
    1 Tablet ORAL DAILY
    Comments:
       Last Taken: 7/25/17
             Time: 8:57 AM
 
Pantoprazole Sodium (Protonix) 40 MG TABLET.DR
    1 Tablet ORAL DAILY
    Comments:
       NOT GIVEN THIS ADMISSION
 
Tiotropium Bromide (Spiriva) 18 MCG CAP.W.DEV
    1 Capsule Inhale through mouth DAILY
    Comments:
       Last Taken: 7/25/17
             Time: 10:51 AM
 
Doxycycline Hyclate (Doxycycline Hyclate) 100 MG CAPSULE
    1 Capsule ORAL TWICE DAILY
    Qty = 60
    Comments:
       Last Taken: 7/25/17
             Time: 08:57 AM
 
Sotalol (Betapace) 80 MG TABLET
    80 Milligram ORAL TWICE DAILY
    Qty = 60
    Comments:
       Last Taken: 7/25/17
             Time: 8:54 AM
 
Budesonide/Formoterol Fumarate (Symbicort 80-4.5 Mcg Inhaler) 80 MCG-4.5 MCG/
ACTUATION HFA.AER.AD
    2 Puff Inhale through mouth TWICE DAILY
    Days = 30
    Comments:
       NOT GIVEN THIS ADMISSION
 
Metoprolol Tartrate (Metoprolol Tartrate) 25 MG TABLET
    12.5 Milligram ORAL TWICE DAILY
    Days = 30
    Comments:
       Last Taken: 7/25/17
             Time: 8:56 AM
 
Apixaban (Eliquis) 5 MG TABLET
    5 Milligram ORAL TWICE DAILY
    Qty = 60
    Comments:
       Last Taken: 7/25/17
             Time: 8:55 AM
 
Omeprazole (Omeprazole) 20 MG CAPSULE.
    1 Capsule ORAL DAILY
    Comments:
       Last Taken: 7/25/27
             Time: 5:38 AM
 
Cannabis (Marijuana Oil) 1 amp AMP
    1 Tablet ORAL SEE INSTRUCTIONS
    Instructions:
       10 MG IN AM
       20 MG AT PM
       DOSE THIS MEDICATION PER THE PROVIDER WHO IS PRESCRIBING IT FOR YOU.
    Comments:
       NOT GIVEN IN HOSPITAL
 
Albuterol Sulfate (Ventolin Hfa) 90 MCG HFA.AER.AD
    2 Puff Inhale through mouth As Directed as needed for COPD
    Qty = 18
    Comments:
       Last Taken: 7/21/17 
             Time: 5:30 PM
 
Start taking the following new medications:
Prednisone (Prednisone) 10 MG TABLET
    1 Tablet ORAL SEE INSTRUCTIONS
    Qty = 30
    No Refills
    Instructions:
       SEE [facility] DISCHARGE INSTRUCTIONS
    Comments:
       FROM 7/26 TO 7/29 TAKE 2 TABS DAILY
       FROM 7/30 TO 8/02 TAKE 1 TAB DAILY
       THEN TAKE 0.5 TAB DAILY UNTIL U SEE  NEXT WEEK
       LAST TAKEN: 7/25/17
             TIME: 8:54 AM 
 
Gabapentin (Gabapentin) 300 MG CAPSULE
    1 Tablet ORAL Q8H as needed for NEUROPATHIC PAIN
    Qty = 30
    No Refills
    Comments:
       Last Taken: 7/25/17
             Time: 8:54 AM
 
Lorazepam (Ativan) 0.5 MG TABLET
    1 Tablet ORAL DAILY as needed for ANXIETY
    Qty = 10
    No Refills
 
Trazodone HCl (Trazodone HCl) 50 MG TABLET
    0.5 Tablet ORAL DAILY as needed for INSOMNIA
    Qty = 30
    No Refills
 
 
Copies To:
JUAQUIN JONES,RIGOBERTO YANG

## 2017-07-24 NOTE — PN- ATT ADDEND
Attending Addendum
Attending Brief Note
Patient seen and examined.  Plan of care discussed with the medical team and the
patient.  Available lab work and radiology test reports were reviewed.  Patient 
appears comfortable and pleasant and denies any chest pain fever chills nausea 
or vomiting.  Patient had a rapid response yesterday due to drowsiness.  He was 
found to have increase in CO2.  He received BiPAP last night.  This morning he 
is awake but appears lethargic 
 
Assessment
* Hospital-acquired pneumonia suspected gram-negative such as Pseudomonas, 
currently stable; note sputum culture is growing GPCs which is most likely 
colonization.  Ceftaz was discontinued 
* CO2 retention
* COPD exacerbation
* Hypoxia
* Persistently elevated WBC count despite  IV antibiotics
* Bilateral atelectasis
* Paraparesis
* Neurogenic bladder
* History of atrial fibrillation
* His hyperlipidemia
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
Plan
* Continue rifampin and doxycycline.  
* Continue prednisone taper
*  agree with discontinuation of Ativan
* Plan for discharge tomorrow
 
Exam:
General: Patient awake alert oriented without any distress 
CVS: S1 plus S2 without any murmur or gallops 
Chest: Few scattered crepitation with expiratory prolongation and mild wheeze.  
There is no respiratory distress. 
Abdomen: Soft nontender, bowel sound present, no guarding or rebound 
CNS: Awake alert oriented with paraparesis follows command  appropriately 
Extremities: No edema; no clubbing or cyanosis noted
Castro intact. 
 Laboratory Tests
 
 
 07/24 07/24
 
 0825 0710
 
Blood Gas  
 
  pH (7.35 - 7.45 PH) 7.38 
 
  pCO2 (35 - 45 TORR) 61 *H 
 
  pO2 (80 - 100 TORR) 80 
 
  HCO3 (21 - 28 MEQ/L) 35  H 
 
  ABG O2 Sat (Measured) (>96.0 %) 95.0  L 
 
  Carboxyhemoglobin (1.5 - 5.0 %) 0.4  L 
 
  O2 Concentration % 35% 
 
  Respiration Rate (BPM) 18 
 
  O2 Delivery Method BIPAP 
 
  Vent Mode ST 
 
  Expiratory Pressure (CM H2O P) 6 
 
  Inspiratory Pressure (CM H2O P) 20 
 
Chemistry  
 
  Sodium (137 - 145 mmol/L)  138
 
  Potassium (3.5 - 5.1 mmol/L)  4.2
 
  Chloride (98 - 107 mmol/L)  95  L
 
  Carbon Dioxide (22 - 30 mmol/L)  38  H
 
  Anion Gap (5 - 16)  5
 
  BUN (9 - 20 mg/dL)  16
 
  Creatinine (0.7 - 1.2 mg/dL)  0.5  L
 
  Estimated GFR (>60 ml/min)  > 60
 
  BUN/Creatinine Ratio (7 - 25 %)  32.0  H
 
Hematology  
 
  CBC w Diff  NO MAN DIFF REQ
 
  WBC (4.8 - 10.8 /CUMM)  16.6  H
 
  RBC (4.70 - 6.10 /CUMM)  4.17  L
 
  Hgb (14.0 - 18.0 G/DL)  12.1  L
 
  Hct (42 - 52 %)  37.8  L
 
  MCV (80.0 - 94.0 FL)  90.7
 
  MCH (27.0 - 31.0 PG)  29.1
 
  RDW (11.5 - 14.5 %)  15.4  H
 
  Plt Count (130 - 400 /CUMM)  338
 
  MPV (7.4 - 10.4 FL)  8.1
 
  Gran % (42.2 - 75.2 %)  79.1  H
 
  Lymphocytes % (20.5 - 51.1 %)  12.7  L
 
  Monocytes % (1.7 - 9.3 %)  6.4
 
  Eosinophils % (0 - 5 %)  1.5
 
  Basophils % (0.0 - 2.0 %)  0.3
 
  Absolute Granulocytes (1.4 - 6.5 /CUMM)  13.1  H
 
  Absolute Lymphocytes (1.2 - 3.4 /CUMM)  2.1
 
  Absolute Monocytes (0.10 - 0.60 /CUMM)  1.1  H
 
  Absolute Eosinophils (0.0 - 0.7 /CUMM)  0.3
 
  Absolute Basophils (0.0 - 0.2 /CUMM)  0
 
  PUBS MCHC (33.0 - 37.0 G/DL)  32.1  L
 
Miscellaneous  
 
  Phlebotomy Draw Site RIGHT RADIAL 
 
 
 
 
 07/23 07/23
 
 7590 7101
 
Blood Gas  
 
  pH (7.35 - 7.45 PH) 7.36 7.32  L
 
  pCO2 (35 - 45 TORR) 67 *H 73 *H
 
  pO2 (80 - 100 TORR) 76  L 72  L
 
  HCO3 (21 - 28 MEQ/L) 37  H 37  H
 
  ABG O2 Sat (Measured) (>96.0 %) 95.0  L 93.0  L
 
  P-50 (Temp Corrected)  Y
 
  Carboxyhemoglobin (1.5 - 5.0 %) 1.0  L 0.7  L
 
  O2 Concentration % 35% 35
 
  Temperature (97.0 - 100.0 FARH)  98.1
 
  Respiration Rate (BPM)  18
 
  O2 Delivery Method BIPAP BIPAP
 
  Vent Mode  ST
 
  Expiratory Pressure (CM H2O P)  6
 
  Inspiratory Pressure (CM H2O P)  12
 
Miscellaneous  
 
  Phlebotomy Draw Site RIGHT RADIAL RIGHT RADIAL
 
 
 Microbiology
 
 
Date/Time Procedure - Status
 
Source Growth
 
07/24 0834 Respiratory Culture - COLB
 
LOWER RESP 
 
07/24 0834 Gram Stain - COLB
 
LOWER RESP 
 
 
Vital Signs
 
 
Date Time Temp Pulse Resp B/P B/P Pulse O2 O2 Flow FiO2
 
     Mean Ox Delivery Rate 
 
07/24 1005  68  138/68     
 
07/24 0833      92 Nasal 3.0L 
 
       Cannula  
 
07/24 0827  72    93   
 
07/24 0800      94 Nasal 3.0L 
 
       Cannula  
 
07/24 0737 97.4 75 20 160/82  94   
 
07/24 0614  76    97   
 
07/24 0000       BIPAP  
 
07/24 0000  71 18 147/85  95 Nasal 4.0L 
 
       Cannula  
 
07/23 2252  89    93   
 
07/23 2247 98.9 81 22 114/80  99 BIPAP  
 
07/23 2150  81  114/80     
 
07/23 1918  81    97   
 
07/23 1717      96 BIPAP 35% 
 
07/23 1659  95    97   
 
07/23 1600      95 BIPAP 35% 
 
07/23 1450 97.5 85 20 146/86  94 BIPAP

## 2017-07-24 NOTE — PN- INFECT DX
Subjective
Subjective:
Afebrile on steroids.  He feels well today with no complaints.
 
Objective
Last 24 Hrs of Vital Signs/I&O
 Vital Signs
 
 
Date Time Temp Pulse Resp B/P B/P Pulse O2 O2 Flow FiO2
 
     Mean Ox Delivery Rate 
 
07/24 1459 98.1 80 18 142/80  97 Nasal 2.0L 
 
       Cannula  
 
07/24 1005  68  138/68     
 
07/24 0833      92 Nasal 3.0L 
 
       Cannula  
 
07/24 0827  72    93   
 
07/24 0800      94 Nasal 3.0L 
 
       Cannula  
 
07/24 0737 97.4 75 20 160/82  94   
 
07/24 0614  76    97   
 
07/24 0000       BIPAP  
 
07/24 0000  71 18 147/85  95 Nasal 4.0L 
 
       Cannula  
 
07/23 2252  89    93   
 
07/23 2247 98.9 81 22 114/80  99 BIPAP  
 
07/23 2150  81  114/80     
 
07/23 1918  81    97   
 
07/23 1717      96 BIPAP 35% 
 
07/23 1659  95    97   
 
07/23 1600      95 BIPAP 35% 
 
 
 Intake & Output
 
 
 07/24 1600 07/24 0800 07/24 0000
 
Intake Total 800 480 240
 
Output Total 1600 350 250
 
Balance -800 130 -10
 
    
 
Intake, Oral 800 480 240
 
Number   1
 
Bowel   
 
Movements   
 
Output, Urine 1600 350 250
 
 
 
 
Physical Exam
Other Physical Findings:
He appears comfortable in no acute distress
Lungs scattered wheezes
Heart irregular rhythm with no murmur
Extremities no cyanosis, clubbing or edema
 Castro catheter is in place
 
 
Results
Last 24 Hours of Lab Results:
 Laboratory Tests
 
 
 07/24 07/24
 
 0825 0710
 
Blood Gas  
 
  pH (7.35 - 7.45 PH) 7.38 
 
  pCO2 (35 - 45 TORR) 61 *H 
 
  pO2 (80 - 100 TORR) 80 
 
  HCO3 (21 - 28 MEQ/L) 35  H 
 
  ABG O2 Sat (Measured) (>96.0 %) 95.0  L 
 
  Carboxyhemoglobin (1.5 - 5.0 %) 0.4  L 
 
  O2 Concentration % 35% 
 
  Respiration Rate (BPM) 18 
 
  O2 Delivery Method BIPAP 
 
  Vent Mode ST 
 
  Expiratory Pressure (CM H2O P) 6 
 
  Inspiratory Pressure (CM H2O P) 20 
 
Chemistry  
 
  Sodium (137 - 145 mmol/L)  138
 
  Potassium (3.5 - 5.1 mmol/L)  4.2
 
  Chloride (98 - 107 mmol/L)  95  L
 
  Carbon Dioxide (22 - 30 mmol/L)  38  H
 
  Anion Gap (5 - 16)  5
 
  BUN (9 - 20 mg/dL)  16
 
  Creatinine (0.7 - 1.2 mg/dL)  0.5  L
 
  Estimated GFR (>60 ml/min)  > 60
 
  BUN/Creatinine Ratio (7 - 25 %)  32.0  H
 
Hematology  
 
  CBC w Diff  NO MAN DIFF REQ
 
  WBC (4.8 - 10.8 /CUMM)  16.6  H
 
  RBC (4.70 - 6.10 /CUMM)  4.17  L
 
  Hgb (14.0 - 18.0 G/DL)  12.1  L
 
  Hct (42 - 52 %)  37.8  L
 
  MCV (80.0 - 94.0 FL)  90.7
 
  MCH (27.0 - 31.0 PG)  29.1
 
  RDW (11.5 - 14.5 %)  15.4  H
 
  Plt Count (130 - 400 /CUMM)  338
 
  MPV (7.4 - 10.4 FL)  8.1
 
  Gran % (42.2 - 75.2 %)  79.1  H
 
  Lymphocytes % (20.5 - 51.1 %)  12.7  L
 
  Monocytes % (1.7 - 9.3 %)  6.4
 
  Eosinophils % (0 - 5 %)  1.5
 
  Basophils % (0.0 - 2.0 %)  0.3
 
  Absolute Granulocytes (1.4 - 6.5 /CUMM)  13.1  H
 
  Absolute Lymphocytes (1.2 - 3.4 /CUMM)  2.1
 
  Absolute Monocytes (0.10 - 0.60 /CUMM)  1.1  H
 
  Absolute Eosinophils (0.0 - 0.7 /CUMM)  0.3
 
  Absolute Basophils (0.0 - 0.2 /CUMM)  0
 
  PUBS MCHC (33.0 - 37.0 G/DL)  32.1  L
 
Miscellaneous  
 
  Phlebotomy Draw Site RIGHT RADIAL 
 
 
 
 
 07/23
 
 1625
 
Blood Gas 
 
  pH (7.35 - 7.45 PH) 7.36
 
  pCO2 (35 - 45 TORR) 67 *H
 
  pO2 (80 - 100 TORR) 76  L
 
  HCO3 (21 - 28 MEQ/L) 37  H
 
  ABG O2 Sat (Measured) (>96.0 %) 95.0  L
 
  Carboxyhemoglobin (1.5 - 5.0 %) 1.0  L
 
  O2 Concentration % 35%
 
  O2 Delivery Method BIPAP
 
Miscellaneous 
 
  Phlebotomy Draw Site RIGHT RADIAL
 
 
 
Last 24 Hours of [facility] Results:
Sputum culture July 21 positive for Enterococcus sensitive to Ampicillin and 
yeast
 
Sputum culture July 24 pending
 
Recent Imaging Studies:
Chest x-ray July 23 no change from his previous x-ray
 
 
Assessment/Plan
Impression:
65 yo man with GARRY, AFIB w/ RVR, stage IV COPD on 2L O2, Afib on Eliquis, CAD s/
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 
never drained resulting in paraparesis now on chronic suppressive therapy with 
Doxycycline and Rifampin admitted 7/18/17
with urinary complaints and with what was felt to be an exacerbation of COPD now
off antibiotics.  He remains afebrile with blood cell count elevated, likely 
secondary to steroids.  The significance of the Enterococcus isolated from his 
sputum culture is unclear and suspect this represents oropharyngeal 
contamination.  The role of Doxycycline and Rifampin suppression is unclear at 
this time.  An MRI of his thoracolumbar spine over 5 years prior to admission 
did not reveal any evidence for any collection; therefore reevaluation of these 
antibiotics may be appropriate.
 
Suggestion:
1.  Would consider repeat MRI of the thoracolumbar spine
2.  Continue suppressive antibiotic treatment with Rifampin and Doxycycline 
pending above

## 2017-07-25 VITALS — SYSTOLIC BLOOD PRESSURE: 126 MMHG | DIASTOLIC BLOOD PRESSURE: 56 MMHG

## 2017-07-25 VITALS — SYSTOLIC BLOOD PRESSURE: 130 MMHG | DIASTOLIC BLOOD PRESSURE: 60 MMHG

## 2017-07-25 LAB
ABSOLUTE GRANULOCYTE CT: 10.5 /CUMM (ref 1.4–6.5)
BASOPHILS # BLD: 0 /CUMM (ref 0–0.2)
BASOPHILS NFR BLD: 0 % (ref 0–2)
EOSINOPHIL # BLD: 0.3 /CUMM (ref 0–0.7)
EOSINOPHIL NFR BLD: 2.3 % (ref 0–5)
ERYTHROCYTE [DISTWIDTH] IN BLOOD BY AUTOMATED COUNT: 15.5 % (ref 11.5–14.5)
GRANULOCYTES NFR BLD: 75.3 % (ref 42.2–75.2)
HCT VFR BLD CALC: 36.8 % (ref 42–52)
LYMPHOCYTES # BLD: 2.3 /CUMM (ref 1.2–3.4)
MCH RBC QN AUTO: 29.1 PG (ref 27–31)
MCHC RBC AUTO-ENTMCNC: 31.8 G/DL (ref 33–37)
MCV RBC AUTO: 91.4 FL (ref 80–94)
MONOCYTES # BLD: 0.8 /CUMM (ref 0.1–0.6)
PLATELET # BLD: 328 /CUMM (ref 130–400)
PMV BLD AUTO: 8.2 FL (ref 7.4–10.4)
RED BLOOD CELL CT: 4.02 /CUMM (ref 4.7–6.1)
WBC # BLD AUTO: 13.9 /CUMM (ref 4.8–10.8)

## 2017-07-25 NOTE — PN- INFECT DX
Subjective
Subjective:
Afebrile on steroids.  He feels well with no significant shortness of breath and
with a minimal cough.
 
Objective
Last 24 Hrs of Vital Signs/I&O
 Vital Signs
 
 
Date Time Temp Pulse Resp B/P B/P Pulse O2 O2 Flow FiO2
 
     Mean Ox Delivery Rate 
 
07/25 0856  68  120/70     
 
07/25 0822      96 Nasal 2.0L 
 
       Cannula  
 
07/25 0800       Nasal 2.0L 
 
       Cannula  
 
07/25 0657 98.0 69 20 126/56  95 Nasal 2.0L 
 
       Cannula  
 
07/25 0429      99 Nasal 2.0L 
 
       Cannula  
 
07/25 0039  71    96   
 
07/25 0000      94 Nasal 2.0L 
 
       Cannula  
 
07/24 2232 98.8 72 18 138/80  96   
 
07/24 2129  78  124/76     
 
07/24 1805      96 Nasal 3.0L 
 
       Cannula  
 
07/24 1600       Nasal 3.0L 
 
       Cannula  
 
07/24 1459 98.1 80 18 142/80  97 Nasal 2.0L 
 
       Cannula  
 
 
 Intake & Output
 
 
 07/25 1600 07/25 0800 07/25 0000
 
Intake Total  240 600
 
Output Total  550 475
 
Balance  -310 125
 
    
 
Intake, Oral  240 600
 
Number 1 1 2
 
Bowel   
 
Movements   
 
Output, Urine  550 475
 
 
 
 
Physical Exam
Other Physical Findings:
He is awake and alert in no acute distress on his baseline 2 L of nasal oxygen
Lungs decreased breath sounds bilaterally
Heart irregular rhythm with no murmur
 Castro catheter remains in place
 
 
Results
Last 24 Hours of Lab Results:
 Laboratory Tests
 
 
 07/25 07/25
 
 0732 0700
 
Blood Gas  
 
  ABG O2 Sat Calc/Rola (92.0 - 96.0 %)  96.0
 
  O2 Concentration %  2LPM
 
  O2 Delivery Method  NC
 
Chemistry  
 
  Sodium (137 - 145 mmol/L) 137 
 
  Potassium (3.5 - 5.1 mmol/L) 4.1 
 
  Chloride (98 - 107 mmol/L) 91  L 
 
  Carbon Dioxide (22 - 30 mmol/L) 40  H 
 
  Anion Gap (5 - 16) 6 
 
  BUN (9 - 20 mg/dL) 17 
 
  Creatinine (0.7 - 1.2 mg/dL) 0.6  L 
 
  Estimated GFR (>60 ml/min) > 60 
 
  BUN/Creatinine Ratio (7 - 25 %) 28.3  H 
 
Hematology  
 
  CBC w Diff NO MAN DIFF REQ 
 
  WBC (4.8 - 10.8 /CUMM) 13.9  H 
 
  RBC (4.70 - 6.10 /CUMM) 4.02  L 
 
  Hgb (14.0 - 18.0 G/DL) 11.7  L 
 
  Hct (42 - 52 %) 36.8  L 
 
  MCV (80.0 - 94.0 FL) 91.4 
 
  MCH (27.0 - 31.0 PG) 29.1 
 
  RDW (11.5 - 14.5 %) 15.5  H 
 
  Plt Count (130 - 400 /CUMM) 328 
 
  MPV (7.4 - 10.4 FL) 8.2 
 
  Gran % (42.2 - 75.2 %) 75.3  H 
 
  Lymphocytes % (20.5 - 51.1 %) 16.4  L 
 
  Monocytes % (1.7 - 9.3 %) 6.0 
 
  Eosinophils % (0 - 5 %) 2.3 
 
  Basophils % (0.0 - 2.0 %) 0  L 
 
  Absolute Granulocytes (1.4 - 6.5 /CUMM) 10.5  H 
 
  Absolute Lymphocytes (1.2 - 3.4 /CUMM) 2.3 
 
  Absolute Monocytes (0.10 - 0.60 /CUMM) 0.8  H 
 
  Absolute Eosinophils (0.0 - 0.7 /CUMM) 0.3 
 
  Absolute Basophils (0.0 - 0.2 /CUMM) 0 
 
  PUBS MCHC (33.0 - 37.0 G/DL) 31.8  L 
 
 
 
Last 24 Hours of Stalin Results:
Sputum culture July 24 positive for Enterococcus and gram-negative rods
 
 
Assessment/Plan
Impression:
Stable with temperatures remaining normal (on steroids) and with blood cell 
count decreasing, though still elevated, likely secondary to steroids, with his 
respiratory status stable off antibiotics.  The significance of the most recent 
sputum culture is unclear, with Enterococcus and gram-negative rods isolated, 
and with his respiratory status stable and CT scan not suggestive of pneumonia 
do not feel these need to be treated. The role of Doxycycline and Rifampin 
suppression is unclear at this time.  An MRI of his thoracolumbar spine over 5 
years prior to admission did not reveal any residual collection and, as 
discussed, a repeat MRI may be helpful.  His Castro catheter is apparently to 
remain in place per Urology with reevaluation as an outpatient.
 
Suggestion:
1.  Would pursue a repeat MRI of the thoracolumbar spine
2.  Urology follow-up as an outpatient for removal of the Castro catheter
3.  Continue suppressive antibiotic treatment with Rifampin and Doxycycline 
pending above

## 2017-07-25 NOTE — PN- PULMONARY
Subjective
HPI/Critical Care Issues:
The patient is awake and alert.  He slept well overnight with supplemental 
oxygen.  He did not use CPAP/BIPAP, noting that he continues to refuse therapy. 
He has less shortness of breath.  He has no new sputum production, wheezing or 
chest congestion.  He feels well and wants to go home.
 
Objective
Current Medications:
 Current Medications
 
 
  Sig/[place] Start time  Last
 
Medication Dose Route Stop Time Status Admin
 
Acetaminophen 650 MG Q6P PRN 07/22 0245 AC 07/23
 
  PO   0640
 
Albuterol Sulfate 3 ML EVERY 4 HRS/AWAKE 07/19 1600 AC 07/25
 
  INH   0759
 
Albuterol Sulfate 2 PUF Q6P PRN 07/18 2330 AC 07/21
 
  INH   1731
 
Albuterol Sulfate 3 ML Q4P PRN 07/18 2330 AC 07/19
 
  INH   1028
 
Apixaban 5 MG BID 07/18 2325 AC 07/24
 
  PO   2124
 
Baclofen 20 MG BID 07/18 2327 AC 07/24
 
  PO   2126
 
Bisacodyl 10 MG .STK-MED ONE 07/24 1835 DC 
 
  [place] 07/24 1836  
 
Bisacodyl 10 MG DAILY PRN 07/19 1400 AC 07/24
 
  [place]   1835
 
Doxycycline Hyclate 100 MG BID 07/18 2327 AC 07/24
 
  PO   2125
 
Furosemide 60 MG Q48 07/20 1000 AC 07/24
 
  PO   1004
 
Gabapentin 300 MG Q8H 07/23 1730 AC 07/24
 
  PO   1724
 
Guaifenesin 1,200 MG BID 07/19 1000 AC 07/24
 
  PO   2124
 
Lorazepam 1 MG BID PRN 07/18 2330 DC 07/23
 
  PO   2150
 
Metoprolol Tartrate 12.5 MG BID 07/20 1200 AC 07/24
 
  PO   2129
 
Montelukast Sodium 10 MG DAILY 07/19 1000 AC 07/24
 
  PO   1006
 
Multivitamins  1 TAB DAILY 07/19 1000 AC 07/24
 
Therapeutic  PO   1000
 
Nortriptyline HCl 10 MG QPM 07/19 2200 AC 07/24
 
  PO   2124
 
Omeprazole 20 MG DAILY AC 07/19 0700 AC 07/25
 
  PO   0538
 
Prednisone 10 MG DAILY 07/25 1000 AC 
 
  PO 07/26 1001  
 
Prednisone 20 MG DAILY 07/23 1000 DC 07/24
 
  PO 07/24 1001  1002
 
Rifampin 300 MG DAILY 07/19 1000 AC 07/24
 
  PO   0958
 
Sotalol HCl 80 MG BID 07/20 1000 AC 07/24
 
  PO   2129
 
Tiotropium [place] 1 PUF DAILY 07/19 1000 AC 07/24
 
  INH   1006
 
Trazodone HCl 50 MG QPM PRN 07/18 2345 AC 07/23
 
  PO   2150
 
 
 
 
Vital Signs & I&O
Last 24 Hrs of Vitals and I&O:
 Vital Signs
 
 
Date Time Temp Pulse Resp B/P B/P Pulse O2 O2 Flow FiO2
 
     Mean Ox Delivery Rate 
 
07/25 0657 98.0 69 20 126/56  95 Nasal 2.0L 
 
       Cannula  
 
07/25 0429      99 Nasal 2.0L 
 
       Cannula  
 
07/25 0039  71    96   
 
07/25 0000      94 Nasal 2.0L 
 
       Cannula  
 
07/24 2232 98.8 72 18 138/80  96   
 
07/24 2129  78  124/76     
 
07/24 1805      96 Nasal 3.0L 
 
       Cannula  
 
07/24 1600       Nasal 3.0L 
 
       Cannula  
 
07/24 1459 98.1 80 18 142/80  97 Nasal 2.0L 
 
       Cannula  
 
07/24 1005  68  138/68     
 
07/24 0833      92 Nasal 3.0L 
 
       Cannula  
 
07/24 0827  72    93   
 
 
 Intake & Output
 
 
 07/25 1600 07/25 0800 07/25 0000
 
Intake Total  120 600
 
Output Total  550 475
 
Balance  -430 125
 
    
 
Intake, Oral  120 600
 
Number  1 2
 
Bowel   
 
Movements   
 
Output, Urine  550 475
 
 
Exam
General Appearance: alert, awake, comfortable, on BIPAP
Head: atraumatic, normal appearance
Neck: supple
Respiratory: quiet respiration, decreased breath sounds, mminimal wheezing
Cardiovascular: irregularly irregular
Abdomen: normal bowel sounds, soft, non-tender
Extremities: trace chronic bilateral edema
Skin: intact, normal color, warm/dry
 
Results
Last 24 Hrs of Lab Results:
 Laboratory Tests
 
07/25/17 0700:
ABG O2 Sat Calc/Rola 96.0, O2 Concentration % 2LPM, O2 Delivery Method NC
 
07/24/17 0825:
pH 7.38, pCO2 61 *H, pO2 80, HCO3 35  H, ABG O2 Sat (Measured) 95.0  L, 
Carboxyhemoglobin 0.4  L, O2 Concentration % 35%, Respiration Rate 18, O2 
Delivery Method BIPAP, Vent Mode ST, Expiratory Pressure 6, Inspiratory Pressure
20, Phlebotomy Draw Site RIGHT RADIAL
 
 
Impression/Plan
 
Impression/Plan
Impression/Plan:
1.  Acute on chronic hypercarbic respiratory failure, secondary to oversedation,
noting the patient received both trazodone and newly ordered Ativan prior to 
this event.  He also has a component of underlying obesity hypoventilation 
syndrome however he has been intolerant to nasal CPAP in the past.
2.  Healthcare associated pneumonia, sputum positive for enterococcus and yeast.
3.  Exacerbation of COPD.
4.  Atelectasis.
5.  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
6.  Morbid obesity.
7.  Atrial fibrillation, on Eliquis.
8.  Chronic leukocytosis secondary to steroid therapy.
Recommendations:
* An ABG has been ordered for this morning.  We will check the results and then 
consider discharge to home.
* Continue nebs/total respiratory care.
* Continue slow prednisone taper - discussed with housestaff.
* Continue suppressive antibiotic therapy with rifampin and doxycycline.
* Follow up ID recommendations.
* DVT prophylaxis - on Eliquis.
* Continue all supportive care.
* Possible discharge for later today.

## 2017-07-25 NOTE — PN- ATT ADDEND
Attending Addendum
Attending Brief Note
Patient seen and examined.  Plan of care discussed with the medical team and the
patient.  Available lab work and radiology test reports were reviewed.  Patient 
appears comfortable and pleasant and denies any chest pain fever chills nausea 
or vomiting.  Patient is excited to go home today.
 
Assessment
* Hospital-acquired pneumonia suspected gram-negative such as Pseudomonas, 
currently stable; note sputum culture is growing GPCs which is most likely 
colonization.  Ceftaz was discontinued; doing well off antibiotics 
* CO2 retention- patient is refusing BiPAP
* COPD exacerbation
* Hypoxia
* Persistently elevated WBC count despite  IV antibiotics
* Bilateral atelectasis
* Paraparesis
* Neurogenic bladder
* History of atrial fibrillation
* His hyperlipidemia
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
Plan
* Continue rifampin and doxycycline.  
* Given recent episode of for excessive sedation necessitating BiPAP and CO2 
retention I would recommend to reduce Ativan 0.5 mg twice a day when necessary. 
Reduced the trazodone dose to 25 mg at night.  Continue gabapentin 300 mg 3 
times a day
* Continue prednisone taper
* Plan for discharge 
* Follow-up with Dr. Hull as outpatient
* Follow-up MRI of the back to follow-up spinal abscess
* Patient will be going home with a Castro.  He will follow with Dr. Dykes for 
urodynamic studies
 
Exam:
General: Patient awake alert oriented without any distress 
CVS: S1 plus S2 without any murmur or gallops 
Chest: Few scattered crepitation with expiratory prolongation and mild wheeze.  
There is no respiratory distress. 
Abdomen: Soft nontender, bowel sound present, no guarding or rebound 
CNS: Awake alert oriented with paraparesis follows command  appropriately 
Extremities: No edema; no clubbing or cyanosis noted
Castro intact. 
 
Laboratory Tests
 
 
 
07/25/17 0732:
Anion Gap 6, Estimated GFR > 60, BUN/Creatinine Ratio 28.3  H, CBC w Diff NO MAN
DIFF REQ, RBC 4.02  L, MCV 91.4, MCH 29.1, RDW 15.5  H, MPV 8.2, Gran % 75.3  H,
Lymphocytes % 16.4  L, Monocytes % 6.0, Eosinophils % 2.3, Basophils % 0  L, 
Absolute Granulocytes 10.5  H, Absolute Lymphocytes 2.3, Absolute Monocytes 0.8 
H, Absolute Eosinophils 0.3, Absolute Basophils 0, PUBS MCHC 31.8  L
 
07/25/17 0700:
ABG O2 Sat Calc/Rola 96.0, O2 Concentration % 2LPM, O2 Delivery Method NC
 
07/24/17 0825:
pH 7.38, pCO2 61 *H, pO2 80, HCO3 35  H, ABG O2 Sat (Measured) 95.0  L, 
Carboxyhemoglobin 0.4  L, O2 Concentration % 35%, Respiration Rate 18, O2 
Delivery Method BIPAP, Vent Mode ST, Expiratory Pressure 6, Inspiratory Pressure
20, Phlebotomy Draw Site RIGHT [facility]
 
07/24/17 0710:
Anion Gap 5, Estimated GFR > 60, BUN/Creatinine Ratio 32.0  H, CBC w Diff NO MAN
DIFF REQ, RBC 4.17  L, MCV 90.7, MCH 29.1, RDW 15.4  H, MPV 8.1, Gran % 79.1  H,
Lymphocytes % 12.7  L, Monocytes % 6.4, Eosinophils % 1.5, Basophils % 0.3, 
Absolute Granulocytes 13.1  H, Absolute Lymphocytes 2.1, Absolute Monocytes 1.1 
H, Absolute Eosinophils 0.3, Absolute Basophils 0, PUBS MCHC 32.1  L
 
07/23/17 1625:
pH 7.36, pCO2 67 *H, pO2 76  L, HCO3 37  H, ABG O2 Sat (Measured) 95.0  L, 
Carboxyhemoglobin 1.0  L, O2 Concentration % 35%, O2 Delivery Method BIPAP, 
Phlebotomy Draw Site RIGHT [facility]
 
07/23/17 1325:
pH 7.32  L, pCO2 73 *H, pO2 72  L, HCO3 37  H, ABG O2 Sat (Measured) 93.0  L, P-
50 (Temp Corrected) Y, Carboxyhemoglobin 0.7  L, O2 Concentration % 35, 
Temperature 98.1, Respiration Rate 18, O2 Delivery Method BIPAP, Vent Mode ST, 
Expiratory Pressure 6, Inspiratory Pressure 12, Phlebotomy Draw Site RIGHT 
[facility]
 
07/23/17 1025:
pH 7.34  L, pCO2 70 *H, pO2 81, HCO3 36  H, ABG O2 Sat (Measured) 94.0  L, P-50 
(Temp Corrected) Y, Carboxyhemoglobin 1.2  L, O2 Concentration % 2L, Temperature
98.1, O2 Delivery Method N/C, Phlebotomy Draw Site RIGHT [facility]
 
07/23/17 0735:
Anion Gap 5, Estimated GFR > 60, BUN/Creatinine Ratio 34.0  H, Phosphorus 2.6, 
Magnesium 2.0, CBC w Diff NO MAN DIFF REQ, RBC 4.01  L, MCV 91.1, MCH 29.0, RDW 
15.1  H, MPV 7.9, Gran % 77.3  H, Lymphocytes % 14.4  L, Monocytes % 6.6, 
Eosinophils % 1.5, Basophils % 0.2, Absolute Granulocytes 11.6  H, Absolute 
Lymphocytes 2.2, Absolute Monocytes 1.0  H, Absolute Eosinophils 0.2, Absolute 
Basophils 0, PUBS MCHC 31.8  L
 Microbiology
07/24 1305  LOWER RESP: Respiratory Culture - RES
                ENTEROCOCCUS
                GRAM NEGATIVE RODS
07/24 1305  LOWER RESP: Gram Stain - RES
 
Vital Signs
 
 
Date Time Temp Pulse Resp B/P B/P Pulse O2 O2 Flow FiO2
 
     Mean Ox Delivery Rate 
 
07/25 0856  68  120/70     
 
07/25 0822      96 Nasal 2.0L 
 
       Cannula  
 
07/25 0800       Nasal 2.0L 
 
       Cannula  
 
07/25 0657 98.0 69 20 126/56  95 Nasal 2.0L 
 
       Cannula  
 
07/25 0429      99 Nasal 2.0L 
 
       Cannula  
 
07/25 0039  71    96   
 
07/25 0000      94 Nasal 2.0L 
 
       Cannula  
 
07/24 2232 98.8 72 18 138/80  96   
 
07/24 2129  78  124/76     
 
07/24 1805      96 Nasal 3.0L 
 
       Cannula  
 
07/24 1600       Nasal 3.0L 
 
       Cannula  
 
07/24 1459 98.1 80 18 142/80  97 Nasal 2.0L 
 
       Cannula

## 2017-07-25 NOTE — PN- HOUSESTAFF
Subjective
Follow-up For:
COPD
Pneumonia
Subjective:
I have seen and examined the patient.  The patient was lying comfortably in the 
bed.  The patient was having breakfast.  He was in 2 L nasal cannula. Overnight 
pulse oximetry showed O2 saturation ranging from 92 to 99 on 2 L oxygen nasal 
cannula.  He feels like he is improving and is ready to go home.  There are no 
overnight acute events.  He does not complain of any fevers palpitations or 
chest pain.
 
Review of Systems
Constitutional:
Denies: fever, malaise. 
Cardiovascular:
Denies: chest pain, edema. 
Respiratory:
Reports: cough, short of breath, sputum production. 
Gastrointestinal:
Reports: no symptoms, see HPI. 
Genitourinary:
Reports: no symptoms. 
 
Objective
Last 24 Hrs of Vital Signs/I&O
 Vital Signs
 
 
Date Time Temp Pulse Resp B/P B/P Pulse O2 O2 Flow FiO2
 
     Mean Ox Delivery Rate 
 
 0822      96 Nasal 2.0L 
 
       Cannula  
 
 0800       Nasal 2.0L 
 
       Cannula  
 
 0657 98.0 69 20 126/56  95 Nasal 2.0L 
 
       Cannula  
 
 0429      99 Nasal 2.0L 
 
       Cannula  
 
 0039  71    96   
 
 0000      94 Nasal 2.0L 
 
       Cannula  
 
 2232 98.8 72 18 138/80  96   
 
 2129  78  124/76     
 
 1805      96 Nasal 3.0L 
 
       Cannula  
 
 1600       Nasal 3.0L 
 
       Cannula  
 
 1459 98.1 80 18 142/80  97 Nasal 2.0L 
 
       Cannula  
 
 1005  68  138/68     
 
 
 Intake & Output
 
 
  1600  0800  0000
 
Intake Total  240 600
 
Output Total  550 475
 
Balance  -310 125
 
    
 
Intake, Oral  240 600
 
Number  1 2
 
Bowel   
 
Movements   
 
Output, Urine  550 475
 
 
 
 
Physical Exam
General Appearance: Alert, Oriented X3, Cooperative, No Acute Distress
Skin: No Rashes, No Breakdown
Cardiovascular: Normal S1, Normal S2
Lungs: scattered ronchi and few wheezes with prolonged expiration
Abdomen: Normal Bowel Sounds, Soft, No Tenderness
Neurological: Normal Speech
Extremities: paraplegic
Current Medications:
 Current Medications
 
 
  Sig/[place] Start time  Last
 
Medication Dose Route Stop Time Status Admin
 
Acetaminophen 650 MG Q6P PRN  0245 AC 
 
  PO   0640
 
Albuterol Sulfate 3 ML EVERY 4 HRS/AWAKE  1600 AC 
 
  INH   0759
 
Albuterol Sulfate 2 PUF Q6P PRN  2330 AC 
 
  INH   1731
 
Albuterol Sulfate 3 ML Q4P PRN  2330 AC 
 
  INH   1028
 
Apixaban 5 MG BID  2325 AC 
 
  PO   2124
 
Baclofen 20 MG BID  2327 AC 
 
  PO   2126
 
Bisacodyl 10 MG .STK-MED ONE  1835 DC 
 
  NH  1836  
 
Bisacodyl 10 MG DAILY PRN  1400 AC 
 
  NH   1835
 
Doxycycline Hyclate 100 MG BID  2327 AC 
 
  PO   2125
 
Furosemide 60 MG Q48  1000 AC 
 
  PO   1004
 
Gabapentin 300 MG Q8H  1730 AC 
 
  PO   1724
 
Guaifenesin 1,200 MG BID  1000 AC 
 
  PO   2124
 
Lorazepam 1 MG BID PRN  2330 DC 
 
  PO   2150
 
Metoprolol Tartrate 12.5 MG BID  1200 AC 
 
  PO   2129
 
Montelukast Sodium 10 MG DAILY  1000 AC 
 
  PO   1006
 
Multivitamins  1 TAB DAILY  1000 AC 
 
Therapeutic  PO   1000
 
Nortriptyline HCl 10 MG QPM  2200 AC 
 
  PO   2124
 
Omeprazole 20 MG DAILY AC  0700 AC 
 
  PO   0538
 
Prednisone 10 MG DAILY  1000 AC 
 
  PO  1001  
 
Prednisone 20 MG DAILY  1000 DC 
 
  PO  1001  1002
 
Rifampin 300 MG DAILY  1000 AC 
 
  PO   0958
 
Sotalol HCl 80 MG BID  1000 AC 
 
  PO   2129
 
Tiotropium [place] 1 PUF DAILY  1000 AC 
 
  INH   1006
 
Trazodone HCl 50 MG QPM PRN  2345 AC 
 
  PO   2150
 
 
 
 
Last 24 Hrs of Lab/Stalin Results
Last 24 Hrs of Labs/Mics:
 Laboratory Tests
 
17 0732:
Sodium Pending, Potassium Pending, Chloride Pending, Carbon Dioxide Pending, 
Anion Gap Pending, BUN Pending, Creatinine Pending, BUN/Creatinine Ratio Pending
, CBC w Diff Pending, WBC Pending, RBC Pending, Hgb Pending, Hct Pending, MCV 
Pending, MCH Pending, RDW Pending, Plt Count Pending, MPV Pending, PUBS MCHC 
Pending
 
17 0700:
ABG O2 Sat Calc/Rola 96.0, O2 Concentration % 2LPM, O2 Delivery Method NC
 Microbiology
 1305  LOWER RESP: Respiratory Culture - RES
 1305  LOWER RESP: Gram Stain - RES
 
 
 
Assessment/Plan
Assessment:
THospital Course:
The pt is 66 year obese male with a history of COPD , bilateral lower extremity 
edema, HTN, hyperlipidemia, history of atrial fibrillation on Eliquis, asthma, 
neurogenic bladder, depression, chronic constipation, muscle spasms, GARRY, CAD s/
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 
abscess. He presented to the ED complaining of urinary leakage, malodorous urine
, persistent cough since his last admission for pneumonia, and fever.
Vitals on admission
Temperature 97.9 heart rate 100 respiratory rate 18 blood pressure 148/69 O2 
sats 98 nasal cannula 2 L
ED workup
White cell count of 14.4 H&H 11.9 and 37.3.  BUN 14 and Cr 0.5
Trops negative
CXR Results Retrocardiac opacification and obscuration of the left hemidiaphragm
suspicious for a left lower lobe infiltrate.
--------------------------------------------------------------------------------
------
He was treated for following conditions
 
Pertinent Lab Results:
CT findings 
1. Obstructive lung disease with bibasilar dependent areas of lung parenchymal 
consolidation, left greater than right. Findings may be related to atelectasis 
with possible superimposed pneumonia in left lung base.Borderline mediastinal 
lymph nodes are likely reactive.
2. Small left pleural effusion.
3. Platelike atelectasis or scarring in the lingula.
4. Severe coronary artery calcifications.
5. Osteopenia with chronic 50% wedge compression deformity of T7 with partial
fusion, focal kyphosis and secondary degenerative change as discussed above.
 
Hospital-acquired pneumonia sputum positive for enterococcus and yeast Pt 
complained that cough has not resolved since last admission and treatment for 
previous pneumonia.Previously culture positive for psuedomonas. Pt has 
persistently elevated WBC count despite antibiotics , can be due to steriod use
* CT scan findings above bibasiler atelectasis with possible superimposed 
pneumonia LL lung base see results of CT
* he was initially started on azithromycin and Ceftaz (4days) and later 
antibiotics were stopped
* Lower respiratory cultures showed mixed rosa maria and growth of gram-positive 
cocci likely colonization
* Urine strep and Legionella negative
 
COPD exacerbation on  rapid response was called because the patient was 
lethargic. he was alert and oriented 3.  ABG showed respiratory acidosis with 
PCo2 of 72 the patient was put on BiPAP.  Repeat ABG shows PCO2 of 67.
* Overnight pulse oximetry was done with oxygen sat ranging from 99-92.  Patient
refuses BiPAP adamantly
* He is being discharged on steroid  taper 20x4 days 10x4 days continue on 0.5 
till appointment with  
* Symibort,TRC ,Albuterol, Montelukast 10 mg daily
* Ativan dose decreased to 0.5 MG PRN and trazodone was decreased to 25 MG daily
to avoid excessive sedation and lethargy
 
UTI-resolved
UA significant for UrWBC 10-15, LE small, trace proteins, negative ntirites. Pt 
has hx of neurogenic bladder with frequent straight catheterizations (3-5 times 
daily)
* urine cultures no growth
* The patient is being discharged with Castro and will follow up with Dr. Dykes 
for urodynamic studies.
 
HTN patient's heart rate and blood pressure has been towards the higher side 
overnight.  We have started hypertensive medication
* Metoprolol 12.5mg BID
* Sotalol 80 mg BID
* Continue Lasix 50 MG Q4
 
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
* Cont suppressive abx treatment with rifampin/doxycycline 
* Schedule MRI thoracic spine to evaluate residual abscess
 
history of muscle spasms
* Continue baclofen
 
history of AFIB
* Continue eloquis BID
 
chronic lower extremity edema
* Continue every other day lasix regimen. Next dose 
 
Hyperlipidemia
* Continue IM evolocumab every two weeks. Next dose 
 
obstructive sleep apnea
Patient does have a history of obstructive sleep apnea 
* refuses BiPAP or CPAP adamantly
Allergies:
Coded Allergies:
heparin (SWELLING 17)
vancomycin (HIVES, SKIN CRACKES, TURNS RED, SWELLS AND PEELS 17)
 
 
Problem List:
 1. Pneumonia due to gram-negative bacteria
 
 2. Pneumonia
 
 3. COPD (chronic obstructive pulmonary disease)
 
Pain Ratin
Pain Location:
n/a
Pain Goal: Pain 4 or less
Pain Plan:
none
Tomorrow's Labs & Rationales:
cbc
bep???
Consulting Request:
   Consulting Specialty: Pulmonary Disease
   Consulting Physician:
NICOLE Hull MD

## 2018-01-12 ENCOUNTER — HOSPITAL ENCOUNTER (INPATIENT)
Dept: HOSPITAL 68 - ERH | Age: 68
LOS: 4 days | Discharge: HOME HEALTH | DRG: 202 | End: 2018-01-16
Admitting: INTERNAL MEDICINE
Payer: COMMERCIAL

## 2018-01-12 VITALS — DIASTOLIC BLOOD PRESSURE: 86 MMHG | SYSTOLIC BLOOD PRESSURE: 140 MMHG

## 2018-01-12 VITALS — DIASTOLIC BLOOD PRESSURE: 84 MMHG | SYSTOLIC BLOOD PRESSURE: 142 MMHG

## 2018-01-12 VITALS — HEIGHT: 69 IN | BODY MASS INDEX: 32.81 KG/M2 | WEIGHT: 221.5 LBS

## 2018-01-12 DIAGNOSIS — R40.0: ICD-10-CM

## 2018-01-12 DIAGNOSIS — G47.33: ICD-10-CM

## 2018-01-12 DIAGNOSIS — J96.12: ICD-10-CM

## 2018-01-12 DIAGNOSIS — K59.00: ICD-10-CM

## 2018-01-12 DIAGNOSIS — Z66: ICD-10-CM

## 2018-01-12 DIAGNOSIS — G89.29: ICD-10-CM

## 2018-01-12 DIAGNOSIS — Z79.01: ICD-10-CM

## 2018-01-12 DIAGNOSIS — J44.0: ICD-10-CM

## 2018-01-12 DIAGNOSIS — D72.829: ICD-10-CM

## 2018-01-12 DIAGNOSIS — J20.8: Primary | ICD-10-CM

## 2018-01-12 DIAGNOSIS — I48.0: ICD-10-CM

## 2018-01-12 DIAGNOSIS — G82.20: ICD-10-CM

## 2018-01-12 DIAGNOSIS — I25.10: ICD-10-CM

## 2018-01-12 DIAGNOSIS — N31.9: ICD-10-CM

## 2018-01-12 DIAGNOSIS — T83.031A: ICD-10-CM

## 2018-01-12 DIAGNOSIS — I10: ICD-10-CM

## 2018-01-12 DIAGNOSIS — E78.5: ICD-10-CM

## 2018-01-12 DIAGNOSIS — Z98.61: ICD-10-CM

## 2018-01-12 DIAGNOSIS — Z99.81: ICD-10-CM

## 2018-01-12 DIAGNOSIS — J44.1: ICD-10-CM

## 2018-01-12 LAB
ABSOLUTE GRANULOCYTE CT: 11.3 /CUMM (ref 1.4–6.5)
BASOPHILS # BLD: 0.1 /CUMM (ref 0–0.2)
BASOPHILS NFR BLD: 0.6 % (ref 0–2)
EOSINOPHIL # BLD: 0.2 /CUMM (ref 0–0.7)
EOSINOPHIL NFR BLD: 1.4 % (ref 0–5)
ERYTHROCYTE [DISTWIDTH] IN BLOOD BY AUTOMATED COUNT: 14.3 % (ref 11.5–14.5)
GRANULOCYTES NFR BLD: 76.3 % (ref 42.2–75.2)
HCT VFR BLD CALC: 41 % (ref 42–52)
LYMPHOCYTES # BLD: 2.3 /CUMM (ref 1.2–3.4)
MCH RBC QN AUTO: 29.1 PG (ref 27–31)
MCHC RBC AUTO-ENTMCNC: 31.7 G/DL (ref 33–37)
MCV RBC AUTO: 91.7 FL (ref 80–94)
MONOCYTES # BLD: 0.9 /CUMM (ref 0.1–0.6)
PLATELET # BLD: 337 /CUMM (ref 130–400)
PMV BLD AUTO: 7.9 FL (ref 7.4–10.4)
RED BLOOD CELL CT: 4.47 /CUMM (ref 4.7–6.1)
WBC # BLD AUTO: 14.8 /CUMM (ref 4.8–10.8)

## 2018-01-12 NOTE — EVENT NOTE
Event Note
Event Note:
Situation:
Rapid response was called as the patient was unarousable, lethargic
 
Background:
Patient is 67 year old man with PMH significant for paroxysmal atrial 
fibrillation , COPD on 2 L home oxygen, GARRY on nocturnal Bipap, paraplegia as a 
result of spinal epidural abscess with chronic Castro catheter for neurogenic 
bladder, hypertension, hyperlipidemia, CAD s/p PCI 2004 and 2006, aortic valve 
endocarditis who presented to ED with chief complaint of shortness of breath 
admitted for COPD exacerbation 
 
 
On examination:
BP: 140/86, HR:72, O2:93 ON Bipap, rr: 20, blood sugar: 138
Patient was sleeping, he woke up later, neurologic examination was normal, he 
was responding to our question appropriately.
 
Later on at about 2 am: Nursing staff was still concerned about that the patient
is not arousable to sternal rub, when we revised his medication he received 
Gabapentin, baclofen and percocet all around 9 pm.
 
Assessment and plan:
#Patient was in deep sleep after he was started on Bipap, we did an ABG which 
showed:7.43, 65, 63, 42, 92
I asked the respiratory therapiest to increase Ipap to 24 instead of 22, to try 
to wash out CO2, and to repeated ABG again at 5 am, when the nurses called us 
again around 2 am I went again to see the patient and 1 dose of IV narcan was 
ordered even before we had the Utox, however, patient didn't respond to narcan, 
it was very unlikely that the patient has an acute event, so we decided to wait 
for a repeated ABG, Me and attending went again to check on the patient at 4 am 
and we made the decision to order a stat ct- head after getting ABG, patient 
immediately woke up after the ABG injection, and he was angry that we woke him 
up, he refused to have CT-head.
ABG at 5am showed improvement: 7.44, 57, 67, 38, 94

## 2018-01-12 NOTE — ED DYSPNEA/ASTHMA COMPLAINT
History of Present Illness
 
General
Chief Complaint: Dyspnea (COPD, CHF, Other)
Stated Complaint: BIBA, SOB
Source: patient, old records, EMS
Exam Limitations: no limitations
 
Vital Signs & Intake/Output
Vital Signs & Intake/Output
 ED Intake and Output
 
 
  0000  1200
 
Intake Total 1000 120
 
Output Total 2400 
 
Balance -1400 120
 
   
 
Intake, Oral 1000 120
 
Output, Urine 2400 
 
 
 
Allergies
Coded Allergies:
heparin (Intermediate, SWELLING, RASH 10/16/17)
vancomycin (Intermediate, HIVES, SKIN CRACKES, TURNS RED, SWELLS AND PEELS 10/16
/17)
warfarin (From COUMADIN) (Intermediate, RASH 10/16/17)
 
Reconcile Medications
Acetaminophen (Tylenol Arthritis) 650 MG TABLET.ER   1 TAB PO Q6-PRN PRN PAIN  (
Reported)
Albuterol Sulfate (Ventolin Hfa) 90 MCG HFA.AER.AD   2 PUF INH AD PRN COPD  (
Reported)
Apixaban (Eliquis) 5 MG TABLET   5 MG PO BID atrial fibrillation
Ascorbate Calcium (Vitamin C) 500 MG TABLET   1 TAB PO BID SUPPLEMENT  (Reported
)
Atomoxetine HCl (Strattera) 40 MG CAPSULE   1 CAP PO QAM MENTAL HEALTH  (
Reported)
Baclofen 20 MG TABLET   1 TAB PO BID MUSCLE RELAXER  (Reported)
Bisacodyl (Dulcolax) 10 MG SUPP.RECT   1 SUP RC Q48 GI  (Reported)
Budesonide/Formoterol Fumarate (Symbicort 80-4.5 Mcg Inhaler) 80 MCG-4.5 MCG/
ACTUATION HFA.AER.AD   2 PUF INH BID COPD  (Reported)
Docusate Sodium (Stool Softener) 100 MG CAPSULE   1 CAP PO DAILY STOOL SOFTENER 
(Reported)
Doxycycline Hyclate 100 MG CAPSULE   1 CAP PO BID infection  (Reported)
Evolocumab (Repatha Sureclick) 140 MG/ML PEN.INJCTR   1 ML SC Q 2 WEEKS 
CHOLESTEROL  (Reported)
Furosemide 20 MG TABLET   3 TAB PO Q48 DIURETIC  (Reported)
Gabapentin 600 MG TABLET   1 TAB PO TID NEUROPATHY  (Reported)
Guaifenesin (Mucinex) 600 MG TAB.ER.12H   2 TAB PO BID MUCUS  (Reported)
Lactobacillus Acidophilus (Acidophilus) 1 EACH CAPSULE   1 CAP PO BID PROBIOTIC 
(Reported)
Levofloxacin (Levaquin) 500 MG TABLET   1 TAB PO DAILY LUNG INFECTION
Lorazepam 1 MG TABLET   1 TAB PO BID ANXIETY  (Reported)
Magnesium Oxide (Magnesium) 400 MG CAPSULE   1 CAP PO 0600 SUPPLEMENT  (Reported
)
Metoprolol Succ XL (Toprol XL) 25 MG TAB   1 TAB PO DAILY HEART  (Reported)
Montelukast Sodium (Singulair) 10 MG TABLET   1 TAB PO DAILY ALLERGIES  (
Reported)
Multivitamin (Daily Value) 1 EACH TABLET   1 TAB PO DAILY VITAMIN SUPPORT  (
Reported)
Nortriptyline HCl 10 MG CAPSULE   1 CAP PO DAILY UNKNOWN  (Reported)
Omeprazole 20 MG CAPSULE.DR   1 CAP PO DAILY ACID REFLUX  (Reported)
Oxycodone HCl/Acetaminophen (Oxycodone-Acetaminophen 5-325) 5 MG-325 MG TABLET  
1 TAB PO Q4H PRN PAIN  (Reported)
Potassium Chloride 20 MEQ TAB.ER.PRT   1 TAB PO DAILY SUPPLEMENT  (Reported)
Prednisone 10 MG TABLET   1 TAB PO DAILY COPD
     DATE      TABS DAILY
      
     -  5
     -  4
     -  3
     -  2
     -  1
     -  0.5
Rifampin (Rifadin) 300 MG CAPSULE   300 MG PO BID MRSA  (Reported)
Rosuvastatin Calcium (Crestor) 5 MG TABLET   1 TAB PO DAILY CHOLESTEROL  (
Reported)
Sotalol (Betapace) 80 MG TABLET   80 MG PO BID AARYTHMIAS
Tiotropium Bromide (Spiriva) 18 MCG CAP.W.DEV   1 CAP INH DAILY BREATHING 
PROBLEMS  (Reported)
Trazodone HCl 50 MG TABLET   1 TAB PO QPM SLEEP  (Reported)
 
Triage Note:
PT BIBA FOR WORSENING SOB
Triage Nurses Notes Reviewed? yes
HPI:
Patient presents for evaluation of worsening shortness of breath over the past 
few days.  Patient states that he has had a cough productive of green sputum but
otherwise denied associated fever, chills, nasal congestion, headache, dizziness
, no nuchal contacts or recent travel.  Likewise denies chest pain or heart 
palpitations.  He has been using a home nebulizer without improvement.  Symptoms
are described as moderate to severe in intensity and worsened with exertion.  
Nothing seems to make it feel better at this time.
 
Past History
 
Travel History
Traveled to [place] past 21 day No
 
Medical History
Any Pertinent Medical History? see below for history
Neurological: C6-7 ABSCESS PARAPLEGIA
EENT: NONE
Cardiovascular: AFIB, hypertension, hyperlipidemia, myocardial infarction
Respiratory: asthma, COPD, OXYGEN DEPEND 2L
Gastrointestinal: NONE
Hepatic: NONE
Renal: neurogenic bladder
Musculoskeletal: PARAPLEGIA R/T ABSCESS
Psychiatric: NONE
Endocrine: NONE
Blood Disorders: NONE
Cancer(s): NONE
GYN/Reproductive: NONE
History of MRSA: Yes
History of VRE: Yes
History of CDIFF: No
Influenza Vaccine: 10/16/17
 
Surgical History
Surgical History: non-contributory
 
Psychosocial History
Who do you live with Significant Other
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
What is your primary language English
Tobacco Use: Never used
ETOH Use: denies use
Illicit Drug Use: denies illicit drug use
 
Family History
Family History, If Any:
Relation not specified for:
  *No pertinent family history
 
Hx Contributory? No
 
Review of Systems
 
Review of Systems
Constitutional:
Reports: no symptoms. 
EENTM:
Reports: no symptoms. 
Respiratory:
Reports: see HPI. 
Cardiovascular:
Reports: no symptoms. 
GI:
Reports: no symptoms. 
Genitourinary:
Reports: no symptoms. 
Musculoskeletal:
Reports: no symptoms. 
Skin:
Reports: no symptoms. 
Neurological/Psychological:
Reports: no symptoms. 
Hematologic/Endocrine:
Reports: no symptoms. 
Immunologic/Allergic:
Reports: no symptoms. 
All Other Systems: Reviewed and Negative
 
Physical Exam
 
Physical Exam
Respiratory: SEE BELOW
Comments:
Gen.: Well-nourished, well-developed, no acute respiratory distress.
Head: Normocephalic, atraumatic.
Eyes: Normal inspection bilaterally
Ears: Normal inspection bilaterally
Nose: Normal inspection
Throat/mouth : Moist mucosa 
Neck: Supple, full range of motion, no goiter
Heart: Regular rate and rhythm, no murmurs rubs or gallops
Lungs: Decreased air entry bilaterally with scattered end expiratory wheezing 
and mild rales
Chest: Nontender
Back: Normal range of motion
Abdomen: Soft, nontender, nondistended, normal bowel sounds
Extremities: Decreased range of motion of the lower extremities sig.  Paraplegia
, equal radial pulses, no cyanosis clubbing or edema
Neurologic: Cranial nerves grossly intact, speech is clear
Skin: warm and dry
Psychiatric: Calm, cooperative, no apparent delusions or hallucinations
 
 
Core Measures
ACS in differential dx? No
CVA/TIA Diagnosis No
Sepsis Present: No
Sepsis Focused Exam Completed? No
 
Progress
Differential Diagnosis: asthma, bronchitis, CHF, COPD, pneumonia
Plan of Care:
 Orders
 
 
Procedure Date/time Status
 
AEROSOL CHG   UNK Complete
 
OXYGEN   UNK Complete
 
OXYGEN DAILY CHARGE   UNK Complete
 
 
 
Diagnostic Imaging:
Discussed w/RAD: Radiology Read. 
CXR Impression: PATIENT: DAYDAY HOOVER  MEDICAL RECORD NO: [id number] PRESENT AGE: 
67  PATIENT ACCOUNT NO: [id number] : [birth year]  LOCATION: [facility] ORDERING PHYSICIAN:
Alexander Morris MD     SERVICE DATE:  EXAM TYPE: RAD - XRY-PORTABLE
CHEST XRAY EXAMINATION: XR PORTABLE CHEST CLINICAL INFORMATION: Dyspnea, cough, 
rales left chest. Presumptive diagnosis: Left pneumonia. COMPARISON: Chest 10/23
/2017. TECHNIQUE: Portable AP 70 degrees upright view of the chest was obtained.
FINDINGS: The heart is normal in size. There is no congestion or edema. There is
no definite focal consolidation although the left retrocardiac region is not 
well evaluated. Moderate degenerative changes of the left shoulder again noted. 
IMPRESSION: No definite evidence of pneumonia although the left retrocardiac 
region is not well evaluated. Consider follow-up examination if clinically 
indicated. DICTATED BY: Ramesh Amin MD  DATE/TIME DICTATED:18 
:RAD.TESFAYE  DATE/TIME TRANSCRIBED:18 CONFIDENTIAL, 
DO NOT COPY WITHOUT APPROPRIATE AUTHORIZATION.  <Electronically signed in Other 
Vendor System>                                                                  
                     SIGNED BY: Ramesh Amin MD 18 [id number]
Initial ED EKG: NSR, no ST T wave changes
Comments:
14:05 PT UPDATED ON TEST RESULTS.  Patient appears improved clinically.  Air 
entry has improved on repeat evaluation.
 
Departure
 
Departure
Disposition: STILL A PATIENT
Condition: Stable
Clinical Impression
Primary Impression: COPD exacerbation
Referrals:
Ramsey Jacobson MD (PCP/Family)
 
Departure Forms:
Customer Survey
General Discharge Information
Prescriptions:
Current Visit Scripts
Prednisone 1 TAB PO DAILY  
     #31 TAB 
     DATE      TABS DAILY
      
     -  5
     -  4
     -  3
     -  2
     -  1
     -  0.5
 
Levofloxacin (Levaquin) 1 TAB PO DAILY  
     #5 TAB 
 
 
 
Admission Note
Spoke With:
Rojelio JONES,Sandra
Documentation of Exam:
Documentation of any treatments & extenuating circumstances including Concerns 
Regarding Discharge (functional status, medication knowledge or non-compliance, 
living conditions, etc.) that warrant an admission rather than observation: 
Patient is currently experiencing exacerbation of COPD which has compromised his
functional capacity making him a poor candidate for outpatient management.  I 
feel the exertion of outpatient treatment would likely worsen his COPD and 
induce severe dyspnea and hypoxia.  The patient is at high risk of respiratory 
failure and mortality.  I now feel he requires hospitalization and treatment 
with bronchodilators and IV Solu-Medrol.  Pulse oximetry should be monitored and
oxygen supplemented accordingly.  Pulmonary consultation should be considered.  
Given this patient's complex past medical history I feel his treatment and 
recovery will likely be prolonged and somewhat complicated.  I feel he'll 
require a multiple day hospitalization.
 
 
Critical Care Note
 
Critical Care Note
Critical Care Time: 30-74 min
 
ED Attending Observation
Initial Observation Note:
I have seen and personally examined DAYDAY HOOVER 
on 18 at 1124. 
 
I agree with the current emergency department documentation.  The disposition 
(admission or discharge) is uncertain at this time, he needs a period of 
observation for the following reason(s): 
 
The ED Nurse caring for this patient has been personally informed as to what the
patient is being observed for.

## 2018-01-12 NOTE — HISTORY & PHYSICAL
Soheila JONES,[facility] 01/12/18 2025:
General Information and HPI
MD Statement:
I have seen and personally examined DAYDAY HOOVER and documented this H&P.
 
The patient is a 67 year old M who presented with a patient stated chief 
complaint of [SOB].
 
Source of Information: patient, EMS
Exam Limitations: no limitations
History of Present Illness:
Patient is 67 year old female with PMH significant for paroxysmal atrial 
fibrillation on telemetry class, COPD on 2 L home oxygen, paraplegia as a result
of spinal epidural abscess with chronic Castro catheter for neurogenic bladder, 
hypertension, hyperlipidemia, CAD s/p PCI 2004 and 2006, aortic valve 
endocarditis who presented to ED with chief complaint of shortness of breath.  
Patient reported that last week he started to have shortness of breath that didn
't improve with home
nrbolizers, associated with cough of green sputum, denied fever, chills, nasal 
congestion, ear pain, headache, dizziness, sick contact.  Patient denied chest 
pain, palpitation.
Patient had Castro catheter changed last week however started to leak yesterday, 
ER nurse was unable to change it because of pineal retraction.  Patient denied 
any abnormal dysuria, has chronic dysuria, change in color of urine.
Follow-up with cardiology in [place] and Dr. Rivero pulmonologist.
 
Allergies/Medications
Allergies:
Coded Allergies:
heparin (Intermediate, SWELLING, RASH 10/16/17)
vancomycin (Intermediate, HIVES, SKIN CRACKES, TURNS RED, SWELLS AND PEELS 10/16
/17)
warfarin (From COUMADIN) (Intermediate, RASH 10/16/17)
 
Home Med list
Acetaminophen (Tylenol Arthritis) 650 MG TABLET.ER   1 TAB PO Q6-PRN PRN PAIN  (
Reported)
Albuterol Sulfate (Ventolin Hfa) 90 MCG HFA.AER.AD   2 PUF INH AD PRN COPD  (
Reported)
Apixaban (Eliquis) 5 MG TABLET   5 MG PO BID atrial fibrillation
Ascorbate Calcium (Vitamin C) 500 MG TABLET   1 TAB PO BID SUPPLEMENT  (Reported
)
Atomoxetine HCl (Strattera) 40 MG CAPSULE   1 CAP PO QAM MENTAL HEALTH  (
Reported)
Baclofen 20 MG TABLET   1 TAB PO BID MUSCLE RELAXER  (Reported)
Bisacodyl (Dulcolax) 10 MG SUPP.RECT   1 SUP RC Q48 GI  (Reported)
Budesonide/Formoterol Fumarate (Symbicort 80-4.5 Mcg Inhaler) 80 MCG-4.5 MCG/
ACTUATION HFA.AER.AD   2 PUF INH BID COPD  (Reported)
Docusate Sodium (Stool Softener) 100 MG CAPSULE   1 CAP PO DAILY STOOL SOFTENER 
(Reported)
Doxycycline Hyclate 100 MG CAPSULE   1 CAP PO BID infection  (Reported)
Evolocumab (Repatha Sureclick) 140 MG/ML PEN.INJCTR   1 ML SC Q 2 WEEKS 
CHOLESTEROL  (Reported)
Furosemide 20 MG TABLET   3 TAB PO Q48 DIURETIC  (Reported)
Gabapentin 600 MG TABLET   1 TAB PO TID NEUROPATHY  (Reported)
Guaifenesin (Mucinex) 600 MG TAB.ER.12H   2 TAB PO BID MUCUS  (Reported)
Lactobacillus Acidophilus (Acidophilus) 1 EACH CAPSULE   1 CAP PO BID PROBIOTIC 
(Reported)
Lorazepam 1 MG TABLET   1 TAB PO BID ANXIETY  (Reported)
Magnesium Oxide (Magnesium) 400 MG CAPSULE   1 CAP PO 0600 SUPPLEMENT  (Reported
)
Metoprolol Succ XL (Toprol XL) 25 MG TAB   1 TAB PO DAILY HEART  (Reported)
Montelukast Sodium (Singulair) 10 MG TABLET   1 TAB PO DAILY ALLERGIES  (
Reported)
Multivitamin (Daily Value) 1 EACH TABLET   1 TAB PO DAILY VITAMIN SUPPORT  (
Reported)
Nortriptyline HCl 10 MG CAPSULE   1 CAP PO DAILY UNKNOWN  (Reported)
Omeprazole 20 MG CAPSULE.DR   1 CAP PO DAILY ACID REFLUX  (Reported)
Oxycodone HCl/Acetaminophen (Oxycodone-Acetaminophen 5-325) 5 MG-325 MG TABLET  
1 TAB PO Q4H PRN PAIN  (Reported)
Potassium Chloride 20 MEQ TAB.ER.PRT   1 TAB PO DAILY SUPPLEMENT  (Reported)
Rifampin (Rifadin) 300 MG CAPSULE   300 MG PO BID MRSA  (Reported)
Rosuvastatin Calcium (Crestor) 5 MG TABLET   1 TAB PO DAILY CHOLESTEROL  (
Reported)
Sotalol (Betapace) 80 MG TABLET   80 MG PO BID AARYTHMIAS
Tiotropium Bromide (Spiriva) 18 MCG CAP.W.DEV   1 CAP INH DAILY BREATHING 
PROBLEMS  (Reported)
Trazodone HCl 50 MG TABLET   1 TAB PO QPM SLEEP  (Reported)
 
 
Past History
 
Travel History
Traveled to [place] past 21 day No
 
Medical History
Neurological: C6-7 ABSCESS PARAPLEGIA
EENT: NONE
Cardiovascular: AFIB, hypertension, hyperlipidemia, myocardial infarction
Respiratory: asthma, COPD, OXYGEN DEPEND 2L
Gastrointestinal: NONE
Hepatic: NONE
Renal: neurogenic bladder
Musculoskeletal: PARAPLEGIA R/T ABSCESS
Psychiatric: NONE
Endocrine: NONE
Blood Disorders: NONE
Cancer(s): NONE
GYN/Reproductive: NONE
History of MRSA: Yes
History of VRE: Yes
History of CDIFF: No
Influenza Vaccine: 10/16/17
 
Surgical History
Surgical History: non-contributory
 
Past Family/Social History
 
Family History
Relations & Conditions if any
Relation not specified for:
  *No pertinent family history
 
 
Psychosocial History
Who Do You Live With? partner
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
Primary Language: English
ETOH Use: denies use
Illicit Drug Use: denies illicit drug use
 
Functional Ability
ADLs
Needs Assist: dressing, eating, toileting, bathing. 
Ambulation: wheelchair
IADLs
Independent: shopping, housework, finances, food prep, telephone, transportation
, medication admin. 
 
Review of Systems
 
Review of Systems
Constitutional:
Reports: see HPI.  Denies: chills, fever, malaise. 
EENTM:
Denies: blurred vision, nasal pain, throat pain. 
Cardiovascular:
Denies: chest pain, palpitations. 
Respiratory:
Reports: cough, short of breath, wheezing. 
GI:
Denies: abdominal pain, bloating, constipation, diarrhea, nausea, vomiting. 
Genitourinary:
Denies: hematuria. 
Musculoskeletal:
Denies: back pain, joint pain. 
Skin:
Denies: rash. 
 
Exam & Diagnostic Data
Last 24 Hrs of Vital Signs/I&O
 Vital Signs
 
 
Date Time Temp Pulse Resp B/P B/P Pulse O2 O2 Flow FiO2
 
     Mean Ox Delivery Rate 
 
01/12 2121  86  140/86     
 
01/12 2115       Nasal 4.0L 
 
       Cannula  
 
01/12 1841 98.8 94 18 142/84  95   
 
01/12 1830      97 Nasal 3.0L 
 
       Cannula  
 
01/12 1830      97 Nasal 3.0L 
 
       Cannula  
 
01/12 1731  82 20 164/97  93 Nasal 3.0L 
 
       Cannula  
 
01/12 1424 98.6 83 20 190/77  95 Nasal 2.0L 
 
       Cannula  
 
01/12 1403      98 Nasal 2.0L 
 
       Cannula  
 
01/12 1237      98 Nasal 2.0L 
 
       Cannula  
 
01/12 1222      99 Nasal  
 
       Cannula  
 
01/12 1222 97.4 88 24 144/87  99 Nasal 3.0L 
 
       Cannula  
 
 
 Intake & Output
 
 
 01/12 1600 01/12 0800 01/12 0000
 
Intake Total 0  
 
Output Total   
 
Balance 0  
 
    
 
Intake, Oral 0  
 
Patient 105.687 kg  
 
Weight   
 
Weight Reported by Patient  
 
Measurement   
 
Method   
 
 
 
 
Physical Exam
General Appearance Alert, Oriented X3, Cooperative, No Acute Distress
Skin No Rashes, No Breakdown, No Significant Lesion
Skin Temp/Moisture Exam: Warm/Dry
HEENT Atraumatic, PERRLA, EOMI, Mucous Membr. moist/pink
Neck Supple
Lymphatic no cervical lymphadenopathy 
Cardiovascular Regular Rate, Normal S1, Normal S2, No Murmurs
Lungs bilateral congested decrease air entery wide spread crackles 
Abdomen Normal Bowel Sounds, Soft, No Tenderness
Neurological Normal Speech, Cranial Nerves 3-12 NL, Reflexes 2+, paraplagia 
Extremities No Clubbing, No Cyanosis, No Edema, Normal Pulses
 
Assessment/Plan
Assessment:
Patient is 67 year old female with PMH significant for paroxysmal atrial 
fibrillation on telemetry class, COPD on 2 L home oxygen, paraplegia as a result
of spinal epidural abscess with chronic Castro catheter for neurogenic bladder, 
hypertension, hyperlipidemia, CAD s/p PCI 2004 and 2006, aortic valve 
endocarditis who presented to ED with chief complaint of shortness of breath.  
 
On admission
Vital signs temperature 97.4, pulse 88 regular, blood pressure 144/87 with 
saturation 99% on 3 L
Labs pertinent to leukocytosis 14.8 with left shift but no bands, H&H 13/41, 
sodium 141, potassium 5, chloride 87, bicarbonate 45, BUN/creatinine 17/0.6, 
glucose 123, proBNP 1770, chest x-ray didn't show any signs of consolidation 
however left retrocardiac area wasn't well-visualized
 
EKG Sinus ryth 
 
Problem list
#COPD
#Community-acquired pneumonia institution of shortness of breath, cough, 
leukocytosis
#Hypertension
#Hyperlipidemia
#Neurogenic Castro catheter with leak
 
Plan
-Please admit to general medical floor
-Solu-Medrol 40 every 8
-Start ceftriaxone and azithromycin
-Pulmonary consultation Juan Pedro MD was placed for a.m.
-[facility] and [facility]
-Urology consultation in a.m. for Castro catheter change
-Continue home medication
-Patient is on atomoxetine, should bring it from home in am 
-Patient is not volume overload, he is on Lasix 60 every 48 hours, I don't feel 
he is a CHF exacerbation despite the proBNP 1770, will do strict ins and outs
-Continue rifampin pain for osteomyelitis with MRSA
-We'll obtain swallow evaluation to rule out aspiration however patient is not 
willing to change his diet to thick liquids
 
DVT prophylaxis elequis
Code DNR/DNI
Diet heart healthy
Consulation pul and uro 
 
As Ranked By This Provider
Problem List:
 1. COPD
 
 
Core Measures/Misc (9/17)
 
Acute Coronary Syndrome
ACS Diagnosis: No
 
Congestive Heart Failure
Congestive Heart Failure Diagnosis No
 
Cerebrovascular Accident
CVA/TIA Diagnosis: No
 
VTE (View Protocol)
VTE Risk Factors Age>40
No Mechanical VTE Prophylaxis d/t N/A MechProphylax Ordered
No VTE Pharm Prophylaxis d/t NA PharmProphylax ordered
 
Sepsis (View protocol)
Sepsis Present: No
 
 
Christiano Mc MD 01/12/18 2114:
Attending MD Review Statement
 
Attending Statement
Attending MD Statement: examined this patient, discuss w/resident/PA/NP, agreed 
w/resident/PA/NP, reviewed EMR data (avail)
Attending Assessment/Plan:
Presentationc onsistent with COPD exacerbation in long time former smoker.  IV 
Solumedrol, Azithromycin, pulmonary consult, nebulizer treatments, continue home
medications, evaluation for possible thoracentesis

## 2018-01-12 NOTE — RADIOLOGY REPORT
EXAMINATION:
XR PORTABLE CHEST
 
CLINICAL INFORMATION:
Dyspnea, cough, rales left chest. Presumptive diagnosis: Left pneumonia.
 
COMPARISON:
Chest 10/23/2017.
 
TECHNIQUE:
Portable AP 70 degrees upright view of the chest was obtained.
 
FINDINGS:
The heart is normal in size. There is no congestion or edema. There is no
definite focal consolidation although the left retrocardiac region is not
well evaluated. Moderate degenerative changes of the left shoulder again
noted.
 
IMPRESSION:
No definite evidence of pneumonia although the left retrocardiac region is
not well evaluated. Consider follow-up examination if clinically indicated.

## 2018-01-13 VITALS — SYSTOLIC BLOOD PRESSURE: 130 MMHG | DIASTOLIC BLOOD PRESSURE: 70 MMHG

## 2018-01-13 VITALS — SYSTOLIC BLOOD PRESSURE: 150 MMHG | DIASTOLIC BLOOD PRESSURE: 70 MMHG

## 2018-01-13 LAB
ABSOLUTE GRANULOCYTE CT: 11.1 /CUMM (ref 1.4–6.5)
BASOPHILS # BLD: 0.1 /CUMM (ref 0–0.2)
BASOPHILS NFR BLD: 0.4 % (ref 0–2)
EOSINOPHIL # BLD: 0 /CUMM (ref 0–0.7)
EOSINOPHIL NFR BLD: 0.3 % (ref 0–5)
ERYTHROCYTE [DISTWIDTH] IN BLOOD BY AUTOMATED COUNT: 14.8 % (ref 11.5–14.5)
GRANULOCYTES NFR BLD: 75 % (ref 42.2–75.2)
HCT VFR BLD CALC: 35 % (ref 42–52)
LYMPHOCYTES # BLD: 2.3 /CUMM (ref 1.2–3.4)
MCH RBC QN AUTO: 29.4 PG (ref 27–31)
MCHC RBC AUTO-ENTMCNC: 31.7 G/DL (ref 33–37)
MCV RBC AUTO: 92.8 FL (ref 80–94)
MONOCYTES # BLD: 1.3 /CUMM (ref 0.1–0.6)
PLATELET # BLD: 257 /CUMM (ref 130–400)
PMV BLD AUTO: 8.3 FL (ref 7.4–10.4)
RED BLOOD CELL CT: 3.78 /CUMM (ref 4.7–6.1)
WBC # BLD AUTO: 14.8 /CUMM (ref 4.8–10.8)

## 2018-01-13 NOTE — CONS- PULMONARY
General Information and HPI
 
Consulting Request
Date of Consult: 01/13/18
Requested By:
Med team
History of Present Illness:
Patient is 67 year old female with PMH significant for paroxysmal atrial 
fibrillation, COPD on 2 L home oxygen, paraplegia as a result of spinal epidural
abscess with chronic Marquez catheter for neurogenic bladder, hypertension, 
hyperlipidemia, CAD s/p PCI 2004 and 2006, h/o aortic valve endocarditis who 
presented to ED with chief complaint of shortness of breath.  Patient reported 
that last week he started to have shortness of breath that didn't improve with 
home
nrbolizers, associated with cough of green sputum, denied fever, chills, nasal 
congestion, ear pain, headache, dizziness, sick contact.  Patient denied chest 
pain, palpitation.
 
Patient had Marquez catheter changed last week however started to leak yesterday, 
ER nurse was unable to change it because of pineal retraction.  Patient denied 
any abnormal dysuria, has chronic dysuria, change in color of urine
 
He does have bakari and uses cpap, however he has had issues with his machine 
lately
 
Last night pt says he was in deep sleep and was woken up as he was thought to 
have sig apnea
 
Has a chronic indwelling marquez
 
Review of Systems
Constitutional:
Reports: see HPI.  Denies: chills, fever, malaise. 
EENTM:
Denies: blurred vision, nasal pain, throat pain. 
Cardiovascular:
Denies: chest pain, palpitations. 
Respiratory:
Reports: cough, short of breath, wheezing. 
GI:
Denies: abdominal pain, bloating, constipation, diarrhea, nausea, vomiting. 
Genitourinary:
Denies: hematuria. 
Musculoskeletal:
Denies: back pain, joint pain. 
Skin:
Denies: rash. 
 
 
Allergies/Medications
Allergies:
Coded Allergies:
heparin (Intermediate, SWELLING, RASH 10/16/17)
vancomycin (Intermediate, HIVES, SKIN CRACKES, TURNS RED, SWELLS AND PEELS 10/16
/17)
warfarin (From COUMADIN) (Intermediate, RASH 10/16/17)
 
Home Med List:
Acetaminophen (Tylenol Arthritis) 650 MG TABLET.ER   1 TAB PO Q6-PRN PRN PAIN  (
Reported)
Albuterol Sulfate (Ventolin Hfa) 90 MCG HFA.AER.AD   2 PUF INH AD PRN COPD  (
Reported)
Apixaban (Eliquis) 5 MG TABLET   5 MG PO BID atrial fibrillation
Ascorbate Calcium (Vitamin C) 500 MG TABLET   1 TAB PO BID SUPPLEMENT  (Reported
)
Atomoxetine HCl (Strattera) 40 MG CAPSULE   1 CAP PO QAM MENTAL HEALTH  (
Reported)
Baclofen 20 MG TABLET   1 TAB PO BID MUSCLE RELAXER  (Reported)
Bisacodyl (Dulcolax) 10 MG SUPP.RECT   1 SUP RC Q48 GI  (Reported)
Budesonide/Formoterol Fumarate (Symbicort 80-4.5 Mcg Inhaler) 80 MCG-4.5 MCG/
ACTUATION HFA.AER.AD   2 PUF INH BID COPD  (Reported)
Docusate Sodium (Stool Softener) 100 MG CAPSULE   1 CAP PO DAILY STOOL SOFTENER 
(Reported)
Doxycycline Hyclate 100 MG CAPSULE   1 CAP PO BID infection  (Reported)
Evolocumab (Repatha Sureclick) 140 MG/ML PEN.INJCTR   1 ML SC Q 2 WEEKS 
CHOLESTEROL  (Reported)
Furosemide 20 MG TABLET   3 TAB PO Q48 DIURETIC  (Reported)
Gabapentin 600 MG TABLET   1 TAB PO TID NEUROPATHY  (Reported)
Guaifenesin (Mucinex) 600 MG TAB.ER.12H   2 TAB PO BID MUCUS  (Reported)
Lactobacillus Acidophilus (Acidophilus) 1 EACH CAPSULE   1 CAP PO BID PROBIOTIC 
(Reported)
Lorazepam 1 MG TABLET   1 TAB PO BID ANXIETY  (Reported)
Magnesium Oxide (Magnesium) 400 MG CAPSULE   1 CAP PO 0600 SUPPLEMENT  (Reported
)
Metoprolol Succ XL (Toprol XL) 25 MG TAB   1 TAB PO DAILY HEART  (Reported)
Montelukast Sodium (Singulair) 10 MG TABLET   1 TAB PO DAILY ALLERGIES  (
Reported)
Multivitamin (Daily Value) 1 EACH TABLET   1 TAB PO DAILY VITAMIN SUPPORT  (
Reported)
Nortriptyline HCl 10 MG CAPSULE   1 CAP PO DAILY UNKNOWN  (Reported)
Omeprazole 20 MG CAPSULE.DR   1 CAP PO DAILY ACID REFLUX  (Reported)
Oxycodone HCl/Acetaminophen (Oxycodone-Acetaminophen 5-325) 5 MG-325 MG TABLET  
1 TAB PO Q4H PRN PAIN  (Reported)
Potassium Chloride 20 MEQ TAB.ER.PRT   1 TAB PO DAILY SUPPLEMENT  (Reported)
Rifampin (Rifadin) 300 MG CAPSULE   300 MG PO BID MRSA  (Reported)
Rosuvastatin Calcium (Crestor) 5 MG TABLET   1 TAB PO DAILY CHOLESTEROL  (
Reported)
Sotalol (Betapace) 80 MG TABLET   80 MG PO BID AARYTHMIAS
Tiotropium Bromide (Spiriva) 18 MCG CAP.W.DEV   1 CAP INH DAILY BREATHING 
PROBLEMS  (Reported)
Trazodone HCl 50 MG TABLET   1 TAB PO QPM SLEEP  (Reported)
 
 
Review of Systems
 
Review of Systems
Constitutional:
Reports: see HPI. 
 
Past History
 
Travel History
Traveled to [place] past 21 day No
 
Medical History
Blood Transfusion Hx: Yes
Neurological: C6-7 ABSCESS PARAPLEGIA
EENT: NONE
Cardiovascular: AFIB, hypertension, hyperlipidemia, myocardial infarction
Respiratory: asthma, COPD, OXYGEN DEPEND 2L
Gastrointestinal: NONE
Hepatic: NONE
Renal: neurogenic bladder
Musculoskeletal: PARAPLEGIA R/T ABSCESS
Psychiatric: NONE
Endocrine: NONE
Blood Disorders: NONE
Cancer(s): NONE
GYN/Reproductive: NONE
 
Surgical History
Surgical History: non-contributory
 
Family History
Relations & Conditions If Any:
Relation not specified for:
  *No pertinent family history
 
 
Psychosocial History
Where Do You Live? Home
Who Do You Live With? partner
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
Primary Language: English
Smoking Status: Never Smoked
ETOH Use: denies use
Illicit Drug Use: denies illicit drug use
 
Functional Ability
ADLs
Needs Assist: dressing, eating, toileting, bathing. 
Ambulation: wheelchair
IADLs
Independent: shopping, housework, finances, food prep, telephone, transportation
, medication admin. 
 
Exam & Diagnostic Data
Last 24 Hrs of Vital Signs/I&O
 Vital Signs
 
 
Date Time Temp Pulse Resp B/P B/P Pulse O2 O2 Flow FiO2
 
     Mean Ox Delivery Rate 
 
01/13 1127      96 Nasal 2.0L 
 
       Cannula  
 
01/13 0805      99 Nasal 4.0L 
 
       Cannula  
 
01/13 0658 98.2 74 20 130/70  99 BIPAP  
 
01/13 0445  82    93   
 
01/13 0030  69    92   
 
01/13 0000       BIPAP  
 
01/12 2250 98.1 72 20 140/86  93 BIPAP  
 
01/12 2121  86  140/86     
 
01/12 2115       Nasal 4.0L 
 
       Cannula  
 
01/12 1841 98.8 94 18 142/84  95   
 
01/12 1830      97 Nasal 3.0L 
 
       Cannula  
 
01/12 1830      97 Nasal 3.0L 
 
       Cannula  
 
01/12 1731  82 20 164/97  93 Nasal 3.0L 
 
       Cannula  
 
01/12 1424 98.6 83 20 190/77  95 Nasal 2.0L 
 
       Cannula  
 
01/12 1403      98 Nasal 2.0L 
 
       Cannula  
 
 
 Intake & Output
 
 
 01/13 1600 01/13 0800 01/13 0000
 
Intake Total  1120 370
 
Output Total  800 200
 
Balance  320 170
 
    
 
Intake, IV  1000 250
 
Intake, Oral  120 120
 
Output, Urine  800 200
 
Patient   222 lb
 
Weight   
 
Weight   Bed scale
 
Measurement   
 
Method   
 
 
 
Last 48 Hrs of Labs/Stalin:
 Laboratory Tests
 
01/13/18 0752:
Anion Gap 7, Estimated GFR > 60, BUN/Creatinine Ratio 40.0  H, CBC w Diff NO MAN
DIFF REQ, RBC 3.78  L, MCV 92.8, MCH 29.4, RDW 14.8  H, MPV 8.3, Gran % 75.0, 
Lymphocytes % 15.7  L, Monocytes % 8.6, Eosinophils % 0.3, Basophils % 0.4, 
Absolute Granulocytes 11.1  H, Absolute Lymphocytes 2.3, Absolute Monocytes 1.3 
H, Absolute Eosinophils 0, Absolute Basophils 0.1, PUBS MCHC 31.7  L
 
01/13/18 0450:
pH 7.44, pCO2 57  H, pO2 67  L, HCO3 38  H, ABG O2 Sat (Measured) 94.0  L, P-50 
(Temp Corrected) Y, Carboxyhemoglobin 0  L, O2 Concentration % 35%, Temperature 
98.1, Respiration Rate 26, O2 Delivery Method VISION-FFM, Vent Mode ST, 
Expiratory Pressure 6, Inspiratory Pressure 24, Phlebotomy Draw Site RIGHT 
RADIAL
 
01/13/18 0230:
Urine Opiates Screen < 100.00, Methadone Screen < 40, Barbiturate Screen < 60, 
Ur Phencyclidine Scrn < 6.00, Amphetamines Screen < 100, U Benzodiazepines Scrn 
< 85, Urine Cocaine Screen < 50, Urine Cannabis Screen < 5.00, Urinalysis MOD  H
, Urine Color YEL, Urine Clarity TURBD  H, Urine pH 7.0, Ur Specific Gravity 
1.025, Urine Protein TRACE  H, Urine Ketones 15  H, Urine Nitrite POS  H, Urine 
Bilirubin NEG, Urine Urobilinogen 0.2, Ur Leukocyte Esterase LARGE  H, Ur 
Microscopic SEDIMENT EXAMINED, Urine RBC 15-25  H, Urine WBC > 75  H, Urine 
Bacteria MOD  H, Urine Mucus FEW, Urine Hemoglobin MOD  H, Urine Glucose NEG
 
01/13/18 0035:
pH 7.43, pCO2 65 *H, pO2 63  L, HCO3 42  H, ABG O2 Sat (Measured) 92.0  L, P-50 
(Temp Corrected) Y, Carboxyhemoglobin 0.7  L, O2 Concentration % 35%, 
Temperature 98.1, Respiration Rate 26, O2 Delivery Method VISION--FFM, Vent Mode
ST, Expiratory Pressure 6, Inspiratory Pressure 22, Phlebotomy Draw Site RIGHT 
RADIAL
 
01/12/18 2325:
Anion Gap 8, Estimated GFR > 60, BUN/Creatinine Ratio 34.0  H, Magnesium 1.9
 
01/12/18 1231:
Anion Gap 9, Estimated GFR > 60, BUN/Creatinine Ratio 34.0  H, Glucose 123  H, 
Calcium 9.5, Magnesium 2.1, Troponin I < 0.01, Pro-B-Natriuretic Pept 1770  H, 
CBC w Diff NO MAN DIFF REQ, RBC 4.47  L, MCV 91.7, MCH 29.1, RDW 14.3, MPV 7.9, 
Gran % 76.3  H, Lymphocytes % 15.6  L, Monocytes % 6.1, Eosinophils % 1.4, 
Basophils % 0.6, Absolute Granulocytes 11.3  H, Absolute Lymphocytes 2.3, 
Absolute Monocytes 0.9  H, Absolute Eosinophils 0.2, Absolute Basophils 0.1, 
PUBS MCHC 31.7  L
 Microbiology
01/12 1700  NASOPHARYN: Influenza Virus A & B Rapid Smear - COMP
 
 
 
Assessment/Plan
Impression/Plan:
CXr reviewed
IMPRESSION:
No definite evidence of pneumonia although the left retrocardiac region is
not well evaluated. Consider follow-up examination if clinically indicated.
 
 
 
 
Physical Exam
General Appearance Alert, Oriented X3, Cooperative, No Acute Distress
Skin No Rashes, No Breakdown, No Significant Lesion
Skin Temp/Moisture Exam: Warm/Dry
HEENT Atraumatic, PERRLA, EOMI, Mucous Membr. moist/pink
Neck Supple
Lymphatic no cervical lymphadenopathy 
Cardiovascular Regular Rate, Normal S1, Normal S2, No Murmurs
Lungs bilateral congested decrease air entery wide spread crackles 
Abdomen Normal Bowel Sounds, Soft, No Tenderness
Neurological Normal Speech, Cranial Nerves 3-12 NL, Reflexes 2+, paraplagia 
Extremities No Clubbing, No Cyanosis, No Edema, Normal Pulses
 
IMRESSION
atient is 67 year old female with PMH significant for paroxysmal atrial 
fibrillation, COPD on 2 L home oxygen, BAKARI on cpap, paraplegia as a result of 
spinal epidural abscess with chronic Marquez catheter for neurogenic bladder, 
hypertension, hyperlipidemia, CAD s/p PCI 2004 and 2006, h/o aortic valve 
endocarditis, with 
 
* Acute exacerbation of COPD with acute bronchitis.
* Chronic hypercarbic respiratory failure.
* History of uncontrolled obstructive sleep apnea
* Atrial fibrillation - on Eliquis.
* Hypertension / History of CAD s/p stent placement. 
* Neurogenic bladder.
* Chronic muscle spasms.
* Paraplegia secondary, on chronic rifampin and doxycycline for suppressive 
therapy.
* GERD, on omeprazole.
 
REC
Cont steroids and abx
REduce steroidst o 60 mg / day
Cont all supp abx
Watch blood work
Cont apixaban
Cpap at hs ( if home settings are not available use cpap of 8)
 
 
 
Consult Acknowledgment
- Thank you for your consult request.

## 2018-01-13 NOTE — PN- ATT ADDEND
Attending Addendum
Attending Brief Note
67M PMH  paroxysmal atrial fibrillation on Eliquis, COPD on 2 L home oxygen, 
paraplegia as a result of spinal epidural abscess with chronic Castro catheter 
for neurogenic bladder, hypertension, hyperlipidemia, CAD s/p PCI 2004 and 2006 
presenting with 4 days of worsening shortness of breath and dyspnea with minimal
exertion.  Mild cough without sputum.  No sick contacts.  Requiring increased 
oxygen, now at 2L. CXR inconclusive.
 
Wheezing on exam but slightly improved from yesterday.  Became somnolent and 
unrousable overnight, given Narcan with no improvement, ABG done, awoke after 
prompting.
 
AFVSS
NAD, comfortable, full sentences
NCAT
Supple
RRR
Bilateral wheezing
Soft, NTND
No c/c/e
A&Ox3 no focal deficits
 
 Current Medications
 
 
  Sig/[place] Start time  Last
 
Medication Dose Route Stop Time Status Admin
 
Acetaminophen 650 MG .STK-MED ONE 01/13 0926 DC 
 
  PO 01/13 0927  
 
Acetaminophen 650 MG Q6P PRN 01/12 1800 AC 01/13
 
  PO   0932
 
Albuterol Sulfate 3 ML EVERY 4 HRS/AWAKE 01/13 0800 AC 01/13
 
  INH   1600
 
Albuterol Sulfate 2 PUF Q4P PRN 01/12 1815 AC 
 
  INH   
 
Apixaban 5 MG BID 01/12 2200 AC 01/13
 
  PO   0933
 
Atorvastatin Calcium 5 MG DAILY 01/13 1000 AC 01/13
 
  PO   0932
 
Azithromycin 500 MG DAILY@1900 01/12 1900 AC 01/13
 
Sodium Chloride 250 ML IV   1822
 
Baclofen 20 MG BID 01/12 2200 DC 01/12
 
  PO   2120
 
Bisacodyl 10 MG ONCE ONE 01/13 1345 DC 01/13
 
  VA 01/13 1346  1437
 
Budesonide/ 2 PUF BID 01/12 2200 AC 01/13
 
Formoterol Fumarate  INH   0932
 
Ceftriaxone Sodium 1,000 MG DAILY@1900 01/12 1900 AC 01/13
 
  IV   1823
 
Docusate Sodium 100 MG DAILY 01/13 1000 AC 01/13
 
  PO   0933
 
Furosemide 60 MG Q48 01/14 1000 AC 
 
  PO   
 
Gabapentin 600 MG Q8 01/12 2200 DC 01/12
 
  PO   2120
 
Guaifenesin 1,200 MG BID 01/12 2200 AC 01/13
 
  PO   0933
 
Methylprednisolone 60 MG DAILY 01/14 1000 AC 
 
  IV   
 
Methylprednisolone 40 MG Q8 01/12 2200 DC 01/13
 
  IV   1436
 
Metoprolol Succinate 25 MG DAILY 01/12 1922 AC 01/13
 
  PO   0933
 
Naloxone HCl 0.4 MG ONCE ONE 01/13 0300 DC 01/13
 
  IV 01/13 0301  0312
 
Nortriptyline HCl 10 MG DAILY 01/12 1921 AC 01/13
 
  PO   1437
 
Omeprazole 20 MG DAILY 01/12 1922 AC 01/13
 
  PO   0933
 
Oxycodone/ 1 TAB ONCE ONE 01/13 1345 DC 
 
Acetaminophen  PO 01/13 1346  
 
Oxycodone/ 1 TAB ONCE ONE 01/13 1000 DC 01/13
 
Acetaminophen  PO 01/13 1001  1016
 
Oxycodone/ 1 TAB Q4H PRN 01/12 2200 DC 
 
Acetaminophen  PO   
 
Potassium Chloride 40 MEQ ONCE ONE 01/13 1745 DC 01/13
 
  PO 01/13 1746  1822
 
Rifampin 300 MG BID 01/12 2200 AC 01/13
 
  PO   0933
 
Sodium Chloride 1,000 ML BOLUS ONE 01/13 0230 DC 01/13
 
  IV 01/13 0429  0238
 
Sotalol HCl 80 MG BID 01/12 2200 AC 01/13
 
  PO   0933
 
Tiotropium [place] 1 PUF DAILY 01/12 1924 AC 01/13
 
  INH   0932
 
 
 Laboratory Tests
 
 
 01/13 01/13
 
 0752 0450
 
Blood Gas  
 
  pH (7.35 - 7.45 PH)  7.44
 
  pCO2 (35 - 45 TORR)  57  H
 
  pO2 (80 - 100 TORR)  67  L
 
  HCO3 (21 - 28 MEQ/L)  38  H
 
  ABG O2 Sat (Measured) (>96.0 %)  94.0  L
 
  P-50 (Temp Corrected)  Y
 
  Carboxyhemoglobin (1.5 - 5.0 %)  0  L
 
  O2 Concentration %  35%
 
  Temperature (97.0 - 100.0 FARH)  98.1
 
  Respiration Rate (BPM)  26
 
  O2 Delivery Method  VISION-FFM
 
  Vent Mode  ST
 
  Expiratory Pressure (CM H2O P)  6
 
  Inspiratory Pressure (CM H2O P)  24
 
Chemistry  
 
  Sodium (137 - 145 mmol/L) 142 
 
  Potassium (3.5 - 5.1 mmol/L) 3.5 
 
  Chloride (98 - 107 mmol/L) 102 
 
  Carbon Dioxide (22 - 30 mmol/L) 33  H 
 
  Anion Gap (5 - 16) 7 
 
  BUN (9 - 20 mg/dL) 16 
 
  Creatinine (0.7 - 1.2 mg/dL) 0.4  L 
 
  Estimated GFR (>60 ml/min) > 60 
 
  BUN/Creatinine Ratio (7 - 25 %) 40.0  H 
 
Hematology  
 
  CBC w Diff NO MAN DIFF REQ 
 
  WBC (4.8 - 10.8 /CUMM) 14.8  H 
 
  RBC (4.70 - 6.10 /CUMM) 3.78  L 
 
  Hgb (14.0 - 18.0 G/DL) 11.1  L 
 
  Hct (42 - 52 %) 35.0  L 
 
  MCV (80.0 - 94.0 FL) 92.8 
 
  MCH (27.0 - 31.0 PG) 29.4 
 
  RDW (11.5 - 14.5 %) 14.8  H 
 
  Plt Count (130 - 400 /CUMM) 257 
 
  MPV (7.4 - 10.4 FL) 8.3 
 
  Gran % (42.2 - 75.2 %) 75.0 
 
  Lymphocytes % (20.5 - 51.1 %) 15.7  L 
 
  Monocytes % (1.7 - 9.3 %) 8.6 
 
  Eosinophils % (0 - 5 %) 0.3 
 
  Basophils % (0.0 - 2.0 %) 0.4 
 
  Absolute Granulocytes (1.4 - 6.5 /CUMM) 11.1  H 
 
  Absolute Lymphocytes (1.2 - 3.4 /CUMM) 2.3 
 
  Absolute Monocytes (0.10 - 0.60 /CUMM) 1.3  H 
 
  Absolute Eosinophils (0.0 - 0.7 /CUMM) 0 
 
  Absolute Basophils (0.0 - 0.2 /CUMM) 0.1 
 
  PUBS MCHC (33.0 - 37.0 G/DL) 31.7  L 
 
Miscellaneous  
 
  Phlebotomy Draw Site  RIGHT RADIAL
 
 
 
 
 01/13 01/13
 
 0230 0035
 
Blood Gas  
 
  pH (7.35 - 7.45 PH)  7.43
 
  pCO2 (35 - 45 TORR)  65 *H
 
  pO2 (80 - 100 TORR)  63  L
 
  HCO3 (21 - 28 MEQ/L)  42  H
 
  ABG O2 Sat (Measured) (>96.0 %)  92.0  L
 
  P-50 (Temp Corrected)  Y
 
  Carboxyhemoglobin (1.5 - 5.0 %)  0.7  L
 
  O2 Concentration %  35%
 
  Temperature (97.0 - 100.0 FARH)  98.1
 
  Respiration Rate (BPM)  26
 
  O2 Delivery Method  VISION--FFM
 
  Vent Mode  ST
 
  Expiratory Pressure (CM H2O P)  6
 
  Inspiratory Pressure (CM H2O P)  22
 
Miscellaneous  
 
  Phlebotomy Draw Site  RIGHT RADIAL
 
Toxicology  
 
  Urine Opiates Screen (>2000 NG/ML) < 100.00 
 
  Methadone Screen (>300 NG/ML) < 40 
 
  Barbiturate Screen (>200 NG/ML) < 60 
 
  Ur Phencyclidine Scrn (>25 NG/ML) < 6.00 
 
  Amphetamines Screen (>1000 NG/ML) < 100 
 
  U Benzodiazepines Scrn (>200 NG/ML) < 85 
 
  Urine Cocaine Screen (>300 NG/ML) < 50 
 
  Urine Cannabis Screen (>50 NG/ML) < 5.00 
 
Urines  
 
  Urinalysis MOD  H 
 
  Urine Color (YEL,AMB,STR) YEL 
 
  Urine Clarity (CLEAR) TURBD  H 
 
  Urine pH (5.0 - 8.0) 7.0 
 
  Ur Specific Gravity (1.001 - 1.035) 1.025 
 
  Urine Protein (NEG,<30 MG/DL) TRACE  H 
 
  Urine Ketones (NEG) 15  H 
 
  Urine Nitrite (NEG) POS  H 
 
  Urine Bilirubin (NEG) NEG 
 
  Urine Urobilinogen (0.1  -  1.0 EU/dl) 0.2 
 
  Ur Leukocyte Esterase (NEG) LARGE  H 
 
  Ur Microscopic SEDIMENT EXAMINED 
 
  Urine RBC (0 - 5 /HPF) 15-25  H 
 
  Urine WBC (0 - 2 /HPF) > 75  H 
 
  Urine Bacteria (NEG/NONE) MOD  H 
 
  Urine Mucus (FEW,NONE) FEW 
 
  Urine Hemoglobin (NEG) MOD  H 
 
  Urine Glucose (N MG/DL) NEG 
 
 
 
 
 01/12
 
 2325
 
Chemistry 
 
  Sodium (137 - 145 mmol/L) 141
 
  Potassium (3.5 - 5.1 mmol/L) 4.4
 
  Chloride (98 - 107 mmol/L) 92  L
 
  Carbon Dioxide (22 - 30 mmol/L) 41  H
 
  Anion Gap (5 - 16) 8
 
  BUN (9 - 20 mg/dL) 17
 
  Creatinine (0.7 - 1.2 mg/dL) 0.5  L
 
  Estimated GFR (>60 ml/min) > 60
 
  BUN/Creatinine Ratio (7 - 25 %) 34.0  H
 
  Magnesium (1.6 - 2.3 mg/dL) 1.9
 
 
 Vital Signs
 
 
Date Time Temp Pulse Resp B/P B/P Pulse O2 O2 Flow FiO2
 
     Mean Ox Delivery Rate 
 
01/13 1600      95 Nasal 2.0L 
 
       Cannula  
 
01/13 1127      96 Nasal 2.0L 
 
       Cannula  
 
01/13 0805      99 Nasal 4.0L 
 
       Cannula  
 
01/13 0800      95 Nasal 2.0L 
 
       Cannula  
 
01/13 0658 98.2 74 20 130/70  99 BIPAP  
 
01/13 0445  82    93   
 
01/13 0030  69    92   
 
01/13 0000       BIPAP  
 
01/12 2250 98.1 72 20 140/86  93 BIPAP  
 
01/12 2121  86  140/86     
 
01/12 2115       Nasal 4.0L 
 
       Cannula  
 
 
 Intake & Output
 
 
 01/13 1600 01/13 0800 01/13 0000
 
Intake Total 750 1120 370
 
Output Total 351 800 200
 
Balance 399 320 170
 
    
 
Intake, IV  1000 250
 
Intake, Oral 750 120 120
 
Output, Stool 1  
 
Output, Urine 350 800 200
 
Patient   100.471 kg
 
Weight   
 
Weight   Bed scale
 
Measurement   
 
Method   
 
 
 
 
1. COPD Exacerbation
2. Acute hypoxemic respiratory failure
3. Somnolence
 
Plan
- Continue on general medicine
- Continue Solumedrol, taper tomorrow
- Continue Ceftriaxone and Azithromycin
- Nebulizer treatments
- Sputum culture
- Pulmonary consult
- Continue to hold Baclofen and Gabapentin due to somnolence
- Continue home medications
- DVT PPx

## 2018-01-14 VITALS — DIASTOLIC BLOOD PRESSURE: 88 MMHG | SYSTOLIC BLOOD PRESSURE: 152 MMHG

## 2018-01-14 VITALS — DIASTOLIC BLOOD PRESSURE: 84 MMHG | SYSTOLIC BLOOD PRESSURE: 152 MMHG

## 2018-01-14 VITALS — SYSTOLIC BLOOD PRESSURE: 156 MMHG | DIASTOLIC BLOOD PRESSURE: 78 MMHG

## 2018-01-14 LAB
ABSOLUTE GRANULOCYTE CT: 10.1 /CUMM (ref 1.4–6.5)
BASOPHILS # BLD: 0 /CUMM (ref 0–0.2)
BASOPHILS NFR BLD: 0.2 % (ref 0–2)
EOSINOPHIL # BLD: 0.4 /CUMM (ref 0–0.7)
EOSINOPHIL NFR BLD: 2.7 % (ref 0–5)
ERYTHROCYTE [DISTWIDTH] IN BLOOD BY AUTOMATED COUNT: 14.2 % (ref 11.5–14.5)
GRANULOCYTES NFR BLD: 71.9 % (ref 42.2–75.2)
HCT VFR BLD CALC: 36.6 % (ref 42–52)
LYMPHOCYTES # BLD: 2.5 /CUMM (ref 1.2–3.4)
MCH RBC QN AUTO: 29.4 PG (ref 27–31)
MCHC RBC AUTO-ENTMCNC: 32.4 G/DL (ref 33–37)
MCV RBC AUTO: 90.7 FL (ref 80–94)
MONOCYTES # BLD: 1.1 /CUMM (ref 0.1–0.6)
PLATELET # BLD: 281 /CUMM (ref 130–400)
PMV BLD AUTO: 8.4 FL (ref 7.4–10.4)
RED BLOOD CELL CT: 4.03 /CUMM (ref 4.7–6.1)
WBC # BLD AUTO: 14.1 /CUMM (ref 4.8–10.8)

## 2018-01-14 NOTE — PN- ATT ADDEND
**See Addendum**
Attending Addendum
Attending Brief Note
67M PMH  paroxysmal atrial fibrillation on Eliquis, COPD on 2 L home oxygen, 
paraplegia as a result of spinal epidural abscess with chronic Castro catheter 
for neurogenic bladder, hypertension, hyperlipidemia, CAD s/p PCI 2004 and 2006 
presenting with 4 days of worsening shortness of breath and dyspnea with minimal
exertion.  Mild cough without sputum.  No sick contacts.  Requiring increased 
oxygen, now at 2L. CXR inconclusive.
 
Wheezing on exam but slightly improved from yesterday.  
 
AFVSS
NAD, comfortable, full sentences
NCAT
Supple
RRR
Bilateral wheezing
Soft, NTND
No c/c/e
A&Ox3 no focal deficits
 
 Current Medications
 
 
  Sig/[place] Start time  Last
 
Medication Dose Route Stop Time Status Admin
 
Acetaminophen 650 MG Q6P PRN 01/12 1800 AC 01/13
 
  PO   0932
 
Albuterol Sulfate 3 ML EVERY 4 HRS/AWAKE 01/13 0800 AC 01/14
 
  INH   1345
 
Albuterol Sulfate 2 PUF Q4P PRN 01/12 1815 AC 
 
  INH   
 
Apixaban 5 MG BID 01/12 2200 AC 01/14
 
  PO   0948
 
Atorvastatin Calcium 5 MG DAILY 01/13 1000 AC 01/14
 
  PO   0948
 
Azithromycin 500 MG DAILY@2100 01/13 2100 AC 
 
  PO   
 
Azithromycin 500 MG DAILY@1900 01/12 1900 DC 01/13
 
Sodium Chloride 250 ML IV   1822
 
Budesonide/ 2 PUF BID 01/12 2200 AC 01/14
 
Formoterol Fumarate  INH   0947
 
Ceftriaxone Sodium 1,000 MG DAILY@1900 01/12 1900 AC 01/13
 
  IV   1823
 
Docusate Sodium 100 MG DAILY 01/13 1000 AC 01/14
 
  PO   0948
 
Furosemide 60 MG Q48 01/14 1000 AC 01/14
 
  PO   0948
 
Guaifenesin 1,200 MG BID 01/12 2200 AC 01/14
 
  PO   0948
 
Melatonin 10 MG ONCE ONE 01/13 2230 DC 01/13
 
  PO 01/13 2231  2228
 
Methylprednisolone 60 MG DAILY 01/14 1000 AC 01/14
 
  IV   0958
 
Methylprednisolone 40 MG Q8 01/12 2200 DC 01/13
 
  IV   1436
 
Metoprolol Succinate 25 MG DAILY 01/12 1922 AC 01/14
 
  PO   0949
 
Nortriptyline HCl 10 MG DAILY 01/12 1921 AC 01/14
 
  PO   0948
 
Omeprazole 20 MG DAILY 01/12 1922 AC 01/14
 
  PO   0948
 
Potassium Chloride 40 MEQ ONCE ONE 01/13 1745 DC 01/13
 
  PO 01/13 1746  1822
 
Rifampin 300 MG BID 01/12 2200 AC 01/14
 
  PO   0949
 
Sotalol HCl 80 MG BID 01/12 2200 AC 01/14
 
  PO   0948
 
Tiotropium [place] 1 PUF DAILY 01/12 1924 AC 01/14
 
  INH   0947
 
 
 Laboratory Tests
 
 
 01/14
 
 0815
 
Chemistry 
 
  Sodium (137 - 145 mmol/L) 139
 
  Potassium (3.5 - 5.1 mmol/L) 4.4
 
  Chloride (98 - 107 mmol/L) 93  L
 
  Carbon Dioxide (22 - 30 mmol/L) 36  H
 
  Anion Gap (5 - 16) 9
 
  BUN (9 - 20 mg/dL) 15
 
  Creatinine (0.7 - 1.2 mg/dL) 0.4  L
 
  Estimated GFR (>60 ml/min) > 60
 
  BUN/Creatinine Ratio (7 - 25 %) 37.5  H
 
Hematology 
 
  CBC w Diff NO MAN DIFF REQ
 
  WBC (4.8 - 10.8 /CUMM) 14.1  H
 
  RBC (4.70 - 6.10 /CUMM) 4.03  L
 
  Hgb (14.0 - 18.0 G/DL) 11.9  L
 
  Hct (42 - 52 %) 36.6  L
 
  MCV (80.0 - 94.0 FL) 90.7
 
  MCH (27.0 - 31.0 PG) 29.4
 
  RDW (11.5 - 14.5 %) 14.2
 
  Plt Count (130 - 400 /CUMM) 281
 
  MPV (7.4 - 10.4 FL) 8.4
 
  Gran % (42.2 - 75.2 %) 71.9
 
  Lymphocytes % (20.5 - 51.1 %) 17.5  L
 
  Monocytes % (1.7 - 9.3 %) 7.7
 
  Eosinophils % (0 - 5 %) 2.7
 
  Basophils % (0.0 - 2.0 %) 0.2
 
  Absolute Granulocytes (1.4 - 6.5 /CUMM) 10.1  H
 
  Absolute Lymphocytes (1.2 - 3.4 /CUMM) 2.5
 
  Absolute Monocytes (0.10 - 0.60 /CUMM) 1.1  H
 
  Absolute Eosinophils (0.0 - 0.7 /CUMM) 0.4
 
  Absolute Basophils (0.0 - 0.2 /CUMM) 0
 
  PUBS MCHC (33.0 - 37.0 G/DL) 32.4  L
 
 
 Vital Signs
 
 
Date Time Temp Pulse Resp B/P B/P Pulse O2 O2 Flow FiO2
 
     Mean Ox Delivery Rate 
 
01/14 1429 98.2 84 22 152/88  95 Nasal 2.0L 
 
       Cannula  
 
01/14 0949  84  140/82     
 
01/14 0816      92 Nasal 2.0L 
 
       Cannula  
 
01/14 0800       Nasal 2.0L 
 
       Cannula  
 
01/14 0707 97.6 70 20 152/84  90 Nasal 2.0L 
 
       Cannula  
 
01/14 0016  77    97   
 
01/14 0000       BIPAP  
 
01/13 2235  72    97   
 
01/13 2233 98.0 74 20 150/70  95 Nasal 2.0L 
 
       Cannula  
 
 
 Intake & Output
 
 
 01/14 1600 01/14 0800 01/14 0000
 
Intake Total   600
 
Output Total 3850 300 550
 
Balance -3850 -300 50
 
    
 
Intake, Oral   600
 
Output, Urine 3850 300 550
 
 
 
 
1. COPD Exacerbation
2. Acute hypoxemic respiratory failure
3. Somnolence
 
Plan
- Continue on general medicine
- Continue Solumedrol, taper tomorrow
- Continue Ceftriaxone and Azithromycin
- Nebulizer treatments
- Sputum culture
- Pulmonary consult
- Continue to hold Baclofen and Gabapentin due to somnolence
- Continue home medications
- DVT PPx

## 2018-01-14 NOTE — PN- PULMONARY
Subjective
HPI/Critical Care Issues:
Much improved
stable 
Wheezing less
 
Objective
Current Medications:
 Current Medications
 
 
  Sig/Viet Start time  Last
 
Medication Dose Route Stop Time Status Admin
 
Acetaminophen 650 MG Q6P PRN 01/12 1800 AC 01/13
 
  PO   0932
 
Albuterol Sulfate 3 ML EVERY 4 HRS/AWAKE 01/13 0800 AC 01/14
 
  INH   1006
 
Albuterol Sulfate 2 PUF Q4P PRN 01/12 1815 AC 
 
  INH   
 
Apixaban 5 MG BID 01/12 2200 AC 01/14
 
  PO   0948
 
Atorvastatin Calcium 5 MG DAILY 01/13 1000 AC 01/14
 
  PO   0948
 
Azithromycin 500 MG DAILY@2100 01/13 2100 AC 
 
  PO   
 
Azithromycin 500 MG DAILY@1900 01/12 1900 DC 01/13
 
Sodium Chloride 250 ML IV   1822
 
Bisacodyl 10 MG ONCE ONE 01/13 1345 DC 01/13
 
  MD 01/13 1346  1437
 
Budesonide/ 2 PUF BID 01/12 2200 AC 01/14
 
Formoterol Fumarate  INH   0947
 
Ceftriaxone Sodium 1,000 MG DAILY@1900 01/12 1900 AC 01/13
 
  IV   1823
 
Docusate Sodium 100 MG DAILY 01/13 1000 AC 01/14
 
  PO   0948
 
Furosemide 60 MG Q48 01/14 1000 AC 01/14
 
  PO   0948
 
Guaifenesin 1,200 MG BID 01/12 2200 AC 01/14
 
  PO   0948
 
Melatonin 10 MG ONCE ONE 01/13 2230 DC 01/13
 
  PO 01/13 2231  2228
 
Methylprednisolone 60 MG DAILY 01/14 1000 AC 01/14
 
  IV   0958
 
Methylprednisolone 40 MG Q8 01/12 2200 DC 01/13
 
  IV   1436
 
Metoprolol Succinate 25 MG DAILY 01/12 1922 AC 01/14
 
  PO   0949
 
Nortriptyline HCl 10 MG DAILY 01/12 1921 AC 01/14
 
  PO   0948
 
Omeprazole 20 MG DAILY 01/12 1922 AC 01/14
 
  PO   0948
 
Oxycodone/ 1 TAB ONCE ONE 01/13 1345 DC 01/13
 
Acetaminophen  PO 01/13 1346  2022
 
Potassium Chloride 40 MEQ ONCE ONE 01/13 1745 DC 01/13
 
  PO 01/13 1746  1822
 
Rifampin 300 MG BID 01/12 2200 AC 01/14
 
  PO   0949
 
Sotalol HCl 80 MG BID 01/12 2200 AC 01/14
 
  PO   0948
 
Tiotropium [place] 1 PUF DAILY 01/12 1924 AC 01/14
 
  INH   0947
 
 
 
 
Vital Signs & I&O
Last 24 Hrs of Vitals and I&O:
 Vital Signs
 
 
Date Time Temp Pulse Resp B/P B/P Pulse O2 O2 Flow FiO2
 
     Mean Ox Delivery Rate 
 
01/14 0949  84  140/82     
 
01/14 0816      92 Nasal 2.0L 
 
       Cannula  
 
01/14 0707 97.6 70 20 152/84  90 Nasal 2.0L 
 
       Cannula  
 
01/14 0016  77    97   
 
01/14 0000       BIPAP  
 
01/13 2235  72    97   
 
01/13 2233 98.0 74 20 150/70  95 Nasal 2.0L 
 
       Cannula  
 
01/13 1600       Nasal 2.0L 
 
       Cannula  
 
01/13 1600      95 Nasal 2.0L 
 
       Cannula  
 
 
 Intake & Output
 
 
 01/14 1600 01/14 0800 01/14 0000
 
Intake Total   600
 
Output Total 2000 300 550
 
Balance -2000 -300 50
 
    
 
Intake, Oral   600
 
Output, Urine 2000 300 550
 
 
 
 
Impression/Plan
 
Impression/Plan
Impression/Plan:
CXr reviewed
IMPRESSION:
No definite evidence of pneumonia although the left retrocardiac region is
not well evaluated. Consider follow-up examination if clinically indicated.
 
 
 
 
Physical Exam
General Appearance Alert, Oriented X3, Cooperative, No Acute Distress
Skin No Rashes, No Breakdown, No Significant Lesion
Skin Temp/Moisture Exam: Warm/Dry
HEENT Atraumatic, PERRLA, EOMI, Mucous Membr. moist/pink
Neck Supple
Lymphatic no cervical lymphadenopathy 
Cardiovascular Regular Rate, Normal S1, Normal S2, No Murmurs
Lungs bilateral congested decrease air entery wide spread crackles 
Abdomen Normal Bowel Sounds, Soft, No Tenderness
Neurological Normal Speech, Cranial Nerves 3-12 NL, Reflexes 2+, paraplagia 
Extremities No Clubbing, No Cyanosis, No Edema, Normal Pulses
 
IMRESSION
atient is 67 year old female with PMH significant for paroxysmal atrial 
fibrillation, COPD on 2 L home oxygen, GARRY on cpap, paraplegia as a result of 
spinal epidural abscess with chronic Castro catheter for neurogenic bladder, 
hypertension, hyperlipidemia, CAD s/p PCI 2004 and 2006, h/o aortic valve 
endocarditis, with 
 
* Acute exacerbation of COPD with acute bronchitis.
* Chronic hypercarbic respiratory failure.
* History of uncontrolled obstructive sleep apnea
* Atrial fibrillation - on Eliquis.
* Hypertension / History of CAD s/p stent placement. 
* Neurogenic bladder.
* Chronic muscle spasms.
* Paraplegia secondary, on chronic rifampin and doxycycline for suppressive 
therapy.
* GERD, on omeprazole.
 
REC
Cont steroids and abx
REduce steroidst to 60 mg / day change to po
Cont all supp abx
Watch blood work
Cont apixaban
Cpap at hs ( if home settings are not available use cpap of 8)

## 2018-01-15 VITALS — DIASTOLIC BLOOD PRESSURE: 70 MMHG | SYSTOLIC BLOOD PRESSURE: 122 MMHG

## 2018-01-15 VITALS — SYSTOLIC BLOOD PRESSURE: 152 MMHG | DIASTOLIC BLOOD PRESSURE: 74 MMHG

## 2018-01-15 VITALS — SYSTOLIC BLOOD PRESSURE: 145 MMHG | DIASTOLIC BLOOD PRESSURE: 82 MMHG

## 2018-01-15 LAB
ABSOLUTE GRANULOCYTE CT: 9.9 /CUMM (ref 1.4–6.5)
BASOPHILS # BLD: 0 /CUMM (ref 0–0.2)
BASOPHILS NFR BLD: 0.3 % (ref 0–2)
EOSINOPHIL # BLD: 0.4 /CUMM (ref 0–0.7)
EOSINOPHIL NFR BLD: 2.7 % (ref 0–5)
ERYTHROCYTE [DISTWIDTH] IN BLOOD BY AUTOMATED COUNT: 14.2 % (ref 11.5–14.5)
GRANULOCYTES NFR BLD: 71.4 % (ref 42.2–75.2)
HCT VFR BLD CALC: 38.4 % (ref 42–52)
LYMPHOCYTES # BLD: 2.4 /CUMM (ref 1.2–3.4)
MCH RBC QN AUTO: 29.3 PG (ref 27–31)
MCHC RBC AUTO-ENTMCNC: 32.5 G/DL (ref 33–37)
MCV RBC AUTO: 90.2 FL (ref 80–94)
MONOCYTES # BLD: 1.1 /CUMM (ref 0.1–0.6)
PLATELET # BLD: 282 /CUMM (ref 130–400)
PMV BLD AUTO: 8 FL (ref 7.4–10.4)
RED BLOOD CELL CT: 4.25 /CUMM (ref 4.7–6.1)
WBC # BLD AUTO: 13.9 /CUMM (ref 4.8–10.8)

## 2018-01-15 NOTE — PN- HOUSESTAFF
Misael JONES,[facility] 01/15/18 0736:
Subjective
Follow-up For:
copd
Subjective:
No acute issues overnight. SOB improved.
 
Review of Systems
Constitutional:
Reports: no symptoms. 
Cardiovascular:
Reports: no symptoms. 
Respiratory:
Reports: short of breath. 
Gastrointestinal:
Reports: no symptoms. 
Genitourinary:
Reports: no symptoms. 
Musculoskeletal:
Reports: no symptoms. 
Skin:
Reports: no symptoms. 
 
Objective
Last 24 Hrs of Vital Signs/I&O
 Vital Signs
 
 
Date Time Temp Pulse Resp B/P B/P Pulse O2 O2 Flow FiO2
 
     Mean Ox Delivery Rate 
 
01/15 2158 97.6 70 20 122/70  96 Nasal 2.0L 
 
       Cannula  
 
01/15 2134  82    96   
 
01/15 1904      96 Nasal 2.0L 
 
       Cannula  
 
01/15 1600      95 Nasal 2.0L 
 
       Cannula  
 
01/15 1555      97 Nasal 2.0L 
 
       Cannula  
 
01/15 1442 98.4 77 20 145/82  95   
 
01/15 0846    150/70     
 
01/15 0830      98 Nasal 3.0L 
 
       Cannula  
 
01/15 0800       Nasal 2.0L 
 
       Cannula  
 
01/15 0659 98.0 70 20 152/74  95 Nasal 2.0L 
 
       Cannula  
 
01/15 0000      96 Nasal 2.0L 
 
       Cannula  
 
01/15 0000  78    97   
 
 
 Intake & Output
 
 
 01/15 1600 01/15 0800 01/15 0000
 
Intake Total  250 250
 
Output Total  200 500
 
Balance  50 -250
 
    
 
Intake, IV  10 10
 
Intake, Oral  240 240
 
Output, Urine  200 500
 
 
 
 
Physical Exam
General Appearance: Alert, Oriented X3, Cooperative
Cardiovascular: Regular Rate, Normal S1, Normal S2
Lungs: decreased air movement. diffuse inspiratory rhonchi
Abdomen: Normal Bowel Sounds, Soft, No Tenderness
Vascular: 2+ radial pulses
Other Physical Findings:
dark orange/red urine in marquez which he states is normal color from rifampin
Current Medications:
 Current Medications
 
 
  Sig/Viet Start time  Last
 
Medication Dose Route Stop Time Status Admin
 
Acetaminophen 650 MG Q6P PRN  1800 AC 
 
  PO   0932
 
Albuterol Sulfate 3 ML EVERY 4 HRS/AWAKE  0800 AC 01/15
 
  INH   1904
 
Albuterol Sulfate 2 PUF Q4P PRN  1815 AC 
 
  INH   
 
Apixaban 5 MG BID  2200 AC 01/15
 
  PO   211
 
Atorvastatin Calcium 5 MG DAILY  1000 AC 01/15
 
  PO   0844
 
Azithromycin 500 MG DAILY@2100  2100 AC 01/15
 
  PO   
 
Budesonide/ 2 PUF BID 2200 AC 01/15
 
Formoterol Fumarate  INH   7
 
Ceftriaxone Sodium 1,000 MG DAILY@1900  1900 AC 01/15
 
  IV   2038
 
Docusate Sodium 100 MG DAILY  1000 AC 01/15
 
  PO   0846
 
Furosemide 60 MG Q48  1000 AC 
 
  PO   0948
 
Guaifenesin 1,200 MG BID 2200 AC 01/15
 
  PO   2115
 
Melatonin 10 MG ONCE ONE  2300 DC 
 
  PO  2301  2311
 
Metoprolol Succinate 25 MG DAILY 1922 AC 01/15
 
  PO   0846
 
Nortriptyline HCl 10 MG DAILY 1921 AC 01/15
 
  PO   0845
 
Omeprazole 20 MG DAILY 1922 AC 01/15
 
  PO   0846
 
Oxycodone/ 1 TAB Q4P PRN 01/15 1330 AC 01/15
 
Acetaminophen  PO   2114
 
Oxycodone/ 1 TAB ONCE ONE 01/15 0330 DC 01/15
 
Acetaminophen  PO 01/15 0331  0336
 
Potassium Chloride 40 MEQ ONCE ONE 01/15 1515 DC 01/15
 
  PO 01/15 1516  1528
 
Prednisone 60 MG DAILY 01/15 1000 AC 01/15
 
  PO   0846
 
Rifampin 300 MG BID 2200 AC 01/15
 
  PO   211
 
Sotalol HCl 80 MG BID 2200 AC 01/15
 
  PO   2115
 
Tiotropium [place] 1 PUF DAILY  192 AC 01/15
 
  INH   0848
 
 
 
 
Last 24 Hrs of Lab/Stalin Results
Last 24 Hrs of Labs/Mics:
 Laboratory Tests
 
01/15/18 0855:
Anion Gap 10, Estimated GFR > 60, BUN/Creatinine Ratio 36.0  H, CBC w Diff NO 
MAN DIFF REQ, RBC 4.25  L, MCV 90.2, MCH 29.3, RDW 14.2, MPV 8.0, Gran % 71.4, 
Lymphocytes % 17.4  L, Monocytes % 8.2, Eosinophils % 2.7, Basophils % 0.3, 
Absolute Granulocytes 9.9  H, Absolute Lymphocytes 2.4, Absolute Monocytes 1.1  
H, Absolute Eosinophils 0.4, Absolute Basophils 0, PUBS MCHC 32.5  L
 Microbiology
01/15 0350  LOWER RESP: Respiratory Culture - RES
01/15 0350  LOWER RESP: Gram Stain - RES
 
 
 
Assessment/Plan
Assessment:
A:
 
Patient is 67 year old female with PMH significant for paroxysmal atrial 
fibrillation on telemetry class, COPD on 2 L home oxygen, paraplegia as a result
of spinal epidural abscess with chronic Marquez catheter for neurogenic bladder, 
hypertension, hyperlipidemia, CAD s/p PCI  and , aortic valve 
endocarditis who presented to ED with COPD exascerbation
 
Plan:
 
#COPD
-cont po steroid taper, azothromycin/ceftriaxone, trc nebs, guaifensin, 
symbicort, spiriva
-cont bipap at night
-will need outpt pulm f/u for contunuation of bipap
 
#hx of afib
-cont apixaban
 
#Hypertension
-cont lasix, stotalol, metroprolol
 
#Hyperlipidemia
-cont atorvastatin
#Neurogenic Marquez due to paraplegia
 
#chronic pain
-cont pain medications, nortryptiline
 
#constipation
-cont colace
 
#hx of paraplegia due to abscess
-cont rifampin
 
#hx of gerd
-omerapazole
 
#dvt prophylaxis
-apixaban
 
#DNR DNI
Problem List:
 1. COPD (chronic obstructive pulmonary disease)
 
Pain Ratin
Pain Location:
back
Pain Goal: Pain 4 or less
Pain Plan:
pain pathway
Tomorrow's Labs & Rationales:
cbc
bep
 
 
Sandra Serrato 01/15/18 1253:
Attending MD Review Statement
 
Attending Statement
Attending MD Statement: examined this patient, discuss w/resident/PA/NP, agreed 
w/resident/PA/NP, discussed with family, reviewed EMR data (avail), discussed 
with nursing, discussed with case mgmt, reviewed images, amended to note
Attending Assessment/Plan:
67M PMH  paroxysmal atrial fibrillation on Eliquis, COPD on 2 L home oxygen, 
paraplegia as a result of spinal epidural abscess with chronic Marquez catheter 
for neurogenic bladder, hypertension, hyperlipidemia, CAD s/p PCI  and  
presenting with 4 days of worsening shortness of breath and dyspnea with minimal
exertion.  Mild cough without sputum.  No sick contacts.  Requiring increased 
oxygen, now at 2L. CXR inconclusive admitted here for COPD exacerbation and 
acute hypoxemic respiratory failure. 
 
Pulmonary consutled and recommend iv antibiotics with conversion of iv sterodis 
to PO steroids. 
 
Patient with overall improvement. Patient can be discharged in stable condition 
if ok with pulmonary.

## 2018-01-15 NOTE — PN- PULMONARY
Subjective
HPI/Critical Care Issues:
Patient seen and examined this morning.  He appears to be doing somewhat better 
but continues to wheeze.
 
Objective
Current Medications:
 Current Medications
 
 
  Sig/Viet Start time  Last
 
Medication Dose Route Stop Time Status Admin
 
Acetaminophen 650 MG Q6P PRN 01/12 1800 AC 01/13
 
  PO   0932
 
Albuterol Sulfate 3 ML EVERY 4 HRS/AWAKE 01/13 0800 AC 01/15
 
  INH   1224
 
Albuterol Sulfate 2 PUF Q4P PRN 01/12 1815 AC 
 
  INH   
 
Apixaban 5 MG BID 01/12 2200 AC 01/15
 
  PO   0846
 
Atorvastatin Calcium 5 MG DAILY 01/13 1000 AC 01/15
 
  PO   0844
 
Azithromycin 500 MG DAILY@2100 01/13 2100 AC 01/14
 
  PO   2206
 
Budesonide/ 2 PUF BID 01/12 2200 AC 01/15
 
Formoterol Fumarate  INH   0848
 
Ceftriaxone Sodium 1,000 MG DAILY@1900 01/12 1900 AC 01/14
 
  IV   1831
 
Docusate Sodium 100 MG DAILY 01/13 1000 AC 01/15
 
  PO   0846
 
Furosemide 60 MG Q48 01/14 1000 AC 01/14
 
  PO   0948
 
Guaifenesin 1,200 MG BID 01/12 2200 AC 01/15
 
  PO   0846
 
Melatonin 10 MG ONCE ONE 01/14 2300 DC 01/14
 
  PO 01/14 2301  2311
 
Methylprednisolone 60 MG DAILY 01/14 1000 DC 01/14
 
  IV   0958
 
Metoprolol Succinate 25 MG DAILY 01/12 1922 AC 01/15
 
  PO   0846
 
Nortriptyline HCl 10 MG DAILY 01/12 1921 AC 01/15
 
  PO   0845
 
Omeprazole 20 MG DAILY 01/12 1922 AC 01/15
 
  PO   0846
 
Oxycodone/ 1 TAB Q4P PRN 01/15 1330 AC 01/15
 
Acetaminophen  PO   1329
 
Oxycodone/ 1 TAB ONCE ONE 01/15 0330 DC 01/15
 
Acetaminophen  PO 01/15 0331  0336
 
Oxycodone/ 1 TAB ONCE ONE 01/14 1900 DC 01/14
 
Acetaminophen  PO 01/14 1901  1851
 
Prednisone 60 MG DAILY 01/15 1000 AC 01/15
 
  PO   0846
 
Rifampin 300 MG BID 01/12 2200 AC 01/15
 
  PO   0846
 
Sotalol HCl 80 MG BID 01/12 2200 AC 01/15
 
  PO   0845
 
Tiotropium [place] 1 PUF DAILY 01/12 1924 AC 01/15
 
  INH   0848
 
 
 
 
Vital Signs & I&O
Last 24 Hrs of Vitals and I&O:
 Vital Signs
 
 
Date Time Temp Pulse Resp B/P B/P Pulse O2 O2 Flow FiO2
 
     Mean Ox Delivery Rate 
 
01/15 0846    150/70     
 
01/15 0830      98 Nasal 3.0L 
 
       Cannula  
 
01/15 0800       Nasal 2.0L 
 
       Cannula  
 
01/15 0659 98.0 70 20 152/74  95 Nasal 2.0L 
 
       Cannula  
 
01/15 0000      96 Nasal 2.0L 
 
       Cannula  
 
01/15 0000  78    97   
 
01/14 2238 98.1 85 24 156/78  96 Nasal 2.0L 
 
       Cannula  
 
01/14 2234  82    97   
 
01/14 1700      97 Nasal 2.0L 
 
       Cannula  
 
01/14 1600       Nasal 2.0L 
 
       Cannula  
 
01/14 1429 98.2 84 22 152/88  95 Nasal 2.0L 
 
       Cannula  
 
 
 Intake & Output
 
 
 01/15 1600 01/15 0800 01/15 0000
 
Intake Total  250 250
 
Output Total  200 500
 
Balance  50 -250
 
    
 
Intake, IV  10 10
 
Intake, Oral  240 240
 
Output, Urine  200 500
 
 
 
 
Exam
Other Physical Findings:
Patient is alert and oriented.  Heart sounds are normal.  He is wheezing 
bilaterally pearly with end expiration.  No leg edema.
 
Results
Last 24 Hrs of Lab Results:
 Laboratory Tests
 
01/15/18 0855:
Anion Gap 10, Estimated GFR > 60, BUN/Creatinine Ratio 36.0  H, CBC w Diff NO 
MAN DIFF REQ, RBC 4.25  L, MCV 90.2, MCH 29.3, RDW 14.2, MPV 8.0, Gran % 71.4, 
Lymphocytes % 17.4  L, Monocytes % 8.2, Eosinophils % 2.7, Basophils % 0.3, 
Absolute Granulocytes 9.9  H, Absolute Lymphocytes 2.4, Absolute Monocytes 1.1  
H, Absolute Eosinophils 0.4, Absolute Basophils 0, PUBS MCHC 32.5  L
 
 
Impression/Plan
 
Impression/Plan
Impression/Plan:
Impression
67 year old man
 
-acute COPD exacerbation, likely secondary to bronchitis
-chronic hypercarbic respiratory failure
 
Plan
-nocturnal bipap
-taper steroids as ordered
-cont abx
-needs to have a face to face in office for bipap continuation
 
DVT prophylaxis at all times

## 2018-01-16 VITALS — SYSTOLIC BLOOD PRESSURE: 136 MMHG | DIASTOLIC BLOOD PRESSURE: 70 MMHG

## 2018-01-16 VITALS — SYSTOLIC BLOOD PRESSURE: 132 MMHG | DIASTOLIC BLOOD PRESSURE: 84 MMHG

## 2018-01-16 LAB
ABSOLUTE GRANULOCYTE CT: 7.2 /CUMM (ref 1.4–6.5)
BASOPHILS # BLD: 0 /CUMM (ref 0–0.2)
BASOPHILS NFR BLD: 0.3 % (ref 0–2)
EOSINOPHIL # BLD: 0.3 /CUMM (ref 0–0.7)
EOSINOPHIL NFR BLD: 2.8 % (ref 0–5)
ERYTHROCYTE [DISTWIDTH] IN BLOOD BY AUTOMATED COUNT: 14.6 % (ref 11.5–14.5)
GRANULOCYTES NFR BLD: 63.7 % (ref 42.2–75.2)
HCT VFR BLD CALC: 38.6 % (ref 42–52)
LYMPHOCYTES # BLD: 2.8 /CUMM (ref 1.2–3.4)
MCH RBC QN AUTO: 29.5 PG (ref 27–31)
MCHC RBC AUTO-ENTMCNC: 32.1 G/DL (ref 33–37)
MCV RBC AUTO: 91.9 FL (ref 80–94)
MONOCYTES # BLD: 0.9 /CUMM (ref 0.1–0.6)
PLATELET # BLD: 292 /CUMM (ref 130–400)
PMV BLD AUTO: 8.1 FL (ref 7.4–10.4)
RED BLOOD CELL CT: 4.2 /CUMM (ref 4.7–6.1)
WBC # BLD AUTO: 11.3 /CUMM (ref 4.8–10.8)

## 2018-01-16 NOTE — PATIENT DISCHARGE INSTRUCTIONS
Discharge Instructions
 
General Discharge Information
Special Instructions:
Please follow up with your pcp in 1-2 weeks.
 
Please follow up with your pulmnologist in 1-2 weeks.
 
Acute Coronary Syndrome
 
Inclusion Criteria
At DC or during hospital stay patient has or had the following:
ACS DIAGNOSIS No
 
Discharge Core Measures
Meds if any: Prescribed or Continued at Discharge
Meds if any: NOT Prescribed or Continued at Discharge
 
Congestive Heart Failure
 
Inclusion Criteria
At DC or during hospital stay patient has or had the following:
CHF DIAGNOSIS No
 
Discharge Core Measures
Meds if any: Prescribed or Continued at Discharge
Meds if any: NOT Prescribed or Continued at Discharge
 
Cerebrovascular accident
 
Inclusion Criteria
At DC or during hospital stay patient has or had the following:
CVA/TIA Diagnosis No
 
Discharge Core Measures
Meds if any: Prescribed or Continued at Discharge
Meds if any: NOT Prescribed or Continued at Discharge
 
Venous thromboembolism
 
Inclusion Criteria
VTE Diagnosis No
VTE Type NONE
VTE Confirmed by (Test) NONE
 
Discharge Core Measures
- Per Current guidelines, there needs to be overlap
- treatment for the first 5 days of Warfarin therapy.
- If discharged on Warfarin prior to 5 days of
- overlap therapy, the patient will need to be
- assessed for post discharge needs including
- *Post discharge parental anticoagulation
- *Warfarin and/or parental anticoagulation education
- *Follow up date to check INR post discharge
At least 5 days overlap therapy as Inpatient No
Meds if any: Prescribed or Continued at Discharge
Note: Overlap Therapy is Warfarin and Anticoagulant
Meds if any: NOT Prescribed or Continued at Discharge

## 2018-01-16 NOTE — PN- HOUSESTAFF
**See Addendum**
Misael JONES,[facility] 18 0834:
Subjective
Follow-up For:
copd
Subjective:
No acute events. States SOB better.
 
Review of Systems
Constitutional:
Reports: no symptoms. 
Cardiovascular:
Reports: no symptoms. 
Respiratory:
Reports: short of breath. 
Gastrointestinal:
Reports: no symptoms. 
Genitourinary:
Reports: no symptoms. 
Musculoskeletal:
Reports: no symptoms. 
 
Objective
Last 24 Hrs of Vital Signs/I&O
 Vital Signs
 
 
Date Time Temp Pulse Resp B/P B/P Pulse O2 O2 Flow FiO2
 
     Mean Ox Delivery Rate 
 
 0909  70  132/84     
 
 0846      97 Nasal 2.0L 
 
       Cannula  
 
 0800       Nasal 2.0L 
 
       Cannula  
 
 0718 98.0 71 20 136/70  95 Nasal 2.0L 
 
       Cannula  
 
 0159      97 Nasal 2.0L 
 
       Cannula  
 
 0025  74    93   
 
 0000       Nasal 2.0L 
 
       Cannula  
 
 
 Intake & Output
 
 
  1600  0800  0000
 
Intake Total 1000 120 120
 
Output Total 2400  700
 
Balance -1400 120 -580
 
    
 
Intake, Oral 1000 120 120
 
Output, Urine 2400  700
 
 
 
 
Physical Exam
General Appearance: Alert, Oriented X3, Cooperative, No Acute Distress
Cardiovascular: Regular Rate, Normal S1, Normal S2
Lungs: diffuse rhonchi
Abdomen: Normal Bowel Sounds, Soft, No Tenderness
Extremities: 2+ radial pulses
Other Physical Findings:
marquez has dark urine due to rifampin
Current Medications:
 Current Medications
 
 
  Sig/Viet Start time  Last
 
Medication Dose Route Stop Time Status Admin
 
Acetaminophen 650 MG .STK-MED ONE  0215 DC 
 
  PO  0216  
 
Acetaminophen 650 MG Q6P PRN  1800 DCD 
 
  PO   0217
 
Albuterol Sulfate 3 ML EVERY 4 HRS/AWAKE  0800 DCD 
 
  INH   1219
 
Albuterol Sulfate 2 PUF Q4P PRN  1815 DCD 
 
  INH   
 
Apixaban 5 MG BID  2200 DCD 
 
  PO   0908
 
Atorvastatin Calcium 5 MG DAILY  1000 DCD 
 
  PO   0908
 
Azithromycin 500 MG DAILY@2100  2100 DCD 01/15
 
  PO   2117
 
Budesonide/ 2 PUF BID  2200 DCD 
 
Formoterol Fumarate  INH   0907
 
Ceftriaxone Sodium 1,000 MG DAILY@1900  1900 DCD 01/15
 
  IV   2038
 
Docusate Sodium 100 MG DAILY  1000 DCD 
 
  PO   0908
 
Furosemide 60 MG Q48  1000 DCD 
 
  PO   0908
 
Guaifenesin 1,200 MG BID 2200 DCD 
 
  PO   0908
 
Metoprolol Succinate 25 MG DAILY 1922 DCD 
 
  PO   0909
 
Nortriptyline HCl 10 MG DAILY 1921 DCD 
 
  PO   0908
 
Omeprazole 20 MG DAILY 1922 DCD 
 
  PO   0908
 
Oxycodone/ 1 TAB Q4P PRN 01/15 1330 DCD 
 
Acetaminophen  PO   1155
 
Prednisone 60 MG DAILY 01/15 1000 DCD 
 
  PO   0908
 
Rifampin 300 MG BID 2200 DCD 
 
  PO   0908
 
Sotalol HCl 80 MG BID 2200 DCD 
 
  PO   0909
 
Tiotropium [place] 1 PUF DAILY 1924 DCD 
 
  INH   0907
 
 
 
 
Last 24 Hrs of Lab/Stalin Results
Last 24 Hrs of Labs/Mics:
 Laboratory Tests
 
18 0758:
Anion Gap 8, Estimated GFR > 60, BUN/Creatinine Ratio 36.0  H, CBC w Diff NO MAN
DIFF REQ, RBC 4.20  L, MCV 91.9, MCH 29.5, RDW 14.6  H, MPV 8.1, Gran % 63.7, 
Lymphocytes % 24.9, Monocytes % 8.3, Eosinophils % 2.8, Basophils % 0.3, 
Absolute Granulocytes 7.2  H, Absolute Lymphocytes 2.8, Absolute Monocytes 0.9  
H, Absolute Eosinophils 0.3, Absolute Basophils 0, PUBS MCHC 32.1  L
 
 
Assessment/Plan
Assessment:
A:
 
Patient is 67 year old female with PMH significant for paroxysmal atrial 
fibrillation on telemetry class, COPD on 2 L home oxygen, paraplegia as a result
of spinal epidural abscess with chronic Marquez catheter for neurogenic bladder, 
hypertension, hyperlipidemia, CAD s/p PCI  and , aortic valve 
endocarditis who presented to ED with COPD exascerbation
 
Plan:
 
#COPD
 
-continue with po steroid taper, azothromycin/ceftriaxone, trc nebs, guaifensin,
symbicort, spiriva
-discharge with steroid taper and levoquin x 5 days. advised to f/u with 
pulmnologist
-cont bipap at night
-will need outpt pulm f/u for contunuation of bipap
 
#hx of afib
-cont apixaban
 
#Hypertension
-cont lasix, stotalol, metroprolol
 
#Hyperlipidemia
-cont atorvastatin
#Neurogenic Marquez due to paraplegia
 
#chronic pain
-cont pain medications, nortryptiline
 
#constipation
-cont colace
 
#hx of paraplegia due to abscess
-cont rifampin
 
#hx of gerd
-omerapazole
 
#dvt prophylaxis
-apixaban
 
#DNR DNI
Problem List:
 1. COPD (chronic obstructive pulmonary disease)
 
Pain Ratin
Pain Location:
none
Pain Goal: Pain 4 or less
Pain Plan:
pain pathway
Tomorrow's Labs & Rationales:
none
 
 
Sandra Serrato 18 1140:
Attending MD Review Statement
 
Attending Statement
Attending MD Statement: examined this patient, discuss w/resident/PA/NP, agreed 
w/resident/PA/NP, discussed with family, reviewed EMR data (avail), discussed 
with nursing, discussed with case mgmt, reviewed images, amended to note
Attending Assessment/Plan:
Patient feeling much better. Patient on PO steroids. Patient also found to have 
gram negative sputum and will be discharged on moxifloxacin PO.

## 2018-01-21 NOTE — DISCHARGE SUMMARY
Visit Information
 
Visit Dates
Admission Date:
01/12/18
 
Discharge Date:
01/16/18
 
 
Hospital Course
 
Course
Attending Physician:
Sandra Serrato MD
 
Primary Care Physician:
Ramsey Jacobson MD
 
Hospital Course:
A:
 
Patient is 67 year old female with PMH significant for paroxysmal atrial 
fibrillation on telemetry class, COPD on 2 L home oxygen, paraplegia as a result
of spinal epidural abscess with chronic Marquez catheter for neurogenic bladder, 
hypertension, hyperlipidemia, CAD s/p PCI 2004 and 2006, aortic valve 
endocarditis who presented to ED with COPD exascerbation.
 
Plan:
 
#COPD
The patient was treated with antibiotics, IV steroids, guaifensin, symbicort, 
and spiriva. He was seen by pulmonology. He was discharged with breathing 
treatments as stated above, a po steroid taper and levofloxacin x5 days. He was 
advised to f/u with his pulmnologist Dr. Hull and to continue bipap at 
night. He will need a pulm f/u for continuation of bipap.
 
#hx of afib
-continued apixaban
 
#Hypertension
-continued lasix, stotalol, metroprolol
 
#Hyperlipidemia
-continued atorvastatin
 
#Neurogenic Marquez due to paraplegia
-continued his marquez
 
#chronic pain
-cont pain medications, nortryptiline
 
#constipation
-continued colace
 
#hx of paraplegia due to abscess
-continued rifampin, doxycycline
 
#hx of gerd
-continued omerapazole
 
Allergies:
Coded Allergies:
heparin (Intermediate, SWELLING, RASH 10/16/17)
vancomycin (Intermediate, HIVES, SKIN CRACKES, TURNS RED, SWELLS AND PEELS 10/16
/17)
warfarin (From COUMADIN) (Intermediate, RASH 10/16/17)
 
 
Disposition Summary
 
Disposition
Principal Diagnosis:
COPD
Additional Diagnosis:
none
Discharge Disposition: home health services
 
Discharge Instructions
 
General Discharge Information
Code Status: Do Not Resucitate/Intubat
Patient's Diet:
Heart healthy
Patient's Activity:
As tolerated
Follow-Up Instructions/Appts:
Please follow up with your pcp in 1-2 weeks.
 
Please follow up with your pulmnologist in 1-2 weeks.
 
 
Medications at Discharge
Discharge Medications:
Continue taking these medications:
Furosemide (Furosemide) 20 MG TABLET
    3 Tablet ORAL EVERY 48 HOURS (Every 2 days)
    Qty = 90
    Comments:
       Last Taken: 1/16/18
             Time: 0900 AM
 
Lactobacillus Acidophilus (Acidophilus) 1 EACH CAPSULE
    1 Capsule ORAL TWICE DAILY
    Comments:
       NOT TAKEN IN HOSPITAL
           
 
Magnesium Oxide (Magnesium) 400 MG CAPSULE
    1 Capsule ORAL 0600
    Comments:
       NOT TAKEN IN HOSPITAL
             
 
Multivitamin (Daily Value) 1 EACH TABLET
    1 Tablet ORAL DAILY
    Comments:
       NOT TAKEN IN HOSPITAL
 
Ascorbate Calcium (Vitamin C) 500 MG TABLET
    1 Tablet ORAL TWICE DAILY
    Comments:
       NOT GIVEN WHILE IN HOSPITAL    
 
Oxycodone HCl/Acetaminophen (Oxycodone-Acetaminophen 5-325) 5 MG-325 MG TABLET
    1 Tablet ORAL Q4H as needed for PAIN
    Qty = 120
    Comments:
       Last Taken: 1/16/18
             Time: 1155 AM
 
Baclofen (Baclofen) 20 MG TABLET
    1 Tablet ORAL TWICE DAILY
    Qty = 150
    Comments:
       NOT GIVEN WHILE IN HOSPITAL
 
Bisacodyl (Dulcolax) 10 MG SUPP.RECT
    1 Suppository RECTAL EVERY 48 HOURS (Every 2 days)
    Comments:
       Last Taken: 01/13/18
             Time: 1400
 
Potassium Chloride (Potassium Chloride) 20 MEQ TAB.ER.PRT
    1 Tablet ORAL DAILY
    Qty = 90
    Comments:
       NOT GIVEN WHILE IN HOSPITAL
 
Guaifenesin (Mucinex) 600 MG TAB.ER.12H
    2 Tablet ORAL TWICE DAILY
    Comments:
       Last Taken: 1/16/18
             Time: 0900 AM
 
Doxycycline Hyclate (Doxycycline Hyclate) 100 MG CAPSULE
    1 Capsule ORAL TWICE DAILY
    Qty = 60
    Comments:
       NOT TAKEN IN HOSPITAL
 
Sotalol (Betapace) 80 MG TABLET
    80 Milligram ORAL TWICE DAILY
    Qty = 60
    Comments:
       Last Taken: 1/16/18
             Time: 0900 AM
 
Apixaban (Eliquis) 5 MG TABLET
    5 Milligram ORAL TWICE DAILY
    Qty = 60
    Comments:
       Last Taken: 1/16/18
             Time: 09:00 AM
 
Albuterol Sulfate (Ventolin Hfa) 90 MCG HFA.AER.AD
    2 Puff Inhale through mouth As Directed as needed for COPD
    Qty = 18
    Comments:
       NOT GIVEN WHILE IN HOSPITAL
 
Budesonide/Formoterol Fumarate (Symbicort 80-4.5 Mcg Inhaler) 80 MCG-4.5 MCG/
ACTUATION HFA.AER.AD
    2 Puff Inhale through mouth TWICE DAILY
    Comments:
       Last Taken: 1/16/18
             Time: 09:00 AM
 
Gabapentin (Gabapentin) 600 MG TABLET
    1 Tablet ORAL THREE TIMES DAILY
    Comments:
       NOT GIVEN WHILE IN HOSPITAL
 
Rifampin (Rifadin) 300 MG CAPSULE
    300 Milligram ORAL TWICE DAILY
    Qty = 60
    Comments:
       TAKEN:  1/16/18
        TIME:  0900 AM
 
Acetaminophen (Tylenol Arthritis) 650 MG TABLET.ER
    1 Tablet ORAL EVERY 6 HOURS AS NEEDED as needed for PAIN
    Comments:
       Last Taken: 1/16/18
             Time: 0215 AM
 
Lorazepam (Lorazepam) 1 MG TABLET
    1 Tablet ORAL TWICE DAILY
    Comments:
       NOT TAKEN IN HOSPITAL
 
Metoprolol Succ XL (Toprol XL) 25 MG TAB
    1 Tablet ORAL DAILY
    Comments:
       Last Taken: 1/16/18
             Time: 0900 AM
 
Nortriptyline HCl (Nortriptyline HCl) 10 MG CAPSULE
    1 Capsule ORAL DAILY
    Comments:
       Last Taken: 1/16/18
             Time: 0900 AM
 
Omeprazole (Omeprazole) 20 MG CAPSULE.DR
    1 Capsule ORAL DAILY
    Comments:
       Last Taken: 1/16/18
             Time: 0900 AM
 
Evolocumab (Repatha Sureclick) 140 MG/ML PEN.INJCTR
    1 Milliliters Inject into fatty tissue EVERY 2 WEEKS
    Comments:
       NOT TAKEN IN HOSPITAL
 
Rosuvastatin Calcium (Crestor) 5 MG TABLET
    1 Tablet ORAL DAILY
    Comments:
       Last Taken: 1/16/18
             Time: 0900 AM
       SUBSTITUTED 
 
Montelukast Sodium (Singulair) 10 MG TABLET
    1 Tablet ORAL DAILY
    Comments:
       NOT TAKEN IN HOSPITAL 
 
Tiotropium Bromide (Spiriva) 18 MCG CAP.W.DEV
    1 Capsule Inhale through mouth DAILY
    Comments:
       Last Taken: 1/16/18
             Time: 0900 AM
 
Atomoxetine HCl (Strattera) 40 MG CAPSULE
    1 Capsule ORAL Every Morning
    Comments:
       NOT TAKEN IN HOSPITAL
 
Trazodone HCl (Trazodone HCl) 50 MG TABLET
    1 Tablet ORAL Every night
    Comments:
       NOT TAKEN IN HOSPITAL
 
Docusate Sodium (Stool Softener) 100 MG CAPSULE
    1 Capsule ORAL DAILY
    Comments:
       Last Taken: 1/16/18
             Time: 0900 AM
 
Start taking the following new medications:
Prednisone (Prednisone) 10 MG TABLET
    1 Tablet ORAL DAILY
    Qty = 31
    No Refills
    Instructions:
       DATE      TABS DAILY
        
       1/17-1/18  5
       1/19-1/20  4
       1/21-1/22  3
       1/23-1/24  2
       1/25-1/26  1
       1/27-1/28  0.5
    Comments:
       Last Taken: 1/16/18
             Time: 0900 AM
 
Levofloxacin (Levaquin) 500 MG TABLET
    1 Tablet ORAL DAILY
    Qty = 5
    No Refills
 
 
Copies To:
Indira JONES,Ramsey YANG

## 2018-01-22 ENCOUNTER — HOSPITAL ENCOUNTER (OUTPATIENT)
Dept: HOSPITAL 68 - ERH | Age: 68
Setting detail: OBSERVATION
LOS: 1 days | End: 2018-01-23
Attending: EMERGENCY MEDICINE | Admitting: EMERGENCY MEDICINE
Payer: COMMERCIAL

## 2018-01-22 VITALS — HEIGHT: 69 IN | WEIGHT: 223 LBS | BODY MASS INDEX: 33.03 KG/M2

## 2018-01-22 DIAGNOSIS — Z87.891: ICD-10-CM

## 2018-01-22 DIAGNOSIS — N31.9: ICD-10-CM

## 2018-01-22 DIAGNOSIS — G82.20: ICD-10-CM

## 2018-01-22 DIAGNOSIS — I10: ICD-10-CM

## 2018-01-22 DIAGNOSIS — G89.29: ICD-10-CM

## 2018-01-22 DIAGNOSIS — Z99.81: ICD-10-CM

## 2018-01-22 DIAGNOSIS — E78.5: ICD-10-CM

## 2018-01-22 DIAGNOSIS — I48.0: ICD-10-CM

## 2018-01-22 DIAGNOSIS — J44.1: Primary | ICD-10-CM

## 2018-01-22 DIAGNOSIS — Z79.01: ICD-10-CM

## 2018-01-22 DIAGNOSIS — I21.9: ICD-10-CM

## 2018-01-22 DIAGNOSIS — I25.10: ICD-10-CM

## 2018-01-22 LAB
ABSOLUTE GRANULOCYTE CT: 12.2 /CUMM (ref 1.4–6.5)
BASOPHILS # BLD: 0 /CUMM (ref 0–0.2)
BASOPHILS NFR BLD: 0.2 % (ref 0–2)
EOSINOPHIL # BLD: 0.2 /CUMM (ref 0–0.7)
EOSINOPHIL NFR BLD: 1.4 % (ref 0–5)
ERYTHROCYTE [DISTWIDTH] IN BLOOD BY AUTOMATED COUNT: 14.7 % (ref 11.5–14.5)
GRANULOCYTES NFR BLD: 74.9 % (ref 42.2–75.2)
HCT VFR BLD CALC: 41.4 % (ref 42–52)
LYMPHOCYTES # BLD: 2.8 /CUMM (ref 1.2–3.4)
MCH RBC QN AUTO: 28.8 PG (ref 27–31)
MCHC RBC AUTO-ENTMCNC: 31.3 G/DL (ref 33–37)
MCV RBC AUTO: 92 FL (ref 80–94)
MONOCYTES # BLD: 1 /CUMM (ref 0.1–0.6)
PLATELET # BLD: 359 /CUMM (ref 130–400)
PMV BLD AUTO: 8.1 FL (ref 7.4–10.4)
RED BLOOD CELL CT: 4.5 /CUMM (ref 4.7–6.1)
WBC # BLD AUTO: 16.3 /CUMM (ref 4.8–10.8)

## 2018-01-22 PROCEDURE — G0378 HOSPITAL OBSERVATION PER HR: HCPCS

## 2018-01-22 NOTE — RADIOLOGY REPORT
EXAMINATION:
CHEST 1 VIEW
 
CLINICAL INFORMATION:
Dyspnea, cough.
 
COMPARISON:
01/12/2018.
 
TECHNIQUE:
An AP view of the chest is provided.
 
FINDINGS:
The cardiac silhouette is not enlarged. The mediastinal and hilar contours
are unremarkable. There are neither pleural effusions nor pneumothoraces.
There is mild hazy opacification within both lung bases. The osseous
structures are unremarkable.
 
IMPRESSION:
 
Mild nonspecific hazy opacification within both lung bases. Consider
correlation with a lateral view, if possible.

## 2018-01-22 NOTE — ED DYSPNEA/ASTHMA COMPLAINT
**See Addendum**
History of Present Illness
 
General
Chief Complaint: Dyspnea (COPD, CHF, Other)
Stated Complaint: BIBA FOR SOB
Source: patient, old records, EMS
Exam Limitations: no limitations
 
Vital Signs & Intake/Output
Vital Signs & Intake/Output
 Vital Signs
 
 
Date Time Temp Pulse Resp B/P B/P Pulse O2 O2 Flow FiO2
 
     Mean Ox Delivery Rate 
 
 2313 99.2 88 30 120/78  89 CPAP  
 
 2245      90   
 
 2156 98.3 87 24 134/80  94 Nasal 2.0L 
 
       Cannula  
 
 1936 98.7 83 21 157/84  977 Nasal 2.0L 
 
       Cannula  
 
 1752       Nasal  
 
       Cannula  
 
 1739 96.8 83 20 137/67  98 Nasal 4.0L 
 
       Cannula  
 
 
 ED Intake and Output
 
 
  0000  1200
 
Intake Total  
 
Output Total 500 
 
Balance -500 
 
   
 
Output, Urine 500 
 
Patient 223 lb 
 
Weight  
 
Weight Reported by Patient 
 
Measurement  
 
Method  
 
 
 
Allergies
Coded Allergies:
heparin (Intermediate, SWELLING, RASH 10/16/17)
vancomycin (Intermediate, HIVES, SKIN CRACKES, TURNS RED, SWELLS AND PEELS 10/16
/17)
warfarin (From COUMADIN) (Intermediate, RASH 10/16/17)
 
Reconcile Medications
Acetaminophen (Tylenol Arthritis) 650 MG TABLET.ER   1 TAB PO Q6-PRN PRN PAIN  (
Reported)
Albuterol Sulfate (Ventolin Hfa) 90 MCG HFA.AER.AD   2 PUF INH AD PRN COPD  (
Reported)
Apixaban (Eliquis) 5 MG TABLET   5 MG PO BID atrial fibrillation
Ascorbate Calcium (Vitamin C) 500 MG TABLET   1 TAB PO BID SUPPLEMENT  (Reported
)
Atomoxetine HCl (Strattera) 40 MG CAPSULE   1 CAP PO QAM MENTAL HEALTH  (
Reported)
Baclofen 20 MG TABLET   1 TAB PO BID MUSCLE RELAXER  (Reported)
Bisacodyl (Dulcolax) 10 MG SUPP.RECT   1 SUP RC Q48 GI  (Reported)
Budesonide/Formoterol Fumarate (Symbicort 80-4.5 Mcg Inhaler) 80 MCG-4.5 MCG/
ACTUATION HFA.AER.AD   2 PUF INH BID COPD  (Reported)
Docusate Sodium (Stool Softener) 100 MG CAPSULE   1 CAP PO DAILY STOOL SOFTENER 
(Reported)
Doxycycline Hyclate 100 MG CAPSULE   1 CAP PO BID infection  (Reported)
Evolocumab (Repatha Sureclick) 140 MG/ML PEN.INJCTR   1 ML SC Q 2 WEEKS 
CHOLESTEROL  (Reported)
Furosemide 20 MG TABLET   3 TAB PO Q48 DIURETIC  (Reported)
Gabapentin 600 MG TABLET   1 TAB PO TID NEUROPATHY  (Reported)
Guaifenesin (Mucinex) 600 MG TAB.ER.12H   2 TAB PO BID MUCUS  (Reported)
Lactobacillus Acidophilus (Acidophilus) 1 EACH CAPSULE   1 CAP PO BID PROBIOTIC 
(Reported)
Levofloxacin (Levaquin) 500 MG TABLET   1 TAB PO DAILY LUNG INFECTION
Lorazepam 1 MG TABLET   1 TAB PO BID ANXIETY  (Reported)
Magnesium Oxide (Magnesium) 400 MG CAPSULE   1 CAP PO 0600 SUPPLEMENT  (Reported
)
Metoprolol Succ XL (Toprol XL) 25 MG TAB   1 TAB PO DAILY HEART  (Reported)
Montelukast Sodium (Singulair) 10 MG TABLET   1 TAB PO DAILY ALLERGIES  (
Reported)
Multivitamin (Daily Value) 1 EACH TABLET   1 TAB PO DAILY VITAMIN SUPPORT  (
Reported)
Nortriptyline HCl 10 MG CAPSULE   1 CAP PO DAILY UNKNOWN  (Reported)
Omeprazole 20 MG CAPSULE.DR   1 CAP PO DAILY ACID REFLUX  (Reported)
Oxycodone HCl/Acetaminophen (Oxycodone-Acetaminophen 5-325) 5 MG-325 MG TABLET  
1 TAB PO Q4H PRN PAIN  (Reported)
Potassium Chloride 20 MEQ TAB.ER.PRT   1 TAB PO DAILY SUPPLEMENT  (Reported)
Prednisone 10 MG TABLET   1 TAB PO DAILY COPD
     DATE      TABS DAILY
      
     -  5
     -  4
     -  3
     -  2
     -  1
     -  0.5
Rifampin (Rifadin) 300 MG CAPSULE   300 MG PO BID MRSA  (Reported)
Rosuvastatin Calcium (Crestor) 5 MG TABLET   1 TAB PO DAILY CHOLESTEROL  (
Reported)
Sotalol (Betapace) 80 MG TABLET   80 MG PO BID AARYTHMIAS
Tiotropium Bromide (Spiriva) 18 MCG CAP.W.DEV   1 CAP INH DAILY BREATHING 
PROBLEMS  (Reported)
Trazodone HCl 50 MG TABLET   1 TAB PO QPM SLEEP  (Reported)
 
Triage Nurses Notes Reviewed? yes
Onset: Abrupt
Duration: day(s): (6), constant
Timing: recent history
Severity: moderate
Activities at Onset: none
Prior Episodes/Possible Cause: occasional episodes
Associated Symptoms: cough
HPI:
 67 year old male with PMH significant for paroxysmal atrial fibrillation on 
elliquis, COPD on 2 L home oxygen, bipap at night, paraplegia as a result of 
spinal epidural abscess with chronic Castro catheter for neurogenic bladder, 
hypertension, hyperlipidemia, CAD s/p PCI  and , aortic valve 
endocarditis presents to the ER for evaluation with his partner.  Since being 
discharged 6 days ago his partner states that at nighttime his oxygen saturation
is been checking has been dropping into the low 80s when he is on his BiPAP.  He
states he's had this before and he is retaining CO2 they take him off his BiPAP 
and he switched over to his nasal cannula and immediately rebounds [phone]% on his 
2 L nasal cannula.  The patient partner states that while he is asleep on the 
BiPAP he is singing and acting abnormally however upon being taken off the BiPAP
he is back to his normal self.  They state that a new mask is brought out 
earlier this week however the symptoms have persisted and they're concerned that
the BiPAP machine is not working.  On arrival patient denies any complaints he 
is currently on prednisone.  No fever no chills he's had a nonproductive cough 
no chest pain abdominal pain
(Jasper Lin)
 
Past History
 
Travel History
Traveled to [place] past 21 day No
 
Medical History
Any Pertinent Medical History? see below for history
Neurological: C6-7 ABSCESS PARAPLEGIA
EENT: NONE
Cardiovascular: AFIB, hypertension, hyperlipidemia, myocardial infarction
Respiratory: asthma, COPD, OXYGEN DEPEND 2L
Gastrointestinal: NONE
Hepatic: NONE
Renal: neurogenic bladder
Musculoskeletal: PARAPLEGIA R/T ABSCESS
Psychiatric: NONE
Endocrine: NONE
Blood Disorders: NONE
Cancer(s): NONE
GYN/Reproductive: NONE
History of MRSA: Yes
History of VRE: No
History of CDIFF: No
Influenza Vaccine: 10/16/17
 
Surgical History
Surgical History: non-contributory
 
Psychosocial History
Who do you live with Significant Other
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
What is your primary language English
 
Family History
Family History, If Any:
Relation not specified for:
  *No pertinent family history
 
Hx Contributory? No
(Jasper Lin)
 
Review of Systems
 
Review of Systems
Constitutional:
Reports: see HPI. 
Comments
Review of systems: See HPI, All other systems negative.
Constitutional, no chills no fever, no malaise 
HEENT:  no sore throat no congestion
Cardiovascular: No chest pain , no palpitation
Skin:  no rashes, no change in skin
Respiratory: SEE HPI
GI: No nausea no vomiting, no diarrhea
: No dysuria No hematuria, no frequency
Muscle skeletal: No joint pain, no back pain
Neurologic: , no headache
Psych: No stress
Heme/endocrine: No bruising 
Immunology: No lymphadenopathy
(Jasper Lin)
 
Physical Exam
 
Physical Exam
General Appearance: well developed/nourished, no apparent distress, alert
Respiratory: decreased breath sounds
Comments:
Well-developed well-nourished person in no acute distress
HEENT: Normal EENT exam; PERRL, EOMI, HEAD is atraumatic. moist mucous 
membranes.  
Neck: Supple, normal range of motion
Back: Full range of motion
Cardiovascular: Regular rate and rhythms no murmurs rubs or gallops, normal JVP
Respiratory:. No respiratory distress.  Patient speaking in full complete 
sentences. diminished breath sounds b/l
Abdomen: Soft, nontender nondistended, no appreciable organomegaly. Normal bowel
sounds. No rebound/guarding, No appreciable enlargement of the abdominal aorta, 
No ascites.
Extremity: No edema, full range of motion of upper extremities
Neuro: Alert oriented x3, motor sensory normal, 
Skin: No appreciable rash on exposed skin, skin is warm and dry.
Psych: Mood and affect is normal, memory and judgment is normal.
 
Core Measures
ACS in differential dx? Yes
CVA/TIA Diagnosis No
Sepsis Present: No
Sepsis Focused Exam Completed? No
(Jasper Lin)
 
Progress
Differential Diagnosis: asthma, AMI, bronchitis, costochondritis, CHF, COPD, 
pericarditis, pulmonary embolism, pneumonia, pneumothorax, unstable angina
Plan of Care:
 Orders
 
 
Procedure Date/time Status
 
Heart Healthy Diet  B Active
 
CONTIN. POSITIVE AIRWAY PRESS  193 Complete
 
CASE MANAGEMENT CONSULT 1916 Active
 
Place in observation 1912 Active
 
ED Holding Orders 1912 Active
 
Patient Data 1912 Active
 
Vital Signs 1912 Active
 
Code Status 1912 Active
 
RAPID VIRAL INFLUENZA A  1751 Complete
 
BLOOD CULTURE  175 Active
 
TROPONIN LEVEL  175 Complete
 
COMPREHENSIVE METABOLIC PANEL  175 Complete
 
CBC WITHOUT DIFFERENTIAL  175 Complete
 
Intake & Output  1747 Active
 
EKG  1747 Active
 
 
 Current Medications
 
 
  Sig/Viet Start time  Last
 
Medication Dose  Stop Time Status Admin
 
Atorvastatin Calcium 20 MG 1700  1700 AC 
 
(Lipitor)     
 
Omeprazole 20 MG DAILY AC  0700 AC 
 
(Prilosec)     
 
Prednisone 40 MG ONCE ONE  0700 AC 
 
    07  
 
Albuterol Sulfate 2 PUF Q4 2200 AC 
 
(Ventolin)     
 
Apixaban 5 MG BID 2200 AC 
 
(Eliquis)     
 
Baclofen 20 MG BID 2200 AC 
 
(Lioresal 10MG      
 
Tablet)     
 
Budesonide/ 2 PUF BID 
 
Formoterol Fumarate     
 
(SYMBICORT)     
 
Gabapentin 600 MG Q8 
 
(Neurontin)     
 
Guaifenesin 600 MG Q12 
 
(Mucinex)     
 
Nortriptyline HCl 10 MG AT BEDTIME 
 
(Aventyl 10MG -      
 
Pamelor Cap)     
 
Sotalol HCl 80 MG BID 
 
(Betapace)     
 
Trazodone HCl 50 MG AT BEDTIME 
 
(Desyrel)     
 
Lorazepam 1 MG 0800,1700 PRN 2015 AC 
 
(Ativan)     
 
Oxycodone/ 1 TAB Q4P PRN 
 
Acetaminophen     2110
 
(Percocet)     
 
Rifampin 300 MG DAILY 
 
(Rifadin-Rimactane      2111
 
300MG Cap)     
 
 
 Laboratory Tests
 
 
 
18 1805:
Anion Gap 10, Estimated GFR > 60, BUN/Creatinine Ratio 25.0, Glucose 88, Calcium
9.5, Total Bilirubin 0.3, AST 41, ALT 82  H, Alkaline Phosphatase 86, Troponin I
< 0.01, Total Protein 7.5, Albumin 3.7, Globulin 3.8, Albumin/Globulin Ratio 1.0
 L, CBC w Diff NO MAN DIFF REQ, RBC 4.50  L, MCV 92.0, MCH 28.8, RDW 14.7  H, 
MPV 8.1, Gran % 74.9, Lymphocytes % 17.1  L, Monocytes % 6.4, Eosinophils % 1.4,
Basophils % 0.2, Absolute Granulocytes 12.2  H, Absolute Lymphocytes 2.8, 
Absolute Monocytes 1.0  H, Absolute Eosinophils 0.2, Absolute Basophils 0, PUBS 
MCHC 31.3  L
 Microbiology
2005  BLOOD: Blood Culture - RECD
1938  NASOPHARYN: Influenza Virus A & B Rapid Smear - COMP
1805  BLOOD: Blood Culture - RECD
 
labs ordered, old records reviewed, case d/w dr bagley. pt was med with 
solumedrol en route IV, duoneb en route. he is declining another tx at this time
, speaking in full sentences
 
 
 I discussed with the patient at length all of his lab results and x-ray 
findings he is alert and oriented 3 on repeat auscultation lungs are decreased 
however no wheezing or rhonchi.  He is declining a breathing treatment again 
when offered and discussed with him plan of care and need for observation 
overnight here in the emergency room for continuous pulse ox monitoring, case 
management consult in the a.m. to speak with the patient's medical supply 
company regarding his BiPAP machine at home.
 
 
2100 pt resting in nad, denies dyspnea
Diagnostic Imaging:
Viewed by Me: Radiology Read.  Discussed w/RAD: Radiology Read. 
Radiology Impression: PATIENT: DAYDAY HOOVER  MEDICAL RECORD NO: [id number] PRESENT
AGE: 67  PATIENT ACCOUNT NO: [id number] : [birth year]  LOCATION: [facility] ORDERING 
PHYSICIAN: Jasper DOWNEY     SERVICE DATE:  EXAM TYPE: RAD - XRY-
PORTABLE CHEST XRAY EXAMINATION: CHEST 1 VIEW CLINICAL INFORMATION: Dyspnea, 
cough. COMPARISON: 2018. TECHNIQUE: An AP view of the chest is provided. 
FINDINGS: The cardiac silhouette is not enlarged. The mediastinal and hilar 
contours are unremarkable. There are neither pleural effusions nor 
pneumothoraces. There is mild hazy opacification within both lung bases. The 
osseous structures are unremarkable. IMPRESSION: Mild nonspecific hazy 
opacification within both lung bases. Consider correlation with a lateral view, 
if possible. DICTATED BY: Vinod Bridges MD  DATE/TIME DICTATED:18 
:RAD.TESFAYE  DATE/TIME TRANSCRIBED:18 CONFIDENTIAL, 
DO NOT COPY WITHOUT APPROPRIATE AUTHORIZATION.  <Electronically signed in Other 
Vendor System>                                                                  
                     SIGNED BY: Vinod Bridges MD 18 1830
Initial ED EKG: normal intervals, normal p-waves, normal QRS complex, normal 
sinus rhythm
Prior EKG: unchanged
Hand-Off
   Endorsed To:
Catalino Bagley MD
   Endorsed Time: 2300
   Pending: other (CASE MANAGEMENT)
(Jasper Lin)
 
Departure
 
Departure
Disposition: STILL A PATIENT
Condition: Stable
Clinical Impression
Primary Impression: COPD exacerbation
Secondary Impressions: Difficulty with BiPAP use
Referrals:
Indira JONES,Ramsey YANG (PCP/Family)
 
Departure Forms:
Customer Survey
General Discharge Information
(Jasper Lin)
 
PA/NP Co-Sign Statement
Statement:
ED Attending supervision documentation-
 
[X] I saw and evaluated the patient. I have also reviewed all the pertinent lab 
results and diagnostic results. I agree with the findings and the plan of care 
as documented in the PA's/NP's documentation. 
 
[X] I have reviewed the ED Record and agree with the PA's/NP's documentation.
 
[] Additions or exceptions (if any) to the PAs/NP's note and plan are 
summarized below:
[]
 
(Catalino Bagley MD)
 
Critical Care Note
 
Critical Care Note
Critical Care Time: non-applicable
(Jasper Lin)
 
ED Attending Observation
Initial Observation Note:
I have seen and personally examined DAYDAY HOOVER 
on 18 at 1912. 
 
I agree with the current emergency department documentation.  The disposition 
(admission or discharge) is uncertain at this time, he needs a period of 
observation for the following reason(s): [Patient to remain in the emergency 
department for observation for respiratory monitoring.  Patient will be placed 
on our BiPAP machine and closely monitored.  We will monitor his pulse ox 
remains normal then he will be evaluated in the morning for a new machine.  If 
he desaturates he may require admission for pulmonary consultation.]
 
The ED Nurse caring for this patient has been personally informed as to what the
patient is being observed for.
 
(Catalino Bagley MD)

## 2018-01-23 VITALS — DIASTOLIC BLOOD PRESSURE: 84 MMHG | SYSTOLIC BLOOD PRESSURE: 136 MMHG

## 2018-01-23 NOTE — CONS- PULMONARY
General Information and HPI
 
Consulting Request
Date of Consult: 01/23/18
Requested By:
Dr. Bagley
Reason for Consult:
Low oxygen levels, question of faulty BIPAP.
Source of Information: patient, old records
Exam Limitations: no limitations
 
Allergies/Medications
Allergies:
Coded Allergies:
heparin (Intermediate, SWELLING, RASH 10/16/17)
vancomycin (Intermediate, HIVES, SKIN CRACKES, TURNS RED, SWELLS AND PEELS 10/16
/17)
warfarin (From COUMADIN) (Intermediate, RASH 10/16/17)
 
Home Med List:
Acetaminophen (Tylenol Arthritis) 650 MG TABLET.ER   1 TAB PO Q6-PRN PRN PAIN  (
Reported)
Albuterol Sulfate (Ventolin Hfa) 90 MCG HFA.AER.AD   2 PUF INH AD PRN COPD  (
Reported)
Apixaban (Eliquis) 5 MG TABLET   5 MG PO BID atrial fibrillation
Ascorbate Calcium (Vitamin C) 500 MG TABLET   1 TAB PO BID SUPPLEMENT  (Reported
)
Atomoxetine HCl (Strattera) 40 MG CAPSULE   1 CAP PO QAM MENTAL HEALTH  (
Reported)
Baclofen 20 MG TABLET   1 TAB PO BID MUSCLE RELAXER  (Reported)
Bisacodyl (Dulcolax) 10 MG SUPP.RECT   1 SUP RC Q48 GI  (Reported)
Budesonide/Formoterol Fumarate (Symbicort 80-4.5 Mcg Inhaler) 80 MCG-4.5 MCG/
ACTUATION HFA.AER.AD   2 PUF INH BID COPD  (Reported)
Docusate Sodium (Stool Softener) 100 MG CAPSULE   1 CAP PO DAILY STOOL SOFTENER 
(Reported)
Doxycycline Hyclate 100 MG CAPSULE   1 CAP PO BID infection  (Reported)
Evolocumab (Repatha Sureclick) 140 MG/ML PEN.INJCTR   1 ML SC Q 2 WEEKS 
CHOLESTEROL  (Reported)
Furosemide 20 MG TABLET   3 TAB PO Q48 DIURETIC  (Reported)
Gabapentin 600 MG TABLET   1 TAB PO TID NEUROPATHY  (Reported)
Guaifenesin (Mucinex) 600 MG TAB.ER.12H   2 TAB PO BID MUCUS  (Reported)
Lactobacillus Acidophilus (Acidophilus) 1 EACH CAPSULE   1 CAP PO BID PROBIOTIC 
(Reported)
Levofloxacin (Levaquin) 500 MG TABLET   1 TAB PO DAILY LUNG INFECTION
Lorazepam 1 MG TABLET   1 TAB PO BID ANXIETY  (Reported)
Magnesium Oxide (Magnesium) 400 MG CAPSULE   1 CAP PO 0600 SUPPLEMENT  (Reported
)
Metoprolol Succ XL (Toprol XL) 25 MG TAB   1 TAB PO DAILY HEART  (Reported)
Montelukast Sodium (Singulair) 10 MG TABLET   1 TAB PO DAILY ALLERGIES  (
Reported)
Multivitamin (Daily Value) 1 EACH TABLET   1 TAB PO DAILY VITAMIN SUPPORT  (
Reported)
Nortriptyline HCl 10 MG CAPSULE   1 CAP PO DAILY UNKNOWN  (Reported)
Omeprazole 20 MG CAPSULE.DR   1 CAP PO DAILY ACID REFLUX  (Reported)
Oxycodone HCl/Acetaminophen (Oxycodone-Acetaminophen 5-325) 5 MG-325 MG TABLET  
1 TAB PO Q4H PRN PAIN  (Reported)
Potassium Chloride 20 MEQ TAB.ER.PRT   1 TAB PO DAILY SUPPLEMENT  (Reported)
Prednisone 10 MG TABLET   1 TAB PO DAILY COPD
     DATE      TABS DAILY
      
     1/17-1/18  5
     1/19-1/20  4
     1/21-1/22  3
     1/23-1/24  2
     1/25-1/26  1
     1/27-1/28  0.5
Rifampin (Rifadin) 300 MG CAPSULE   300 MG PO BID MRSA  (Reported)
Rosuvastatin Calcium (Crestor) 5 MG TABLET   1 TAB PO DAILY CHOLESTEROL  (
Reported)
Sotalol (Betapace) 80 MG TABLET   80 MG PO BID AARYTHMIAS
Tiotropium Bromide (Spiriva) 18 MCG CAP.W.DEV   1 CAP INH DAILY BREATHING 
PROBLEMS  (Reported)
Trazodone HCl 50 MG TABLET   1 TAB PO QPM SLEEP  (Reported)
 
Current Medications:
 Current Medications
 
 
  Sig/Viet Start time  Last
 
Medication Dose Route Stop Time Status Admin
 
Albuterol Sulfate 2 PUF Q4 01/22 2200 AC 01/23
 
  INH   0735
 
Apixaban 5 MG BID 01/22 2200 AC 01/23
 
  PO   0922
 
Atorvastatin Calcium 20 MG 1700 01/23 1700 AC 
 
  PO   
 
Baclofen 20 MG BID 01/22 2200 AC 01/23
 
  PO   0922
 
Budesonide/ 2 PUF BID 01/22 2200 AC 01/22
 
Formoterol Fumarate  INH   2237
 
Gabapentin 0 .STK-MED ONE 01/23 0730 DC 
 
  PO   
 
Gabapentin 600 MG Q8 01/22 2200 AC 01/23
 
  PO   0735
 
Guaifenesin 600 MG Q12 01/22 2200 AC 01/23
 
  PO   0922
 
Lorazepam 1 MG 0800,1700 PRN 01/22 2015 AC 
 
  PO   
 
Nortriptyline HCl 10 MG AT BEDTIME 01/22 2200 AC 01/22
 
  PO   2236
 
Omeprazole 0 .STK-MED ONE 01/23 0731 DC 
 
  PO   
 
Omeprazole 0 .STK-MED ONE 01/23 0730 DC 
 
  PO   
 
Omeprazole 20 MG DAILY AC 01/23 0700 AC 01/23
 
  PO   0735
 
Oxycodone/ 0 .STK-MED ONE 01/23 0809 DC 
 
Acetaminophen  PO   
 
Oxycodone/ 0 .STK-MED ONE 01/22 2111 DC 
 
Acetaminophen  PO   
 
Oxycodone/ 1 TAB Q4P PRN 01/22 2015 AC 01/23
 
Acetaminophen  PO   0807
 
Prednisone 0 .STK-MED ONE 01/23 0730 DC 
 
  PO   
 
Prednisone 40 MG ONCE ONE 01/23 0700 DC 01/23
 
  PO 01/23 0701  0735
 
Rifampin 300 MG DAILY 01/22 2008 AC 01/23
 
  PO   0922
 
Sotalol HCl 80 MG BID 01/22 2200 AC 01/23
 
  PO   0922
 
Trazodone HCl 50 MG AT BEDTIME 01/22 2200 AC 01/22
 
  PO   2236
 
 
 
 
Review of Systems
 
Review of Systems
All Other Systems: Reviewed and Negative
 
Past History
 
Travel History
Traveled to [place] past 21 day No
 
Medical History
Neurological: C6-7 ABSCESS PARAPLEGIA
EENT: NONE
Cardiovascular: AFIB, hypertension, hyperlipidemia, myocardial infarction
Respiratory: asthma, COPD, OXYGEN DEPEND 2L
Gastrointestinal: NONE
Hepatic: NONE
Renal: neurogenic bladder
Musculoskeletal: PARAPLEGIA R/T ABSCESS
Psychiatric: NONE
Endocrine: NONE
Blood Disorders: NONE
Cancer(s): NONE
GYN/Reproductive: NONE
 
Surgical History
Surgical History: non-contributory
 
Family History
Relations & Conditions If Any:
Relation not specified for:
  *No pertinent family history
 
 
Psychosocial History
Who Do You Live With? partner
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
Primary Language: English
Smoking Status: Former Smoker
ETOH Use: denies use
Illicit Drug Use: denies illicit drug use
 
Functional Ability
ADLs
Needs Assist: dressing, eating, toileting, bathing. 
Ambulation: wheelchair
IADLs
Independent: shopping, housework, finances, food prep, telephone, transportation
, medication admin. 
 
Exam & Diagnostic Data
Last 24 Hrs of Vital Signs/I&O
 Vital Signs
 
 
Date Time Temp Pulse Resp B/P B/P Pulse O2 O2 Flow FiO2
 
     Mean Ox Delivery Rate 
 
01/23 0921 98.2 86 18 163/92  96 Nasal 2.0L 
 
       Cannula  
 
01/23 0653 95.7 91 20 153/81  97 Nasal 2.0L 
 
       Cannula  
 
01/23 0500  8 20 128/85  94 BIPAP  
 
01/23 0251 98.9 83 20 132/72  92 BIPAP  
 
01/23 0144  81    92   
 
01/22 2313 99.2 88 30 120/78  89 CPAP  
 
01/22 2245      90   
 
01/22 2156 98.3 87 24 134/80  94 Nasal 2.0L 
 
       Cannula  
 
01/22 1936 98.7 83 21 157/84  977 Nasal 2.0L 
 
       Cannula  
 
01/22 1752       Nasal  
 
       Cannula  
 
01/22 1739 96.8 83 20 137/67  98 Nasal 4.0L 
 
       Cannula  
 
 
 Intake & Output
 
 
 01/23 1600 01/23 0800 01/23 0000
 
Intake Total   
 
Output Total   500
 
Balance   -500
 
    
 
Output, Urine   500
 
Patient   223 lb
 
Weight   
 
Weight   Reported by Patient
 
Measurement   
 
Method   
 
 
 
 
 
Assessment/Plan
 
Consult Acknowledgment
- Thank you for your consult request.

## 2018-01-30 ENCOUNTER — HOSPITAL ENCOUNTER (OUTPATIENT)
Dept: HOSPITAL 68 - ERH | Age: 68
Setting detail: OBSERVATION
LOS: 2 days | Discharge: HOME HEALTH | End: 2018-02-01
Attending: INTERNAL MEDICINE | Admitting: INTERNAL MEDICINE
Payer: COMMERCIAL

## 2018-01-30 VITALS — WEIGHT: 233 LBS | BODY MASS INDEX: 34.51 KG/M2 | HEIGHT: 69 IN

## 2018-01-30 VITALS — DIASTOLIC BLOOD PRESSURE: 70 MMHG | SYSTOLIC BLOOD PRESSURE: 128 MMHG

## 2018-01-30 DIAGNOSIS — I25.10: ICD-10-CM

## 2018-01-30 DIAGNOSIS — G82.20: ICD-10-CM

## 2018-01-30 DIAGNOSIS — I48.0: ICD-10-CM

## 2018-01-30 DIAGNOSIS — J44.1: ICD-10-CM

## 2018-01-30 DIAGNOSIS — J96.22: Primary | ICD-10-CM

## 2018-01-30 DIAGNOSIS — Z86.14: ICD-10-CM

## 2018-01-30 DIAGNOSIS — G47.33: ICD-10-CM

## 2018-01-30 DIAGNOSIS — Z99.81: ICD-10-CM

## 2018-01-30 DIAGNOSIS — I25.2: ICD-10-CM

## 2018-01-30 DIAGNOSIS — Z79.01: ICD-10-CM

## 2018-01-30 DIAGNOSIS — E78.5: ICD-10-CM

## 2018-01-30 DIAGNOSIS — N31.9: ICD-10-CM

## 2018-01-30 DIAGNOSIS — Z95.5: ICD-10-CM

## 2018-01-30 LAB
ABSOLUTE GRANULOCYTE CT: 11.6 /CUMM (ref 1.4–6.5)
BASOPHILS # BLD: 0 /CUMM (ref 0–0.2)
BASOPHILS NFR BLD: 0.1 % (ref 0–2)
EOSINOPHIL # BLD: 0.4 /CUMM (ref 0–0.7)
EOSINOPHIL NFR BLD: 2.5 % (ref 0–5)
ERYTHROCYTE [DISTWIDTH] IN BLOOD BY AUTOMATED COUNT: 15.6 % (ref 11.5–14.5)
GRANULOCYTES NFR BLD: 74.4 % (ref 42.2–75.2)
HCT VFR BLD CALC: 39.1 % (ref 42–52)
LYMPHOCYTES # BLD: 2.7 /CUMM (ref 1.2–3.4)
MCH RBC QN AUTO: 29.1 PG (ref 27–31)
MCHC RBC AUTO-ENTMCNC: 31.6 G/DL (ref 33–37)
MCV RBC AUTO: 92 FL (ref 80–94)
MONOCYTES # BLD: 0.9 /CUMM (ref 0.1–0.6)
PLATELET # BLD: 289 /CUMM (ref 130–400)
PMV BLD AUTO: 7.9 FL (ref 7.4–10.4)
RED BLOOD CELL CT: 4.25 /CUMM (ref 4.7–6.1)
WBC # BLD AUTO: 15.6 /CUMM (ref 4.8–10.8)

## 2018-01-30 PROCEDURE — G0378 HOSPITAL OBSERVATION PER HR: HCPCS

## 2018-01-30 NOTE — ED DYSPNEA/ASTHMA COMPLAINT
History of Present Illness
 
General
Chief Complaint: Dyspnea (COPD, CHF, Other)
Stated Complaint: BIBA SOB
Source: patient, old records, EMS
Exam Limitations: clinical condition
 
Vital Signs & Intake/Output
Vital Signs & Intake/Output
 Vital Signs
 
 
Date Time Temp Pulse Resp B/P B/P Pulse O2 O2 Flow FiO2
 
     Mean Ox Delivery Rate 
 
01/30 1341  86    95   
 
01/30 1338      97 BIPAP 40% 
 
01/30 1337 96.8 81 24 128/77  97 BIPAP 40% 
 
 
 
Allergies
Coded Allergies:
heparin (Intermediate, SWELLING, RASH 10/16/17)
vancomycin (Intermediate, HIVES, SKIN CRACKES, TURNS RED, SWELLS AND PEELS 10/16
/17)
warfarin (From COUMADIN) (Intermediate, RASH 10/16/17)
 
Reconcile Medications
Acetaminophen (Tylenol Arthritis) 650 MG TABLET.ER   1 TAB PO Q6-PRN PRN PAIN  (
Reported)
Albuterol Sulfate (Ventolin Hfa) 90 MCG HFA.AER.AD   2 PUF INH AD PRN COPD  (
Reported)
Apixaban (Eliquis) 5 MG TABLET   5 MG PO BID atrial fibrillation
Ascorbate Calcium (Vitamin C) 500 MG TABLET   1 TAB PO BID SUPPLEMENT  (Reported
)
Atomoxetine HCl (Strattera) 40 MG CAPSULE   1 CAP PO QAM MENTAL HEALTH  (
Reported)
Baclofen 20 MG TABLET   1 TAB PO BID MUSCLE RELAXER  (Reported)
Bisacodyl (Dulcolax) 10 MG SUPP.RECT   1 SUP RC Q48 GI  (Reported)
Budesonide/Formoterol Fumarate (Symbicort 80-4.5 Mcg Inhaler) 80 MCG-4.5 MCG/
ACTUATION HFA.AER.AD   2 PUF INH BID COPD  (Reported)
Docusate Sodium (Stool Softener) 100 MG CAPSULE   1 CAP PO DAILY STOOL SOFTENER 
(Reported)
Doxycycline Hyclate 100 MG CAPSULE   1 CAP PO BID infection  (Reported)
Evolocumab (Repatha Sureclick) 140 MG/ML PEN.INJCTR   1 ML SC Q 2 WEEKS 
CHOLESTEROL  (Reported)
Furosemide 20 MG TABLET   3 TAB PO Q48 DIURETIC  (Reported)
Gabapentin 600 MG TABLET   1 TAB PO TID NEUROPATHY  (Reported)
Guaifenesin (Mucinex) 600 MG TAB.ER.12H   1 TAB PO BID CONGESTION  (Reported)
Lactobacillus Acidophilus (Acidophilus) 1 EACH CAPSULE   1 CAP PO BID PROBIOTIC 
(Reported)
Lorazepam 1 MG TABLET   1 TAB PO BID ANXIETY  (Reported)
Magnesium Oxide (Magnesium) 400 MG CAPSULE   1 CAP PO 0600 SUPPLEMENT  (Reported
)
Metoprolol Succ XL (Toprol XL) 25 MG TAB   1 TAB PO DAILY HEART  (Reported)
Montelukast Sodium (Singulair) 10 MG TABLET   1 TAB PO DAILY ALLERGIES  (
Reported)
Multivitamin (Daily Value) 1 EACH TABLET   1 TAB PO DAILY VITAMIN SUPPORT  (
Reported)
Nortriptyline HCl 10 MG CAPSULE   1 CAP PO DAILY UNKNOWN  (Reported)
Omeprazole 20 MG CAPSULE.DR   1 CAP PO DAILY ACID REFLUX  (Reported)
Oxycodone HCl/Acetaminophen (Oxycodone-Acetaminophen 5-325) 5 MG-325 MG TABLET  
1 TAB PO Q4H PRN PAIN  (Reported)
Potassium Chloride 20 MEQ TAB.ER.PRT   1 TAB PO DAILY SUPPLEMENT  (Reported)
Rifampin (Rifadin) 300 MG CAPSULE   300 MG PO BID MRSA  (Reported)
Rosuvastatin Calcium (Crestor) 5 MG TABLET   1 TAB PO DAILY CHOLESTEROL  (
Reported)
Sotalol (Betapace) 80 MG TABLET   80 MG PO BID AARYTHMIAS
Tiotropium Bromide (Spiriva) 18 MCG CAP.W.DEV   1 CAP INH DAILY BREATHING 
PROBLEMS  (Reported)
Trazodone HCl 50 MG TABLET   1 TAB PO QPM SLEEP  (Reported)
 
Core Measure Meds Pre-Hospital Eliquis
Triage Note:
66 Y/O MALE BIBA FROM HOME FOR EVAL OF SOB SINCE
YESTERDAY; RECENTLY EVAL'D IN ED FOR SAME AND WAS
D/ERNESTINA WITH COMPANY COMING OUT TO REPAIR HOME
BIPAP.  PT STATES BIPAP HAS BEEN FIXED BUT HE DID
NOT WEAR IT LAST EVENING.  PARAMEDIC ADMINISTERED
2 DUO NEBS, 1 ALBUTEROL TX, 125MG SOLU MEDROL, AND
2GRAMS MAG WITH IMPROVEMENT IN SOB.  ALSO BEGAN
CPAP.  PT ARRIVES A/O X 4, SPEAKING CLEARLY ON
CPAP - THEN SWITCHED TO BIPAP BY RT.  PT REPORTS
NON PRODUCTIVE COUGH.  CURRENTLY ON TAMIFLU BUT
HAD NEGATIVE FLU SWAB PER PT.  HX PARALYSIS FROM
WAIST DOWN, PRADO IN PLACE DRAINING YELLOW URINE.
PLACED ON MONITOR, RATE 70'S.  DENIES CHEST PAIN.
AFEBRILE.
MD INTO EVAL
Triage Nurses Notes Reviewed? yes
Onset: Evening
Duration: hour(s):, constant, getting worse
Timing: recent history
Severity: severe
Activities at Onset: none
Prior Episodes/Possible Cause: chronic episodes, illness exposure
Modifying Factors:
Improves With: rest.  Worsens With: movement. 
Associated Symptoms: wheezing, weakness
HPI:
The evening prior to admission patient decided not to use his BiPAP.
Prior to admission patient was found to be acutely short of breath with 
increased work of breathing given nebs CPAP magnesium and Solu-Medrol.
He denies fever chills nausea vomiting diarrhea abdominal pain chest pain 
dysuria rash bleeding headache.
 
Past History
 
Travel History
Traveled to [place] past 21 day No
 
Medical History
Any Pertinent Medical History? see below for history
Neurological: C6-7 ABSCESS PARAPLEGIA
EENT: NONE
Cardiovascular: AFIB, hypertension, hyperlipidemia, myocardial infarction
Respiratory: asthma, COPD, OXYGEN DEPEND 2L
Gastrointestinal: NONE
Hepatic: NONE
Renal: neurogenic bladder
Musculoskeletal: PARAPLEGIA R/T ABSCESS
Psychiatric: NONE
Endocrine: NONE
Blood Disorders: NONE
Cancer(s): NONE
GYN/Reproductive: NONE
History of MRSA: Yes
History of VRE: No
History of CDIFF: No
 
Surgical History
Surgical History: non-contributory
 
Psychosocial History
Who do you live with Significant Other
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
What is your primary language English
Tobacco Use: Quit >30 days ago
 
Family History
Family History, If Any:
Relation not specified for:
  *No pertinent family history
 
Hx Contributory? No
 
Review of Systems
 
Review of Systems
Constitutional:
Reports: see HPI, weakness. 
EENTM:
Reports: no symptoms. 
Respiratory:
Reports: see HPI, cough, short of breath, wheezing. 
Cardiovascular:
Reports: no symptoms. 
GI:
Reports: no symptoms. 
Genitourinary:
Reports: no symptoms. 
Musculoskeletal:
Reports: no symptoms. 
Skin:
Reports: no symptoms. 
Neurological/Psychological:
Reports: no symptoms. 
Hematologic/Endocrine:
Reports: no symptoms. 
Immunologic/Allergic:
Reports: no symptoms. 
All Other Systems: Reviewed and Negative
 
Physical Exam
 
Physical Exam
General Appearance: well developed/nourished, alert, awake, severe distress, 
obese
Head: atraumatic, normal appearance
Eyes:
Bilateral: normal appearance, PERRL, EOMI. 
Ears, Nose, Throat: normal pharynx, normal ENT inspection
Neck: normal inspection, supple, full range of motion, no midline tenderness
Respiratory: chest non-tender, decreased breath sounds, wheezing, respiratory 
distress
Cardiovascular: normal peripheral pulses, irregularly irregular, norml femoral 
pulses equa
Peripheral Pulses:
4+ carotid (R), 4+ carotid (L)
Gastrointestinal: normal bowel sounds, soft, non-tender, no organomegaly
Extremities: normal inspection, normal capillary refill, normal range of motion,
no edema, no ligament instability
Neurologic/Psych: awake, alert, oriented x 3, CNs II-XII nml as tested
Skin: intact, normal color, warm/dry
Lymphatic: no anterior cervical devin
 
Core Measures
ACS in differential dx? No
CVA/TIA Diagnosis No
Sepsis Present: No
Sepsis Focused Exam Completed? No
 
Progress
Differential Diagnosis: asthma, bronchitis, CHF, COPD, pneumonia
Plan of Care:
 Orders
 
 
Procedure Date/time Status
 
Regular Diet 01/31 D Active
 
RAPID VIRAL INFLUENZA A 01/30 1514 Active
 
OXYGEN SETUP (GEN) 01/30 1446 Active
 
Saline Lock 01/30 1446 Active
 
Place in observation 01/30 1446 Active
 
Vital Signs 01/30 1446 Active
 
Activity/Ambulation 01/30 1446 Active
 
Code Status 01/30 1446 Active
 
EKG 01/30 1347 Active
 
ARTERIAL BLOOD GAS (GEN) 01/30 1337 Complete
 
BLOOD CULTURE 01/30 1337 Active
 
TROPONIN LEVEL 01/30 1337 Complete
 
MAGNESIUM 01/30 1337 Complete
 
COMPREHENSIVE METABOLIC PANEL 01/30 1337 Complete
 
CBC WITHOUT DIFFERENTIAL 01/30 1337 Complete
 
B-TYPE NATRIURETIC PEP (BNP) 01/30 1337 Complete
 
BIPAP 01/30  UNK Complete
 
 
 Current Medications
 
 
  Sig/Viet Start time  Last
 
Medication Dose  Stop Time Status Admin
 
Azithromycin 500 MG ONCE ONE 01/30 1500 AC 
 
(Zithromax)   01/30 1559  
 
Sodium Chloride 250 ML    
 
(Normal Saline 0.9%)     
 
 
 Laboratory Tests
 
 
 
01/30/18 1430:
pH 7.31  L, pCO2 75 *H, pO2 83, HCO3 38  H, ABG O2 Sat (Measured) 95.0  L, P-50 
(Temp Corrected) Y, Carboxyhemoglobin 1.2  L, O2 Concentration % 40%, 
Temperature 96.8  L, Respiration Rate 24, O2 Delivery Method BIPAP, Vent Mode ST
, Expiratory Pressure 6, Inspiratory Pressure 24, Phlebotomy Draw Site RIGHT 
RADIAL
 
01/30/18 1338:
Anion Gap 9, Estimated GFR > 60, BUN/Creatinine Ratio 45.0  H, Glucose 109  H, 
Calcium 9.5, Magnesium 4.1  H, Total Bilirubin 0.5, AST 28, ALT 48, Alkaline 
Phosphatase 82, Troponin I < 0.01, Pro-B-Natriuretic Pept 466  H, Total Protein 
6.4, Albumin 3.2  L, Globulin 3.2, Albumin/Globulin Ratio 1.0  L, CBC w Diff NO 
MAN DIFF REQ, RBC 4.25  L, MCV 92.0, MCH 29.1, MCHC 31.6  L, RDW 15.6  H, MPV 
7.9, Gran % 74.4, Lymphocytes % 17.2  L, Monocytes % 5.8, Eosinophils % 2.5, 
Basophils % 0.1, Absolute Granulocytes 11.6  H, Absolute Lymphocytes 2.7, 
Absolute Monocytes 0.9  H, Absolute Eosinophils 0.4, Absolute Basophils 0
 Microbiology
01/30 1514  NASOPHARYN: Influenza Virus A & B Rapid Smear - ORD
01/30 1510  BLOOD: Blood Culture - RECD
01/30 1337  BLOOD: Blood Culture - ORD
 
 
Diagnostic Imaging:
Viewed by Me: Radiology Read.  Discussed w/RAD: Radiology Read. 
CXR Impression: 1. The study redemonstrates opacities at the lower zones 
bilaterally. 2. The cardiac silhouette is normal in size.
Initial ED EKG: normal axis, normal intervals, normal p-waves, normal QRS 
complex, normal sinus rhythm, no ST T wave changes
Prior EKG: unchanged
Rhythm Strip: normal sinus rhythm
 
Departure
 
Departure
Disposition: STILL A PATIENT
Condition: Stable
Clinical Impression
Primary Impression: Respiratory failure with hypercapnia
Secondary Impressions: COPD with exacerbation, Pneumonia
Referrals:
Indira JONES,Ramsey YANG (PCP/Family)
 
Departure Forms:
Customer Survey
General Discharge Information
 
Observation Note
Spoke With:
Jesusita JONES,Christiano
Physician Advisor Notified: KATHIE JONES,BENJA SERRANO
Place Patient In: Non-ED OBS Care Area
Rationale for Observation:
My rational for observation is as follows BiPAP serial lab exam serial beta 
agonist nebs medication adjustment IV steroids IV antibiotics physical therapy 
pulmonary evaluation continuing care discharge planning.
 
 
Critical Care Note
 
Critical Care Note
Critical Care Time: 30-74 min (35)

## 2018-01-30 NOTE — HISTORY & PHYSICAL
Gretchen Palumbo 01/30/18 1519:
General Information and HPI
MD Statement:
I have seen and personally examined DAYDAY MANDUJANO and documented this H&P.
 
The patient is a 67 year old M who presented with a patient stated chief 
complaint of [Shortness of Breath].
 
Source of Information: patient, family, old records
Exam Limitations: clinical condition
History of Present Illness:
Mr. Mandujano is a 68yo M w/ PMH of COPD (on 2L O2/Bipap), BLE edema, GARRY, HTN, HLD,
A-fib on eliquis, neurogenic bladder, depression, CAD s/p stent placement, IVC 
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 
shortness of breath x 1 day PTA. Patient had problem with his BiPAP from last 
discharge and the company came and repaired it. However, patient admitted that 
he was not using the BiPAP last evening. Patient also reported non-productive 
cough and was on Tamiflu however he had negative flu swab per patient. Patient 
was then found be SOB this AM and paramedics was called. Patient received 2x Duo
Nebs, 1 Albuterol, and 125mg Solumedrol, and 2g Mg during transportation and 
felt improved. He was then put on BiPAP in ER. During our clinical interaction, 
patient was on BiPAP with no appparent stress and responded clearly to our 
words. Patient's family member was also in the room to aid on history taking. 
Of note, patient also had a history of paralegia from spinal abscess before and 
had a marquez in place. 
Patient denied fever/night sweat/weight change/mood change/insomnia, dietary/
appetite change.
Patient denied Chest Pain/Palpitation/Abdominal pain, bowel movement/urinary 
abnormality, or other skin/musculoskeletal/neurological disorders.
 
 
 
Allergies/Medications
Allergies:
Coded Allergies:
heparin (Intermediate, SWELLING, RASH 10/16/17)
vancomycin (Intermediate, HIVES, SKIN CRACKES, TURNS RED, SWELLS AND PEELS 10/16
/17)
warfarin (From COUMADIN) (Intermediate, RASH 10/16/17)
 
Home Med list
Acetaminophen (Tylenol Arthritis) 650 MG TABLET.ER   1 TAB PO Q6-PRN PRN PAIN  (
Reported)
Albuterol Sulfate (Ventolin Hfa) 90 MCG HFA.AER.AD   2 PUF INH AD PRN COPD  (
Reported)
Apixaban (Eliquis) 5 MG TABLET   5 MG PO BID atrial fibrillation
Ascorbate Calcium (Vitamin C) 500 MG TABLET   1 TAB PO BID SUPPLEMENT  (Reported
)
Atomoxetine HCl (Strattera) 40 MG CAPSULE   1 CAP PO QAM MENTAL HEALTH  (
Reported)
Baclofen 20 MG TABLET   1 TAB PO BID MUSCLE RELAXER  (Reported)
Bisacodyl (Dulcolax) 10 MG SUPP.RECT   1 SUP RC Q48 GI  (Reported)
Budesonide/Formoterol Fumarate (Symbicort 80-4.5 Mcg Inhaler) 80 MCG-4.5 MCG/
ACTUATION HFA.AER.AD   2 PUF INH BID COPD  (Reported)
Docusate Sodium (Stool Softener) 100 MG CAPSULE   1 CAP PO DAILY STOOL SOFTENER 
(Reported)
Doxycycline Hyclate 100 MG CAPSULE   1 CAP PO BID infection  (Reported)
Evolocumab (Repatha Sureclick) 140 MG/ML PEN.INJCTR   1 ML SC Q 2 WEEKS 
CHOLESTEROL  (Reported)
Furosemide 20 MG TABLET   3 TAB PO Q48 DIURETIC  (Reported)
Gabapentin 600 MG TABLET   1 TAB PO TID NEUROPATHY  (Reported)
Guaifenesin (Mucinex) 600 MG TAB.ER.12H   1 TAB PO BID CONGESTION  (Reported)
Lactobacillus Acidophilus (Acidophilus) 1 EACH CAPSULE   1 CAP PO BID PROBIOTIC 
(Reported)
Lorazepam 1 MG TABLET   1 TAB PO BID ANXIETY  (Reported)
Magnesium Oxide (Magnesium) 400 MG CAPSULE   1 CAP PO 0600 SUPPLEMENT  (Reported
)
Metoprolol Succ XL (Toprol XL) 25 MG TAB   1 TAB PO DAILY HEART  (Reported)
Montelukast Sodium (Singulair) 10 MG TABLET   1 TAB PO DAILY ALLERGIES  (
Reported)
Multivitamin (Daily Value) 1 EACH TABLET   1 TAB PO DAILY VITAMIN SUPPORT  (
Reported)
Nortriptyline HCl 10 MG CAPSULE   1 CAP PO DAILY UNKNOWN  (Reported)
Omeprazole 20 MG CAPSULE.DR   1 CAP PO DAILY ACID REFLUX  (Reported)
Oxycodone HCl/Acetaminophen (Oxycodone-Acetaminophen 5-325) 5 MG-325 MG TABLET  
1 TAB PO Q4H PRN PAIN  (Reported)
Potassium Chloride 20 MEQ TAB.ER.PRT   1 TAB PO DAILY SUPPLEMENT  (Reported)
Rifampin (Rifadin) 300 MG CAPSULE   300 MG PO BID MRSA  (Reported)
Rosuvastatin Calcium (Crestor) 5 MG TABLET   1 TAB PO DAILY CHOLESTEROL  (
Reported)
Sotalol (Betapace) 80 MG TABLET   80 MG PO BID AARYTHMIAS
Tiotropium Bromide (Spiriva) 18 MCG CAP.W.DEV   1 CAP INH DAILY BREATHING 
PROBLEMS  (Reported)
Trazodone HCl 50 MG TABLET   1 TAB PO QPM SLEEP  (Reported)
 
 
Past History
 
Travel History
Traveled to [place] past 21 day No
 
Medical History
Neurological: C6-7 ABSCESS PARAPLEGIA
EENT: NONE
Cardiovascular: AFIB, hypertension, hyperlipidemia, myocardial infarction
Respiratory: asthma, COPD, OXYGEN DEPEND 2L
Gastrointestinal: NONE
Hepatic: NONE
Renal: neurogenic bladder
Musculoskeletal: PARAPLEGIA R/T ABSCESS
Psychiatric: NONE
Endocrine: NONE
Blood Disorders: NONE
Cancer(s): NONE
GYN/Reproductive: NONE
History of MRSA: Yes
History of VRE: No
History of CDIFF: No
 
Surgical History
Surgical History: non-contributory
 
Past Family/Social History
 
Family History
Relations & Conditions if any
Relation not specified for:
  *No pertinent family history
 
 
Psychosocial History
Who Do You Live With? partner
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
Primary Language: English
Smoking Status: Former Smoker
ETOH Use: denies use
Illicit Drug Use: denies illicit drug use
 
Functional Ability
ADLs
Needs Assist: dressing, eating, toileting, bathing. 
Ambulation: wheelchair
IADLs
Independent: shopping, housework, finances, food prep, telephone, transportation
, medication admin. 
 
Review of Systems
 
Review of Systems
Constitutional:
Reports: see HPI. 
 
Exam & Diagnostic Data
Last 24 Hrs of Vital Signs/I&O
 Vital Signs
 
 
Date Time Temp Pulse Resp B/P B/P Pulse O2 O2 Flow FiO2
 
     Mean Ox Delivery Rate 
 
01/30 1647  76       
 
01/30 1602      95 BIPAP  
 
01/30 1526  71 24 132/73  100 BIPAP 40% 
 
01/30 1341  86    95   
 
01/30 1338      97 BIPAP 40% 
 
01/30 1337 96.8 81 24 128/77  97 BIPAP 40% 
 
 
 Intake & Output
 
 
 01/30 1600 01/30 0800 01/30 0000
 
Intake Total   
 
Output Total   
 
Balance   
 
    
 
Patient 105.687 kg  
 
Weight   
 
Weight Reported by Patient  
 
Measurement   
 
Method   
 
 
 
 
Physical Exam
General Appearance Alert, Oriented X3, Cooperative, No Acute Distress, On BiPAP
Skin No Breakdown, No Significant Lesion
Skin Temp/Moisture Exam: Cool/Dry
Sepsis Skin Exam (color): Normal for Ethnicity
HEENT Atraumatic, PERRLA, EOMI
Neck Supple, No JVD
Cardiovascular Regular Rate, Normal S1, Normal S2
Lungs Normal Air Movement, Bilateral Wheeze
Abdomen Normal Bowel Sounds, Soft, No Tenderness
Neurological Normal Speech, Strength at 5/5 X4 Ext
Extremities No Cyanosis, No Edema, Normal Pulses
Last 24 Hrs of Labs/Stalin:
 Laboratory Tests
 
01/30/18 1430:
pH 7.31  L, pCO2 75 *H, pO2 83, HCO3 38  H, ABG O2 Sat (Measured) 95.0  L, P-50 
(Temp Corrected) Y, Carboxyhemoglobin 1.2  L, O2 Concentration % 40%, 
Temperature 96.8  L, Respiration Rate 24, O2 Delivery Method BIPAP, Vent Mode ST
, Expiratory Pressure 6, Inspiratory Pressure 24, Phlebotomy Draw Site RIGHT 
RADIAL
 
01/30/18 1338:
Anion Gap 9, Estimated GFR > 60, BUN/Creatinine Ratio 45.0  H, Glucose 109  H, 
Calcium 9.5, Magnesium 4.1  H, Total Bilirubin 0.5, AST 28, ALT 48, Alkaline 
Phosphatase 82, Troponin I < 0.01, Pro-B-Natriuretic Pept 466  H, Total Protein 
6.4, Albumin 3.2  L, Globulin 3.2, Albumin/Globulin Ratio 1.0  L, CBC w Diff NO 
MAN DIFF REQ, RBC 4.25  L, MCV 92.0, MCH 29.1, MCHC 31.6  L, RDW 15.6  H, MPV 
7.9, Gran % 74.4, Lymphocytes % 17.2  L, Monocytes % 5.8, Eosinophils % 2.5, 
Basophils % 0.1, Absolute Granulocytes 11.6  H, Absolute Lymphocytes 2.7, 
Absolute Monocytes 0.9  H, Absolute Eosinophils 0.4, Absolute Basophils 0
 Microbiology
01/30 1545  NASOPHARYN: Influenza Virus A & B Rapid Smear - COMP
01/30 1510  BLOOD: Blood Culture - RECD
01/30 1337  BLOOD: Blood Culture - ORD
 
 
 
Diagnostic Data
EKG Results
Normal Sinus Rhythm
 
Assessment/Plan
Assessment:
Mr. Mandujano is a 68yo M w/ PMH of COPD (on 2L O2/Bipap), BLE edema, GARRY, HTN, HLD,
A-fib on eliquis, neurogenic bladder, depression, CAD s/p stent placement, IVC 
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 
shortness of breath x 1 day PTA. Patient had problem with his BiPAP from last 
discharge and the company came and repaired it. However, patient admitted that 
he was not using the BiPAP last evening. Patient also reported non-productive 
cough and was on Tamiflu however he had negative flu swab per patient. Patient 
was then found be SOB this AM and paramedics was called. 
 
ER Course:
VSS on BIPAP, afebrile
 
Physical exam as above
Labs remarkable for leukocytosis of 15.6, H/H 12.4/39.1, ABG@7.31/75/83/38. BEP 
Na 142, K 4.8, Cl 93, CO2 40, Cr 0.4, ProBNP 466,
 
-CXR: demonstrated opacities at lower zones bilaterally consistent with 
atelectasis similar to previous study.
-EKG: Normal sinus rhythm without significant ST-T abnormalities.
-Interventions in ER:
Patient received 2x Duo Nebs, 1 Albuterol, and 125mg Solumedrol, and 2g Mg from 
EMS
Ceftazidime x 1 + Azithromycin 500mg x 1
 
Assessment:
Patient clinical status was afebrile with leukocytosis of 15.6 on admission, 
with hypoxic hypercapnic respiratory acidosis, unspecific CXR findings lead to 
diagnosis of COPD exacerbation from incompliance of BiPAP use. His elevation of 
serum bicarbonate indicated chronic hypercarbic from renal compensation. Patient
had no significant wheezing or worsening course in typical COPD exacerbation 
however would still consider treating with steroids. 
 
 
Problem list
#Hypercarbic Hypoxic respiratory failure
#Hx of: CAD, COPD, GARRY, A-fib, HLD, Paralegia, Neurogenic bladder
 
 
Plan
- Observe in Gen Med
- O2/BiPAP per RT, TRC/Nebulizer, Mucinex, Singlair, repeat ABG once patient 
agreed
- Continue IV Solumedrol 40mg q8 for now and taper per clinical condition
- Pending Blood/Sputum culture
- Continued home meds including: Lasix, Lipitor, K-dur, Omeprazole, 
Nortryptyline, Singulair, Metoprolol, Sotalol, trazodone, Rifampin, Eliquis.
 
 
DVT prophylaxis Eliquis + ALPS
Regular Diet
Full Code
 
 
 
 
As Ranked By This Provider
Problem List:
 1. Difficulty with BiPAP use
 
 2. BiPAP (biphasic positive airway pressure) dependence
 
 3. Hypercapnic respiratory failure
 
 4. COPD exacerbation
 
 
Core Measures/Misc (9/17)
 
Acute Coronary Syndrome
ACS Diagnosis: No
 
Congestive Heart Failure
Congestive Heart Failure Diagnosis No
 
Cerebrovascular Accident
CVA/TIA Diagnosis: No
 
VTE (View Protocol)
VTE Risk Factors Age>40
No Mechanical VTE Prophylaxis d/t N/A MechProphylax Ordered
No VTE Pharm Prophylaxis d/t NA PharmProphylax ordered
 
Sepsis (View protocol)
Sepsis Present: No
 
Alexx JONES,Tevin 01/30/18 2113:
General Information and HPI
MD Statement:
I have seen and personally examined DAYDAY MANDUJANO and documented this H&P.
 
The patient is a 67 year old M who presented with a patient stated chief 
complaint of [].
 
 
 
Resident Review Statement
Resident Statement: examined this patient, discussed with intern, agreed with 
intern
Other Findings:
This is a 67-year-old gentleman with a significant past medical history COPD on 
2 L home oxygen, struct of sleep apnea or nocturnal BiPAP, atrial fibrillation 
on About, paraplegia secondary to spinal epidural abscess, hypertension, 
hyperlipidemia CAD status post stent placement in 2001 2006, aortic valve 
endocarditis, presents to [place] ED with complaints of shortness of breath in 
the setting of suboptimal BiPAP use.
 
Impression
* Hypercarbic respiratory failure as evident by an ABG remarkable for and a pH 
of 7.31 and a PCO2 of 75.  Likely cause of this acute on chronic hypercarbia is 
the suboptimal use of the BiPAP as patient reports not using it overnight 
because he thought either the setting on the mask fitting was not corect.  His 
elevated bicarbonate is indicative of chronic hypercarbia since this kind of 
compensation by the kidney takes time.Clinically he did not have any significant
wheezing or does not report any increased sputum production or worsening cough 
to suggest an exacerbation of COPD.
 
* History of chronic disease: CAD, COPD, GARRY, A. fib, hyperlipidemia, paraplegia
, neurogenic bladder.
 
Plan
Place in GEN med observation
Continue BiPAP for now and reassess in a couple hours with a repeat ABG
O2 supplementation to keep sats above 88%
Status post Solu-medrol by EMS, continue with Solu-Medrol 40 mg q8h for now and 
would assess tapering based on clinical status
Follow-up blood cultures and sputum cultures
Continue cardiac meds 
CODE STATUS: DNR/DNI
 
 
 
Christiano Mc MD 01/30/18 4590:
Attending MD Review Statement
 
Attending Statement
Attending MD Statement: examined this patient, discuss w/resident/PA/NP, agreed 
w/resident/PA/NP, reviewed EMR data (avail)
Attending Assessment/Plan:
67M PMH COPD (on 2L O2/Bipap), BLE edema, GARRY, HTN, HLD, A-fib on eliquis, 
neurogenic bladder, depression, CAD s/p stent placement, IVC filter, Paralegia 2
/2 MRSA spinal abscess on Rifampin, recently discharged from , had non-working
BiPAP at home, was repaired, and intermittent use since discharge, did not use 
it last night, and presented with severe shortness of breath today, found to be 
in acute hypercapnic respiratory failure.  Placed on BiPAP in ED with 
improvement in breathing.  Currently comfortable, very mild wheezing on exam, 
stable vitals, negative CXR, EKG NSR.
 
Plan
- Observation in general medicine
- Solumedrol 40mg q12h with rapid taper
- Continue BiPAP
- Recheck ABG and BEP tomorrow morning
- Pulmonary consult
- Continue home medications
- DVT PPx

## 2018-01-30 NOTE — RADIOLOGY REPORT
EXAMINATION:
XR PORTABLE CHEST
 
CLINICAL INFORMATION:
Shortness of breath, rhonchi and hypoxia. Assess for CHF.
 
COMPARISON:
Chest x-rays 01/12/2018 and 01/22/2018.
 
TECHNIQUE:
Portable frontal 75 degrees semiupright view of the chest was obtained.
 
FINDINGS:
The lung fields are hypoexpanded, similar compared to prior imaging. There
are opacities in the lower zones bilaterally, most consistent with
atelectasis, also demonstrated on the prior study. The cardiac silhouette is
normal in size. The aortic arch is unfolded. There are no pleural effusions.
There is mild prominence of the pulmonary arteries, which may be consistent
with pulmonary hypertension, unchanged. There are no acute osseous findings.
 
IMPRESSION:
1. The study redemonstrates opacities at the lower zones bilaterally.
 
2. The cardiac silhouette is normal in size.

## 2018-01-31 VITALS — SYSTOLIC BLOOD PRESSURE: 120 MMHG | DIASTOLIC BLOOD PRESSURE: 70 MMHG

## 2018-01-31 VITALS — DIASTOLIC BLOOD PRESSURE: 64 MMHG | SYSTOLIC BLOOD PRESSURE: 118 MMHG

## 2018-01-31 LAB
ABSOLUTE GRANULOCYTE CT: 9.9 /CUMM (ref 1.4–6.5)
BASOPHILS # BLD: 0 /CUMM (ref 0–0.2)
BASOPHILS NFR BLD: 0.2 % (ref 0–2)
EOSINOPHIL # BLD: 0.2 /CUMM (ref 0–0.7)
EOSINOPHIL NFR BLD: 1.5 % (ref 0–5)
ERYTHROCYTE [DISTWIDTH] IN BLOOD BY AUTOMATED COUNT: 15.3 % (ref 11.5–14.5)
GRANULOCYTES NFR BLD: 72.2 % (ref 42.2–75.2)
HCT VFR BLD CALC: 37 % (ref 42–52)
LYMPHOCYTES # BLD: 2.5 /CUMM (ref 1.2–3.4)
MCH RBC QN AUTO: 29.4 PG (ref 27–31)
MCHC RBC AUTO-ENTMCNC: 31.8 G/DL (ref 33–37)
MCV RBC AUTO: 92.5 FL (ref 80–94)
MONOCYTES # BLD: 1.1 /CUMM (ref 0.1–0.6)
PLATELET # BLD: 241 /CUMM (ref 130–400)
PMV BLD AUTO: 8.6 FL (ref 7.4–10.4)
RED BLOOD CELL CT: 3.99 /CUMM (ref 4.7–6.1)
WBC # BLD AUTO: 13.7 /CUMM (ref 4.8–10.8)

## 2018-01-31 NOTE — CONS- PULMONARY
General Information and HPI
 
Consulting Request
Date of Consult: 01/31/18
Requested By:
Dr. Mc
Reason for Consult:
Uncontrolled GARRY
Source of Information: patient, old records
Exam Limitations: no limitations
History of Present Illness:
The patient is a 67-year-old male with a past medical history significant for 
COPD on 2 lpm, respiratory failure in the setting of sepsis requiring prolonged 
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
spinal abscess, MI, atrial fibrillation not on Eliquis, reflux disease, 
angioedema, dyslipidemia, hypertension, neurogenic bladder, chronic constipation
, neuropathy, anxiety, depression and history of IVC filter placement.  The 
patient has had multiple admissions for acute exacerbation of COPD. He also has 
a history of GARRY with BIPAP non-compliance.  The patient was admitted yesterday 
with a non-productive cough, noting he was on Tamiflu however he had negative 
flu swab per patient. Patient was then found be SOB on the morning of admission 
and 911 was called. Patient received 2x Duo Nebs, 1 Albuterol, and 125mg 
Solumedrol, and 2g Mg during transportation and felt improved. He was then put 
on BiPAP in ER.  He is currently awake, alert and oriented, off BIPAP.  He feels
back to baseline and offers no new complaints.  He denies any increased 
shortness of breath, sputum production, fever, chills, chest congestion or chest
pain.  He continues to wear nocturnal BIPAP.
 
Allergies/Medications
Allergies:
Coded Allergies:
heparin (Intermediate, SWELLING, RASH 10/16/17)
vancomycin (Intermediate, HIVES, SKIN CRACKES, TURNS RED, SWELLS AND PEELS 10/16
/17)
warfarin (From COUMADIN) (Intermediate, RASH 10/16/17)
 
Home Med List:
Acetaminophen (Tylenol Arthritis) 650 MG TABLET.ER   1 TAB PO Q6-PRN PRN PAIN  (
Reported)
Albuterol Sulfate (Ventolin Hfa) 90 MCG HFA.AER.AD   2 PUF INH AD PRN COPD  (
Reported)
Apixaban (Eliquis) 5 MG TABLET   5 MG PO BID atrial fibrillation
Ascorbate Calcium (Vitamin C) 500 MG TABLET   1 TAB PO BID SUPPLEMENT  (Reported
)
Atomoxetine HCl (Strattera) 40 MG CAPSULE   1 CAP PO QAM MENTAL HEALTH  (
Reported)
Baclofen 20 MG TABLET   1 TAB PO BID MUSCLE RELAXER  (Reported)
Bisacodyl (Dulcolax) 10 MG SUPP.RECT   1 SUP RC Q48 GI  (Reported)
Budesonide/Formoterol Fumarate (Symbicort 80-4.5 Mcg Inhaler) 80 MCG-4.5 MCG/
ACTUATION HFA.AER.AD   2 PUF INH BID COPD  (Reported)
Docusate Sodium (Stool Softener) 100 MG CAPSULE   1 CAP PO DAILY STOOL SOFTENER 
(Reported)
Doxycycline Hyclate 100 MG CAPSULE   1 CAP PO BID infection  (Reported)
Evolocumab (Repatha Sureclick) 140 MG/ML PEN.INJCTR   1 ML SC Q 2 WEEKS 
CHOLESTEROL  (Reported)
Furosemide 20 MG TABLET   3 TAB PO Q48 DIURETIC  (Reported)
Gabapentin 600 MG TABLET   1 TAB PO TID NEUROPATHY  (Reported)
Guaifenesin (Mucinex) 600 MG TAB.ER.12H   1 TAB PO BID CONGESTION  (Reported)
Lactobacillus Acidophilus (Acidophilus) 1 EACH CAPSULE   1 CAP PO BID PROBIOTIC 
(Reported)
Lorazepam 1 MG TABLET   1 TAB PO BID ANXIETY  (Reported)
Magnesium Oxide (Magnesium) 400 MG CAPSULE   1 CAP PO 0600 SUPPLEMENT  (Reported
)
Metoprolol Succ XL (Toprol XL) 25 MG TAB   1 TAB PO DAILY HEART  (Reported)
Montelukast Sodium (Singulair) 10 MG TABLET   1 TAB PO DAILY ALLERGIES  (
Reported)
Multivitamin (Daily Value) 1 EACH TABLET   1 TAB PO DAILY VITAMIN SUPPORT  (
Reported)
Nortriptyline HCl 10 MG CAPSULE   1 CAP PO DAILY UNKNOWN  (Reported)
Omeprazole 20 MG CAPSULE.DR   1 CAP PO DAILY ACID REFLUX  (Reported)
Oxycodone HCl/Acetaminophen (Oxycodone-Acetaminophen 5-325) 5 MG-325 MG TABLET  
1 TAB PO Q4H PRN PAIN  (Reported)
Potassium Chloride 20 MEQ TAB.ER.PRT   1 TAB PO DAILY SUPPLEMENT  (Reported)
Rifampin (Rifadin) 300 MG CAPSULE   300 MG PO BID MRSA  (Reported)
Rosuvastatin Calcium (Crestor) 5 MG TABLET   1 TAB PO DAILY CHOLESTEROL  (
Reported)
Sotalol (Betapace) 80 MG TABLET   80 MG PO BID AARYTHMIAS
Tiotropium Bromide (Spiriva) 18 MCG CAP.W.DEV   1 CAP INH DAILY BREATHING 
PROBLEMS  (Reported)
Trazodone HCl 50 MG TABLET   1 TAB PO QPM SLEEP  (Reported)
 
 
Review of Systems
 
Review of Systems
All Other Systems: Reviewed and Negative
 
Past History
 
Travel History
Traveled to [place] past 21 day No
 
Medical History
Blood Transfusion Hx: No
Neurological: C6-7 ABSCESS PARAPLEGIA
EENT: NONE
Cardiovascular: AFIB, hypertension, hyperlipidemia, myocardial infarction
Respiratory: asthma, COPD, OXYGEN DEPEND 2L
Gastrointestinal: NONE
Hepatic: NONE
Renal: neurogenic bladder
Musculoskeletal: PARAPLEGIA R/T ABSCESS
Psychiatric: NONE
Endocrine: NONE
Blood Disorders: NONE
Cancer(s): NONE
GYN/Reproductive: NONE
 
Surgical History
Surgical History: non-contributory
 
Family History
Relations & Conditions If Any:
Relation not specified for:
  *No pertinent family history
 
 
Psychosocial History
Who Do You Live With? partner
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
Primary Language: English
Smoking Status: Former Smoker
ETOH Use: denies use
Illicit Drug Use: denies illicit drug use
 
Functional Ability
ADLs
Needs Assist: dressing, eating, toileting, bathing. 
Ambulation: wheelchair
IADLs
Independent: shopping, housework, finances, food prep, telephone, transportation
, medication admin. 
 
Exam & Diagnostic Data
Last 24 Hrs of Vital Signs/I&O
 Vital Signs
 
 
Date Time Temp Pulse Resp B/P B/P Pulse O2 O2 Flow FiO2
 
     Mean Ox Delivery Rate 
 
01/31 1015  61  118/64     
 
01/31 0805      95 Nasal 2.0L 
 
       Cannula  
 
01/31 0800      97 Nasal 2.0L 
 
       Cannula  
 
01/31 0707 98.0 61 20 118/64  91 Nasal  
 
       Cannula  
 
01/31 0025  81    92   
 
01/31 0000      93 Nasal 2.0L 
 
       Cannula  
 
01/30 2207 98.0 92 20 128/70  92   
 
01/30 2045 98.8 80 20 128/70  95   
 
01/30 2015 97.0 75 20 144/96  98 Nasal 2.0L 
 
       Cannula  
 
01/30 1908       BIPAP 40% 
 
01/30 1818 96.6 74 24 137/71  97 BIPAP  
 
01/30 1647  76       
 
01/30 1602      95 BIPAP  
 
01/30 1526  71 24 132/73  100 BIPAP 40% 
 
 
 Intake & Output
 
 
 01/31 1600 01/31 0800 01/31 0000
 
Intake Total 410  
 
Output Total  375 
 
Balance 410 -375 
 
    
 
Intake, IV 10  
 
Intake, Oral 400  
 
Output, Urine  375 
 
Patient   233 lb
 
Weight   
 
 
 
 
Physical Exam
General Appearance: no apparent distress, alert, awake, comfortable
Head: atraumatic, normal appearance
Neck: supple
Respiratory: quiet respiration, clear anteriorly
Cardiovascular: regular rate/rhythm
Gastrointestinal: normal bowel sounds, soft, non-tender
Extremities: no edema
Skin: intact, normal color, warm/dry
Last 48 Hrs of Labs/Stalin:
 Laboratory Tests
 
01/31/18 0625:
Anion Gap 8, Estimated GFR > 60, BUN/Creatinine Ratio 46.0  H, CBC w Diff NO MAN
DIFF REQ, RBC 3.99  L, MCV 92.5, MCH 29.4, MCHC 31.8  L, RDW 15.3  H, MPV 8.6, 
Gran % 72.2, Lymphocytes % 18.1  L, Monocytes % 8.0, Eosinophils % 1.5, 
Basophils % 0.2, Absolute Granulocytes 9.9  H, Absolute Lymphocytes 2.5, 
Absolute Monocytes 1.1  H, Absolute Eosinophils 0.2, Absolute Basophils 0
 
01/30/18 2305:
pH 7.38, pCO2 66 *H, pO2 68  L, HCO3 38  H, ABG O2 Sat (Measured) 92.0  L, 
Carboxyhemoglobin 0.4  L, O2 Concentration % 3L, O2 Delivery Method CPAP, 
Expiratory Pressure 8, Phlebotomy Draw Site RIGHT RADIAL
 
01/30/18 1430:
pH 7.31  L, pCO2 75 *H, pO2 83, HCO3 38  H, ABG O2 Sat (Measured) 95.0  L, P-50 
(Temp Corrected) Y, Carboxyhemoglobin 1.2  L, O2 Concentration % 40%, 
Temperature 96.8  L, Respiration Rate 24, O2 Delivery Method BIPAP, Vent Mode ST
, Expiratory Pressure 6, Inspiratory Pressure 24, Phlebotomy Draw Site RIGHT 
RADIAL
 
01/30/18 1338:
Anion Gap 9, Estimated GFR > 60, BUN/Creatinine Ratio 45.0  H, Glucose 109  H, 
Calcium 9.5, Magnesium 4.1  H, Total Bilirubin 0.5, AST 28, ALT 48, Alkaline 
Phosphatase 82, Troponin I < 0.01, Pro-B-Natriuretic Pept 466  H, Total Protein 
6.4, Albumin 3.2  L, Globulin 3.2, Albumin/Globulin Ratio 1.0  L, CBC w Diff NO 
MAN DIFF REQ, RBC 4.25  L, MCV 92.0, MCH 29.1, MCHC 31.6  L, RDW 15.6  H, MPV 
7.9, Gran % 74.4, Lymphocytes % 17.2  L, Monocytes % 5.8, Eosinophils % 2.5, 
Basophils % 0.1, Absolute Granulocytes 11.6  H, Absolute Lymphocytes 2.7, 
Absolute Monocytes 0.9  H, Absolute Eosinophils 0.4, Absolute Basophils 0
 Microbiology
01/30 1545  NASOPHARYN: Influenza Virus A & B Rapid Smear - COMP
 
 
 
Diagnostic Data
CXR Results
1. The study redemonstrates opacities at the lower zones bilaterally.
 
2. The cardiac silhouette is normal in size.
 
 
Assessment/Plan
Impression/Plan:
1.  Chronic respiratory failure.
2.  Uncontrolled GARRY with BIPAP non-compliance.
3.  Paraplegia.
4.  Bibasilar opacities/atelectasis. 
5.  Severer COPD.
Recommendations:
* Continue Solumedrol.
* Continue nebs/TRC.
* Incentive spirometry.
* Continue inhaler regimen.
* Oxygen supplementation to continue.
* Nocturnal Bipap.
* DVT prophylaxis - on Eliquis.
* Home tomorrow?
* Continue all supportive care. 
 
 
Consult Acknowledgment
- Thank you for your consult request.

## 2018-01-31 NOTE — PN- HOUSESTAFF
Thony Maguire MD,[facility] 18 0803:
Subjective
Follow-up For:
Shortness of breathing
Tele-Events Since Last Visit:
Off telemetry
Subjective:
Patient visited today, was lying in bed comfortably in no acute distress, drowsy
but arousable.
 
No fever or chills, improved shortness of breathing, no chest pain, no other 
events.
 
It was planned to extend observation for 24hours, will make sure the BIPAP 
device is functinal before discharge. 
 
Review of Systems
Constitutional:
Reports: see HPI. 
 
Objective
Last 24 Hrs of Vital Signs/I&O
 Vital Signs
 
 
Date Time Temp Pulse Resp B/P B/P Pulse O2 O2 Flow FiO2
 
     Mean Ox Delivery Rate 
 
 1015  61  118/64     
 
 0805      95 Nasal 2.0L 
 
       Cannula  
 
 0800      97 Nasal 2.0L 
 
       Cannula  
 
 0707 98.0 61 20 118/64  91 Nasal  
 
       Cannula  
 
 0025  81    92   
 
 0000      93 Nasal 2.0L 
 
       Cannula  
 
 2207 98.0 92 20 128/70  92   
 
 2045 98.8 80 20 128/70  95   
 
 2015 97.0 75 20 144/96  98 Nasal 2.0L 
 
       Cannula  
 
 1908       BIPAP 40% 
 
 1818 96.6 74 24 137/71  97 BIPAP  
 
 1647  76       
 
 1602      95 BIPAP  
 
 1526  71 24 132/73  100 BIPAP 40% 
 
 
 Intake & Output
 
 
  1600  0800  0000
 
Intake Total 410  
 
Output Total  375 
 
Balance 410 -375 
 
    
 
Intake, IV 10  
 
Intake, Oral 400  
 
Output, Urine  375 
 
Patient   233 lb
 
Weight   
 
 
 
 
Physical Exam
General Appearance: Oriented X3, Cooperative, No Acute Distress, Drowsy but 
arousable
Skin: No Significant Lesion
Skin Temp/Moisture Exam: Warm/Dry
Sepsis Skin Exam (color): Normal for Ethnicity
HEENT: Atraumatic, EOMI
Cardiovascular: Normal S1, Normal S2, ireeg irreg
Lungs: bilateral ronchi and wheezing
Abdomen: Soft, No Tenderness
Neurological: bilateral leg paraplegia
Extremities: No Edema
Current Medications:
 Current Medications
 
 
  Sig/Viet Start time  Last
 
Medication Dose Route Stop Time Status Admin
 
Acetaminophen 500 MG Q6P PRN  1745 AC 
 
  PO   
 
Albuterol Sulfate 3 ML EVERY 4 HRS/AWAKE 2000 AC 
 
  INH   1507
 
Apixaban 5 MG BID  2200 AC 
 
  PO   1015
 
Atorvastatin Calcium 10 MG 1700  1700 AC 
 
  PO   
 
Azithromycin 500 MG ONCE ONE  1500 DC 
 
Sodium Chloride 250 ML IV  1559  1608
 
Docusate Sodium 100 MG DAILY  1000 AC 
 
  PO   1015
 
Furosemide 60 MG Q48  1000 AC 
 
  PO   
 
Gabapentin 600 MG Q8  2235 AC 
 
  PO   1502
 
Guaifenesin 600 MG BID  220 AC 
 
  PO   1015
 
Methylprednisolone 40 MG Q12  1000 AC 
 
  IV   1015
 
Methylprednisolone 40 MG Q8  0600 DC 
 
  IV   
 
Metoprolol Succinate 25 MG DAILY  1000 AC 
 
  PO   1015
 
Montelukast Sodium 10 MG DAILY  1000 AC 
 
  PO   1016
 
Nortriptyline HCl 10 MG DAILY  1000 AC 
 
  PO   1503
 
Omeprazole 20 MG DAILY  1000 AC 
 
  PO   1015
 
Potassium Chloride 20 MEQ DAILY  1000 DC 
 
  PO   
 
Rifampin 300 MG BID  220 AC 
 
  PO   1015
 
Sotalol HCl 80 MG BID  220 AC 
 
  PO   1015
 
Trazodone HCl 50 MG QPM 2200 AC 
 
  PO   2351
 
 
 
 
Last 24 Hrs of Lab/Stalin Results
Last 24 Hrs of Labs/Mics:
 Laboratory Tests
 
18:
Anion Gap 8, Estimated GFR > 60, BUN/Creatinine Ratio 46.0  H, CBC w Diff NO MAN
DIFF REQ, RBC 3.99  L, MCV 92.5, MCH 29.4, MCHC 31.8  L, RDW 15.3  H, MPV 8.6, 
Gran % 72.2, Lymphocytes % 18.1  L, Monocytes % 8.0, Eosinophils % 1.5, 
Basophils % 0.2, Absolute Granulocytes 9.9  H, Absolute Lymphocytes 2.5, 
Absolute Monocytes 1.1  H, Absolute Eosinophils 0.2, Absolute Basophils 0
 
185:
pH 7.38, pCO2 66 *H, pO2 68  L, HCO3 38  H, ABG O2 Sat (Measured) 92.0  L, 
Carboxyhemoglobin 0.4  L, O2 Concentration % 3L, O2 Delivery Method CPAP, 
Expiratory Pressure 8, Phlebotomy Draw Site RIGHT RADIAL
 Microbiology
 1655  NASOPHARYN: Influenza Virus A & B Rapid Smear - COMP
 
 
 
Assessment/Plan
Assessment:
Mr. Mandujano is a 68yo M w/ PMH of COPD (on 2L O2/Bipap), BLE edema, GARRY, HTN, HLD,
A-fib on eliquis, neurogenic bladder, depression, CAD s/p stent placement, IVC 
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 
shortness of breath x 1 day PTA. Patient had problem with his BiPAP from last 
discharge and the company came and repaired it. However, patient admitted that 
he was not using the BiPAP last evening. Patient also reported non-productive 
cough and was on Tamiflu however he had negative flu swab per patient. Patient 
was then found be SOB this AM and paramedics was called. 
 
ER Course:
VSS on BIPAP, afebrile
 
Physical exam as above
Labs remarkable for leukocytosis of 15.6, H/H 12.4/39.1, ABG@7.31/75/83/38. BEP 
Na 142, K 4.8, Cl 93, CO2 40, Cr 0.4, ProBNP 466,
 
-CXR: demonstrated opacities at lower zones bilaterally consistent with 
atelectasis similar to previous study.
-EKG: Normal sinus rhythm without significant ST-T abnormalities.
-Interventions in ER:
Patient received 2x Duo Nebs, 1 Albuterol, and 125mg Solumedrol, and 2g Mg from 
EMS
Ceftazidime x 1 + Azithromycin 500mg x 1
 
Assessment:
Patient clinical status was afebrile with leukocytosis of 15.6 on admission, 
with hypoxic hypercapnic respiratory acidosis, unspecific CXR findings lead to 
diagnosis of COPD exacerbation from incompliance of BiPAP use. His elevation of 
serum bicarbonate indicated chronic hypercarbic from renal compensation. Patient
had no significant wheezing or worsening course in typical COPD exacerbation 
however would still consider treating with steroids. 
 
Patient was placed under observation for management of following condition.   to
further stablize patient it was panned to extend observation for another 24 
hours. 
 
Problem list
#Hypercarbic Hypoxic respiratory failure
#Hx of: CAD, COPD, GARRY, A-fib, HLD, Paralegia, Neurogenic bladder
 
 
Plan
- Continue observe in Gen Med (physically in tele)
- O2/BiPAP per RT, TRC/Nebulizer, Mucinex, Singlair, repeat ABG once patient 
agreed
- Continue IV Solumedrol 40mg q8 for now and taper per clinical condition
- Pending Blood/Sputum culture
- Continued home meds including: Lasix, Lipitor, K-dur, Omeprazole, 
Nortryptyline, Singulair, Metoprolol, Sotalol, trazodone, Rifampin, Eliquis.
- pulm consult was placed with Dr Velez
-  consult for fixing/application of BiPAP device at home
 
DVT prophylaxis Eliquis + ALPS
Regular Diet
Full Code
 
Problem List:
 1. Hypercapnic respiratory failure
 
Pain Ratin
Pain Location:
None
Pain Goal: Pain 4 or less
Pain Plan:
Continue current paln
Tomorrow's Labs & Rationales:
CBc BEP
 
 
Beto Herbert 18 1453:
Attending MD Review Statement
 
Attending Statement
Attending MD Statement: examined this patient, discuss w/resident/PA/NP, agreed 
w/resident/PA/NP, reviewed EMR data (avail), discussed with nursing, discussed 
with case mgmt
Attending Assessment/Plan:
pts resp status much better. His ABG shows improvement. WIll fu on pulm 
recommendations. 
 
pt encouraged to use his Bipap at night. Pt at baseline has high pCo2.

## 2018-02-01 VITALS — DIASTOLIC BLOOD PRESSURE: 76 MMHG | SYSTOLIC BLOOD PRESSURE: 145 MMHG

## 2018-02-01 VITALS — SYSTOLIC BLOOD PRESSURE: 145 MMHG | DIASTOLIC BLOOD PRESSURE: 76 MMHG

## 2018-02-01 LAB
ABSOLUTE GRANULOCYTE CT: 10 /CUMM (ref 1.4–6.5)
BASOPHILS # BLD: 0 /CUMM (ref 0–0.2)
BASOPHILS NFR BLD: 0.2 % (ref 0–2)
EOSINOPHIL # BLD: 0.1 /CUMM (ref 0–0.7)
EOSINOPHIL NFR BLD: 0.5 % (ref 0–5)
ERYTHROCYTE [DISTWIDTH] IN BLOOD BY AUTOMATED COUNT: 15 % (ref 11.5–14.5)
GRANULOCYTES NFR BLD: 76.1 % (ref 42.2–75.2)
HCT VFR BLD CALC: 36.2 % (ref 42–52)
LYMPHOCYTES # BLD: 2.3 /CUMM (ref 1.2–3.4)
MCH RBC QN AUTO: 29.5 PG (ref 27–31)
MCHC RBC AUTO-ENTMCNC: 31.9 G/DL (ref 33–37)
MCV RBC AUTO: 92.3 FL (ref 80–94)
MONOCYTES # BLD: 0.7 /CUMM (ref 0.1–0.6)
PLATELET # BLD: 237 /CUMM (ref 130–400)
PMV BLD AUTO: 8.5 FL (ref 7.4–10.4)
RED BLOOD CELL CT: 3.93 /CUMM (ref 4.7–6.1)
WBC # BLD AUTO: 13.1 /CUMM (ref 4.8–10.8)

## 2018-02-01 NOTE — PN- HOUSESTAFF
Alexx JONES,Tevin 18 0811:
Subjective
Follow-up For:
Hypercarbic respiratory failure
Subjective:
Patient is seen and examined at bedside.  He does not endorse any increased 
shortness of breath, he appears comfortable and in no apparent distress compared
to previous days.  He denies any fever, chills, nausea, vomiting, chest pain, 
palpitation, new focal neurological deficit, abdominal pain or dysuria.  No 
acute overnight event reported by nursing staff.
 
Review of Systems
Constitutional:
Reports: see HPI. 
 
Objective
Last 24 Hrs of Vital Signs/I&O
 Vital Signs
 
 
Date Time Temp Pulse Resp B/P B/P Pulse O2 O2 Flow FiO2
 
     Mean Ox Delivery Rate 
 
 09  84  145/76     
 
 0802      93 Nasal 2.0L 
 
       Cannula  
 
 0633 98.5 84 20 145/76  97 Nasal 2.0L 
 
       Cannula  
 
 0311      96 Nasal 2.0L 
 
       Cannula  
 
 0032  82    90   
 
 0000       CPAP  
 
 2339 98.6 65 20 120/70  91 Nasal 2.0L 
 
       Cannula  
 
 2242  72    92   
 
 1925       Nasal 2.0L 
 
       Cannula  
 
 1015  61  118/64     
 
 
 Intake & Output
 
 
  1600  0800  0000
 
Intake Total  240 480
 
Output Total  400 
 
Balance  -160 480
 
    
 
Intake, Oral  240 480
 
Output, Urine  400 
 
 
 
 
Physical Exam
General Appearance: Alert, Oriented X3, Cooperative
Other Physical Findings:
Skin: No Significant Lesion
Skin Temp/Moisture Exam: Warm/Dry
Sepsis Skin Exam (color): Normal for Ethnicity
HEENT: Atraumatic, EOMI
Cardiovascular: Normal S1, Normal S2, ireeg irreg
Lungs: bilateral ronchi and wheezing
Abdomen: Soft, No Tenderness
Neurological: bilateral leg paraplegia
Extremities: No Edema
Current Medications:
 Current Medications
 
 
  Sig/Viet Start time  Last
 
Medication Dose Route Stop Time Status Admin
 
Acetaminophen 500 MG Q6P PRN  1745 AC 
 
  PO   
 
Albuterol Sulfate 3 ML EVERY 4 HRS/AWAKE 2000 AC 
 
  INH   0750
 
Apixaban 5 MG BID  2200 AC 
 
  PO   0929
 
Atorvastatin Calcium 10 MG 1700  1700 AC 
 
  PO   1647
 
Docusate Sodium 100 MG DAILY  1000 AC 
 
  PO   0929
 
Furosemide 60 MG Q48  1000 AC 
 
  PO   0929
 
Gabapentin 600 MG Q8  2235 AC 
 
  PO   0512
 
Guaifenesin 600 MG BID  2200 AC 
 
  PO   0929
 
Methylprednisolone 40 MG Q12  1000 AC 
 
  IV   0928
 
Metoprolol Succinate 25 MG DAILY  1000 AC 
 
  PO   0929
 
Montelukast Sodium 10 MG DAILY  1000 AC 
 
  PO   0929
 
Nortriptyline HCl 10 MG DAILY  1000 AC 
 
  PO   0928
 
Omeprazole 20 MG DAILY  1000 AC 
 
  PO   0929
 
Potassium Chloride 20 MEQ DAILY  1000 DC 
 
  PO   
 
Rifampin 300 MG BID  2200 AC 
 
  PO   2224
 
Sotalol HCl 80 MG BID  220 AC 
 
  PO   0929
 
Trazodone HCl 50 MG QPM 2200 AC 
 
  PO   2201
 
 
 
 
Last 24 Hrs of Lab/Stalin Results
Last 24 Hrs of Labs/Mics:
 Laboratory Tests
 
18 0632:
Anion Gap 8, Estimated GFR > 60, BUN/Creatinine Ratio 42.5  H, CBC w Diff NO MAN
DIFF REQ, RBC 3.93  L, MCV 92.3, MCH 29.5, MCHC 31.9  L, RDW 15.0  H, MPV 8.5, 
Gran % 76.1  H, Lymphocytes % 17.7  L, Monocytes % 5.5, Eosinophils % 0.5, 
Basophils % 0.2, Absolute Granulocytes 10.0  H, Absolute Lymphocytes 2.3, 
Absolute Monocytes 0.7  H, Absolute Eosinophils 0.1, Absolute Basophils 0
 
18 2330:
Anion Gap 6, Estimated GFR > 60, BUN/Creatinine Ratio 47.5  H
 
 
Assessment/Plan
Assessment:
Mr. Mandujano is a 66yo M w/ PMH of COPD (on 2L O2/Bipap), BLE edema, GARRY, HTN, HLD,
A-fib on eliquis, neurogenic bladder, depression, CAD s/p stent placement, IVC 
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 
shortness of breath x 1 day PTA. Patient had problem with his BiPAP from last 
discharge and the company came and repaired it. However, patient admitted that 
he was not using the BiPAP last evening. Patient also reported non-productive 
cough and was on Tamiflu however he had negative flu swab per patient. Patient 
was then found be SOB this AM and paramedics was called. 
 
ER Course:
VSS on BIPAP, afebrile
 
Physical exam as above
Labs remarkable for leukocytosis of 15.6, H/H 12.4/39.1, ABG@7.31/75/83/38. BEP 
Na 142, K 4.8, Cl 93, CO2 40, Cr 0.4, ProBNP 466,
 
-CXR: demonstrated opacities at lower zones bilaterally consistent with 
atelectasis similar to previous study.
-EKG: Normal sinus rhythm without significant ST-T abnormalities.
-Interventions in ER:
Patient received 2x Duo Nebs, 1 Albuterol, and 125mg Solumedrol, and 2g Mg from 
EMS
Ceftazidime x 1 + Azithromycin 500mg x 1
 
Assessment and plan
Acute on chronic hypercarbic respiratory failure.  On day 3, clinically improved
with stable respiratory status.  Will discharge patient today with a prednisone 
taper.  Reiterated to patient about the compliance of BiPAP use and to 
coordinate with BiPAP apply company on re-fitting and any malfunctioning issues.
 She is aware that he will need to follow-up with his pulmonologist ( Rosy Hull MD).  He is to continue with his regular home regimen of medications.
Problem List:
 1. Hypercapnic respiratory failure
 
Pain Ratin
Pain Location:
NoneNONE
Pain Goal: Pain 4 or less
Pain Plan:
Per pathway
Tomorrow's Labs & Rationales:
None discharge
 
 
Hilton JONES,Tiara 18 1209:
Attending MD Review Statement
 
Attending Statement
Attending MD Statement: examined this patient, discuss w/resident/PA/NP, agreed 
w/resident/PA/NP, reviewed EMR data (avail), discussed with nursing, discussed 
with case mgmt, reviewed images, amended to note
Attending Assessment/Plan:
And examined, overall doing better.  Breathing is back to his baseline.  Patient
has troubles with his BiPAP machine which she claims that has been fixed.  He 
has been kept on IV steroids this can be switched to oral.  Patient remains 
afebrile and he has a leukocytosis which is chronic.  Patient medically stable 
for discharge.  Reportedly his partner has Flu-like symptoms as well as fever.  
At this point we'll discharge the patient on prophylactic dose of Tamiflu.

## 2018-02-01 NOTE — PATIENT DISCHARGE INSTRUCTIONS
Discharge Instructions
 
General Discharge Information
You were seen/treated for:
breathing problem (your carbon dioxide level was increased due to worsening of 
your chronic condition)
Special Instructions:
Please continue to use your bipap at night on a daily basis without any fail.
Please contact your doctor or seek immediate medical help if your breathing 
worsens
Please follow up with your primary care doctor in 1 week after being discharged
Please follow up with Dr Hull within 1 week after being discharged
 
Acute Coronary Syndrome
 
Inclusion Criteria
At DC or during hospital stay patient has or had the following:
ACS DIAGNOSIS No
 
Discharge Core Measures
Meds if any: Prescribed or Continued at Discharge
Meds if any: NOT Prescribed or Continued at Discharge
 
Congestive Heart Failure
 
Inclusion Criteria
At DC or during hospital stay patient has or had the following:
CHF DIAGNOSIS No
 
Discharge Core Measures
Meds if any: Prescribed or Continued at Discharge
Meds if any: NOT Prescribed or Continued at Discharge
 
Cerebrovascular accident
 
Inclusion Criteria
At DC or during hospital stay patient has or had the following:
CVA/TIA Diagnosis No
 
Discharge Core Measures
Meds if any: Prescribed or Continued at Discharge
Meds if any: NOT Prescribed or Continued at Discharge
 
Venous thromboembolism
 
Inclusion Criteria
VTE Diagnosis No
VTE Type NONE
VTE Confirmed by (Test) NONE
 
Discharge Core Measures
- Per Current guidelines, there needs to be overlap
- treatment for the first 5 days of Warfarin therapy.
- If discharged on Warfarin prior to 5 days of
- overlap therapy, the patient will need to be
- assessed for post discharge needs including
- *Post discharge parental anticoagulation
- *Warfarin and/or parental anticoagulation education
- *Follow up date to check INR post discharge
At least 5 days overlap therapy as Inpatient No
Meds if any: Prescribed or Continued at Discharge
Note: Overlap Therapy is Warfarin and Anticoagulant
Meds if any: NOT Prescribed or Continued at Discharge

## 2018-02-01 NOTE — PN- PULMONARY
Subjective
HPI/Critical Care Issues:
The patient is sleeping comfortably, arousable.  Seen and examined.  No 
overnight events. 
 
Objective
Current Medications:
 Current Medications
 
 
  Sig/Viet Start time  Last
 
Medication Dose Route Stop Time Status Admin
 
Acetaminophen 500 MG Q6P PRN 01/30 1745 AC 
 
  PO   
 
Albuterol Sulfate 3 ML EVERY 4 HRS/AWAKE 01/30 2000 AC 02/01
 
  INH   0750
 
Apixaban 5 MG BID 01/30 2200 AC 01/31
 
  PO   2202
 
Atorvastatin Calcium 10 MG 1700 01/31 1700 AC 01/31
 
  PO   1647
 
Docusate Sodium 100 MG DAILY 01/31 1000 AC 01/31
 
  PO   1015
 
Furosemide 60 MG Q48 02/01 1000 AC 
 
  PO   
 
Gabapentin 600 MG Q8 01/30 2235 AC 02/01
 
  PO   0512
 
Guaifenesin 600 MG BID 01/30 2200 AC 01/31
 
  PO   2202
 
Methylprednisolone 40 MG Q12 01/31 1000 AC 01/31
 
  IV   2202
 
Metoprolol Succinate 25 MG DAILY 01/31 1000 AC 01/31
 
  PO   1015
 
Montelukast Sodium 10 MG DAILY 01/31 1000 AC 01/31
 
  PO   1016
 
Nortriptyline HCl 10 MG DAILY 01/31 1000 AC 01/31
 
  PO   1503
 
Omeprazole 20 MG DAILY 01/31 1000 AC 01/31
 
  PO   1015
 
Potassium Chloride 20 MEQ DAILY 01/31 1000 DC 
 
  PO   
 
Rifampin 300 MG BID 01/30 2200 AC 01/31
 
  PO   2224
 
Sotalol HCl 80 MG BID 01/30 2200 AC 01/31
 
  PO   2202
 
Trazodone HCl 50 MG QPM 01/30 2200 AC 01/31
 
  PO   2201
 
 
 
 
Vital Signs & I&O
Last 24 Hrs of Vitals and I&O:
 Vital Signs
 
 
Date Time Temp Pulse Resp B/P B/P Pulse O2 O2 Flow FiO2
 
     Mean Ox Delivery Rate 
 
02/01 0802      93 Nasal 2.0L 
 
       Cannula  
 
02/01 0633 98.5 84 20 145/76  97 Nasal 2.0L 
 
       Cannula  
 
02/01 0311      96 Nasal 2.0L 
 
       Cannula  
 
02/01 0032  82    90   
 
02/01 0000       CPAP  
 
01/31 2339 98.6 65 20 120/70  91 Nasal 2.0L 
 
       Cannula  
 
01/31 2242  72    92   
 
01/31 1925       Nasal 2.0L 
 
       Cannula  
 
01/31 1015  61  118/64     
 
 
 Intake & Output
 
 
 02/01 1600 02/01 0800 02/01 0000
 
Intake Total  240 480
 
Output Total  400 
 
Balance  -160 480
 
    
 
Intake, Oral  240 480
 
Output, Urine  400 
 
 
Physical Exam
General Appearance: no apparent distress, alert, awake, comfortable
Head: atraumatic, normal appearance
Neck: supple
Respiratory: quiet respiration, clear anteriorly
Cardiovascular: regular rate/rhythm
Gastrointestinal: normal bowel sounds, soft, non-tender
Extremities: no edema
Skin: intact, normal color, warm/dry
 
Impression/Plan
 
Impression/Plan
Impression/Plan:
1.  Chronic respiratory failure.
2.  Uncontrolled GARRY with BIPAP non-compliance.
3.  Paraplegia.
4.  Bibasilar opacities/atelectasis. 
5.  Severer COPD.
Recommendations:
* Agree with prednisone taper.
* Continue nebs/TRC.
* Incentive spirometry.
* Continue inhaler regimen.
* Oxygen supplementation to continue.
* Nocturnal Bipap.
* DVT prophylaxis - on Eliquis.
* STR?
* Continue all supportive care.

## 2018-02-03 ENCOUNTER — HOSPITAL ENCOUNTER (INPATIENT)
Dept: HOSPITAL 68 - ERH | Age: 68
LOS: 4 days | Discharge: HOME HEALTH | DRG: 189 | End: 2018-02-07
Admitting: INTERNAL MEDICINE
Payer: COMMERCIAL

## 2018-02-03 VITALS — DIASTOLIC BLOOD PRESSURE: 70 MMHG | SYSTOLIC BLOOD PRESSURE: 114 MMHG

## 2018-02-03 VITALS — BODY MASS INDEX: 33.03 KG/M2 | WEIGHT: 223 LBS | HEIGHT: 69 IN

## 2018-02-03 DIAGNOSIS — I25.2: ICD-10-CM

## 2018-02-03 DIAGNOSIS — Z86.14: ICD-10-CM

## 2018-02-03 DIAGNOSIS — I48.0: ICD-10-CM

## 2018-02-03 DIAGNOSIS — J96.22: Primary | ICD-10-CM

## 2018-02-03 DIAGNOSIS — I25.10: ICD-10-CM

## 2018-02-03 DIAGNOSIS — G47.33: ICD-10-CM

## 2018-02-03 DIAGNOSIS — E87.2: ICD-10-CM

## 2018-02-03 DIAGNOSIS — Z99.81: ICD-10-CM

## 2018-02-03 DIAGNOSIS — Z66: ICD-10-CM

## 2018-02-03 DIAGNOSIS — Z79.2: ICD-10-CM

## 2018-02-03 DIAGNOSIS — N31.9: ICD-10-CM

## 2018-02-03 DIAGNOSIS — G82.20: ICD-10-CM

## 2018-02-03 DIAGNOSIS — J44.1: ICD-10-CM

## 2018-02-03 DIAGNOSIS — Z79.01: ICD-10-CM

## 2018-02-03 DIAGNOSIS — Z91.19: ICD-10-CM

## 2018-02-03 DIAGNOSIS — E78.5: ICD-10-CM

## 2018-02-03 LAB
ABSOLUTE GRANULOCYTE CT: 11.9 /CUMM (ref 1.4–6.5)
BASOPHILS # BLD: 0 /CUMM (ref 0–0.2)
BASOPHILS NFR BLD: 0 % (ref 0–2)
EOSINOPHIL # BLD: 0.1 /CUMM (ref 0–0.7)
EOSINOPHIL NFR BLD: 0.6 % (ref 0–5)
ERYTHROCYTE [DISTWIDTH] IN BLOOD BY AUTOMATED COUNT: 14.8 % (ref 11.5–14.5)
GRANULOCYTES NFR BLD: 87 % (ref 42.2–75.2)
HCT VFR BLD CALC: 40.1 % (ref 42–52)
LYMPHOCYTES # BLD: 1.3 /CUMM (ref 1.2–3.4)
MCH RBC QN AUTO: 29.3 PG (ref 27–31)
MCHC RBC AUTO-ENTMCNC: 32 G/DL (ref 33–37)
MCV RBC AUTO: 91.3 FL (ref 80–94)
MONOCYTES # BLD: 0.4 /CUMM (ref 0.1–0.6)
PLATELET # BLD: 270 /CUMM (ref 130–400)
PMV BLD AUTO: 8.4 FL (ref 7.4–10.4)
RED BLOOD CELL CT: 4.39 /CUMM (ref 4.7–6.1)
WBC # BLD AUTO: 13.7 /CUMM (ref 4.8–10.8)

## 2018-02-03 NOTE — PN- ATT ADDEND
Attending Addendum
Attending Brief Note
This is a fairly complex 67-year-old male with a history of paraplegia secondary
to spinal abscess with MRSA on chronic treatment with rifampin and doxycycline. 
He also has a history of chronic respiratory failure with COPD on 2 L of oxygen 
and has had multiple admissions in the past few months with hypercapnic 
respiratory failure and issues with his BiPAP machine at home.  I'm not totally 
sure if the BiPAP is nonfunctioning/malfunctioning versus he is not compliant 
but there is concern about that.  He was recently here and sent home on February
1 after treatment for the same.  He returns with acute hypercapnic respiratory 
failure on chronic hypercapnic respiratory failure as evidenced by a PCO2 of 83.
 He also has underlying A. fib on Eliquis, he takes Lasix 60 every 48hrs and has
a chronic Castro for neurogenic bladder.  At this point will bring him into Gen 
med, he is here as observation and will treat him with IV steroids.  Will need 
to make sure that his BiPAP machine gets fixed before any discharge plans are 
made.  Will need to repeat the gas to follow-up on the hypercapnia.  We'll 
continue his other chronic medication.

## 2018-02-03 NOTE — HISTORY & PHYSICAL
**See Addendum**
General Information and HPI
Source of Information: patient
Exam Limitations: unable to give history, not alert/orientated
History of Present Illness:
Mr. Mandujano, is a 67-year-old gentleman with significant past medical history of 
paroxysmal atrial fibrillation, paraplegia secondary to a T6-T7 MRSA epidural 
spinal abscess [on chronic suppressive antibiotic therapy] with neurogenic 
bladder status post chronic Castro, coronary artery disease, and COPD on 2 L of 
home oxygen, who was recently discharged from the hospital for COPD exacerbation
on 02/01/2018.
 
He presents to the hospital emergency department with similar complaints.  He is
on BiPAP at the time of evaluation, and as a difficult time answering questions,
therefore the interval history was obtained from the ED providers.
 
Per the record, the patient was discharged to complete a course of Levaquin and 
prednisone taper.  He was also instructed to use his BiPAP.  Unfortunately, his 
BiPAP was malfunctioning, and the patient was brought in by the ambulance 
severely dyspneic and somnolence.
 
In the emergency department, his vitals: temperature 97.5, pulse 92, respiratory
rate 15, blood pressure 174/84 saturating 93% on 50% BiPAP. His chest x-ray 
revealed bibasilar opacities, right greater than left.  This was relatively 
unchanged from previous examination last radiograph.
 
Allergies/Medications
Allergies:
Coded Allergies:
heparin (Intermediate, SWELLING, RASH 10/16/17)
vancomycin (Intermediate, HIVES, SKIN CRACKES, TURNS RED, SWELLS AND PEELS 10/16
/17)
warfarin (From COUMADIN) (Intermediate, RASH 10/16/17)
 
Home Med list
Acetaminophen (Tylenol Arthritis) 650 MG TABLET.ER   1 TAB PO Q6-PRN PRN PAIN  (
Reported)
Albuterol Sulfate (Ventolin Hfa) 90 MCG HFA.AER.AD   2 PUF INH AD PRN COPD  (
Reported)
Apixaban (Eliquis) 5 MG TABLET   5 MG PO BID atrial fibrillation
Ascorbate Calcium (Vitamin C) 500 MG TABLET   1 TAB PO BID SUPPLEMENT  (Reported
)
Atomoxetine HCl (Strattera) 40 MG CAPSULE   1 CAP PO QAM MENTAL HEALTH  (
Reported)
Baclofen 20 MG TABLET   1 TAB PO BID MUSCLE RELAXER  (Reported)
Bisacodyl (Dulcolax) 10 MG SUPP.RECT   1 SUP RC Q48 GI  (Reported)
Budesonide/Formoterol Fumarate (Symbicort 80-4.5 Mcg Inhaler) 80 MCG-4.5 MCG/
ACTUATION HFA.AER.AD   2 PUF INH BID COPD  (Reported)
Docusate Sodium (Stool Softener) 100 MG CAPSULE   1 CAP PO DAILY STOOL SOFTENER 
(Reported)
Doxycycline Hyclate 100 MG CAPSULE   1 CAP PO BID infection  (Reported)
Evolocumab (Repatha Sureclick) 140 MG/ML PEN.INJCTR   1 ML SC Q 2 WEEKS 
CHOLESTEROL  (Reported)
Furosemide 20 MG TABLET   3 TAB PO Q48 DIURETIC  (Reported)
Gabapentin 600 MG TABLET   1 TAB PO TID NEUROPATHY  (Reported)
Guaifenesin (Mucinex) 600 MG TAB.ER.12H   1 TAB PO BID CONGESTION  (Reported)
Lactobacillus Acidophilus (Acidophilus) 1 EACH CAPSULE   1 CAP PO BID PROBIOTIC 
(Reported)
Lorazepam 1 MG TABLET   1 TAB PO BID ANXIETY  (Reported)
Magnesium Oxide (Magnesium) 400 MG CAPSULE   1 CAP PO 0600 SUPPLEMENT  (Reported
)
Metoprolol Succ XL (Toprol XL) 25 MG TAB   1 TAB PO DAILY HEART  (Reported)
Montelukast Sodium (Singulair) 10 MG TABLET   1 TAB PO DAILY ALLERGIES  (
Reported)
Multivitamin (Daily Value) 1 EACH TABLET   1 TAB PO DAILY VITAMIN SUPPORT  (
Reported)
Nortriptyline HCl 10 MG CAPSULE   1 CAP PO DAILY UNKNOWN  (Reported)
Omeprazole 20 MG CAPSULE.DR   1 CAP PO DAILY ACID REFLUX  (Reported)
Oxycodone HCl/Acetaminophen (Oxycodone-Acetaminophen 5-325) 5 MG-325 MG TABLET  
1 TAB PO Q4H PRN PAIN  (Reported)
Potassium Chloride 20 MEQ TAB.ER.PRT   1 TAB PO DAILY SUPPLEMENT  (Reported)
Rifampin (Rifadin) 300 MG CAPSULE   300 MG PO BID MRSA  (Reported)
Rosuvastatin Calcium (Crestor) 5 MG TABLET   1 TAB PO DAILY CHOLESTEROL  (
Reported)
Sotalol (Betapace) 80 MG TABLET   80 MG PO BID AARYTHMIAS
Tiotropium Bromide (Spiriva) 18 MCG CAP.W.DEV   1 CAP INH DAILY BREATHING 
PROBLEMS  (Reported)
Trazodone HCl 50 MG TABLET   1 TAB PO QPM SLEEP  (Reported)
 
 
Past History
 
Travel History
Traveled to [place] past 21 day No
 
Medical History
Neurological: C6-7 ABSCESS PARAPLEGIA
EENT: NONE
Cardiovascular: AFIB, hypertension, hyperlipidemia, myocardial infarction
Respiratory: asthma, COPD, OXYGEN DEPEND 2L
Gastrointestinal: NONE
Hepatic: NONE
Renal: neurogenic bladder
Musculoskeletal: PARAPLEGIA R/T ABSCESS
Psychiatric: NONE
Endocrine: NONE
Blood Disorders: NONE
Cancer(s): NONE
GYN/Reproductive: NONE
History of MRSA: Yes
History of VRE: Yes
History of CDIFF: No
Influenza Vaccine: 10/16/17
 
Surgical History
Surgical History: non-contributory
 
Past Family/Social History
 
Family History
Relations & Conditions if any
Relation not specified for:
  *No pertinent family history
 
 
Psychosocial History
Who Do You Live With? partner
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
Primary Language: English
ETOH Use: denies use
 
Functional Ability
ADLs
Needs Assist: dressing, eating, toileting, bathing. 
Ambulation: wheelchair
IADLs
Independent: shopping, housework, finances, food prep, telephone, transportation
, medication admin. 
 
Review of Systems
 
Review of Systems
Constitutional:
Reports: see HPI. 
 
Exam & Diagnostic Data
Last 24 Hrs of Vital Signs/I&O
 Vital Signs
 
 
Date Time Temp Pulse Resp B/P B/P Pulse O2 O2 Flow FiO2
 
     Mean Ox Delivery Rate 
 
02/03 1845 98.8 84 22 124/53  97 BIPAP 50% 
 
02/03 1843       BIPAP 50% 
 
02/03 1627 97.9 84 30 152/72  98 BIPAP 50% 
 
02/03 1616      96   
 
02/03 1601  88    98   
 
02/03 1424 97.6 76 18 159/69  97 BIPAP 50% 
 
02/03 1310      93 CPAP  
 
02/03 1250 97.5 92 15 174/84  93 CPAP  
 
 
 
 
Diagnostic Data
EKG Results
EKG revealed sinus rhythm at 75 bpm with a normal axis.  No significant ST-T 
changes.
 
CXR Results
SERVICE DATE: 02/03/
EXAM TYPE: RAD - XRY-PORTABLE CHEST XRAY
 
EXAMINATION:
CR PORTABLE CHEST
 
CLINICAL INFORMATION:
Bilateral rhonchi and hypoxia. Rule out pneumonia.
 
COMPARISON:
Several prior chest x-rays, most recent of which is dated 01/30/2018.
 
TECHNIQUE:
Portable AP semiupright view of the chest was obtained.
 
FINDINGS:
The cardiomediastinal silhouette is within normal limits in size. Mild ectasia 
and tortuosity of the aorta is again seen. Central pulmonary arteries
appear enlarged.
 
There is persistent parenchymal opacity seen in the lung bases bilaterally, 
right greater than left, probably unchanged allowing for differences in 
technique. No significant pleural fluid seen. No pneumothorax.
 
Bony structures grossly unremarkable.
 
IMPRESSION:
No significant change in bibasilar opacities, right greater than left, possibly 
due to chronic atelectasis or chronic infiltrate.
 
 
Assessment/Plan
Assessment:
Mr. Mandujano, is a 67-year-old gentleman with significant past medical history of 
paroxysmal atrial fibrillation, paraplegia secondary to a T6-T7 MRSA epidural 
spinal abscess [on chronic suppressive antibiotic therapy] with neurogenic 
bladder status post chronic Castro, coronary artery disease, and COPD on 2 L of 
home oxygen, who was recently discharged from the hospital for COPD exacerbation
on 02/01/2018.  He presents to the hospital emergency department with similar 
complaints.  
 
His chest x-ray revealed bibasilar opacities, right greater than left.  This was
relatively unchanged from previous examination last radiograph.  His ABG 
revealed hypercarbia and acidosis, 7.3/83/298/40 on 50% O2 via BiPAP.  CBC 
revealed white blood cell count 13.7, H&H 12.8/40.1.  Electrolytes were 
unremarkable however bicarbonate was 40.  BNP 1600.
 
Flu culture negative.
 
Repeat arterial blood gas revealed 7.34/79/97/42 on 50% oxygen via BiPAP [24/6]
 
Problem list/Assessment and Plan
Hypercarbic Hypoxic respiratory failure
* Etiology: ? Noncompliance versus broken BiPAP
* We will treat for acute exacerbation of COPD
* Pulmonary consult appreciated
* Solu-Medrol 40 mg every 8 hours started
* We will also start Zithromax 500 mg every day
* Total respiratory care/nebulizer and BiPAP as tolerated
* Repeat ABG at 9 PM.
 
Chronic medical problems
* Hx of: CAD, COPD, GARRY, A-fib, HLD, hx of epidural abscess w subsequent 
paralegia and neurogenic bladder
* Continue Eliquis 5 mg twice a day for A. fib
* Continue sotalol 80 mg
* Continue gabapentin 600 mg every 8
* Continue prophylactic antibiotics rifampin 300 mg twice a day and doxycycline 
100 mg twice a day
 
DVT prophylaxis Eliquis 
Heart healthy Diet
DNR/DNI
Pain path as ordered
 
 
As Ranked By This Provider
Problem List:
 1. Acute hypercapnic respiratory failure due to obstructive sleep apnea
 
 2. Difficulty with BiPAP use
 
 3. Paroxysmal atrial fibrillation
 
 
Core Measures/Misc (9/17)
 
Acute Coronary Syndrome
ACS Diagnosis: No
 
Congestive Heart Failure
Congestive Heart Failure Diagnosis No
 
Cerebrovascular Accident
CVA/TIA Diagnosis: No
 
VTE (View Protocol)
VTE Risk Factors Age>40
No Mechanical VTE Prophylaxis d/t N/A MechProphylax Ordered
No VTE Pharm Prophylaxis d/t NA PharmProphylax ordered
 
Sepsis (View protocol)
Sepsis Present: No

## 2018-02-03 NOTE — ED DYSPNEA/ASTHMA COMPLAINT
History of Present Illness
 
General
Chief Complaint: Dyspnea (COPD, CHF, Other)
Stated Complaint: BIBA SOB
Source: patient, old records
Exam Limitations: no limitations
 
Vital Signs & Intake/Output
Vital Signs & Intake/Output
 Vital Signs
 
 
Date Time Temp Pulse Resp B/P B/P Pulse O2 O2 Flow FiO2
 
     Mean Ox Delivery Rate 
 
 1030      93 Nasal 2.0L 
 
       Cannula  
 
 0954 98.1 68 20 120/66     
 
 0800       Nasal 2.0L 
 
       Cannula  
 
 0750 98.1 68 20 120/66  91 Nasal Room Air 
 
       Cannula  
 
 0609      94 Nasal 2.0L 
 
       Cannula  
 
 0608      94   
 
 0116  60    93   
 
 0000      96 CPAP 2.0L 
 
 2215 96.7 67 20 110/60  96   
 
 1600       Nasal 2.0L 
 
       Cannula  
 
 
 ED Intake and Output
 
 
  0000  1200
 
Intake Total 820 340
 
Output Total  800
 
Balance -1205 -460
 
   
 
Intake, IV 20 0
 
Intake, Oral 800 340
 
Number  0
 
Bowel  
 
Movements  
 
Output, Urine  800
 
 
 
Allergies
Coded Allergies:
heparin (Intermediate, SWELLING, RASH 10/16/17)
vancomycin (Intermediate, HIVES, SKIN CRACKES, TURNS RED, SWELLS AND PEELS 10/16
/17)
warfarin (From COUMADIN) (Intermediate, RASH 10/16/17)
 
Reconcile Medications
Acetaminophen (Tylenol Arthritis) 650 MG TABLET.ER   1 TAB PO Q6-PRN PRN PAIN  (
Reported)
Albuterol Sulfate (Ventolin Hfa) 90 MCG HFA.AER.AD   2 PUF INH AD PRN COPD  (
Reported)
Apixaban (Eliquis) 5 MG TABLET   5 MG PO BID atrial fibrillation
Ascorbate Calcium (Vitamin C) 500 MG TABLET   1 TAB PO BID SUPPLEMENT  (Reported
)
Atomoxetine HCl (Strattera) 40 MG CAPSULE   1 CAP PO QAM MENTAL HEALTH  (
Reported)
Azithromycin 500 MG TABLET   1 TAB PO DAILY COPD Exacerbation
     .
Baclofen 20 MG TABLET   1 TAB PO BID MUSCLE RELAXER  (Reported)
Bisacodyl (Dulcolax) 10 MG SUPP.RECT   1 SUP RC Q48 GI  (Reported)
Budesonide/Formoterol Fumarate (Symbicort 80-4.5 Mcg Inhaler) 80 MCG-4.5 MCG/
ACTUATION HFA.AER.AD   2 PUF INH BID COPD  (Reported)
Docusate Sodium (Stool Softener) 100 MG CAPSULE   1 CAP PO DAILY STOOL SOFTENER 
(Reported)
Doxycycline Hyclate 100 MG CAPSULE   1 CAP PO BID infection  (Reported)
Evolocumab (Repatha Sureclick) 140 MG/ML PEN.INJCTR   1 ML SC Q 2 WEEKS 
CHOLESTEROL  (Reported)
Furosemide 20 MG TABLET   3 TAB PO Q48 DIURETIC  (Reported)
Gabapentin 600 MG TABLET   1 TAB PO TID NEUROPATHY  (Reported)
Guaifenesin (Mucinex) 600 MG TAB.ER.12H   1 TAB PO BID CONGESTION  (Reported)
Lactobacillus Acidophilus (Acidophilus) 1 EACH CAPSULE   1 CAP PO BID PROBIOTIC 
(Reported)
Lorazepam 1 MG TABLET   1 TAB PO BID ANXIETY  (Reported)
Magnesium Oxide (Magnesium) 400 MG CAPSULE   1 CAP PO 0600 SUPPLEMENT  (Reported
)
Metoprolol Succ XL (Toprol XL) 25 MG TAB   1 TAB PO DAILY HEART  (Reported)
Montelukast Sodium (Singulair) 10 MG TABLET   1 TAB PO DAILY ALLERGIES  (
Reported)
Multivitamin (Daily Value) 1 EACH TABLET   1 TAB PO DAILY VITAMIN SUPPORT  (
Reported)
Nortriptyline HCl 10 MG CAPSULE   1 CAP PO DAILY UNKNOWN  (Reported)
Omeprazole 20 MG CAPSULE.DR   1 CAP PO DAILY ACID REFLUX  (Reported)
Oxycodone HCl/Acetaminophen (Oxycodone-Acetaminophen 5-325) 5 MG-325 MG TABLET  
1 TAB PO Q4H PRN PAIN  (Reported)
Potassium Chloride 20 MEQ TAB.ER.PRT   1 TAB PO DAILY SUPPLEMENT  (Reported)
Prednisone 10 MG TABLET   1 TAB PO BID Resp Failure Steroid Taper
     .
Rifampin (Rifadin) 300 MG CAPSULE   300 MG PO BID MRSA  (Reported)
Rosuvastatin Calcium (Crestor) 5 MG TABLET   1 TAB PO DAILY CHOLESTEROL  (
Reported)
Sotalol (Betapace) 80 MG TABLET   80 MG PO BID AARYTHMIAS
Tiotropium Bromide (Spiriva) 18 MCG CAP.W.DEV   1 CAP INH DAILY BREATHING 
PROBLEMS  (Reported)
Trazodone HCl 50 MG TABLET   1 TAB PO QPM SLEEP  (Reported)
 
Triage Nurses Notes Reviewed? yes
HPI:
67M PMH COPD (on 2L O2/Bipap), BLE edema, GARRY, HTN, HLD, A-fib on eliquis, 
neurogenic bladder, depression, CAD s/p stent placement, IVC filter, Paralegia 2
/2 MRSA spinal abscess on Rifampin, recently discharged from [facility] with 
severe shortness of breath today, found to be in acute hypercapnic respiratory 
failure, somnolent.   pCO2 83 on ABG, started on BiPAP.
 
 
Past History
 
Travel History
Traveled to [place] past 21 day No
 
Medical History
Any Pertinent Medical History? see below for history
Neurological: C6-7 ABSCESS PARAPLEGIA
EENT: NONE
Cardiovascular: AFIB, hypertension, hyperlipidemia, myocardial infarction
Respiratory: asthma, COPD, OXYGEN DEPEND 2L
Gastrointestinal: NONE
Hepatic: NONE
Renal: neurogenic bladder
Musculoskeletal: PARAPLEGIA R/T ABSCESS
Psychiatric: NONE
Endocrine: NONE
Blood Disorders: NONE
Cancer(s): NONE
GYN/Reproductive: NONE
History of MRSA: Yes
History of VRE: Yes
History of CDIFF: No
Influenza Vaccine: 10/16/17
 
Surgical History
Surgical History: non-contributory
 
Psychosocial History
Who do you live with Significant Other
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
What is your primary language English
 
Family History
Family History, If Any:
Relation not specified for:
  *No pertinent family history
 
Hx Contributory? No
 
Review of Systems
 
Review of Systems
Constitutional:
Reports: no symptoms. 
EENTM:
Reports: no symptoms. 
Respiratory:
Reports: no symptoms. 
Cardiovascular:
Reports: no symptoms. 
GI:
Reports: no symptoms. 
Genitourinary:
Reports: no symptoms. 
Musculoskeletal:
Reports: no symptoms. 
Skin:
Reports: no symptoms. 
Neurological/Psychological:
Reports: no symptoms. 
Hematologic/Endocrine:
Reports: no symptoms. 
Immunologic/Allergic:
Reports: no symptoms. 
All Other Systems: Reviewed and Negative
 
Physical Exam
 
Physical Exam
General Appearance: well developed/nourished, lethargic
Head: atraumatic, normal appearance
Eyes:
Bilateral: normal appearance. 
Ears, Nose, Throat: normal pharynx, normal ENT inspection, hearing grossly 
normal
Neck: normal inspection, supple, full range of motion
Respiratory: wheezing
Cardiovascular: regular rate/rhythm
Gastrointestinal: soft, non-tender
Extremities: normal inspection, normal range of motion, no edema
Neurologic/Psych: awake, normal mood/affect
Skin: intact, normal color, warm/dry
 
Core Measures
ACS in differential dx? No
CVA/TIA Diagnosis No
Sepsis Present: No
Sepsis Focused Exam Completed? No
 
Progress
Differential Diagnosis: asthma, AMI, altitude sickness, bronchitis, 
costochondritis, CHF, COPD, musculoskeletal pain, pericarditis, pulmonary 
embolism, pneumonia, pneumothorax, rib fracture, unstable angina
Plan of Care:
 Orders
 
 
Procedure Date/time Status
 
Castro, Insertion/Removal/Asses 0645 Active
 
Discharge Patient   UNK Active
 
RT RE-EVALUATION 1737 Complete
 
RT: Reevaluation  173 Active
 
INCENTIVE SPIROMETRY TRX CHG   UNK Complete
 
AEROSOL CHG   UNK Complete
 
OXYGEN   UNK Complete
 
OXYGEN DAILY CHARGE   UNK Complete
 
CONTIN. POS. AIRWAY PRESS. CHG   UNK Complete
 
Nursing Misc   UNK Active
 
 
 
Diagnostic Imaging:
Viewed by Me: Radiology Read.  Discussed w/RAD: Radiology Read. 
Radiology Impression: PATIENT: DAYDAY HOOVER  MEDICAL RECORD NO: [id number] PRESENT
AGE: 67  PATIENT ACCOUNT NO: [id number] : [birth year]  LOCATION: [facility] ORDERING 
PHYSICIAN: Christiano Mc MD     SERVICE DATE:  EXAM TYPE: RAD - 
XRY-PORTABLE CHEST XRAY EXAMINATION: CR PORTABLE CHEST CLINICAL INFORMATION: 
Bilateral rhonchi and hypoxia. Rule out pneumonia. COMPARISON: Several prior 
chest x-rays, most recent of which is dated 2018. TECHNIQUE: Portable AP 
semiupright view of the chest was obtained. FINDINGS: The cardiomediastinal 
silhouette is within normal limits in size. Mild ectasia and tortuosity of the 
aorta is again seen. Central pulmonary arteries appear enlarged There is 
persistent parenchymal opacity seen in the lung bases bilaterally, right greater
than left, probably unchanged allowing for differences in technique. No 
significant pleural fluid seen. No pneumothorax. Bony structures grossly 
unremarkable. IMPRESSION: No significant change in bibasilar opacities, right 
greater than left, possibly due to chronic atelectasis or chronic infiltrate. 
DICTATED BY: Diana JONES,Carmen PURCELL  DATE/TIME DICTATED:18 
:RAD.[email or web address]  DATE/TIME TRANSCRIBED:18
Initial ED EKG: NSR, no ST T wave changes
 
Departure
 
Departure
Disposition: HOME OR SELF CARE
Condition: Stable
Clinical Impression
Primary Impression: Acute hypercapnic respiratory failure due to obstructive 
sleep apnea
Referrals:
Indira JONES,Ramsey YANG (PCP/Family)
 
Departure Forms:
Customer Survey
General Discharge Information
Prescriptions:
Current Visit Scripts
Azithromycin 1 TAB PO DAILY  
     #1 TAB 
     .
 
Prednisone 1 TAB PO BID  
     #30 TAB 
     .
 
 
 
Observation Note
Spoke With:
Blanche JONES,Kathryn MEIER
Physician Advisor Notified: DEVON RED DO
Place Patient In: Non-ED OBS Care Area
Rationale for Observation:
My rational for observation is as follows HYPERCAPNEA AND ALTERED MENTAL STATUS,
IMPROVED WITH BIPAP.  HAS POORLY FUNCTIONING BIPAP AT HOME.  WILL REQUIRE 
PULMONARY CONSULT, IV SOLUMEDROL, AND ADJUSTMENT OF HOME BIPAP TO PREVENT 
READMISSION, WILL LIKELY IMPROVE IN 24-48 HOURS AND CAN BE DISCHARGED WITH HOME 
ISSUES ADDRESSED.
 
 
Critical Care Note
 
Critical Care Note
Critical Care Time: 30-74 min

## 2018-02-03 NOTE — RADIOLOGY REPORT
EXAMINATION:
CR PORTABLE CHEST
 
CLINICAL INFORMATION:
Bilateral rhonchi and hypoxia. Rule out pneumonia.
 
COMPARISON:
Several prior chest x-rays, most recent of which is dated 01/30/2018.
 
TECHNIQUE:
Portable AP semiupright view of the chest was obtained.
 
FINDINGS:
The cardiomediastinal silhouette is within normal limits in size. Mild
ectasia and tortuosity of the aorta is again seen. Central pulmonary arteries
appear enlarged
 
There is persistent parenchymal opacity seen in the lung bases bilaterally,
right greater than left, probably unchanged allowing for differences in
technique. No significant pleural fluid seen. No pneumothorax.
 
Bony structures grossly unremarkable.
 
IMPRESSION:
No significant change in bibasilar opacities, right greater than left,
possibly due to chronic atelectasis or chronic infiltrate.

## 2018-02-03 NOTE — CONS- PULMONARY
General Information and HPI
 
Consulting Request
Date of Consult: 02/03/18
Requested By:
Dr. Mc
Reason for Consult:
hypercarbic respiratory failure
Source of Information: patient
Exam Limitations: no limitations
History of Present Illness:
67 year old man. Recent admission. 
Hx of COPD on 2LNC home o2. 
Paraplegia hx after spinal abscess/mrsa. A.fib on Eliquis. 
Hx of IVC filter. 
GARRY on BIPAP. 
Presented for worsened dyspnea, denies travel/sick contacts. Recent admission. 
Dyspnea, cough non-productive, no cp, no palpitations, no n/v/d/c. No HA. 
 
CXR - no changes in bibasilar opacifications
wbc 13.7
bnp 1600
 
abg - 7.3/83/278 on PAP tx. 
 
Allergies/Medications
Allergies:
Coded Allergies:
heparin (Intermediate, SWELLING, RASH 10/16/17)
vancomycin (Intermediate, HIVES, SKIN CRACKES, TURNS RED, SWELLS AND PEELS 10/16
/17)
warfarin (From COUMADIN) (Intermediate, RASH 10/16/17)
 
Home Med List:
Acetaminophen (Tylenol Arthritis) 650 MG TABLET.ER   1 TAB PO Q6-PRN PRN PAIN  (
Reported)
Albuterol Sulfate (Ventolin Hfa) 90 MCG HFA.AER.AD   2 PUF INH AD PRN COPD  (
Reported)
Apixaban (Eliquis) 5 MG TABLET   5 MG PO BID atrial fibrillation
Ascorbate Calcium (Vitamin C) 500 MG TABLET   1 TAB PO BID SUPPLEMENT  (Reported
)
Atomoxetine HCl (Strattera) 40 MG CAPSULE   1 CAP PO QAM MENTAL HEALTH  (
Reported)
Baclofen 20 MG TABLET   1 TAB PO BID MUSCLE RELAXER  (Reported)
Bisacodyl (Dulcolax) 10 MG SUPP.RECT   1 SUP RC Q48 GI  (Reported)
Budesonide/Formoterol Fumarate (Symbicort 80-4.5 Mcg Inhaler) 80 MCG-4.5 MCG/
ACTUATION HFA.AER.AD   2 PUF INH BID COPD  (Reported)
Docusate Sodium (Stool Softener) 100 MG CAPSULE   1 CAP PO DAILY STOOL SOFTENER 
(Reported)
Doxycycline Hyclate 100 MG CAPSULE   1 CAP PO BID infection  (Reported)
Evolocumab (Repatha Sureclick) 140 MG/ML PEN.INJCTR   1 ML SC Q 2 WEEKS 
CHOLESTEROL  (Reported)
Furosemide 20 MG TABLET   3 TAB PO Q48 DIURETIC  (Reported)
Gabapentin 600 MG TABLET   1 TAB PO TID NEUROPATHY  (Reported)
Guaifenesin (Mucinex) 600 MG TAB.ER.12H   1 TAB PO BID CONGESTION  (Reported)
Lactobacillus Acidophilus (Acidophilus) 1 EACH CAPSULE   1 CAP PO BID PROBIOTIC 
(Reported)
Lorazepam 1 MG TABLET   1 TAB PO BID ANXIETY  (Reported)
Magnesium Oxide (Magnesium) 400 MG CAPSULE   1 CAP PO 0600 SUPPLEMENT  (Reported
)
Metoprolol Succ XL (Toprol XL) 25 MG TAB   1 TAB PO DAILY HEART  (Reported)
Montelukast Sodium (Singulair) 10 MG TABLET   1 TAB PO DAILY ALLERGIES  (
Reported)
Multivitamin (Daily Value) 1 EACH TABLET   1 TAB PO DAILY VITAMIN SUPPORT  (
Reported)
Nortriptyline HCl 10 MG CAPSULE   1 CAP PO DAILY UNKNOWN  (Reported)
Omeprazole 20 MG CAPSULE.DR   1 CAP PO DAILY ACID REFLUX  (Reported)
Oseltamivir Phosphate (Tamiflu) 75 MG CAPSULE   1 CAP PO DAILY FLU PPX
     .
Oxycodone HCl/Acetaminophen (Oxycodone-Acetaminophen 5-325) 5 MG-325 MG TABLET  
1 TAB PO Q4H PRN PAIN  (Reported)
Potassium Chloride 20 MEQ TAB.ER.PRT   1 TAB PO DAILY SUPPLEMENT  (Reported)
Prednisone 10 MG TABLET   1 TAB PO SEE ADMIN CRITERIA COPD
     50 MG X 3 DAYS, 40 MG X 2 DAYS, 30 MG X2 DAYS, 20MG X 2 DAYS, 10 MG X1
     DAY,THEN stop..
Rifampin (Rifadin) 300 MG CAPSULE   300 MG PO BID MRSA  (Reported)
Rosuvastatin Calcium (Crestor) 5 MG TABLET   1 TAB PO DAILY CHOLESTEROL  (
Reported)
Sotalol (Betapace) 80 MG TABLET   80 MG PO BID AARYTHMIAS
Tiotropium Bromide (Spiriva) 18 MCG CAP.W.DEV   1 CAP INH DAILY BREATHING 
PROBLEMS  (Reported)
Trazodone HCl 50 MG TABLET   1 TAB PO QPM SLEEP  (Reported)
 
Current Medications:
 Current Medications
 
 
  Sig/Viet Start time  Last
 
Medication Dose Route Stop Time Status Admin
 
Methylprednisolone 0 .STK-MED ONE 02/03 1452 DC 
 
  .ROUTE   
 
Methylprednisolone 125 MG ONCE ONE 02/03 1345 DC 02/03
 
  IV 02/03 1346  1448
 
 
 
 
Review of Systems
Comments
18 point review of systems was performed and reviewed. Please see pertinent 
positives and pertinent negatives in the HPI. Otherwise ROS is negative.
 
Past History
 
Travel History
Traveled to [place] past 21 day No
 
Medical History
Neurological: C6-7 ABSCESS PARAPLEGIA
EENT: NONE
Cardiovascular: AFIB, hypertension, hyperlipidemia, myocardial infarction
Respiratory: asthma, COPD, OXYGEN DEPEND 2L
Gastrointestinal: NONE
Hepatic: NONE
Renal: neurogenic bladder
Musculoskeletal: PARAPLEGIA R/T ABSCESS
Psychiatric: NONE
Endocrine: NONE
Blood Disorders: NONE
Cancer(s): NONE
GYN/Reproductive: NONE
 
Surgical History
Surgical History: non-contributory
 
Family History
Relations & Conditions If Any:
Relation not specified for:
  *No pertinent family history
 
 
Psychosocial History
Who Do You Live With? partner
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
Primary Language: English
ETOH Use: denies use
 
Functional Ability
ADLs
Needs Assist: dressing, eating, toileting, bathing. 
Ambulation: wheelchair
IADLs
Independent: shopping, housework, finances, food prep, telephone, transportation
, medication admin. 
 
Exam & Diagnostic Data
Last 24 Hrs of Vital Signs/I&O
 Vital Signs
 
 
Date Time Temp Pulse Resp B/P B/P Pulse O2 O2 Flow FiO2
 
     Mean Ox Delivery Rate 
 
02/03 1424 97.6 76 18 159/69  97 BIPAP 50% 
 
02/03 1250 97.5 92 15 174/84  93 CPAP  
 
 
 
 
Physical Exam
Other Physical Findings:
Generally - Awake, on bipap
Head and neck - ncat
Cardiovascular - S1, S2
Lungs -bilateral rhonchi
Abdomen - Bowel sounds positive, soft, non-tender
Extremities - without edema
 
Last 48 Hrs of Labs/Stalin:
 Laboratory Tests
 
02/03/18 1335:
Anion Gap 12, Estimated GFR > 60, BUN/Creatinine Ratio 32.5  H, Glucose 129  H, 
Calcium 9.3, Total Bilirubin 0.4, AST 44, ALT 62, Alkaline Phosphatase 86, 
Troponin I 0.01, Pro-B-Natriuretic Pept 1600  H, Total Protein 7.0, Albumin 3.6,
Globulin 3.4, Albumin/Globulin Ratio 1.1, CBC w Diff NO MAN DIFF REQ, RBC 4.39  
L, MCV 91.3, MCH 29.3, MCHC 32.0  L, RDW 14.8  H, MPV 8.4, Gran % 87.0  H, 
Lymphocytes % 9.4  L, Monocytes % 3.0, Eosinophils % 0.6, Basophils % 0, 
Absolute Granulocytes 11.9  H, Absolute Lymphocytes 1.3, Absolute Monocytes 0.4,
Absolute Eosinophils 0.1, Absolute Basophils 0
 
02/03/18 1300:
pH 7.30 *L, pCO2 83 *H, pO2 278  H, HCO3 40  H, ABG O2 Sat (Measured) 98.0, 
Carboxyhemoglobin 1.1  L, O2 Concentration % 15L, O2 Delivery Method AUTOPAP, 
Phlebotomy Draw Site RIGHT RADIAL
 Microbiology
02/03 1330  NASOPHARYN: Influenza Virus A & B Rapid Smear - COMP
 
 
 
Assessment/Plan
Impression/Plan:
Impression
67 year old man
 
* acute on chronic hypercarbic respiratory failure - issues with home bipap
* acute COPD exacerbation
 
Plan
-solumedrol 40mg iv q8h
-cont bipap
-recheck abg
-trc/nebs
-spo2 goal >92%
-incentive spirometry
 
DVT prophylaxis at all times
 
may require STR
 
 
Consult Acknowledgment
- Thank you for your consult request.

## 2018-02-04 VITALS — SYSTOLIC BLOOD PRESSURE: 116 MMHG | DIASTOLIC BLOOD PRESSURE: 70 MMHG

## 2018-02-04 VITALS — DIASTOLIC BLOOD PRESSURE: 68 MMHG | SYSTOLIC BLOOD PRESSURE: 110 MMHG

## 2018-02-04 VITALS — DIASTOLIC BLOOD PRESSURE: 70 MMHG | SYSTOLIC BLOOD PRESSURE: 110 MMHG

## 2018-02-04 LAB
ABSOLUTE GRANULOCYTE CT: 10.7 /CUMM (ref 1.4–6.5)
BASOPHILS # BLD: 0 /CUMM (ref 0–0.2)
BASOPHILS NFR BLD: 0.2 % (ref 0–2)
EOSINOPHIL # BLD: 0 /CUMM (ref 0–0.7)
EOSINOPHIL NFR BLD: 0.1 % (ref 0–5)
ERYTHROCYTE [DISTWIDTH] IN BLOOD BY AUTOMATED COUNT: 15 % (ref 11.5–14.5)
GRANULOCYTES NFR BLD: 82.8 % (ref 42.2–75.2)
HCT VFR BLD CALC: 35.3 % (ref 42–52)
LYMPHOCYTES # BLD: 1.7 /CUMM (ref 1.2–3.4)
MCH RBC QN AUTO: 29.6 PG (ref 27–31)
MCHC RBC AUTO-ENTMCNC: 32.2 G/DL (ref 33–37)
MCV RBC AUTO: 91.9 FL (ref 80–94)
MONOCYTES # BLD: 0.5 /CUMM (ref 0.1–0.6)
PLATELET # BLD: 241 /CUMM (ref 130–400)
PMV BLD AUTO: 8.3 FL (ref 7.4–10.4)
RED BLOOD CELL CT: 3.83 /CUMM (ref 4.7–6.1)
WBC # BLD AUTO: 12.9 /CUMM (ref 4.8–10.8)

## 2018-02-04 NOTE — PN- HOUSESTAFF
LinwoodGretchen 18 0850:
Subjective
Follow-up For:
#Hypercarbic Hypoxi Resp Failure
#Chronic medical conditions of CAD, COPD, GARRY, A-fib, HLD, hx of epidural 
abscess w subsequent paralegia and neurogenic bladder
Subjective:
No overnight event.
 
Review of Systems
Constitutional:
Reports: see HPI. 
 
Objective
Last 24 Hrs of Vital Signs/I&O
 Vital Signs
 
 
Date Time Temp Pulse Resp B/P B/P Pulse O2 O2 Flow FiO2
 
     Mean Ox Delivery Rate 
 
 0907  77  140/78     
 
 0838      94 Nasal 2.0L 
 
       Cannula  
 
 0711 97.7 77 20 110/70  96   
 
 0621  77    97   
 
 0226  75    98   
 
 0000       BIPAP  
 
 2100 97.8 94 20 114/70  99 BIPAP  
 
2011 98.0 83 22 143/67  97 BIPAP  
 
 1845 98.8 84 22 124/53  97 BIPAP 50% 
 
 1843       BIPAP 50% 
 
 1627 97.9 84 30 152/72  98 BIPAP 50% 
 
 1616      96   
 
/ 1601  88    98   
 
/ 1424 97.6 76 18 159/69  97 BIPAP 50% 
 
 1310      93 CPAP  
 
 1250 97.5 92 15 174/84  93 CPAP  
 
 
 Intake & Output
 
 
  1600 04 0800  0000
 
Intake Total  360 240
 
Output Total  150 250
 
Balance  210 -10
 
    
 
Intake, Oral  360 240
 
Output, Urine  150 250
 
Patient   104.128 kg
 
Weight   
 
 
 
 
Physical Exam
General Appearance: Alert, Oriented X3, Cooperative, On 2LNC
Cardiovascular: Regular Rate
Lungs: Normal Air Movement, Dminished breath sound BL mid-lower lungs
Current Medications:
 Current Medications
 
 
  Sig/Viet Start time  Last
 
Medication Dose Route Stop Time Status Admin
 
Acetaminophen 650 MG Q6P PRN  1830 AC 
 
  PO   
 
Albuterol Sulfate 3 ML EVERY 4 HRS/AWAKE  2000 AC 
 
  INH   1212
 
Albuterol Sulfate 2 PUF Q4P PRN  1815 AC 
 
  INH   
 
Apixaban 5 MG BID  2200 AC 
 
  PO   0906
 
Ascorbic Acid 500 MG BID  2200 AC 
 
  PO   0906
 
Atorvastatin Calcium 20 MG 1700  1700 AC 
 
  PO   
 
Azithromycin 500 MG DAILY  1000 AC 
 
Dextrose/Water 250 ML IV   0914
 
Baclofen 20 MG BID  2200 AC 
 
  PO   0906
 
Bisacodyl 10 MG Q48  1000 AC 
 
  OR   
 
Budesonide/ 2 PUF BID  2200 AC 
 
Formoterol Fumarate  INH   0909
 
Docusate Sodium 100 MG DAILY  1000 AC 
 
  PO   0906
 
Doxycycline Hyclate 100 MG BID  2200 AC 
 
  PO  2159  0906
 
Furosemide 60 MG Q48  1000 AC 
 
  PO   
 
Gabapentin 600 MG Q8  2200 AC 
 
  PO   0652
 
Guaifenesin 600 MG BID  2200 AC 
 
  PO   0906
 
Ibuprofen 600 MG Q6P PRN  1830 DC 
 
  PO   
 
Lactobacillus  1 CAP BID  2200 AC 
 
Acidophilus  PO   0906
 
Lorazepam 1 MG BID  2200 AC 
 
  PO   0913
 
Magnesium Oxide 400 MG DAILY  1000 DC 
 
  PO   
 
Methylprednisolone 40 MG Q8  2200 AC 
 
  IV   0652
 
Methylprednisolone 0 .STK-MED ONE  1452 DC 
 
  .ROUTE   
 
Methylprednisolone 125 MG ONCE ONE  1345 DC 02/03
 
  IV  1346  1448
 
Metoprolol Succinate 25 MG DAILY  1814 AC 
 
  PO   0907
 
Montelukast Sodium 10 MG DAILY  1000 AC 
 
  PO   0906
 
Multivitamins  1 TAB DAILY  1812 AC 
 
Therapeutic  PO   0907
 
Non-Formulary  0 SEE ADMIN CRITERIA  1830 DC 
 
Medication  ANY   
 
Non-Formulary  0 SEE ADMIN CRITERIA  1815 DC 
 
Medication  ANY   
 
Nortriptyline HCl 10 MG DAILY  1000 AC 
 
  PO   0906
 
Omeprazole 20 MG DAILY  1000 AC 
 
  PO   0906
 
Oxycodone/ 1 TAB Q6P PRN  1830 AC 
 
Acetaminophen  PO   
 
Potassium Chloride 20 MEQ DAILY  1000 AC 
 
  PO   0906
 
Rifampin 300 MG BID  2200 AC 
 
  PO   0906
 
Sotalol HCl 80 MG BID  2200 AC 
 
  PO   0906
 
Tiotropium [place] 1 PUF DAILY  1000 AC 
 
  INH   0907
 
Trazodone HCl 50 MG QPM 02/03 2200 AC 
 
  PO   2231
 
 
 
 
Last 24 Hrs of Lab/Stalin Results
Last 24 Hrs of Labs/Mics:
 Laboratory Tests
 
18 1120:
Anion Gap 7, Estimated GFR > 60, BUN/Creatinine Ratio 33.3  H
 
18 1004:
Anion Gap 9, Estimated GFR > 60, BUN/Creatinine Ratio 43.3  H, CBC w Diff NO MAN
DIFF REQ, RBC 3.83  L, MCV 91.9, MCH 29.6, MCHC 32.2  L, RDW 15.0  H, MPV 8.3, 
Gran % 82.8  H, Lymphocytes % 12.8  L, Monocytes % 4.1, Eosinophils % 0.1, 
Basophils % 0.2, Absolute Granulocytes 10.7  H, Absolute Lymphocytes 1.7, 
Absolute Monocytes 0.5, Absolute Eosinophils 0, Absolute Basophils 0
 
18 1700:
pH 7.34  L, pCO2 79 *H, pO2 97, HCO3 42  H, ABG O2 Sat (Measured) 96.0, 
Carboxyhemoglobin 1.3  L, O2 Concentration % 50%, Respiration Rate 24, O2 
Delivery Method BIPAP, Vent Mode ST, Expiratory Pressure 6, Inspiratory Pressure
24, Phlebotomy Draw Site LEFT R
 
18 1335:
Anion Gap 12, Estimated GFR > 60, BUN/Creatinine Ratio 32.5  H, Glucose 129  H, 
Calcium 9.3, Total Bilirubin 0.4, AST 44, ALT 62, Alkaline Phosphatase 86, 
Troponin I 0.01, Pro-B-Natriuretic Pept 1600  H, Total Protein 7.0, Albumin 3.6,
Globulin 3.4, Albumin/Globulin Ratio 1.1, CBC w Diff NO MAN DIFF REQ, RBC 4.39  
L, MCV 91.3, MCH 29.3, MCHC 32.0  L, RDW 14.8  H, MPV 8.4, Gran % 87.0  H, 
Lymphocytes % 9.4  L, Monocytes % 3.0, Eosinophils % 0.6, Basophils % 0, 
Absolute Granulocytes 11.9  H, Absolute Lymphocytes 1.3, Absolute Monocytes 0.4,
Absolute Eosinophils 0.1, Absolute Basophils 0
 
18 1300:
pH 7.30 *L, pCO2 83 *H, pO2 278  H, HCO3 40  H, ABG O2 Sat (Measured) 98.0, 
Carboxyhemoglobin 1.1  L, O2 Concentration % 15L, O2 Delivery Method AUTOPAP, 
Phlebotomy Draw Site RIGHT RADIAL
 Microbiology
 1330  NASOPHARYN: Influenza Virus A & B Rapid Smear - COMP
 
 
 
Assessment/Plan
Assessment:
Mr. Mandujano, is a 67-year-old gentleman with significant past medical history of 
paroxysmal atrial fibrillation, paraplegia secondary to a T6-T7 MRSA epidural 
spinal abscess [on chronic suppressive antibiotic therapy] with neurogenic 
bladder status post chronic Castro, coronary artery disease, and COPD on 2 L of 
home oxygen, who was recently discharged from the hospital for COPD exacerbation
on 2018.  He presents to the hospital emergency department with similar 
complaints.  
 
His chest x-ray revealed bibasilar opacities, right greater than left.  This was
relatively unchanged from previous examination last radiograph.  His ABG 
revealed hypercarbia and acidosis, 7.3/83/298/40 on 50% O2 via BiPAP.  CBC 
revealed white blood cell count 13.7, H&H 12.8/40.1.  Electrolytes were 
unremarkable however bicarbonate was 40.  BNP 1600.
 
Flu culture negative.
 
Repeat arterial blood gas revealed 7.34/79/97/42 on 50% oxygen via BiPAP []
 
Problem list/Assessment and Plan
Hypercarbic Hypoxic respiratory failure
* Etiology: ? Noncompliance versus broken BiPAP
* Continue Solu-Medrol 40 mg every 8 hours started pending Pulm consult.
* Continue Zithromax 500 mg qd x 5d
* Total respiratory care/nebulizer and BiPAP as tolerated
* DC to STR for BiPAP incompliance/broken machine.
 
Chronic medical problems
* Hx of: CAD, COPD, GARRY, A-fib, HLD, hx of epidural abscess w subsequent 
paralegia and neurogenic bladder
* Continue Eliquis 5 mg twice a day for A. fib
* Continue sotalol 80 mg
* Continue gabapentin 600 mg every 8
* Continue prophylactic antibiotics rifampin 300 mg twice a day and doxycycline 
100 mg twice a day
 
DVT prophylaxis Eliquis 
Heart healthy Diet
DNR/DNI
Pain path as ordered
Problem List:
 1. Difficulty with BiPAP use
 
 2. BiPAP (biphasic positive airway pressure) dependence
 
 3. Hypercapnic respiratory failure
 
Pain Ratin
Pain Location:
NA
Pain Goal: Remain pain free
Pain Plan:
see AP
Tomorrow's Labs & Rationales:
CBC/BEP
 
 
Blanche JONES,Kathryn 18 1214:
Attending MD Review Statement
 
Attending Statement
Attending MD Statement: examined this patient, discuss w/resident/PA/NP, agreed 
w/resident/PA/NP, reviewed EMR data (avail), discussed with nursing, reviewed 
images
Attending Assessment/Plan:
67-year-old male chronic hypercapnic respiratory failure with underlying COPD 
and issues with his BiPAP machine at home, is now in observation status for 
acute on chronic hypercapnic respiratory failure.  We have him on IV steroids 
with azithromycin for pulmonary.  He is on chronic suppressive treatment with 
rifampin and Doxy for an MRSA abscess ( paraplegic).  He remains on Eliquis and 
I'll talk to case management because I think he would benefit from placement and
he appears agreeable.

## 2018-02-04 NOTE — PN- PULMONARY
Subjective
HPI/Critical Care Issues:
Patient seen and examined this morning.  He appears to be doing much better and 
he feels better as well.
 
Objective
Current Medications:
 Current Medications
 
 
  Sig/Viet Start time  Last
 
Medication Dose Route Stop Time Status Admin
 
Acetaminophen 650 MG Q6P PRN 02/03 1830 AC 
 
  PO   
 
Albuterol Sulfate 3 ML EVERY 4 HRS/AWAKE 02/03 2000 AC 02/04
 
  INH   1212
 
Albuterol Sulfate 2 PUF Q4P PRN 02/03 1815 AC 
 
  INH   
 
Apixaban 5 MG BID 02/03 2200 AC 02/04
 
  PO   0906
 
Ascorbic Acid 500 MG BID 02/03 2200 AC 02/04
 
  PO   0906
 
Atorvastatin Calcium 20 MG 1700 02/04 1700 AC 
 
  PO   
 
Azithromycin 500 MG DAILY 02/04 1000 AC 02/04
 
Dextrose/Water 250 ML IV   0914
 
Baclofen 20 MG BID 02/03 2200 AC 02/04
 
  PO   0906
 
Bisacodyl 10 MG Q48 02/05 1000 AC 
 
  MN   
 
Budesonide/ 2 PUF BID 02/03 2200 AC 02/04
 
Formoterol Fumarate  INH   0909
 
Docusate Sodium 100 MG DAILY 02/04 1000 AC 02/04
 
  PO   0906
 
Doxycycline Hyclate 100 MG BID 02/03 2200 AC 02/04
 
  PO 02/04 2159  0906
 
Furosemide 60 MG Q48 02/05 1000 AC 
 
  PO   
 
Gabapentin 600 MG Q8 02/03 2200 AC 02/04
 
  PO   0652
 
Guaifenesin 600 MG BID 02/03 2200 AC 02/04
 
  PO   0906
 
Ibuprofen 600 MG Q6P PRN 02/03 1830 DC 
 
  PO   
 
Lactobacillus  1 CAP BID 02/03 2200 AC 02/04
 
Acidophilus  PO   0906
 
Lorazepam 1 MG BID 02/03 2200 AC 02/04
 
  PO   0913
 
Magnesium Oxide 400 MG DAILY 02/04 1000 DC 
 
  PO   
 
Methylprednisolone 40 MG Q8 02/03 2200 AC 02/04
 
  IV   0652
 
Methylprednisolone 0 .STK-MED ONE 02/03 1452 DC 
 
  .ROUTE   
 
Methylprednisolone 125 MG ONCE ONE 02/03 1345 DC 02/03
 
  IV 02/03 1346  1448
 
Metoprolol Succinate 25 MG DAILY 02/03 1814 AC 02/04
 
  PO   0907
 
Montelukast Sodium 10 MG DAILY 02/04 1000 AC 02/04
 
  PO   0906
 
Multivitamins  1 TAB DAILY 02/03 1812 AC 02/04
 
Therapeutic  PO   0907
 
Non-Formulary  0 SEE ADMIN CRITERIA 02/03 1830 DC 
 
Medication  ANY   
 
Non-Formulary  0 SEE ADMIN CRITERIA 02/03 1815 DC 
 
Medication  ANY   
 
Nortriptyline HCl 10 MG DAILY 02/04 1000 AC 02/04
 
  PO   0906
 
Omeprazole 20 MG DAILY 02/04 1000 AC 02/04
 
  PO   0906
 
Oxycodone/ 1 TAB Q6P PRN 02/03 1830 AC 
 
Acetaminophen  PO   
 
Potassium Chloride 20 MEQ DAILY 02/04 1000 AC 02/04
 
  PO   0906
 
Rifampin 300 MG BID 02/03 2200 AC 02/04
 
  PO   0906
 
Sotalol HCl 80 MG BID 02/03 2200 AC 02/04
 
  PO   0906
 
Tiotropium [place] 1 PUF DAILY 02/04 1000 AC 02/04
 
  INH   0907
 
Trazodone HCl 50 MG QPM 02/03 2200 AC 02/03
 
  PO   2231
 
 
 
 
Vital Signs & I&O
Last 24 Hrs of Vitals and I&O:
 Vital Signs
 
 
Date Time Temp Pulse Resp B/P B/P Pulse O2 O2 Flow FiO2
 
     Mean Ox Delivery Rate 
 
02/04 0907  77  140/78     
 
02/04 0838      94 Nasal 2.0L 
 
       Cannula  
 
02/04 0711 97.7 77 20 110/70  96   
 
02/04 0621  77    97   
 
02/04 0226  75    98   
 
02/04 0000       BIPAP  
 
02/03 2100 97.8 94 20 114/70  99 BIPAP  
 
02/03 2011 98.0 83 22 143/67  97 BIPAP  
 
02/03 1845 98.8 84 22 124/53  97 BIPAP 50% 
 
02/03 1843       BIPAP 50% 
 
02/03 1627 97.9 84 30 152/72  98 BIPAP 50% 
 
02/03 1616      96   
 
02/03 1601  88    98   
 
02/03 1424 97.6 76 18 159/69  97 BIPAP 50% 
 
 
 Intake & Output
 
 
 02/04 1600 02/04 0800 02/04 0000
 
Intake Total  360 240
 
Output Total  150 250
 
Balance  210 -10
 
    
 
Intake, Oral  360 240
 
Output, Urine  150 250
 
Patient   230 lb
 
Weight   
 
 
 
 
Exam
Other Physical Findings:
Generally - Awake
Head and neck - ncat
Cardiovascular - S1, S2
Lungs -bilateral rhonchi
Abdomen - Bowel sounds positive, soft, non-tender
Extremities - without edema
 
Results
Last 24 Hrs of Lab Results:
 Laboratory Tests
 
02/04/18 1120:
Anion Gap 7, Estimated GFR > 60, BUN/Creatinine Ratio 33.3  H
 
02/04/18 1004:
Anion Gap 9, Estimated GFR > 60, BUN/Creatinine Ratio 43.3  H, CBC w Diff NO MAN
DIFF REQ, RBC 3.83  L, MCV 91.9, MCH 29.6, MCHC 32.2  L, RDW 15.0  H, MPV 8.3, 
Gran % 82.8  H, Lymphocytes % 12.8  L, Monocytes % 4.1, Eosinophils % 0.1, 
Basophils % 0.2, Absolute Granulocytes 10.7  H, Absolute Lymphocytes 1.7, 
Absolute Monocytes 0.5, Absolute Eosinophils 0, Absolute Basophils 0
 
02/03/18 1700:
pH 7.34  L, pCO2 79 *H, pO2 97, HCO3 42  H, ABG O2 Sat (Measured) 96.0, 
Carboxyhemoglobin 1.3  L, O2 Concentration % 50%, Respiration Rate 24, O2 
Delivery Method BIPAP, Vent Mode ST, Expiratory Pressure 6, Inspiratory Pressure
24, Phlebotomy Draw Site LEFT R
 
02/03/18 1335:
Anion Gap 12, Estimated GFR > 60, BUN/Creatinine Ratio 32.5  H, Glucose 129  H, 
Calcium 9.3, Total Bilirubin 0.4, AST 44, ALT 62, Alkaline Phosphatase 86, 
Troponin I 0.01, Pro-B-Natriuretic Pept 1600  H, Total Protein 7.0, Albumin 3.6,
Globulin 3.4, Albumin/Globulin Ratio 1.1, CBC w Diff NO MAN DIFF REQ, RBC 4.39  
L, MCV 91.3, MCH 29.3, MCHC 32.0  L, RDW 14.8  H, MPV 8.4, Gran % 87.0  H, 
Lymphocytes % 9.4  L, Monocytes % 3.0, Eosinophils % 0.6, Basophils % 0, 
Absolute Granulocytes 11.9  H, Absolute Lymphocytes 1.3, Absolute Monocytes 0.4,
Absolute Eosinophils 0.1, Absolute Basophils 0
 
 
Impression/Plan
 
Impression/Plan
Impression/Plan:
Impression
67 year old man
 
* acute on chronic hypercarbic respiratory failure - issues with home bipap
* acute COPD exacerbation
 
Plan
-REDUCE solumedrol 40mg iv every 12 hours
-cont bipap
-clinically no need to repeat abg today
-trc/nebs
-spo2 goal >92%
-incentive spirometry
 
DVT prophylaxis at all times
 
may require STR - pt agreeable

## 2018-02-05 VITALS — SYSTOLIC BLOOD PRESSURE: 110 MMHG | DIASTOLIC BLOOD PRESSURE: 70 MMHG

## 2018-02-05 VITALS — DIASTOLIC BLOOD PRESSURE: 60 MMHG | SYSTOLIC BLOOD PRESSURE: 112 MMHG

## 2018-02-05 VITALS — DIASTOLIC BLOOD PRESSURE: 60 MMHG | SYSTOLIC BLOOD PRESSURE: 108 MMHG

## 2018-02-05 LAB
ABSOLUTE GRANULOCYTE CT: 7.5 /CUMM (ref 1.4–6.5)
BASOPHILS # BLD: 0 /CUMM (ref 0–0.2)
BASOPHILS NFR BLD: 0.2 % (ref 0–2)
EOSINOPHIL # BLD: 0.1 /CUMM (ref 0–0.7)
EOSINOPHIL NFR BLD: 0.7 % (ref 0–5)
ERYTHROCYTE [DISTWIDTH] IN BLOOD BY AUTOMATED COUNT: 15.4 % (ref 11.5–14.5)
GRANULOCYTES NFR BLD: 63.9 % (ref 42.2–75.2)
HCT VFR BLD CALC: 35.2 % (ref 42–52)
LYMPHOCYTES # BLD: 3.2 /CUMM (ref 1.2–3.4)
MCH RBC QN AUTO: 29.2 PG (ref 27–31)
MCHC RBC AUTO-ENTMCNC: 31.9 G/DL (ref 33–37)
MCV RBC AUTO: 91.5 FL (ref 80–94)
MONOCYTES # BLD: 1 /CUMM (ref 0.1–0.6)
PLATELET # BLD: 258 /CUMM (ref 130–400)
PMV BLD AUTO: 8.4 FL (ref 7.4–10.4)
RED BLOOD CELL CT: 3.84 /CUMM (ref 4.7–6.1)
WBC # BLD AUTO: 11.8 /CUMM (ref 4.8–10.8)

## 2018-02-05 NOTE — PN- HOUSESTAFF
LinwoodGretchen 18 0748:
Subjective
Follow-up For:
#Hypercarbic Hypoxic Resp Failure
#Chronic medical conditions of CAD, COPD, GARRY, A-fib, HLD, hx of epidural 
abscess w subsequent paralegia and neurogenic bladder
Subjective:
No overnight event. patient felt improved. patient would not want to go to 
rehab.
 
Review of Systems
Constitutional:
Reports: see HPI. 
 
Objective
Last 24 Hrs of Vital Signs/I&O
 Vital Signs
 
 
Date Time Temp Pulse Resp B/P B/P Pulse O2 O2 Flow FiO2
 
     Mean Ox Delivery Rate 
 
 0813      94 Nasal 2.0L 
 
       Cannula  
 
 0755      94   
 
 0716 96.1 60 20 108/60  94 Nasal 2.0L 
 
       Cannula  
 
 0318  62    97   
 
 0053  65    97   
 
 0050  66    969   
 
 2239  87    94   
 
 2227 98.2 89 20 116/70  92 Nasal  
 
       Cannula  
 
 2036  72    95   
 
 1702  75    95   
 
 1640      95 Nasal 2.0L 
 
       Cannula  
 
 1600       Nasal 2.0L 
 
       Cannula  
 
 1425 97.8 92 20 110/68  91   
 
/ 0907  77  140/78     
 
 
 Intake & Output
 
 
  1600  0800  0000
 
Intake Total   450
 
Output Total  625 
 
Balance  -625 450
 
    
 
Intake, Oral   450
 
Output, Urine  625 
 
 
 
 
Physical Exam
General Appearance: Alert, Oriented X3, Cooperative, No Acute Distress
Cardiovascular: Regular Rate
Lungs: Normal Air Movement, bilateral wheezing
Abdomen: Normal Bowel Sounds, Soft, No Tenderness
Neurological: Normal Speech
Extremities: No Edema, Normal Pulses
Current Medications:
 Current Medications
 
 
  Sig/Viet Start time  Last
 
Medication Dose Route Stop Time Status Admin
 
Acetaminophen 650 MG Q6P PRN  1830 AC 
 
  PO   
 
Albuterol Sulfate 3 ML EVERY 4 HRS/AWAKE  2000 AC 
 
  INH   0753
 
Albuterol Sulfate 2 PUF Q4P PRN  1815 AC 
 
  INH   
 
Apixaban 5 MG BID 2200 AC 
 
  PO   2155
 
Ascorbic Acid 500 MG BID  2200 AC 
 
  PO   215
 
Atorvastatin Calcium 20 MG 1700 / 1700 AC 
 
  PO   1657
 
Azithromycin 500 MG DAILY  1000 AC 
 
Dextrose/Water 250 ML IV   0914
 
Baclofen 20 MG BID 02/03 2200 AC 
 
  PO   2156
 
Bisacodyl 10 MG Q48  1000 AC 
 
  NM   
 
Budesonide/ 2 PUF BID  2200 AC 
 
Formoterol Fumarate  INH   2200
 
Docusate Sodium 100 MG DAILY  1000 AC 
 
  PO   0906
 
Doxycycline Hyclate 100 MG BID  2200 DC 02/04
 
  PO 04 2159  0906
 
Furosemide 60 MG Q48  1000 AC 
 
  PO   
 
Gabapentin 600 MG Q8  2200 AC 
 
  PO   0649
 
Guaifenesin 600 MG BID  2200 AC 
 
  PO   2156
 
Lactobacillus  1 CAP BID  2200 AC 
 
Acidophilus  PO   2156
 
Lorazepam 1 MG BID  2200 AC 
 
  PO   2155
 
Magnesium Oxide 400 MG DAILY  1000 DC 
 
  PO   
 
Methylprednisolone 40 MG Q12  2200 AC 
 
  IV   2155
 
Methylprednisolone 40 MG Q8  2200 DC 
 
  IV   1340
 
Metoprolol Succinate 25 MG DAILY  1814 AC 
 
  PO   0907
 
Montelukast Sodium 10 MG DAILY  1000 AC 
 
  PO   0906
 
Multivitamins  1 TAB DAILY  1812 AC 
 
Therapeutic  PO   0907
 
Nortriptyline HCl 10 MG DAILY  1000 AC 
 
  PO   0906
 
Omeprazole 20 MG DAILY  1000 AC 
 
  PO   0650
 
Oxycodone/ 1 TAB Q6P PRN  1830 AC 
 
Acetaminophen  PO   2156
 
Potassium Chloride 20 MEQ DAILY  1000 AC 
 
  PO   0906
 
Rifampin 300 MG BID  2200 AC 
 
  PO   2156
 
Sotalol HCl 80 MG BID  2200 AC 
 
  PO   2155
 
Tiotropium [place] 1 PUF DAILY  1000 AC 
 
  INH   0907
 
Trazodone HCl 50 MG QPM  2200 AC 
 
  PO   2231
 
 
 
 
Last 24 Hrs of Lab/Stalin Results
Last 24 Hrs of Labs/Mics:
 Laboratory Tests
 
18 0733:
Sodium Pending, Potassium Pending, Chloride Pending, Carbon Dioxide Pending, 
Anion Gap Pending, BUN Pending, Creatinine Pending, BUN/Creatinine Ratio Pending
, CBC w Diff Pending, WBC Pending, RBC Pending, Hgb Pending, Hct Pending, MCV 
Pending, MCH Pending, MCHC Pending, RDW Pending, Plt Count Pending, MPV Pending
 
18 1120:
Anion Gap 7, Estimated GFR > 60, BUN/Creatinine Ratio 33.3  H
 
18 1004:
Anion Gap 9, Estimated GFR > 60, BUN/Creatinine Ratio 43.3  H, CBC w Diff NO MAN
DIFF REQ, RBC 3.83  L, MCV 91.9, MCH 29.6, MCHC 32.2  L, RDW 15.0  H, MPV 8.3, 
Gran % 82.8  H, Lymphocytes % 12.8  L, Monocytes % 4.1, Eosinophils % 0.1, 
Basophils % 0.2, Absolute Granulocytes 10.7  H, Absolute Lymphocytes 1.7, 
Absolute Monocytes 0.5, Absolute Eosinophils 0, Absolute Basophils 0
 
 
Assessment/Plan
Assessment:
Mr. Mandujano, is a 67-year-old gentleman with significant past medical history of 
paroxysmal atrial fibrillation, paraplegia secondary to a T6-T7 MRSA epidural 
spinal abscess [on chronic suppressive antibiotic therapy] with neurogenic 
bladder status post chronic Castro, coronary artery disease, and COPD on 2 L of 
home oxygen, who was recently discharged from the hospital for COPD exacerbation
on 2018.  He presents to the hospital emergency department with similar 
complaints.  
 
His chest x-ray revealed bibasilar opacities, right greater than left.  This was
relatively unchanged from previous examination last radiograph.  His ABG 
revealed hypercarbia and acidosis, 7.3/83/298/40 on 50% O2 via BiPAP.  CBC 
revealed white blood cell count 13.7, H&H 12.8/40.1.  Electrolytes were 
unremarkable however bicarbonate was 40.  BNP 1600.
 
Flu culture negative.
 
Repeat arterial blood gas revealed 7.34/79/97/42 on 50% oxygen via BiPAP [24/]
 
Problem list/Assessment and Plan
Hypercarbic Hypoxic respiratory failure
* Etiology: ? Noncompliance versus broken BiPAP
* DC IV solumedrol, will start prednisone 60mg qd, and continue taper.
* Continue Zithromax 500 mg qd x 5d, currently day 2
* Total respiratory care/nebulizer and BiPAP as tolerated
* DC to STR for BiPAP incompliance/broken machine. However patient denied going 
to SNF
 
Chronic medical problems
* Hx of: CAD, COPD, GARRY, A-fib, HLD, hx of epidural abscess w subsequent 
paralegia and neurogenic bladder
* Continue Eliquis 5 mg twice a day for A. fib
* Continue sotalol 80 mg
* Continue gabapentin 600 mg every 8
* Continue prophylactic antibiotics rifampin 300 mg twice a day and doxycycline 
100 mg twice a day
 
DVT prophylaxis Eliquis 
Heart healthy Diet
DNR/DNI
Pain path as ordered
Problem List:
 1. BiPAP (biphasic positive airway pressure) dependence
 
 2. Difficulty with BiPAP use
 
 3. Hypercapnic respiratory failure
 
Pain Ratin
Pain Location:
NA
Pain Goal: Remain pain free
Pain Plan:
see AP
Tomorrow's Labs & Rationales:
LENORA NelsonSandra ahmadi 18 1351:
Attending MD Review Statement
 
Attending Statement
Attending MD Statement: examined this patient, discuss w/resident/PA/NP, agreed 
w/resident/PA/NP, discussed with family, reviewed EMR data (avail), discussed 
with nursing, discussed with case mgmt, reviewed images, amended to note
Attending Assessment/Plan:
67-year-old male chronic hypercapnic respiratory failure with underlying COPD 
and issues with his BiPAP machine at home, is here for acute on chronic 
hypercapnic respiratory failure. Taper on IV steroids, azithromycin for 
pulmonary.  He is on chronic suppressive treatment with rifampin and Doxy for an
MRSA abscess ( paraplegic).  He remains on Eliquis. F/u case management for dc 
planning. anticipate dc in next 24 hrs.

## 2018-02-05 NOTE — PN- PULMONARY
Subjective
HPI/Critical Care Issues:
Patient seen and examined this morning.  He appears to be doing much better.  We
will observe him for another day and decide whether he is ready for discharge
 
Objective
Current Medications:
 Current Medications
 
 
  Sig/Viet Start time  Last
 
Medication Dose Route Stop Time Status Admin
 
Acetaminophen 650 MG Q6P PRN 02/03 1830 AC 
 
  PO   
 
Albuterol Sulfate 3 ML EVERY 4 HRS/AWAKE 02/03 2000 AC 02/05
 
  INH   0753
 
Albuterol Sulfate 2 PUF Q4P PRN 02/03 1815 AC 
 
  INH   
 
Apixaban 5 MG BID 02/03 2200 AC 02/05
 
  PO   1018
 
Ascorbic Acid 500 MG BID 02/03 2200 AC 02/05
 
  PO   1017
 
Atorvastatin Calcium 20 MG 1700 02/04 1700 AC 02/04
 
  PO   1657
 
Azithromycin 500 MG DAILY 02/04 1000 AC 02/05
 
Dextrose/Water 250 ML IV   1021
 
Baclofen 20 MG BID 02/03 2200 AC 02/05
 
  PO   1018
 
Bisacodyl 10 MG Q48 02/05 1000 AC 
 
  OK   
 
Budesonide/ 2 PUF BID 02/03 2200 AC 02/05
 
Formoterol Fumarate  INH   1016
 
Docusate Sodium 100 MG DAILY 02/04 1000 AC 02/05
 
  PO   1019
 
Doxycycline Hyclate 100 MG BID 02/03 2200 DC 02/04
 
  PO 02/04 2159  0906
 
Furosemide 60 MG Q48 02/05 1000 AC 02/05
 
  PO   1019
 
Gabapentin 600 MG Q8 02/03 2200 AC 02/05
 
  PO   0649
 
Guaifenesin 600 MG BID 02/03 2200 AC 02/05
 
  PO   1019
 
Lactobacillus  1 CAP BID 02/03 2200 AC 02/05
 
Acidophilus  PO   1019
 
Lorazepam 1 MG BID 02/03 2200 AC 02/05
 
  PO   1017
 
Methylprednisolone 40 MG Q12 02/04 2200 AC 02/05
 
  IV   1016
 
Methylprednisolone 40 MG Q8 02/03 2200 DC 02/04
 
  IV   1340
 
Metoprolol Succinate 25 MG DAILY 02/03 1814 AC 02/05
 
  PO   1018
 
Montelukast Sodium 10 MG DAILY 02/04 1000 AC 02/05
 
  PO   1018
 
Multivitamins  1 TAB DAILY 02/03 1812 AC 02/05
 
Therapeutic  PO   1018
 
Nortriptyline HCl 10 MG DAILY 02/04 1000 AC 02/05
 
  PO   1016
 
Omeprazole 20 MG DAILY 02/04 1000 AC 02/05
 
  PO   0650
 
Oxycodone/ 1 TAB Q6P PRN 02/03 1830 AC 02/04
 
Acetaminophen  PO   2156
 
Potassium Chloride 20 MEQ DAILY 02/04 1000 AC 02/05
 
  PO   1017
 
Rifampin 300 MG BID 02/03 2200 AC 02/05
 
  PO   1018
 
Sotalol HCl 80 MG BID 02/03 2200 AC 02/05
 
  PO   1018
 
Tiotropium [place] 1 PUF DAILY 02/04 1000 AC 02/05
 
  INH   1015
 
Trazodone HCl 50 MG QPM 02/03 2200 AC 02/03
 
  PO   2231
 
 
 
 
Vital Signs & I&O
Last 24 Hrs of Vitals and I&O:
 Vital Signs
 
 
Date Time Temp Pulse Resp B/P B/P Pulse O2 O2 Flow FiO2
 
     Mean Ox Delivery Rate 
 
02/05 1018 96.1 60 20 160/80     
 
02/05 0813      94 Nasal 2.0L 
 
       Cannula  
 
02/05 0755      94   
 
02/05 0716 96.1 60 20 108/60  94 Nasal 2.0L 
 
       Cannula  
 
02/05 0318  62    97   
 
02/05 0053  65    97   
 
02/05 0050  66    969   
 
02/05 0000      92 BIPAP 40% 
 
02/04 2239  87    94   
 
02/04 2227 98.2 89 20 116/70  92 Nasal  
 
       Cannula  
 
02/04 2036  72    95   
 
02/04 1702  75    95   
 
02/04 1640      95 Nasal 2.0L 
 
       Cannula  
 
02/04 1600       Nasal 2.0L 
 
       Cannula  
 
02/04 1425 97.8 92 20 110/68  91   
 
 
 Intake & Output
 
 
 02/05 1600 02/05 0800 02/05 0000
 
Intake Total  100 450
 
Output Total  625 
 
Balance  -525 450
 
    
 
Intake, IV  0 
 
Intake, Oral  100 450
 
Number  0 
 
Bowel   
 
Movements   
 
Output, Urine  625 
 
 
 
 
Exam
Other Physical Findings:
Generally - Awake
Head and neck - ncat
Cardiovascular - S1, S2
Lungs -bilateral rhonchi
Abdomen - Bowel sounds positive, soft, non-tender
Extremities - without edema
 
Results
Last 24 Hrs of Lab Results:
 Laboratory Tests
 
02/05/18 0733:
Anion Gap 8, Estimated GFR > 60, BUN/Creatinine Ratio 41.7  H, CBC w Diff NO MAN
DIFF REQ, RBC 3.84  L, MCV 91.5, MCH 29.2, MCHC 31.9  L, RDW 15.4  H, MPV 8.4, 
Gran % 63.9, Lymphocytes % 26.8, Monocytes % 8.4, Eosinophils % 0.7, Basophils %
0.2, Absolute Granulocytes 7.5  H, Absolute Lymphocytes 3.2, Absolute Monocytes 
1.0  H, Absolute Eosinophils 0.1, Absolute Basophils 0
 
02/04/18 1120:
Anion Gap 7, Estimated GFR > 60, BUN/Creatinine Ratio 33.3  H
 
 
Impression/Plan
 
Impression/Plan
Impression/Plan:
Impression
67 year old man
 
* acute on chronic hypercarbic respiratory failure - issues with home bipap
* acute COPD exacerbation
 
Plan
-dc solumedrol today, begin prednisone 60mg - will taper with you
-cont bipap
-clinically no need to repeat abg today
-trc/nebs
-spo2 goal >92%
-incentive spirometry
 
DVT prophylaxis at all times
 
pt has declined STR

## 2018-02-05 NOTE — DISCHARGE SUMMARY
Visit Information
 
Visit Dates
Admission Date:
02/03/18
 
Discharge Date:
2/7/2018
 
 
Hospital Course
 
Course
Attending Physician:
Sandra Serrato MD
 
Primary Care Physician:
Ramsey Jacobson MD
 
Hospital Course:
Mr. Mandujano, is a 67-year-old gentleman with significant past medical history of 
paroxysmal atrial fibrillation, paraplegia secondary to a T6-T7 MRSA epidural 
spinal abscess [on chronic suppressive antibiotic therapy] with neurogenic 
bladder status post chronic Castro, coronary artery disease, and COPD on 2 L of 
home oxygen, who was recently discharged from the hospital for COPD exacerbation
on 02/01/2018.  He presents to the hospital emergency department with similar 
complaints.  
 
Upon presentation, chest x-ray revealed bibasilar opacities, right greater than 
left, relatively unchanged from previous examination last radiograph.  ER ABG 
revealed hypercarbia and acidosis, 7.3/83/298/40 on 50% O2 via BiPAP.  CBC 
revealed white blood cell count 13.7, H&H 12.8/40.1.  Electrolytes were 
unremarkable however bicarbonate was 40.  BNP 1600.
 
Flu culture negative.
 
Repeat arterial blood gas revealed 7.34/79/97/42 on 50% oxygen via BiPAP [24/6]
 
 
==================================
Patient was admitted to general medicine floor for follow managements:
 
#Hypercarbic Hypoxic respiratory failure
Upon admission, patient was started on IV solumedrol and later tapered to oral 
prednisone. Patient was also given for a total of 5 days course of Zithromax, in
addition to total respiratory care/Nebulizer and BiPAP for support. Patient was 
discharged with oral prednisone tapering dose and confirmed his home BiPAP was 
functional by overnight testing during this hospital stay.
 
#Chronic medical conditions including CAD, COPD, GARRY, A-fib, HLD, hx of epidural
abscess w subsequent paralegia and neurogenic bladder
Patient was continued on home medications including Eliquis 5 mg twice a day for
A. fib, sotalol 80 mg, gabapentin 600 mg every 8 hours daily, and prophylactic 
antibiotics rifampin 300 mg twice a day and doxycycline 100 mg twice a day. 
Patient remained on chronic Castro over hospital course.
 
DVT prophylaxis Eliquis 
Heart healthy Diet
DNR/DNI
Pain path as ordered
Allergies:
Coded Allergies:
heparin (Intermediate, SWELLING, RASH 10/16/17)
vancomycin (Intermediate, HIVES, SKIN CRACKES, TURNS RED, SWELLS AND PEELS 10/16
/17)
warfarin (From COUMADIN) (Intermediate, RASH 10/16/17)
 
Pertinent Lab Results:
SERVICE DATE: 02/03/
EXAM TYPE: RAD - XRY-PORTABLE CHEST XRAY
 
 
IMPRESSION:
No significant change in bibasilar opacities, right greater than left,
possibly due to chronic atelectasis or chronic infiltrate.
=======================================
 Laboratory Tests
 
 
 02/05 02/04
 
 0733 1120
 
Chemistry  
 
  Sodium (137 - 145 mmol/L) 139 140
 
  Potassium (3.5 - 5.1 mmol/L) 4.1 4.6
 
  Chloride (98 - 107 mmol/L) 91  L 89  L
 
  Carbon Dioxide (22 - 30 mmol/L) 40  H 44  H
 
  Anion Gap (5 - 16) 8 7
 
  BUN (9 - 20 mg/dL) 25  H 20
 
  Creatinine (0.7 - 1.2 mg/dL) 0.6  L 0.6  L
 
  Estimated GFR (>60 ml/min) > 60 > 60
 
  BUN/Creatinine Ratio (7 - 25 %) 41.7  H 33.3  H
 
Hematology  
 
  CBC w Diff NO MAN DIFF REQ 
 
  WBC (4.8 - 10.8 /CUMM) 11.8  H 
 
  RBC (4.70 - 6.10 /CUMM) 3.84  L 
 
  Hgb (14.0 - 18.0 G/DL) 11.2  L 
 
  Hct (42 - 52 %) 35.2  L 
 
  MCV (80.0 - 94.0 FL) 91.5 
 
  MCH (27.0 - 31.0 PG) 29.2 
 
  MCHC (33.0 - 37.0 G/DL) 31.9  L 
 
  RDW (11.5 - 14.5 %) 15.4  H 
 
  Plt Count (130 - 400 /CUMM) 258 
 
  MPV (7.4 - 10.4 FL) 8.4 
 
  Gran % (42.2 - 75.2 %) 63.9 
 
  Lymphocytes % (20.5 - 51.1 %) 26.8 
 
  Monocytes % (1.7 - 9.3 %) 8.4 
 
  Eosinophils % (0 - 5 %) 0.7 
 
  Basophils % (0.0 - 2.0 %) 0.2 
 
  Absolute Granulocytes (1.4 - 6.5 /CUMM) 7.5  H 
 
  Absolute Lymphocytes (1.2 - 3.4 /CUMM) 3.2 
 
  Absolute Monocytes (0.10 - 0.60 /CUMM) 1.0  H 
 
  Absolute Eosinophils (0.0 - 0.7 /CUMM) 0.1 
 
  Absolute Basophils (0.0 - 0.2 /CUMM) 0 
 
 
 
 
 02/04
 
 1004
 
Chemistry 
 
  Sodium (137 - 145 mmol/L) 142
 
  Potassium (3.5 - 5.1 mmol/L) 2.7 *L
 
  Chloride (98 - 107 mmol/L) 107
 
  Carbon Dioxide (22 - 30 mmol/L) 25
 
  Anion Gap (5 - 16) 9
 
  BUN (9 - 20 mg/dL) 13
 
  Creatinine (0.7 - 1.2 mg/dL) 0.3  L
 
  Estimated GFR (>60 ml/min) > 60
 
  BUN/Creatinine Ratio (7 - 25 %) 43.3  H
 
Hematology 
 
  CBC w Diff NO MAN DIFF REQ
 
  WBC (4.8 - 10.8 /CUMM) 12.9  H
 
  RBC (4.70 - 6.10 /CUMM) 3.83  L
 
  Hgb (14.0 - 18.0 G/DL) 11.4  L
 
  Hct (42 - 52 %) 35.3  L
 
  MCV (80.0 - 94.0 FL) 91.9
 
  MCH (27.0 - 31.0 PG) 29.6
 
  MCHC (33.0 - 37.0 G/DL) 32.2  L
 
  RDW (11.5 - 14.5 %) 15.0  H
 
  Plt Count (130 - 400 /CUMM) 241
 
  MPV (7.4 - 10.4 FL) 8.3
 
  Gran % (42.2 - 75.2 %) 82.8  H
 
  Lymphocytes % (20.5 - 51.1 %) 12.8  L
 
  Monocytes % (1.7 - 9.3 %) 4.1
 
  Eosinophils % (0 - 5 %) 0.1
 
  Basophils % (0.0 - 2.0 %) 0.2
 
  Absolute Granulocytes (1.4 - 6.5 /CUMM) 10.7  H
 
  Absolute Lymphocytes (1.2 - 3.4 /CUMM) 1.7
 
  Absolute Monocytes (0.10 - 0.60 /CUMM) 0.5
 
  Absolute Eosinophils (0.0 - 0.7 /CUMM) 0
 
  Absolute Basophils (0.0 - 0.2 /CUMM) 0
 
 
 
 
Disposition Summary
 
Disposition
Principal Diagnosis:
#Hypercarbic Hypoxic respiratory failure
#Incompliance with BiPAP use
#Chronic medical conditions including CAD, COPD, GARRY, A-fib, HLD, hx of epidural
abscess w subsequent paralegia and neurogenic bladder
Additional Diagnosis:
As Above
Discharge Disposition: home or self care
 
Discharge Instructions
 
General Discharge Information
Code Status: Do Not Resucitate/Intubat
Patient's Diet:
Heart Healthy Diet
Patient's Activity:
Limited to Paralegia
Follow-Up Instructions/Appts:
- Please ensure correct daily use of BiPAP for your respiratory support.
- Please follow up with your primary care physician within 1-2 week of 
discharge. Inform your primary care physician of this admission to [facility].
- Continue your current medications per discharge instructions.
- Please watch for these problems: Fever, Chills, Nausea, Vomiting, Shortness of
Breath, Productive Cough, Chest Pain/Discomfort, Abdominal Pain, Active Bleeding
or Bloody urine/stool.
 
Medications at Discharge
Discharge Medications:
Continue taking these medications:
Furosemide (Furosemide) 20 MG TABLET
    3 Tablet ORAL EVERY 48 HOURS (Every 2 days)
    Qty = 90
    Comments:
       Last Taken: 2/7/18
             Time: 0930 AM
 
Lactobacillus Acidophilus (Acidophilus) 1 EACH CAPSULE
    1 Capsule ORAL TWICE DAILY
    Comments:
       Last Taken:2/7/18
             Time:9AM
           
 
Magnesium Oxide (Magnesium) 400 MG CAPSULE
    1 Capsule ORAL 0600
    Comments:
       Last Taken:2/8/18
             Time:9AM
             
 
Multivitamin (Daily Value) 1 EACH TABLET
    1 Tablet ORAL DAILY
    Comments:
       Last Taken:2/8/17
             Time:9AM
 
Ascorbate Calcium (Vitamin C) 500 MG TABLET
    1 Tablet ORAL TWICE DAILY
    Comments:
       Last Taken:2/7/18
             Time:9AM
 
Oxycodone HCl/Acetaminophen (Oxycodone-Acetaminophen 5-325) 5 MG-325 MG TABLET
    1 Tablet ORAL Q4H as needed for PAIN
    Qty = 120
    Comments:
       Last Taken:2/7/18
             Time:5AM
 
Baclofen (Baclofen) 20 MG TABLET
    1 Tablet ORAL TWICE DAILY
    Qty = 150
    Comments:
       Last Taken:2/7/18
             Time:9AM
 
Bisacodyl (Dulcolax) 10 MG SUPP.RECT
    1 Suppository RECTAL EVERY 48 HOURS (Every 2 days)
    Comments:
       NOT GIVEN IN HOSPITAL
 
Potassium Chloride (Potassium Chloride) 20 MEQ TAB.ER.PRT
    1 Tablet ORAL DAILY
    Qty = 90
    Comments:
       Last Taken:2/7/18
             Time:9AM
 
Doxycycline Hyclate (Doxycycline Hyclate) 100 MG CAPSULE
    1 Capsule ORAL TWICE DAILY
    Qty = 60
    Comments:
       NOT GIVEN IN HOSPITAL
 
Sotalol (Betapace) 80 MG TABLET
    80 Milligram ORAL TWICE DAILY
    Qty = 60
    Comments:
       Last Taken: 2/7/18
             Time: 0930AM
 
Apixaban (Eliquis) 5 MG TABLET
    5 Milligram ORAL TWICE DAILY
    Qty = 60
    Comments:
       Last Taken: 2/7/18
             Time: 09:30AM
 
Albuterol Sulfate (Ventolin Hfa) 90 MCG HFA.AER.AD
    2 Puff Inhale through mouth As Directed as needed for COPD
    Qty = 18
    Comments:
       Last Taken:2/7/18
             Time:8AM
 
Budesonide/Formoterol Fumarate (Symbicort 80-4.5 Mcg Inhaler) 80 MCG-4.5 MCG/
ACTUATION HFA.AER.AD
    2 Puff Inhale through mouth TWICE DAILY
    Comments:
       Last Taken:2/7/18
             Time:9AM
 
Gabapentin (Gabapentin) 600 MG TABLET
    1 Tablet ORAL THREE TIMES DAILY
    Comments:
       Last Taken: 2/7/18
             Time: 5AM
 
Rifampin (Rifadin) 300 MG CAPSULE
    300 Milligram ORAL TWICE DAILY
    Qty = 60
    Comments:
       TAKEN:  2/7/18
        TIME:  9 AM
 
Acetaminophen (Tylenol Arthritis) 650 MG TABLET.ER
    1 Tablet ORAL EVERY 6 HOURS AS NEEDED as needed for PAIN
    Comments:
       Last Taken:2/7/18
             Time:4PM
 
Lorazepam (Lorazepam) 1 MG TABLET
    1 Tablet ORAL TWICE DAILY
    Comments:
       Last Taken:2/7/18
             Time:9AM
 
Metoprolol Succ XL (Toprol XL) 25 MG TAB
    1 Tablet ORAL DAILY
    Comments:
       Last Taken: 2/7/18
             Time: 0930AM
 
Nortriptyline HCl (Nortriptyline HCl) 10 MG CAPSULE
    1 Capsule ORAL DAILY
    Comments:
       Last Taken: 2/7/18
             Time: 0930AM
 
Omeprazole (Omeprazole) 20 MG CAPSULE.DR
    1 Capsule ORAL DAILY
    Comments:
       Last Taken: 2/7/18
             Time: 0930 AM
 
Evolocumab (Repatha Sureclick) 140 MG/ML PEN.INJCTR
    1 Milliliters Inject into fatty tissue EVERY 2 WEEKS
    Comments:
       NOT GIVEN IN HOSPITAL
 
Rosuvastatin Calcium (Crestor) 5 MG TABLET
    1 Tablet ORAL DAILY
    Comments:
       Last Taken: 1/31/18
             Time: 1647PM
       SUBSTITUTED 
 
Montelukast Sodium (Singulair) 10 MG TABLET
    1 Tablet ORAL DAILY
    Comments:
       Last Taken:2/7/18
             Time:9AM
 
Tiotropium Bromide (Spiriva) 18 MCG CAP.W.DEV
    1 Capsule Inhale through mouth DAILY
    Comments:
       Last Taken:2/7/18
             Time:9AM
 
Atomoxetine HCl (Strattera) 40 MG CAPSULE
    1 Capsule ORAL Every Morning
    Comments:
       NOT GIVEN
             
 
Trazodone HCl (Trazodone HCl) 50 MG TABLET
    1 Tablet ORAL Every night
    Comments:
       Last Taken: 2/6/18
             Time: 2200PM
 
Docusate Sodium (Stool Softener) 100 MG CAPSULE
    1 Capsule ORAL DAILY
    Comments:
       Last Taken: 2/7/18
             Time: 0930AM
 
Guaifenesin (Mucinex) 600 MG TAB.ER.12H
    1 Tablet ORAL TWICE DAILY
    Comments:
       Last Taken: 2/7/18
             Time: 930AM
 
Start taking the following new medications:
Azithromycin (Azithromycin) 500 MG TABLET
    1 Tablet ORAL DAILY
    Qty = 1
    No Refills
    Instructions:
       .
    Comments:
       NOT GIVEN
 
Prednisone (Prednisone) 10 MG TABLET
    1 Tablet ORAL TWICE DAILY
    Qty = 30
    No Refills
    Instructions:
       .
    Comments:
       2/8-2/9: Please take 5 tablets, once daily
       2/10-2/11: Please take 4 tablets, once daily
       2/12-2/13: Please take 3 tablets, once daily
       2/14-2/15: Please take 2 tablets, once daily
       2/16-2/17: Please take 1 tablets, once daily
       2/18: STOP
 
 
Copies To:
Indira JONES,Ramsey YANG
 
Attending MD Review Statement
Documenting Attending:
Rojelio JONES,Sandra
Other Findings:
67-year-old male chronic hypercapnic respiratory failure with underlying COPD 
and issues with his BiPAP machine at home, is here for acute on chronic 
hypercapnic respiratory failure. Taper on PO steroids, azithromycin for 
pulmonary.  He is on chronic suppressive treatment with rifampin and Doxy for an
MRSA abscess ( paraplegic).  He remains on Eliquis.
 
Patient seen/examined bedside. Patient denies any new complaints. He is on 
oxygen supplementation, uses bipap at night. Minimal use of accessry muscles. 
Patient tried hospital/large mask and tolerated well overnight.  F/u case 
management for dc planning. Anticipate dc soon.

## 2018-02-05 NOTE — PATIENT DISCHARGE INSTRUCTIONS
Discharge Instructions
 
General Discharge Information
You were seen/treated for:
#Hypercarbic Hypoxi Resp Failure
#Chronic medical conditions of CAD, COPD, GARRY, A-fib, HLD, hx of epidural 
abscess w subsequent paralegia and neurogenic bladder
Special Instructions:
- Please ensure correct daily use of BiPAP for your respiratory support.
- Please follow up with your primary care physician within 1-2 week of 
discharge. Inform your primary care physician of this admission to [facility].
- Continue your current medications per discharge instructions.
- Please watch for these problems: Fever, Chills, Nausea, Vomiting, Shortness of
Breath, Productive Cough, Chest Pain/Discomfort, Abdominal Pain, Active Bleeding
or Bloody urine/stool.
 
Diet
Continue normal diet: Yes
 
Activity
Full Activity/No Limits: No
Activity Self Limited: Yes
 
Acute Coronary Syndrome
 
Inclusion Criteria
At DC or during hospital stay patient has or had the following:
ACS DIAGNOSIS No
 
Discharge Core Measures
Meds if any: Prescribed or Continued at Discharge
Meds if any: NOT Prescribed or Continued at Discharge
 
Congestive Heart Failure
 
Inclusion Criteria
At DC or during hospital stay patient has or had the following:
CHF DIAGNOSIS No
 
Discharge Core Measures
Meds if any: Prescribed or Continued at Discharge
Meds if any: NOT Prescribed or Continued at Discharge
 
Cerebrovascular accident
 
Inclusion Criteria
At DC or during hospital stay patient has or had the following:
CVA/TIA Diagnosis No
 
Discharge Core Measures
Meds if any: Prescribed or Continued at Discharge
Meds if any: NOT Prescribed or Continued at Discharge
 
Venous thromboembolism
 
Inclusion Criteria
VTE Diagnosis No
VTE Type NONE
VTE Confirmed by (Test) NONE
 
Discharge Core Measures
- Per Current guidelines, there needs to be overlap
- treatment for the first 5 days of Warfarin therapy.
- If discharged on Warfarin prior to 5 days of
- overlap therapy, the patient will need to be
- assessed for post discharge needs including
- *Post discharge parental anticoagulation
- *Warfarin and/or parental anticoagulation education
- *Follow up date to check INR post discharge
At least 5 days overlap therapy as Inpatient No
Meds if any: Prescribed or Continued at Discharge
Note: Overlap Therapy is Warfarin and Anticoagulant
Meds if any: NOT Prescribed or Continued at Discharge

## 2018-02-06 VITALS — DIASTOLIC BLOOD PRESSURE: 72 MMHG | SYSTOLIC BLOOD PRESSURE: 110 MMHG

## 2018-02-06 VITALS — DIASTOLIC BLOOD PRESSURE: 60 MMHG | SYSTOLIC BLOOD PRESSURE: 110 MMHG

## 2018-02-06 VITALS — DIASTOLIC BLOOD PRESSURE: 62 MMHG | SYSTOLIC BLOOD PRESSURE: 118 MMHG

## 2018-02-06 NOTE — PN- HOUSESTAFF
PalumboGretchen 18 0826:
Subjective
Follow-up For:
#Hypercarbic Hypoxic Resp Failure
#Incompliance of BiPAP use
#Chronic medical conditions of CAD, COPD, GARRY, A-fib, HLD, hx of epidural 
abscess w subsequent paralegia and neurogenic bladder
Subjective:
No overnight event. Patient was haveing respiratory treatment when I entered
 
Review of Systems
Constitutional:
Reports: see HPI. 
 
Objective
Last 24 Hrs of Vital Signs/I&O
 Vital Signs
 
 
Date Time Temp Pulse Resp B/P B/P Pulse O2 O2 Flow FiO2
 
     Mean Ox Delivery Rate 
 
 0756      93 Nasal 2.0L 
 
       Cannula  
 
 0755  82    93   
 
 0617 97.9 83 20 118/62  94   
 
 0020  82    96   
 
 2232  86    92   
 
 2231 98.3 71 18 112/60  94 Nasal  
 
       Cannula  
 
 2055      92 Nasal 2.0L 
 
       Cannula  
 
 1613      94 Nasal 2.0L 
 
       Cannula  
 
 1600      94 Nasal 2.0L 
 
       Cannula  
 
 1509 97.7 77 16 110/70  93   
 
 1018 96.1 60 20 160/80     
 
 
 Intake & Output
 
 
  1600  0800  0000
 
Intake Total   480
 
Output Total  50 850
 
Balance  -50 -370
 
    
 
Intake, Oral   480
 
Number   1
 
Bowel   
 
Movements   
 
Output, Urine  50 850
 
Patient   101.151 kg
 
Weight   
 
Weight   Reported by Patient
 
Measurement   
 
Method   
 
 
 
 
Physical Exam
General Appearance: Alert, Oriented X3, Cooperative, No Acute Distress
Cardiovascular: Regular Rate
Lungs: Clear to Auscultation, Normal Air Movement, Under TRC/Neb treatment 
during examine
Abdomen: Normal Bowel Sounds, Soft, No Tenderness
Neurological: Normal Speech
Extremities: No Edema, Normal Pulses
 
Assessment/Plan
Assessment:
Mr. Mandujano, is a 67-year-old gentleman with significant past medical history of 
paroxysmal atrial fibrillation, paraplegia secondary to a T6-T7 MRSA epidural 
spinal abscess [on chronic suppressive antibiotic therapy] with neurogenic 
bladder status post chronic Castro, coronary artery disease, and COPD on 2 L of 
home oxygen, who was recently discharged from the hospital for COPD exacerbation
on 2018.  He presents to the hospital emergency department with similar 
complaints.  
 
His chest x-ray revealed bibasilar opacities, right greater than left.  This was
relatively unchanged from previous examination last radiograph.  His ABG 
revealed hypercarbia and acidosis, 7.3/83/298/40 on 50% O2 via BiPAP.  CBC 
revealed white blood cell count 13.7, H&H 12.8/40.1.  Electrolytes were 
unremarkable however bicarbonate was 40.  BNP 1600.
 
Flu culture negative.
 
Repeat arterial blood gas revealed 7.34/79/97/42 on 50% oxygen via BiPAP []
 
Problem list/Assessment and Plan
Hypercarbic Hypoxic respiratory failure
* Etiology: Noncompliance versus broken BiPAP
* DC IV solumedrol, will continue prednisone 60mg qd and will taper per clinical
conditions
- Patient's partner was supposed to bring in the the BiPAP machine. CM has 
contacted the partner to bring the machine in today, and we probably need to 
test the BiPAP here overnight.
* Continue Zithromax 500 mg qd x 5d, currently day 3
* Total respiratory care/nebulizer and BiPAP as tolerated
* Best to DC to [facility] for BiPAP incompliance/broken machine. However patient 
denied going to SNF. Would try to repair his BiPAP within hospital stay as 
mentioned above.
 
Chronic medical problems
* Hx of: CAD, COPD, GARRY, A-fib, HLD, hx of epidural abscess w subsequent 
paralegia and neurogenic bladder
* Continue Eliquis 5 mg twice a day for A. fib
* Continue sotalol 80 mg
* Continue gabapentin 600 mg every 8
* Continue prophylactic antibiotics rifampin 300 mg twice a day and doxycycline 
100 mg twice a day
 
DVT prophylaxis Eliquis 
Heart healthy Diet
DNR/DNI
Pain path as ordered
Problem List:
 1. BiPAP (biphasic positive airway pressure) dependence
 
 2. Difficulty with BiPAP use
 
Pain Ratin
Pain Location:
NA
Pain Goal: Remain pain free
Pain Plan:
see AP
Tomorrow's Labs & Rationales:
Sandra Benavides 18 1309:
Attending MD Review Statement
 
Attending Statement
Attending MD Statement: examined this patient, discuss w/resident/PA/NP, agreed 
w/resident/PA/NP, discussed with family, reviewed EMR data (avail), discussed 
with nursing, discussed with case mgmt, reviewed images, amended to note
Attending Assessment/Plan:
Patient seen/examined bedside. Patient denies any new complaints. He is on 
oxygen supplementation, uses bipap at night. Minimal use of accessry muscles. 
 
67-year-old male chronic hypercapnic respiratory failure with underlying COPD 
and issues with his BiPAP machine at home, is here for acute on chronic 
hypercapnic respiratory failure. Taper on PO steroids, azithromycin for 
pulmonary.  He is on chronic suppressive treatment with rifampin and Doxy for an
MRSA abscess ( paraplegic).  He remains on Eliquis. F/u case management for dc 
planning. anticipate dc in next 24 hrs.

## 2018-02-07 VITALS — DIASTOLIC BLOOD PRESSURE: 66 MMHG | SYSTOLIC BLOOD PRESSURE: 120 MMHG

## 2018-02-07 VITALS — SYSTOLIC BLOOD PRESSURE: 120 MMHG | DIASTOLIC BLOOD PRESSURE: 66 MMHG

## 2018-02-07 NOTE — PN- HOUSESTAFF
**See Addendum**
Subjective
Follow-up For:
#Hypercarbic Hypoxic Resp Failure
#Incompliance of BiPAP use
#Chronic medical conditions of CAD, COPD, GARRY, A-fib, HLD, hx of epidural 
abscess w subsequent paralegia and neurogenic bladder
Subjective:
No overnight event. Patient stated that the home BiPAP worked well overnight and
had no specific complaint.
 
Review of Systems
Constitutional:
Reports: see HPI. 
 
Objective
Last 24 Hrs of Vital Signs/I&O
 Vital Signs
 
 
Date Time Temp Pulse Resp B/P B/P Pulse O2 O2 Flow FiO2
 
     Mean Ox Delivery Rate 
 
 0750 98.1 68 20 120/66  91 Nasal Room Air 
 
       Cannula  
 
 0609      94 Nasal 2.0L 
 
       Cannula  
 
 0608      94   
 
 0116  60    93   
 
 2215 96.7 67 20 110/60  96   
 
 1600       Nasal 2.0L 
 
       Cannula  
 
 1432 97.7 94 20 110/72  96   
 
 0930      93 Nasal 2.0L 
 
       Cannula  
 
 
 Intake & Output
 
 
  1600  0800  0000
 
Intake Total   820
 
Output Total  525 1300
 
Balance  -525 -480
 
    
 
Intake, IV   20
 
Intake, Oral   800
 
Output, Urine  525 1300
 
 
 
 
Physical Exam
General Appearance: Alert, Oriented X3, Cooperative, No Acute Distress
Cardiovascular: Regular Rate
Lungs: Clear to Auscultation, Normal Air Movement
Abdomen: Normal Bowel Sounds, Soft, No Tenderness
Neurological: Normal Speech
Extremities: No Edema, Normal Pulses
Current Medications:
 Current Medications
 
 
  Sig/Viet Start time  Last
 
Medication Dose Route Stop Time Status Admin
 
Acetaminophen 650 MG .STK-MED ONE  1612 DC 
 
  PO  1613  
 
Acetaminophen 650 MG Q6P PRN  1830 AC 
 
  PO   1612
 
Albuterol Sulfate 3 ML EVERY 4 HRS/AWAKE 2000 AC 
 
  INH   0611
 
Albuterol Sulfate 2 PUF Q4P PRN  1815 AC 
 
  INH   0954
 
Apixaban 5 MG BID 2200 AC 
 
  PO   2020
 
Ascorbic Acid 500 MG BID 2200 AC 
 
  PO   2019
 
Atorvastatin Calcium 20 MG 1700  1700 AC 
 
  PO   1610
 
Azithromycin 500 MG DAILY  1000 AC 
 
Dextrose/Water 250 ML IV   0949
 
Baclofen 20 MG BID 2200 AC 
 
  PO   2020
 
Bisacodyl 10 MG Q48H / 1730 AC 
 
  AR   1759
 
Budesonide/ 2 PUF BID  2200 AC 
 
Formoterol Fumarate  INH   0045
 
Docusate Sodium 100 MG DAILY  1000 AC 
 
  PO   0943
 
Furosemide 60 MG Q48 / 1000 AC 
 
  PO   1019
 
Gabapentin 600 MG Q8  2200 AC 
 
  PO   0503
 
Guaifenesin 600 MG BID  2200 AC 
 
  PO   2020
 
Lactobacillus  1 CAP BID  2200 AC 
 
Acidophilus  PO   2019
 
Lorazepam 1 MG BID  2200 AC 
 
  PO   2019
 
Metoprolol Succinate 25 MG DAILY  1814 AC 
 
  PO   0947
 
Montelukast Sodium 10 MG DAILY  1000 AC 
 
  PO   0946
 
Multivitamins  1 TAB DAILY  1812 AC 
 
Therapeutic  PO   0947
 
Nortriptyline HCl 10 MG DAILY  1000 AC 
 
  PO   0942
 
Omeprazole 20 MG DAILY  1000 AC 
 
  PO   0503
 
Oxycodone/ 1 TAB Q6P PRN  1830 AC 
 
Acetaminophen  PO   0500
 
Potassium Chloride 20 MEQ DAILY  1000 AC 
 
  PO   0944
 
Prednisone 60 MG DAILY  1000 AC 
 
  PO   0945
 
Rifampin 300 MG BID  2200 AC 
 
  PO   2019
 
Sotalol HCl 80 MG BID  2200 AC 
 
  PO   2020
 
Tiotropium Bromide 1 PUF DAILY  1000 AC 
 
  INH   0946
 
Trazodone HCl 50 MG QPM  2200 AC 
 
  PO   2231
 
 
 
 
Assessment/Plan
Assessment:
Mr. Mandujano, is a 67-year-old gentleman with significant past medical history of 
paroxysmal atrial fibrillation, paraplegia secondary to a T6-T7 MRSA epidural 
spinal abscess [on chronic suppressive antibiotic therapy] with neurogenic 
bladder status post chronic Castro, coronary artery disease, and COPD on 2 L of 
home oxygen, who was recently discharged from the hospital for COPD exacerbation
on 2018.  He presents to the hospital emergency department with similar 
complaints.  
 
His chest x-ray revealed bibasilar opacities, right greater than left.  This was
relatively unchanged from previous examination last radiograph.  His ABG 
revealed hypercarbia and acidosis, 7.3/83/298/40 on 50% O2 via BiPAP.  CBC 
revealed white blood cell count 13.7, H&H 12.8/40.1.  Electrolytes were 
unremarkable however bicarbonate was 40.  BNP 1600.
 
Flu culture negative.
 
Repeat arterial blood gas revealed 7.34/79/97/42 on 50% oxygen via BiPAP [24/]
 
Problem list/Assessment and Plan
Hypercarbic Hypoxic respiratory failure
* Etiology: Noncompliance versus broken BiPAP
* DC IV solumedrol, will continue prednisone 60mg qd and will taper per clinical
conditions
- Patient's partner was supposed to bring in the the BiPAP machine. Overnight 
testing of BiPAP worked well and patient was discharged with the mask he was 
using overnight here. 
* Continue Zithromax 500 mg qd x 5d, currently day 4, will discharge with 1 more
tablet of zithromax.
* Total respiratory care/nebulizer and BiPAP as tolerated
* DC planning to home health service today.
 
Chronic medical problems
* Hx of: CAD, COPD, GARRY, A-fib, HLD, hx of epidural abscess w subsequent 
paralegia and neurogenic bladder
* Continue Eliquis 5 mg twice a day for A. fib
* Continue sotalol 80 mg
* Continue gabapentin 600 mg every 8
* Continue prophylactic antibiotics rifampin 300 mg twice a day and doxycycline 
100 mg twice a day
 
DVT prophylaxis Eliquis 
Heart healthy Diet
DNR/DNI
Pain path as ordered
Problem List:
 1. Difficulty with BiPAP use
 
 2. BiPAP (biphasic positive airway pressure) dependence
 
Pain Ratin
Pain Location:
NA
Pain Goal: Remain pain free
Pain Plan:
see AP
Tomorrow's Labs & Rationales:
NA

## 2018-02-24 ENCOUNTER — HOSPITAL ENCOUNTER (OUTPATIENT)
Dept: HOSPITAL 68 - ERH | Age: 68
Setting detail: OBSERVATION
LOS: 1 days | Discharge: HOME HEALTH | End: 2018-02-25
Attending: INTERNAL MEDICINE | Admitting: INTERNAL MEDICINE
Payer: COMMERCIAL

## 2018-02-24 VITALS — DIASTOLIC BLOOD PRESSURE: 72 MMHG | SYSTOLIC BLOOD PRESSURE: 138 MMHG

## 2018-02-24 VITALS — WEIGHT: 226.31 LBS | BODY MASS INDEX: 33.52 KG/M2 | HEIGHT: 69 IN

## 2018-02-24 DIAGNOSIS — I10: ICD-10-CM

## 2018-02-24 DIAGNOSIS — I48.91: ICD-10-CM

## 2018-02-24 DIAGNOSIS — Z86.14: ICD-10-CM

## 2018-02-24 DIAGNOSIS — Z99.81: ICD-10-CM

## 2018-02-24 DIAGNOSIS — E78.5: ICD-10-CM

## 2018-02-24 DIAGNOSIS — I25.2: ICD-10-CM

## 2018-02-24 DIAGNOSIS — J96.01: ICD-10-CM

## 2018-02-24 DIAGNOSIS — F32.9: ICD-10-CM

## 2018-02-24 DIAGNOSIS — Z95.5: ICD-10-CM

## 2018-02-24 DIAGNOSIS — N31.9: ICD-10-CM

## 2018-02-24 DIAGNOSIS — G47.33: ICD-10-CM

## 2018-02-24 DIAGNOSIS — F41.9: ICD-10-CM

## 2018-02-24 DIAGNOSIS — I25.10: ICD-10-CM

## 2018-02-24 DIAGNOSIS — J44.1: Primary | ICD-10-CM

## 2018-02-24 DIAGNOSIS — G82.20: ICD-10-CM

## 2018-02-24 DIAGNOSIS — Z79.01: ICD-10-CM

## 2018-02-24 LAB
ABSOLUTE GRANULOCYTE CT: 10.5 /CUMM (ref 1.4–6.5)
BASOPHILS # BLD: 0 /CUMM (ref 0–0.2)
BASOPHILS NFR BLD: 0.2 % (ref 0–2)
EOSINOPHIL # BLD: 0.1 /CUMM (ref 0–0.7)
EOSINOPHIL NFR BLD: 1.1 % (ref 0–5)
ERYTHROCYTE [DISTWIDTH] IN BLOOD BY AUTOMATED COUNT: 14.9 % (ref 11.5–14.5)
GRANULOCYTES NFR BLD: 79.8 % (ref 42.2–75.2)
HCT VFR BLD CALC: 40.3 % (ref 42–52)
LYMPHOCYTES # BLD: 1.8 /CUMM (ref 1.2–3.4)
MCH RBC QN AUTO: 29.1 PG (ref 27–31)
MCHC RBC AUTO-ENTMCNC: 31.5 G/DL (ref 33–37)
MCV RBC AUTO: 92.3 FL (ref 80–94)
MONOCYTES # BLD: 0.6 /CUMM (ref 0.1–0.6)
PLATELET # BLD: 290 /CUMM (ref 130–400)
PMV BLD AUTO: 8.2 FL (ref 7.4–10.4)
RED BLOOD CELL CT: 4.36 /CUMM (ref 4.7–6.1)
WBC # BLD AUTO: 13.1 /CUMM (ref 4.8–10.8)

## 2018-02-24 PROCEDURE — G0378 HOSPITAL OBSERVATION PER HR: HCPCS

## 2018-02-24 NOTE — HISTORY & PHYSICAL
Jasper Schmidt 02/24/18 6261:
General Information and HPI
MD Statement:
I have seen and personally examined DAYDAY HOOVER and documented this H&P.
 
The patient is a 67 year old M who presented with a patient stated chief 
complaint of [shortness of breath].
 
Source of Information: patient, old records, friend
Exam Limitations: no limitations
History of Present Illness:
This is a 66yo M w/ PMH of COPD (on 2L O2/Bipap), BLE edema, GARRY, HTN, HLD, A-
fib on eliquis, neurogenic bladder, depression, CAD s/p stent placement, IVC 
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 
shortness of breath x 1 day PTA.  This patient was admitted on February 1 and 
discharged on February 5 for COPD exacerbation and is reporting to have been on 
steroid taper which she completed 5 days ago and his symptoms have been stable. 
 
Patient was brought in by ambulance after being noted to develop shortness of 
breath.  This patient is supposed to be on BiPAP at night for obstructive sleep 
apnea but per information from the patient the payer is not satisfied with 
initial workup showing obstructive sleep apnea and because of that the patient 
was required to stop using BiPAP and sleep on nasal cannula with continuous 
pulse oximetry.  She started doing the GARRY testing exercise yesterday evening 
and he believes to due to not using his BiPAP  he worked up this morning with 
shortness of breath.  Patient denies any fevers or chills he denies any abnormal
cough citing that he has been having his baseline cough throughout without any 
changes.  He has no anesthetic contacts.  He denies any paroxysmal nocturnal 
dyspnea orthopnea or swelling of lower limbs. 
 
Allergies/Medications
Allergies:
Coded Allergies:
heparin (Intermediate, SWELLING, RASH 10/16/17)
vancomycin (Intermediate, HIVES, SKIN CRACKES, TURNS RED, SWELLS AND PEELS 10/16
/17)
warfarin (From COUMADIN) (Intermediate, RASH 10/16/17)
 
 
Past History
 
Travel History
Traveled to [place] past 21 day No
 
Medical History
Neurological: C6-7 ABSCESS PARAPLEGIA
EENT: NONE
Cardiovascular: AFIB, hypertension, hyperlipidemia, myocardial infarction
Respiratory: asthma, COPD, OXYGEN DEPEND 2L
Gastrointestinal: NONE
Hepatic: NONE
Renal: neurogenic bladder
Musculoskeletal: PARAPLEGIA R/T ABSCESS
Psychiatric: anxiety
Endocrine: NONE
Blood Disorders: NONE
Cancer(s): NONE
GYN/Reproductive: NONE
History of MRSA: Yes
History of VRE: No
History of CDIFF: No
Influenza Vaccine: 10/16/17
 
Surgical History
Surgical History: non-contributory
 
Past Family/Social History
 
Family History
Relations & Conditions if any
Relation not specified for:
  *No pertinent family history
 
 
Psychosocial History
Who Do You Live With? partner
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
Primary Language: English
 
Functional Ability
ADLs
Needs Assist: dressing, eating, toileting, bathing. 
Ambulation: wheelchair
IADLs
Independent: shopping, housework, finances, food prep, telephone, transportation
, medication admin. 
 
Review of Systems
 
Review of Systems
Constitutional:
Denies: chills, fever. 
EENTM:
Denies: hearing changes, nasal congestion. 
Cardiovascular:
Denies: chest pain, palpitations. 
Respiratory:
Reports: see HPI, cough, short of breath. 
GI:
Denies: no symptoms. 
Genitourinary:
Denies: no symptoms. 
Musculoskeletal:
Reports: see HPI. 
All Other Systems: Reviewed and Negative
 
Exam & Diagnostic Data
Last 24 Hrs of Vital Signs/I&O
 Vital Signs
 
 
Date Time Temp Pulse Resp B/P B/P Pulse O2 O2 Flow FiO2
 
     Mean Ox Delivery Rate 
 
02/24 1648      96 Nasal 3.0L 
 
       Cannula  
 
02/24 1338 98.5 110  161/74  99 CPAP  
 
 
 
 
Physical Exam
General Appearance Alert, Oriented X3, Cooperative, Moderate Distress
Skin No Rashes
Skin Temp/Moisture Exam: Warm/Dry
Sepsis Skin Exam (color): Normal for Ethnicity
HEENT Atraumatic, Mucous Membr. moist/pink
Neck Supple, No JVD, short wide neck
Cardiovascular Regular Rate, Normal S1, Normal S2
Lungs Bilateral wheezes in both upper and lower lobes, transmitted sounds
Abdomen Normal Bowel Sounds, Soft, No Tenderness
Neurological Normal Speech
Extremities No Clubbing, No Cyanosis, No Edema
Sepsis Peripheral Pulse Location: Dorsalis Pedis
Sepsis Peripheral Pulse Exam: Normal
Sepsis Cap Refill Exam: <2 Sec
Last 24 Hrs of Labs/Stalin:
 Laboratory Tests
 
02/24/18 1642:
Lactic Acid Cancelled
 
02/24/18 1430:
Anion Gap 6, Estimated GFR > 60, BUN/Creatinine Ratio 26.7  H, Glucose 152  H, 
Lactic Acid 1.0, Calcium 9.4, Total Bilirubin 0.4, AST 26, ALT 33, Alkaline 
Phosphatase 103, Troponin I < 0.01, Pro-B-Natriuretic Pept 588  H, Total Protein
6.7, Albumin 3.3  L, Globulin 3.4, Albumin/Globulin Ratio 1.0  L, CBC w Diff NO 
MAN DIFF REQ, RBC 4.36  L, MCV 92.3, MCH 29.1, MCHC 31.5  L, RDW 14.9  H, MPV 
8.2, Gran % 79.8  H, Lymphocytes % 14.0  L, Monocytes % 4.9, Eosinophils % 1.1, 
Basophils % 0.2, Absolute Granulocytes 10.5  H, Absolute Lymphocytes 1.8, 
Absolute Monocytes 0.6, Absolute Eosinophils 0.1, Absolute Basophils 0
 Microbiology
02/24 1430  BLOOD: Blood Culture - RECD
02/24 1420  NASOPHARYN: Influenza Virus A & B Rapid Smear - COMP
02/24 1420  BLOOD: Blood Culture - RECD
 
 
 
Diagnostic Data
CXR Results
No significant changes
 
Assessment/Plan
Assessment:
This is a 66yo M w/ PMH of COPD (on 2L O2/Bipap), BLE edema, GARRY, HTN, HLD, A-
fib on eliquis, neurogenic bladder, depression, CAD s/p stent placement, IVC 
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 
shortness of breath x 1 day PTA.  Patient is reported to have stopped the using 
his BiPAP so that he can have further evaluation for obstructive sleep apnea 
overnight prior to presentation to the ER.  He denies any fever or chills or 
cough out of baseline.  His initial blood work is being baseline parameters with
only slight increase in WBC which is well with seeing his previous values.  This
patient might be having a COPD exacerbation which has been worsened by his trial
experiment of not using BiPAP to document obstructive sleep apnea.  This patient
when off BiPAP has recorded multiple previous admissions in the past.
 
COPD exacerbation
GARRY assessment while off BiPAP
 
Admit to general medicine floor
Vital signs every shift
TRC nebulization around the clock
The shunt has received IV Solu-Medrol 125 mg Will continue with 40 mg every 8 
hours
IV as azithromycin 500 mg daily
Chest physical therapy, feels very junky
BiPAP at night per home settings
 
 
As Ranked By This Provider
Problem List:
 1. COPD (chronic obstructive pulmonary disease)
 
 2. Acute hypercapnic respiratory failure due to obstructive sleep apnea
 
 3. COPD exacerbation
 
 
Core Measures/Misc (9/17)
 
Acute Coronary Syndrome
ACS Diagnosis: No
 
Congestive Heart Failure
Congestive Heart Failure Diagnosis No
 
Cerebrovascular Accident
CVA/TIA Diagnosis: No
 
VTE (View Protocol)
VTE Risk Factors Age>40
No Mechanical VTE Prophylaxis d/t Other
No VTE Pharm Prophylaxis d/t Other
 
Sepsis (View protocol)
Sepsis Present: No
 
Resident Review Statement
Resident Statement: examined this patient, Refer to my detailed review above
 
Beto Herbert 02/24/18 6438:
General Information and HPI
 
Allergies/Medications
Home Med list
Acetaminophen (Tylenol Arthritis) 650 MG TABLET.ER   1 TAB PO Q6-PRN PRN PAIN  (
Reported)
Albuterol Sulfate (Ventolin Hfa) 90 MCG HFA.AER.AD   2 PUF INH AD PRN COPD  (
Reported)
Apixaban (Eliquis) 5 MG TABLET   5 MG PO BID atrial fibrillation
Ascorbate Calcium (Vitamin C) 500 MG TABLET   1 TAB PO BID SUPPLEMENT  (Reported
)
Atomoxetine HCl (Strattera) 40 MG CAPSULE   1 CAP PO QAM MENTAL HEALTH  (
Reported)
Baclofen 20 MG TABLET   1 TAB PO BID MUSCLE RELAXER  (Reported)
Bisacodyl (Dulcolax) 10 MG SUPP.RECT   1 SUP RC Q48 GI  (Reported)
Budesonide/Formoterol Fumarate (Symbicort 80-4.5 Mcg Inhaler) 80 MCG-4.5 MCG/
ACTUATION HFA.AER.AD   2 PUF INH BID COPD  (Reported)
Docusate Sodium (Stool Softener) 100 MG CAPSULE   1 CAP PO DAILY STOOL SOFTENER 
(Reported)
Doxycycline Hyclate 100 MG CAPSULE   1 CAP PO BID infection  (Reported)
Esomeprazole (Nexium) 40 MG CAPSULE.DR   1 CAP PO DAILY GI  (Reported)
Evolocumab (Repatha Sureclick) 140 MG/ML PEN.INJCTR   1 ML SC Q 2 WEEKS 
CHOLESTEROL  (Reported)
Furosemide 20 MG TABLET   3 TAB PO Q48 DIURETIC  (Reported)
Gabapentin 300 MG CAPSULE   2 CAP PO BID NERVE PAIN  (Reported)
Guaifenesin (Mucinex) 600 MG TAB.ER.12H   1 TAB PO BID CONGESTION  (Reported)
Hydroxyzine HCl (hydrOXYzine HCl) 10 MG TABLET   1 TAB PO BID PRN ANXIETY  (
Reported)
Lactobacillus Acidophilus (Acidophilus) 1 EACH CAPSULE   1 CAP PO BID PROBIOTIC 
(Reported)
Lorazepam 1 MG TABLET   1 TAB PO BID ANXIETY  (Reported)
Magnesium Oxide (Magnesium) 400 MG CAPSULE   1 CAP PO 0600 SUPPLEMENT  (Reported
)
Metoprolol Succ XL (Toprol XL) 25 MG TAB   1 TAB PO DAILY HEART  (Reported)
Montelukast Sodium (Singulair) 10 MG TABLET   1 TAB PO DAILY ALLERGIES  (
Reported)
Multivitamin (Daily Value) 1 EACH TABLET   1 TAB PO DAILY VITAMIN SUPPORT  (
Reported)
Nortriptyline HCl 10 MG CAPSULE   1 CAP PO DAILY UNKNOWN  (Reported)
Omeprazole 20 MG CAPSULE.DR   1 CAP PO DAILY ACID REFLUX  (Reported)
Oxycodone HCl/Acetaminophen (Oxycodone-Acetaminophen 5-325) 5 MG-325 MG TABLET  
1 TAB PO Q4H PRN PAIN  (Reported)
Potassium Chloride 20 MEQ TAB.ER.PRT   1 TAB PO DAILY SUPPLEMENT  (Reported)
Rifampin (Rifadin) 300 MG CAPSULE   300 MG PO BID MRSA  (Reported)
Rosuvastatin Calcium (Crestor) 5 MG TABLET   1 TAB PO DAILY CHOLESTEROL  (
Reported)
Sotalol (Betapace) 80 MG TABLET   80 MG PO BID AARYTHMIAS
Tiotropium Bromide (Spiriva) 18 MCG CAP.W.DEV   1 CAP INH DAILY BREATHING 
PROBLEMS  (Reported)
Trazodone HCl 50 MG TABLET   1 TAB PO QPM SLEEP  (Reported)
 
 
 
Attending MD Review Statement
 
Attending Statement
Attending MD Statement: examined this patient, discuss w/resident/PA/NP, agreed 
w/resident/PA/NP, reviewed EMR data (avail), discussed with nursing
Attending Assessment/Plan:
Pt being placed in observation status for COPD exacerbation. 
Pt in ER was hypoxic to 83% on 2 L and is being treated for acute hypoxic resp 
failure secondary to copd exacerbation. 
 
cont on iv steroids and zithromax. 
 
bipap at night. 
 
d/w pt and pts roommate at bedside the care plan.

## 2018-02-24 NOTE — ED DYSPNEA/ASTHMA COMPLAINT
History of Present Illness
 
General
Chief Complaint: Dyspnea (COPD, CHF, Other)
Stated Complaint: BIBA SOB
Source: patient, old records, EMS
Exam Limitations: no limitations
 
Vital Signs & Intake/Output
Vital Signs & Intake/Output
 Vital Signs
 
 
Date Time Temp Pulse Resp B/P B/P Pulse O2 O2 Flow FiO2
 
     Mean Ox Delivery Rate 
 
 1835 98.3 103 50 182/68  96 Nasal 2.0L 
 
       Cannula  
 
 1648      96 Nasal 3.0L 
 
       Cannula  
 
 1402      96 Nasal 4.0L 
 
       Cannula  
 
 1338 98.5 110  161/74  99 CPAP  
 
 
 
Allergies
Coded Allergies:
heparin (Intermediate, SWELLING, RASH 10/16/17)
vancomycin (Intermediate, HIVES, SKIN CRACKES, TURNS RED, SWELLS AND PEELS 10/16
/17)
warfarin (From COUMADIN) (Intermediate, RASH 10/16/17)
 
Reconcile Medications
Acetaminophen (Tylenol Arthritis) 650 MG TABLET.ER   1 TAB PO Q6-PRN PRN PAIN  (
Reported)
Albuterol Sulfate (Ventolin Hfa) 90 MCG HFA.AER.AD   2 PUF INH AD PRN COPD  (
Reported)
Apixaban (Eliquis) 5 MG TABLET   5 MG PO BID atrial fibrillation
Ascorbate Calcium (Vitamin C) 500 MG TABLET   1 TAB PO BID SUPPLEMENT  (Reported
)
Atomoxetine HCl (Strattera) 40 MG CAPSULE   1 CAP PO QAM MENTAL HEALTH  (
Reported)
Baclofen 20 MG TABLET   1 TAB PO BID MUSCLE RELAXER  (Reported)
Bisacodyl (Dulcolax) 10 MG SUPP.RECT   1 SUP RC Q48 GI  (Reported)
Budesonide/Formoterol Fumarate (Symbicort 80-4.5 Mcg Inhaler) 80 MCG-4.5 MCG/
ACTUATION HFA.AER.AD   2 PUF INH BID COPD  (Reported)
Docusate Sodium (Stool Softener) 100 MG CAPSULE   1 CAP PO DAILY STOOL SOFTENER 
(Reported)
Doxycycline Hyclate 100 MG CAPSULE   1 CAP PO BID infection  (Reported)
Esomeprazole (Nexium) 40 MG CAPSULE.DR   1 CAP PO DAILY GI  (Reported)
Evolocumab (Repatha Sureclick) 140 MG/ML PEN.INJCTR   1 ML SC Q 2 WEEKS 
CHOLESTEROL  (Reported)
Furosemide 20 MG TABLET   3 TAB PO Q48 DIURETIC  (Reported)
Gabapentin 300 MG CAPSULE   2 CAP PO BID NERVE PAIN  (Reported)
Guaifenesin (Mucinex) 600 MG TAB.ER.12H   1 TAB PO BID CONGESTION  (Reported)
Hydroxyzine HCl (hydrOXYzine HCl) 10 MG TABLET   1 TAB PO BID PRN ANXIETY  (
Reported)
Lactobacillus Acidophilus (Acidophilus) 1 EACH CAPSULE   1 CAP PO BID PROBIOTIC 
(Reported)
Lorazepam 1 MG TABLET   1 TAB PO BID ANXIETY  (Reported)
Magnesium Oxide (Magnesium) 400 MG CAPSULE   1 CAP PO 0600 SUPPLEMENT  (Reported
)
Metoprolol Succ XL (Toprol XL) 25 MG TAB   1 TAB PO DAILY HEART  (Reported)
Montelukast Sodium (Singulair) 10 MG TABLET   1 TAB PO DAILY ALLERGIES  (
Reported)
Multivitamin (Daily Value) 1 EACH TABLET   1 TAB PO DAILY VITAMIN SUPPORT  (
Reported)
Nortriptyline HCl 10 MG CAPSULE   1 CAP PO DAILY UNKNOWN  (Reported)
Omeprazole 20 MG CAPSULE.DR   1 CAP PO DAILY ACID REFLUX  (Reported)
Oxycodone HCl/Acetaminophen (Oxycodone-Acetaminophen 5-325) 5 MG-325 MG TABLET  
1 TAB PO Q4H PRN PAIN  (Reported)
Potassium Chloride 20 MEQ TAB.ER.PRT   1 TAB PO DAILY SUPPLEMENT  (Reported)
Rifampin (Rifadin) 300 MG CAPSULE   300 MG PO BID MRSA  (Reported)
Rosuvastatin Calcium (Crestor) 5 MG TABLET   1 TAB PO DAILY CHOLESTEROL  (
Reported)
Sotalol (Betapace) 80 MG TABLET   80 MG PO BID AARYTHMIAS
Tiotropium Bromide (Spiriva) 18 MCG CAP.W.DEV   1 CAP INH DAILY BREATHING 
PROBLEMS  (Reported)
Trazodone HCl 50 MG TABLET   1 TAB PO QPM SLEEP  (Reported)
 
Triage Nurses Notes Reviewed? yes
Onset: Abrupt
Duration: day(s): (1), constant
Timing: recent history
Severity: moderate
Prior Episodes/Possible Cause: chronic episodes
Modifying Factors:
Worsens With: movement. 
Associated Symptoms: wheezing
HPI:
 
 67 year old male with PMH significant for paroxysmal atrial fibrillation on 
elliquis, COPD on 2 L home oxygen, bipap at night, paraplegia as a result of 
spinal epidural abscess with chronic Castro catheter for neurogenic bladder, 
hypertension, hyperlipidemia, CAD s/p PCI  and , aortic valve 
endocarditis presents emergency room for evaluation complaining of progressively
worsening shortness of breath and work of breathing.  Patient was having a 
difficult time at home using his BiPAP today he was given 4 treatments prior to 
calling the ambulance with no improvement in his symptoms.  On arrival patient 
was titrated down to 3 L of oxygen 96-97% he denies cough fever chills.  He is 
been admitted twice in the past month for history of similar symptoms and is not
currently on any prednisone.  No leg swelling pain with inspiration fever chills
 
(Jasper Lin)
 
Past History
 
Travel History
Traveled to [place] past 21 day No
 
Medical History
Any Pertinent Medical History? see below for history
Neurological: C6-7 ABSCESS PARAPLEGIA
EENT: NONE
Cardiovascular: AFIB, hypertension, hyperlipidemia, myocardial infarction
Respiratory: asthma, COPD, OXYGEN DEPEND 2L
Gastrointestinal: NONE
Hepatic: NONE
Renal: neurogenic bladder
Musculoskeletal: PARAPLEGIA R/T ABSCESS
Psychiatric: anxiety
Endocrine: NONE
Blood Disorders: NONE
Cancer(s): NONE
GYN/Reproductive: NONE
History of MRSA: Yes
History of VRE: No
History of CDIFF: No
Influenza Vaccine: 10/16/17
 
Surgical History
Surgical History: non-contributory
 
Psychosocial History
Who do you live with Significant Other
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
What is your primary language English
 
Family History
Family History, If Any:
Relation not specified for:
  *No pertinent family history
 
Hx Contributory? No
(Jasper Lin)
 
Review of Systems
 
Review of Systems
Constitutional:
Reports: see HPI. 
Comments
Review of systems: See HPI, All other systems negative.
Constitutional, no chills no fever,
HEENT:  no sore throat no congestion, no ear pain
Cardiovascular: No chest pain , no palpitation
Skin:  no rashes, no change in skin
Respiratory:  dyspnea no cough no  sputum no  hemoptysis
GI: No nausea no vomiting, no diarrhea
: No dysuria No hematuria, 
Muscle skeletal: No joint pain, no back pain, no neck pain,
Neurologic: , no headache
Heme/endocrine: No bruising 
Immunology: No lymphadenopathy
(Jasper Lin)
 
Physical Exam
 
Physical Exam
General Appearance: well developed/nourished, no apparent distress
Respiratory: chest non-tender
Comments:
Well-developed well-nourished person in no acute distress
HEENT: Normal EENT exam; PERRL, EOMI,  HEAD is atraumatic. moist mucous 
membranes.  
Neck: Supple, normal range of motion
Back:  Full range of motion
Cardiovascular: Irregular rate and rhythm, no murmurs rubs, normal JVP
Respiratory: Chest nontender.There were no bony deformities, no asymmetry. No 
respiratory distress.  Patient speaking in full complete sentences.  Lung sounds
rhonchorous no wheezing
abd: soft, nontender. Normal bowel sounds. No rebound/guarding, No appreciable 
enlargement of the abdominal aorta, No ascites.
Extremity: No edema, paraplegic, full range of motion of upper  extremities,  
normal and equal pulses bilaterally, 5 out of 5 strength noted to bilateral 
upper and lower extremities
Neuro: Alert oriented x3, motor sensory normal, There were no obvious focal 
neurologic abnormalities.
Skin: No appreciable rash on exposed skin, skin is warm and dry.
Psych: Mood and affect is normal, memory and judgment is normal.
 
Core Measures
ACS in differential dx? Yes
CVA/TIA Diagnosis No
Sepsis Present: No
Sepsis Focused Exam Completed? No
(Tawanna DOWNEY,Jasper)
 
Progress
Differential Diagnosis: asthma, AMI, bronchitis, CHF, COPD, pulmonary embolism, 
pneumonia, pneumothorax, unstable angina
Plan of Care:
 Orders
 
 
Procedure Date/time Status
 
Heart Healthy Diet  B Active
 
CBC WITHOUT DIFFERENTIAL  0600 Active
 
Intake & Output  1856 Active
 
Pathway - chart  1652 Active
 
Code Status  1652 Active
 
Place in observation  1606 Active
 
Vital Signs  1606 Active
 
Code Status  1606 Complete
 
Patient Data  1605 Active
 
EKG  1500 Active
 
Telemetry/Cardiac Monitor  1453 Active
 
RAPID VIRAL INFLUENZA A  1342 Complete
 
BLOOD CULTURE  1342 Active
 
TROPONIN LEVEL  1342 Complete
 
LACTIC ACID  1342 Complete
 
COMPREHENSIVE METABOLIC PANEL  1342 Complete
 
CBC WITHOUT DIFFERENTIAL  1342 Complete
 
B-TYPE NATRIURETIC PEP (BNP)  1342 Complete
 
EKG  1332 Active
 
TRC EVALUATION (GEN)   UNK Active
 
PEAK FLOW MEASUREMENT (GEN)   UNK Active
 
CHEST PHYSICAL THERAPY (GEN)   UNK Active
 
BIPAP   UNK Active
 
House Staff   UNK Active
 
VTE Mechanical Prophylaxis   UNK Active
 
 
 Current Medications
 
 
  Sig/Viet Start time  Last
 
Medication Dose  Stop Time Status Admin
 
Furosemide 60 MG Q48  1000 AC 
 
(Lasix)     
 
Atorvastatin Calcium 20 MG 1700  1700 AC 
 
(Lipitor)     
 
Azithromycin 500 MG DAILY  1000 AC 
 
(Zithromax)     
 
Dextrose/Water 250 ML    
 
(D5W)     
 
Docusate Sodium 100 MG DAILY  1000 AC 
 
(Colace)     
 
Metoprolol Succinate 25 MG DAILY  1000 AC 
 
(Toprol XL)     
 
Montelukast Sodium 10 MG DAILY  1000 AC 
 
(Singulair)     
 
Multivitamins  1 TAB DAILY  1000 AC 
 
Therapeutic     
 
(Theragran-M      
 
Vitamins Tabs)     
 
Nortriptyline HCl 10 MG DAILY  1000 AC 
 
(Aventyl 10MG -      
 
Pamelor Cap)     
 
Potassium Chloride 20 MEQ DAILY  1000 AC 
 
(K-Dur)     
 
Tiotropium Bromide 1 PUF DAILY  1000 AC 
 
(Spiriva)     
 
Omeprazole 20 MG DAILY AC  0700 AC 
 
(Prilosec)     
 
Apixaban 5 MG BID 2200 AC 
 
(Eliquis)     
 
Ascorbic Acid 500 MG BID 2200 AC 
 
(Vitamin C)     
 
Baclofen 20 MG BID 2200 AC 
 
(Lioresal 10MG      
 
Tablet)     
 
Budesonide/ 2 PUF BID  220 AC 
 
Formoterol Fumarate     
 
(SYMBICORT)     
 
Gabapentin 600 MG BID 2200 AC 
 
(Neurontin)     
 
Guaifenesin 600 MG BID 2200 AC 
 
(Mucinex)     
 
Lactobacillus  1 CAP BID 2200 AC 
 
Acidophilus     
 
(Probiotic)     
 
Lorazepam 1 MG BID 2200 AC 
 
(Ativan)     
 
Methylprednisolone 40 MG Q8 2200 AC 
 
(Solumedrol)     
 
Rifampin 300 MG BID 2200 AC 
 
(Rifadin-Rimactane      
 
300MG Cap)     
 
Sotalol HCl 80 MG BID 2200 AC 
 
(Betapace)     
 
Trazodone HCl 50 MG QPM  2200 AC 
 
(Desyrel)     
 
Oxycodone/ 1 TAB Q4H PRN  1745 AC 
 
Acetaminophen     
 
(Percocet)     
 
Acetaminophen 500 MG Q6P PRN  1730 AC 
 
(Tylenol)     
 
Albuterol Sulfate 2 PUF Q4 HRS AS NEEDED PRN  173 AC 
 
(Ventolin)     
 
Non-Formulary  0 SEE ADMIN CRITERIA 1730 UNVr 
 
Medication     
 
(NON FORMULARY)     
 
 
 Laboratory Tests
 
 
 
18 1642:
Lactic Acid Cancelled
 
18 1430:
Anion Gap 6, Estimated GFR > 60, BUN/Creatinine Ratio 26.7  H, Glucose 152  H, 
Lactic Acid 1.0, Calcium 9.4, Total Bilirubin 0.4, AST 26, ALT 33, Alkaline 
Phosphatase 103, Troponin I < 0.01, Pro-B-Natriuretic Pept 588  H, Total Protein
6.7, Albumin 3.3  L, Globulin 3.4, Albumin/Globulin Ratio 1.0  L, CBC w Diff NO 
MAN DIFF REQ, RBC 4.36  L, MCV 92.3, MCH 29.1, MCHC 31.5  L, RDW 14.9  H, MPV 
8.2, Gran % 79.8  H, Lymphocytes % 14.0  L, Monocytes % 4.9, Eosinophils % 1.1, 
Basophils % 0.2, Absolute Granulocytes 10.5  H, Absolute Lymphocytes 1.8, 
Absolute Monocytes 0.6, Absolute Eosinophils 0.1, Absolute Basophils 0
 Microbiology
 1430  BLOOD: Blood Culture - RECD
 1420  NASOPHARYN: Influenza Virus A & B Rapid Smear - COMP
 1420  BLOOD: Blood Culture - RECD
 
Labs ordered old records reviewed patient is refusing ABG and declining 
breathing treatment when offered 3 L 96% currently, patient medicated with Solu-
Medrol 125 IV 
 
Patient seen and evaluated by Dr. Cano agrees with plan.
 
I discussed with the patient at [facility] and his family is lab results x-ray 
findings and need for close observation which they're in agreement with IV 
steroids and breathing treatments pulmonology consult 
 
case d/w dr rowell will place in obs, azithro 500mg iv ordered
Diagnostic Imaging:
Viewed by Me: Radiology Read.  Discussed w/RAD: Radiology Read. 
Radiology Impression: PATIENT: DAYDAY HOOVER  MEDICAL RECORD NO: [id number] PRESENT
AGE: 67  PATIENT ACCOUNT NO: [id number] : [birth year]  LOCATION: [facility] ORDERING 
PHYSICIAN: Jasper DOWNEY     SERVICE DATE:  EXAM TYPE: RAD - XRY-
PORTABLE CHEST XRAY EXAMINATION: XR PORTABLE CHEST CLINICAL INFORMATION: 
Shortness of breath. COMPARISON: Chest done on 2018. TECHNIQUE: Portable 
frontal view of the chest was obtained. FINDINGS: Persistent stable nonspecific 
bibasilar airspace opacities are noted, may represent atelectasis, pleural 
parenchymal scar and less likely to be chronic infiltrate. The remainder of the 
lung fields are clear. The heart size is within normal limits. There is no 
pleural effusion present. No significant change. IMPRESSION: No significant 
change since 2018. DICTATED BY: Shirley Mackenzie MD  DATE/TIME DICTATED:1501 :RAD.TESFAYE  DATE/TIME TRANSCRIBED:18 
CONFIDENTIAL, DO NOT COPY WITHOUT APPROPRIATE AUTHORIZATION.  <Electronically 
signed in Other Vendor System>                                                  
                                     SIGNED BY: Shirley Mackenzie MD 18 1524
Initial ED EKG: normal intervals, normal QRS complex, AFIB
(Jasper Lin)
 
Departure
 
Departure
Time of Disposition: 1603
Disposition: STILL A PATIENT
Condition: Stable
Referrals:
Indira JONES,Ramsey YANG (PCP/Family)
 
Departure Forms:
Customer Survey
General Discharge Information
 
Observation Note
Spoke With:
Keon JONES,Beto YANG
Physician Advisor Notified: KATHIE JONES,BENJA SERRANO
Place Patient In: Non-ED OBS Care Area
Rationale for Observation:
My rational for observation is as follows [resp tx, pulm consult, iv steroids, 
premature discharge would be medically harmful as pt isnot on baseline o2 
level.hypoxic on ems arrival,
 
(Jasper Lin)
 
Departure
Clinical Impression
Primary Impression: COPD exacerbation
 
PA/NP Co-Sign Statement
Statement:
ED Attending supervision documentation-
 
[X] I saw and evaluated the patient. I have also reviewed all the pertinent lab 
results and diagnostic results. I agree with the findings and the plan of care 
as documented in the PA's/NP's documentation. 
 
[] I have reviewed the ED Record and agree with the PA's/NP's documentation.
 
[] Additions or exceptions (if any) to the PAs/NP's note and plan are 
summarized below:
[]
 
 
 
18
1:46 PM
I have Seen and personally examined the patient, he came in and with difficulty 
breathing.  He has a history of severe COPD.  He was put on BiPAP by prehospital
personnel.  Currently he is awake alert and oriented 3.  Oxygen saturation is 
greater than 95% on BiPAP.
(Alexander Cano DO)
 
Critical Care Note
 
Critical Care Note
Critical Care Time: non-applicable
(Jasper Lin)
 
Critical Care Time: non-applicable
(Jasper Lin)

## 2018-02-24 NOTE — RADIOLOGY REPORT
EXAMINATION:
XR PORTABLE CHEST
 
CLINICAL INFORMATION:
Shortness of breath.
 
COMPARISON:
Chest done on 02/03/2018.
 
TECHNIQUE:
Portable frontal view of the chest was obtained.
 
FINDINGS:
Persistent stable nonspecific bibasilar airspace opacities are noted, may
represent atelectasis, pleural parenchymal scar and less likely to be chronic
infiltrate. The remainder of the lung fields are clear. The heart size is
within normal limits. There is no pleural effusion present. No significant
change.
 
IMPRESSION:
No significant change since 02/03/2018.

## 2018-02-25 VITALS — SYSTOLIC BLOOD PRESSURE: 136 MMHG | DIASTOLIC BLOOD PRESSURE: 74 MMHG

## 2018-02-25 VITALS — DIASTOLIC BLOOD PRESSURE: 86 MMHG | SYSTOLIC BLOOD PRESSURE: 150 MMHG

## 2018-02-25 LAB
ABSOLUTE GRANULOCYTE CT: 9.6 /CUMM (ref 1.4–6.5)
BASOPHILS # BLD: 0 /CUMM (ref 0–0.2)
BASOPHILS NFR BLD: 0.2 % (ref 0–2)
EOSINOPHIL # BLD: 0 /CUMM (ref 0–0.7)
EOSINOPHIL NFR BLD: 0.2 % (ref 0–5)
ERYTHROCYTE [DISTWIDTH] IN BLOOD BY AUTOMATED COUNT: 15.3 % (ref 11.5–14.5)
GRANULOCYTES NFR BLD: 78.5 % (ref 42.2–75.2)
HCT VFR BLD CALC: 34.3 % (ref 42–52)
LYMPHOCYTES # BLD: 2 /CUMM (ref 1.2–3.4)
MCH RBC QN AUTO: 29.9 PG (ref 27–31)
MCHC RBC AUTO-ENTMCNC: 32.4 G/DL (ref 33–37)
MCV RBC AUTO: 92.2 FL (ref 80–94)
MONOCYTES # BLD: 0.6 /CUMM (ref 0.1–0.6)
PLATELET # BLD: 283 /CUMM (ref 130–400)
PMV BLD AUTO: 8.4 FL (ref 7.4–10.4)
RED BLOOD CELL CT: 3.71 /CUMM (ref 4.7–6.1)
WBC # BLD AUTO: 12.3 /CUMM (ref 4.8–10.8)

## 2018-02-25 NOTE — PN- HOUSESTAFF
**See Addendum**
Subjective
Follow-up For:
COPD
Subjective:
No overnight events. Feels much better this morning, breathing improved. No 
other issues.
 
Review of Systems
Constitutional:
Reports: no symptoms. 
EENTM:
Reports: no symptoms. 
Cardiovascular:
Reports: no symptoms. 
Respiratory:
Reports: see HPI. 
Gastrointestinal:
Reports: no symptoms. 
Genitourinary:
Reports: no symptoms. 
Musculoskeletal:
Reports: no symptoms. 
Skin:
Reports: no symptoms. 
Neurological/Psychological:
Reports: no symptoms. 
Hematologic/Endocrine:
Reports: no symptoms. 
Immunologic/Allergic:
Reports: no symptoms. 
 
Objective
Last 24 Hrs of Vital Signs/I&O
 Vital Signs
 
 
Date Time Temp Pulse Resp B/P B/P Pulse O2 O2 Flow FiO2
 
     Mean Ox Delivery Rate 
 
 0648 98.5 80 20 150/86  94   
 
 0608       Nasal 2.0L 
 
       Cannula  
 
 0000       BIPAP 2.0L 
 
 2342       Nasal 2.0L 
 
       Cannula  
 
 2230      98 BIPAP 2.0L 
 
 2104 99.1 106 20 138/72  95 Nasal 2.0L 
 
       Cannula  
 
2      97 Nasal 2.0L 
 
       Cannula  
 
 2009 97.7 104 31 143/72  96 Non 2.0L 
 
       ReBreather  
 
 1835 98.3 103 50 182/68  96 Nasal 2.0L 
 
       Cannula  
 
 1648      96 Nasal 3.0L 
 
       Cannula  
 
 1402      96 Nasal 4.0L 
 
       Cannula  
 
 1338 98.5 110  161/74  99 CPAP  
 
 
 Intake & Output
 
 
  1600  0800  0000
 
Intake Total   250
 
Output Total  350 350
 
Balance  -350 -100
 
    
 
Intake, IV   250
 
Number   1
 
Bowel   
 
Movements   
 
Output, Urine  350 350
 
Patient   102.654 kg
 
Weight   
 
 
 
 
Physical Exam
General Appearance: Alert, Oriented X3, Cooperative, No Acute Distress
Cardiovascular: Regular Rate, Normal S1, Normal S2
Lungs: mild wheezing and rhonci
Abdomen: Normal Bowel Sounds, Soft, No Tenderness
Extremities: No Edema, Normal Pulses, No Tenderness/Swelling
Current Medications:
 Current Medications
 
 
  Sig/Viet Start time  Last
 
Medication Dose Route Stop Time Status Admin
 
Acetaminophen 500 MG Q6P PRN  1730 AC 
 
  PO   
 
Acetylcysteine 2 ML BID  1000 AC 
 
  INH   
 
Albuterol Sulfate 3 ML EVERY 4 HRS/AWAKE  0800 AC 
 
  INH   0607
 
Albuterol Sulfate 2 PUF Q4 HRS AS NEEDED PRN  1730 AC 
 
  INH   
 
Albuterol Sulfate 3 ML ONCE ONE  1645 DC 
 
  INH  1646  1643
 
Apixaban 5 MG BID  2200 AC 
 
  PO   2314
 
Ascorbic Acid 500 MG BID  2200 AC 
 
  PO   2312
 
Atorvastatin Calcium 20 MG 1700  1700 AC 
 
  PO   
 
Azithromycin 500 MG DAILY  1000 AC 
 
Dextrose/Water 250 ML IV   
 
Azithromycin 500 MG ONCE ONE  1615 DC 
 
Dextrose/Water 250 ML IV  1714  1750
 
Baclofen 20 MG BID 0 AC 
 
  PO   2314
 
Budesonide/ 2 PUF BID 2200 AC 
 
Formoterol Fumarate  INH   2313
 
Docusate Sodium 100 MG DAILY  1000 AC 
 
  PO   
 
Furosemide 60 MG Q48  1000 AC 
 
  PO   
 
Gabapentin 600 MG BID 0 AC 
 
  PO   2313
 
Guaifenesin 600 MG BID 0 AC 
 
  PO   2314
 
Ipratropium Bromide 2.5 ML ONCE ONE  1645 DC 
 
  INH  1646  1643
 
Lactobacillus  1 CAP BID 2200 AC 
 
Acidophilus  PO   2314
 
Lorazepam 1 MG BID 2200 AC 
 
  PO   2312
 
Methylprednisolone 40 MG Q8  2200 AC 
 
  IV   0531
 
Methylprednisolone 0 .STK-MED ONE  1415 DC 
 
  .ROUTE   
 
Methylprednisolone 125 MG ONCE ONE  1345 DC 
 
  IV  1346  1417
 
Metoprolol Succinate 25 MG DAILY  1000 AC 
 
  PO   
 
Montelukast Sodium 10 MG DAILY  1000 AC 
 
  PO   
 
Multivitamins  1 TAB DAILY  1000 AC 
 
Therapeutic  PO   
 
Non-Formulary  0 SEE ADMIN CRITERIA  1730 UNVr 
 
Medication  ANY   
 
Nortriptyline HCl 10 MG DAILY  1000 AC 
 
  PO   
 
Omeprazole 20 MG DAILY  1000 DC 
 
  PO   
 
Omeprazole 40 MG DAILY AC  0700 DC 
 
  PO   
 
Omeprazole 20 MG DAILY AC  0700 AC 
 
  PO   0537
 
Oxycodone/ 1 TAB Q4H PRN  1745 AC 
 
Acetaminophen  PO   0537
 
Potassium Chloride 20 MEQ DAILY  1000 AC 
 
  PO   
 
Rifampin 300 MG BID 2200 AC 
 
  PO   2312
 
Sotalol HCl 80 MG BID 2200 AC 
 
  PO   2315
 
Tiotropium [place] 1 PUF DAILY  1000 AC 
 
  INH   
 
Trazodone HCl 50 MG QPM 2200 AC 
 
  PO   2315
 
 
 
 
Last 24 Hrs of Lab/Stalin Results
Last 24 Hrs of Labs/Mics:
 Laboratory Tests
 
18 0713:
CBC w Diff Pending, WBC Pending, RBC Pending, Hgb Pending, Hct Pending, MCV 
Pending, MCH Pending, MCHC Pending, RDW Pending, Plt Count Pending, MPV Pending
 
18 1642:
Lactic Acid Cancelled
 
18 1430:
Anion Gap 6, Estimated GFR > 60, BUN/Creatinine Ratio 26.7  H, Glucose 152  H, 
Lactic Acid 1.0, Calcium 9.4, Total Bilirubin 0.4, AST 26, ALT 33, Alkaline 
Phosphatase 103, Troponin I < 0.01, Pro-B-Natriuretic Pept 588  H, Total Protein
6.7, Albumin 3.3  L, Globulin 3.4, Albumin/Globulin Ratio 1.0  L, CBC w Diff NO 
MAN DIFF REQ, RBC 4.36  L, MCV 92.3, MCH 29.1, MCHC 31.5  L, RDW 14.9  H, MPV 
8.2, Gran % 79.8  H, Lymphocytes % 14.0  L, Monocytes % 4.9, Eosinophils % 1.1, 
Basophils % 0.2, Absolute Granulocytes 10.5  H, Absolute Lymphocytes 1.8, 
Absolute Monocytes 0.6, Absolute Eosinophils 0.1, Absolute Basophils 0
 Microbiology
 1430  BLOOD: Blood Culture - RECD
 142  NASOPHARYN: Influenza Virus A & B Rapid Smear - COMP
 142  BLOOD: Blood Culture - RECD
 
 
 
Assessment/Plan
Assessment:
This is a 66yo M w/ PMH of COPD (on 2L O2/Bipap), BLE edema, GARRY, HTN, HLD, A-
fib on eliquis, neurogenic bladder, depression, CAD s/p stent placement, IVC 
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 
exacerbation.
 
Problem List:
1. COPD exacerbation
2. GARRY assessment while off BiPAP
 
Vital signs every shift
TRC nebulization around the clock
IV Solu-Medrol 40 mg every 12H
IV as azithromycin 500 mg daily swiching to PO
Chest physical therapy, feels very junky
BiPAP at night per home settings
On 2L home O2
Continue home meds
Wheelchair bound
 
 
DVT ppx with apixiban
Heart healthy diet
Full code
 
Problem List:
 1. COPD exacerbation
 
Pain Ratin
Pain Location:
no
Pain Goal: Remain pain free
Pain Plan:
no
Tomorrow's Labs & Rationales:
see a/p

## 2018-02-25 NOTE — PATIENT DISCHARGE INSTRUCTIONS
Discharge Instructions
 
General Discharge Information
You were seen/treated for:
COPD exacerbation,
Watch for these problems:
Severe shortness of breathing, dizziness, lightheadedness, chest pain, 
palpitation, heart racing, worsening of any other problems
Special Instructions:
Please follow with you.  Significant amount of discharge.
 
 
Diet
Continue normal diet: No
Recommended Diet: Heart Healthy
 
Activity
Full Activity/No Limits: No
Activity Self Limited: Yes
 
Acute Coronary Syndrome
 
Inclusion Criteria
At DC or during hospital stay patient has or had the following:
ACS DIAGNOSIS No
 
Discharge Core Measures
Meds if any: Prescribed or Continued at Discharge
Meds if any: NOT Prescribed or Continued at Discharge
 
Congestive Heart Failure
 
Inclusion Criteria
At DC or during hospital stay patient has or had the following:
CHF DIAGNOSIS No
 
Discharge Core Measures
Meds if any: Prescribed or Continued at Discharge
Meds if any: NOT Prescribed or Continued at Discharge
 
Cerebrovascular accident
 
Inclusion Criteria
At DC or during hospital stay patient has or had the following:
CVA/TIA Diagnosis No
 
Discharge Core Measures
Meds if any: Prescribed or Continued at Discharge
Meds if any: NOT Prescribed or Continued at Discharge
 
Venous thromboembolism
 
Inclusion Criteria
VTE Diagnosis No
VTE Type NONE
VTE Confirmed by (Test) NONE
 
Discharge Core Measures
- Per Current guidelines, there needs to be overlap
- treatment for the first 5 days of Warfarin therapy.
- If discharged on Warfarin prior to 5 days of
- overlap therapy, the patient will need to be
- assessed for post discharge needs including
- *Post discharge parental anticoagulation
- *Warfarin and/or parental anticoagulation education
- *Follow up date to check INR post discharge
At least 5 days overlap therapy as Inpatient No
Meds if any: Prescribed or Continued at Discharge
Note: Overlap Therapy is Warfarin and Anticoagulant
Meds if any: NOT Prescribed or Continued at Discharge

## 2018-05-12 ENCOUNTER — HOSPITAL ENCOUNTER (INPATIENT)
Dept: HOSPITAL 68 - ERH | Age: 68
LOS: 2 days | Discharge: HOME HEALTH | DRG: 812 | End: 2018-05-14
Attending: INTERNAL MEDICINE | Admitting: INTERNAL MEDICINE
Payer: COMMERCIAL

## 2018-05-12 VITALS — DIASTOLIC BLOOD PRESSURE: 84 MMHG | SYSTOLIC BLOOD PRESSURE: 132 MMHG

## 2018-05-12 VITALS — BODY MASS INDEX: 33.03 KG/M2 | HEIGHT: 69 IN | WEIGHT: 223 LBS

## 2018-05-12 VITALS — DIASTOLIC BLOOD PRESSURE: 73 MMHG | SYSTOLIC BLOOD PRESSURE: 153 MMHG

## 2018-05-12 DIAGNOSIS — I25.2: ICD-10-CM

## 2018-05-12 DIAGNOSIS — Z79.51: ICD-10-CM

## 2018-05-12 DIAGNOSIS — J44.9: ICD-10-CM

## 2018-05-12 DIAGNOSIS — G83.89: ICD-10-CM

## 2018-05-12 DIAGNOSIS — N31.9: ICD-10-CM

## 2018-05-12 DIAGNOSIS — Z79.01: ICD-10-CM

## 2018-05-12 DIAGNOSIS — Z98.61: ICD-10-CM

## 2018-05-12 DIAGNOSIS — I48.1: ICD-10-CM

## 2018-05-12 DIAGNOSIS — G47.33: ICD-10-CM

## 2018-05-12 DIAGNOSIS — I25.10: ICD-10-CM

## 2018-05-12 DIAGNOSIS — Z88.8: ICD-10-CM

## 2018-05-12 DIAGNOSIS — Z79.891: ICD-10-CM

## 2018-05-12 DIAGNOSIS — Z88.1: ICD-10-CM

## 2018-05-12 DIAGNOSIS — Z96.0: ICD-10-CM

## 2018-05-12 DIAGNOSIS — F32.9: ICD-10-CM

## 2018-05-12 DIAGNOSIS — Z99.81: ICD-10-CM

## 2018-05-12 DIAGNOSIS — F41.9: ICD-10-CM

## 2018-05-12 DIAGNOSIS — D62: Primary | ICD-10-CM

## 2018-05-12 DIAGNOSIS — E78.5: ICD-10-CM

## 2018-05-12 DIAGNOSIS — J96.10: ICD-10-CM

## 2018-05-12 DIAGNOSIS — I10: ICD-10-CM

## 2018-05-12 DIAGNOSIS — T45.515D: ICD-10-CM

## 2018-05-12 DIAGNOSIS — Z66: ICD-10-CM

## 2018-05-12 DIAGNOSIS — G82.20: ICD-10-CM

## 2018-05-12 DIAGNOSIS — R31.9: ICD-10-CM

## 2018-05-12 LAB
ABSOLUTE GRANULOCYTE CT: 9.4 /CUMM (ref 1.4–6.5)
BASOPHILS # BLD: 0 /CUMM (ref 0–0.2)
BASOPHILS NFR BLD: 0.2 % (ref 0–2)
EOSINOPHIL # BLD: 0.2 /CUMM (ref 0–0.7)
EOSINOPHIL NFR BLD: 1.8 % (ref 0–5)
ERYTHROCYTE [DISTWIDTH] IN BLOOD BY AUTOMATED COUNT: 14 % (ref 11.5–14.5)
GRANULOCYTES NFR BLD: 74.9 % (ref 42.2–75.2)
HCT VFR BLD CALC: 32.2 % (ref 42–52)
LYMPHOCYTES # BLD: 2.1 /CUMM (ref 1.2–3.4)
MCH RBC QN AUTO: 28.8 PG (ref 27–31)
MCHC RBC AUTO-ENTMCNC: 32.2 G/DL (ref 33–37)
MCV RBC AUTO: 89.6 FL (ref 80–94)
MONOCYTES # BLD: 0.8 /CUMM (ref 0.1–0.6)
PLATELET # BLD: 258 /CUMM (ref 130–400)
PMV BLD AUTO: 8.4 FL (ref 7.4–10.4)
RED BLOOD CELL CT: 3.6 /CUMM (ref 4.7–6.1)
WBC # BLD AUTO: 12.5 /CUMM (ref 4.8–10.8)

## 2018-05-12 NOTE — HISTORY & PHYSICAL
[facility] 05[facility]12[facility] 1418:
General Information and HPI
MD Statement:
I have seen and personally examined DAYDAY HOOVER and documented this H&P.
 
The patient is a 67 year old M who presented with a patient stated chief 
complaint of [pain lower abdomen and hematuria].
 
Source of Information: patient
History of Present Illness:
67-year-old gentleman PMH of COPD (on 2L O2/Bipap), BLE edema, GARRY, HTN, HLD, A-
fib on eliquis, neurogenic bladder status post Marquez catheter, depression, CAD s
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 
ER w/ CC of blood in urine.  Patient was in a usual state of health until 
yesterday morning, his Marquez was apparently pulled accidentally by his health 
aide.  Patient didn't have any symptoms.  Over the night patient started having 
blood in his urine with clots and with pain in the lower abdomen.  Patient came 
to ED had his Marquez catheter was changed and he was sent home after giving 
continuous bladder irrigation.  His urine cleared.  Patient was sent from this 
morning.  Apparently,, patient again developed blood in his urine and also 
around his penis and came to [facility] 2 hours after discharge.  He denies any 
pain abdomen, nausea, vomiting, back pain, chest pain, shortness of breath, 
weakness, dizziness.
Patient had MRSA infection in the spine following which developed neurogenic 
bladder in 2008 and has been on Marquez since then.  He shouldn't follows up with 
Dr. Dykes.  Recently he had a urinary tract infection was treated with Cipro 
and Augmentin in April 2018.  He changes his Marquez catheter once a month.  
Patient has constipation and gets supposed to every other day.  Patient follows 
with Dr. Hull, Dr. Ayala as outpatient.  At home patient uses wheelchair
 
Allergies/Medications
Allergies:
Coded Allergies:
heparin (Intermediate, SWELLING, RASH 04/16/18)
vancomycin (Intermediate, HIVES, SKIN CRACKES, TURNS RED, SWELLS AND PEELS 04/16
/18)
warfarin (From COUMADIN) (Intermediate, RASH 04/16/18)
 
Home Med list
Albuterol Sulfate (Ventolin Hfa) 90 MCG HFA.AER.AD   2 PUF INH AD PRN COPD  (
Reported)
Apixaban (Eliquis) 5 MG TABLET   5 MG PO BID atrial fibrillation
Ascorbate Calcium (Vitamin C) 500 MG TABLET   1 TAB PO BID SUPPLEMENT  (Reported
)
Atomoxetine HCl (Strattera) 40 MG CAPSULE   1 CAP PO QAM MENTAL HEALTH  (
Reported)
Baclofen 20 MG TABLET   1 TAB PO BID MUSCLE RELAXER  (Reported)
Bisacodyl (Dulcolax) 10 MG SUPP.RECT   1 SUP RC Q48 GI  (Reported)
Budesonide/Formoterol Fumarate (Symbicort 80-4.5 Mcg Inhaler) 80 MCG-4.5 MCG/
ACTUATION HFA.AER.AD   2 PUF INH BID COPD  (Reported)
Cefuroxime Axetil (Cefuroxime) 500 MG TABLET   1 TAB PO BID UROLOGIC
Docusate Sodium (Stool Softener) 100 MG CAPSULE   1 CAP PO DAILY STOOL SOFTENER 
(Reported)
Doxycycline Hyclate 100 MG CAPSULE   1 CAP PO BID infection  (Reported)
Evolocumab (Repatha Sureclick) 140 MG/ML PEN.INJCTR   1 ML SC Q 2 WEEKS 
CHOLESTEROL  (Reported)
Furosemide 20 MG TABLET   3 TAB PO Q48 DIURETIC  (Reported)
Gabapentin 300 MG CAPSULE   2 CAP PO BID NERVE PAIN  (Reported)
Guaifenesin (Mucinex) 600 MG TAB.ER.12H   1 TAB PO BID CONGESTION  (Reported)
Hydroxyzine HCl (hydrOXYzine HCl) 10 MG TABLET   1 TAB PO BID PRN ANXIETY  (
Reported)
Lactobacillus Acidophilus (Acidophilus) 1 EACH CAPSULE   1 CAP PO BID PROBIOTIC 
(Reported)
Magnesium Oxide (Magnesium) 400 MG CAPSULE   1 CAP PO 0600 SUPPLEMENT  (Reported
)
Metoprolol Succ XL (Toprol XL) 25 MG TAB   1 TAB PO DAILY HEART  (Reported)
Montelukast Sodium (Singulair) 10 MG TABLET   1 TAB PO DAILY ALLERGIES  (
Reported)
Multivitamin (Daily Value) 1 EACH TABLET   1 TAB PO DAILY VITAMIN SUPPORT  (
Reported)
Nortriptyline HCl 10 MG CAPSULE   1 CAP PO DAILY mental health  (Reported)
Omeprazole 20 MG CAPSULE.DR   1 CAP PO DAILY ACID REFLUX  (Reported)
Oxycodone HCl/Acetaminophen (Oxycodone-Acetaminophen 5-325) 5 MG-325 MG TABLET  
1 TAB PO Q4H PRN PAIN  (Reported)
Potassium Chloride 20 MEQ TAB.ER.PRT   1 TAB PO DAILY SUPPLEMENT  (Reported)
Rifampin (Rifadin) 300 MG CAPSULE   300 MG PO BID MRSA  (Reported)
Sotalol (Betapace) 80 MG TABLET   80 MG PO BID AARYTHMIAS
Tiotropium Bromide (Spiriva) 18 MCG CAP.W.DEV   1 CAP INH DAILY BREATHING 
PROBLEMS  (Reported)
 
Compliance With Home Meds: GOOD
 
Past History
 
Travel History
Traveled to [place] past 21 day No
 
Medical History
Neurological: C6-7 ABSCESS PARAPLEGIA
EENT: NONE
Cardiovascular: AFIB, hypertension, hyperlipidemia, myocardial infarction
Respiratory: asthma, COPD, OXYGEN DEPEND 2L
Gastrointestinal: NONE
Hepatic: NONE
Renal: neurogenic bladder
Musculoskeletal: PARAPLEGIA R/T ABSCESS
Psychiatric: anxiety
Endocrine: NONE
Blood Disorders: NONE
Cancer(s): NONE
GYN/Reproductive: NONE
History of MRSA: Yes
History of VRE: Yes
History of CDIFF: No
 
Surgical History
Surgical History: non-contributory
 
Past Family/Social History
 
Family History
Relations & Conditions if any
Relation not specified for:
  *No pertinent family history
 
 
Psychosocial History
Where do you live? Home
Who Do You Live With? partner
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
Primary Language: English
Smoking Status: Never Smoked
ETOH Use: denies use
Illicit Drug Use: denies illicit drug use
Living Will? yes
 
Functional Ability
ADLs
Needs Assist: dressing, eating, toileting, bathing. 
Ambulation: wheelchair
IADLs
Independent: shopping, housework, finances, food prep, telephone, transportation
, medication admin. 
 
Review of Systems
 
Review of Systems
Constitutional:
Reports: no symptoms. 
Cardiovascular:
Reports: no symptoms. 
Respiratory:
Reports: no symptoms. 
GI:
Reports: no symptoms. 
Genitourinary:
Reports: no symptoms. 
Musculoskeletal:
Reports: no symptoms. 
 
Exam & Diagnostic Data
Last 24 Hrs of Vital Signs/I&O
 Vital Signs
 
 
Date Time Temp Pulse Resp B/P B/P Pulse O2 O2 Flow FiO2
 
     Mean Ox Delivery Rate 
 
05/12 1700 98.5 85 18 132/84  96   
 
05/12 1622 98.1 85 22 151/69  99 Nasal 2.0L 
 
       Cannula  
 
05/12 1410 99.0 88 20 128/68  97 Nasal 2.0L 
 
       Cannula  
 
05/12 1222 97.0 80 20 126/63  98 Nasal 2.0L 
 
       Cannula  
 
05/12 1043 97.0 84 20 115/56  97 Nasal 2.0L 
 
       Cannula  
 
05/12 0915      94 Nasal 2.0L 
 
       Cannula  
 
05/12 0857 97.1 90 16 123/75  94 Room Air Room Air 
 
 
 Intake & Output
 
 
 05/12 1600 05/12 0800 05/12 0000
 
Intake Total   
 
Output Total 1000  
 
Balance -1000  
 
    
 
Output, Urine 1000  
 
Patient 223 lb  
 
Weight   
 
Weight Reported by Patient  
 
Measurement   
 
Method   
 
 
 
 
Physical Exam
General Appearance Alert, Oriented X3, Cooperative, No Acute Distress
Cardiovascular Regular Rate, Normal S1, Normal S2, No Murmurs
Lungs Clear to Auscultation
Abdomen Soft, No Tenderness, No Hepatospenomegaly
Neurological Normal Speech, Strength at 5/5 X4 Ext, Normal Tone, Sensation 
Intact
Extremities No Cyanosis, No Edema, Normal Pulses
Vascular Normal Pulses
Last 24 Hrs of Labs/Stalin:
 Laboratory Tests
 
05/12/18 0931:
CBC w Diff NO MAN DIFF REQ, RBC 3.60  L, MCV 89.6, MCH 28.8, MCHC 32.2  L, RDW 
14.0, MPV 8.4, Gran % 74.9, Lymphocytes % 16.4  L, Monocytes % 6.7, Eosinophils 
% 1.8, Basophils % 0.2, Absolute Granulocytes 9.4  H, Absolute Lymphocytes 2.1, 
Absolute Monocytes 0.8  H, Absolute Eosinophils 0.2, Absolute Basophils 0
 
05/12/18 0924:
Anion Gap 6, Estimated GFR > 60, BUN/Creatinine Ratio 33.3  H, Glucose 146  H, 
Calcium 8.8
 
 
Assessment/Plan
Assessment:
68yo M w/ PMH of COPD (on 2L O2/Bipap), BLE edema, GARRY, HTN, HLD, A-fib on 
eliquis, neurogenic bladder status post Marquez catheter, depression, CAD s/p 
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 
w/ CC of blood in urine.  Patient admitted to [facility] for further evaluation and
management
 
 
Admission vitals
Temperature 97--- 99, pulse rate 88, respiratory rate 20, blood pressure 128/68,
saturation 97 on 2 L of nasal oxygen.
 
 
WBC 12.5, hemoglobin 10.4, platelet count 258, sodium 137, potassium 4.1, BUN 20
, creatinine 0.6, calcium 8.8, glucose 146,
 
Urine culture
04/25/2018 growing Proteus mirabilis.  Patient was on Augmentin and Cipro for 
the same. patient has grown MRSA 10 years ago in blood.
 
ED treatment
Albuterol, Percocet, phenazopyridine
 
Assessment and plan
1.  Hematuria secondary due to trauma
2.  Chronic medical conditions-COPD, GARRY, HTN, HLD, A-fib
 
* Admitted in GEN med.
* CBC monitoring every 12.
* Irrigate Marquez with continuous bladder irrigation.  Urology consult.  Patient 
apixaban held.  We will get cardiology on board to help us with Anticoagulation
* Diet-heart heal  thy diet
* Continue all his home medication except apixaban.
* Code-DNR/DNI
 
 
Home meds
Albuterol
Apixaban 5 mg twice a day ON HOLD
Vitamin C
Atomoxetine 40 mg
Baclofen 20 mg twice a day
Symbicort
Dicyclomine
evolocumab
Lasix 20
Gabapentin 300
Mucinex
Metoprolol 25
Montelukast 10
Percocet
Nortriptyline 10 mg
Rifampin 300
Sotalol 80
Spiriva
 
 
As Ranked By This Provider
Problem List:
 1. Hematuria
 
 
Core Measures/Misc (9/17)
 
Acute Coronary Syndrome
ACS Diagnosis: No
 
Congestive Heart Failure
Congestive Heart Failure Diagnosis No
 
Cerebrovascular Accident
CVA/TIA Diagnosis: No
 
VTE (View Protocol)
VTE Risk Factors Age>40
No Mechanical VTE Prophylaxis d/t Other
No VTE Pharm Prophylaxis d/t Other
 
Sepsis (View protocol)
Sepsis Present: No
 
 
Tiara Canela MD 05/12/18 1634:
Attending MD Review Statement
 
Attending Statement
Attending MD Statement: examined this patient, discuss w/resident/PA/NP, agreed 
w/resident/PA/NP, reviewed EMR data (avail), discussed with nursing, discussed 
with case mgmt, reviewed images, amended to note
Attending Assessment/Plan:
68 y/o M with pmh sig for AFIB on Eliquis, hypertension, hyperlipidemia, 
myocardial infarction, chronic respiratory failure, history of cervical spine 
abscess now paraplegic, history of neurogenic bladder with chronic Marquez who was
seen in the emergency room last night with hematuria after accidentally his 
caretaker pulled the Marquez.  Patient received CBI and was discharged home.  This
morning he woke up again with hematuria also presented to the emergency room.  
He was completing of some lower abdominal pressure and pain.  He was having 
bright red hematuria.  CBI was started again and now patient has light pinkish 
to orange colored urine.  He does have a small drop in his H&H today.  His 
abdominal pain has better.  He does have chronic back pain.
 
Vital Signs
 
 
Date Time Temp Pulse Resp B/P B/P Pulse O2 O2 Flow FiO2
 
     Mean Ox Delivery Rate 
 
05/12 1622 98.1 85 22 151/69  99 Nasal 2.0L 
 
       Cannula  
 
05/12 1410 99.0 88 20 128/68  97 Nasal 2.0L 
 
       Cannula  
 
05/12 1222 97.0 80 20 126/63  98 Nasal 2.0L 
 
       Cannula  
 
05/12 1043 97.0 84 20 115/56  97 Nasal 2.0L 
 
       Cannula  
 
05/12 0915      94 Nasal 2.0L 
 
       Cannula  
 
05/12 0857 97.1 90 16 123/75  94 Room Air Room Air 
 
 
on exam; 
aox3, nad. 
cv; s1, s2, irregular. 
resp; clear
abd; soft, nt, bs+
ext; 1+ edema
 
 Laboratory Tests
 
 
 05/12 05/12
 
 0931 0924
 
Chemistry  
 
  Sodium (137 - 145 mmol/L)  137
 
  Potassium (3.5 - 5.1 mmol/L)  4.1
 
  Chloride (98 - 107 mmol/L)  98
 
  Carbon Dioxide (22 - 30 mmol/L)  33  H
 
  Anion Gap (5 - 16)  6
 
  BUN (9 - 20 mg/dL)  20
 
  Creatinine (0.7 - 1.2 mg/dL)  0.6  L
 
  Estimated GFR (>60 ml/min)  > 60
 
  BUN/Creatinine Ratio (7 - 25 %)  33.3  H
 
  Glucose (65 - 99 mg/dL)  146  H
 
  Calcium (8.4 - 10.2 mg/dL)  8.8
 
Hematology  
 
  CBC w Diff NO MAN DIFF REQ 
 
  WBC (4.8 - 10.8 /CUMM) 12.5  H 
 
  RBC (4.70 - 6.10 /CUMM) 3.60  L 
 
  Hgb (14.0 - 18.0 G/DL) 10.4  L 
 
  Hct (42 - 52 %) 32.2  L 
 
  MCV (80.0 - 94.0 FL) 89.6 
 
  MCH (27.0 - 31.0 PG) 28.8 
 
  MCHC (33.0 - 37.0 G/DL) 32.2  L 
 
  RDW (11.5 - 14.5 %) 14.0 
 
  Plt Count (130 - 400 /CUMM) 258 
 
  MPV (7.4 - 10.4 FL) 8.4 
 
  Gran % (42.2 - 75.2 %) 74.9 
 
  Lymphocytes % (20.5 - 51.1 %) 16.4  L 
 
  Monocytes % (1.7 - 9.3 %) 6.7 
 
  Eosinophils % (0 - 5 %) 1.8 
 
  Basophils % (0.0 - 2.0 %) 0.2 
 
  Absolute Granulocytes (1.4 - 6.5 /CUMM) 9.4  H 
 
  Absolute Lymphocytes (1.2 - 3.4 /CUMM) 2.1 
 
  Absolute Monocytes (0.10 - 0.60 /CUMM) 0.8  H 
 
  Absolute Eosinophils (0.0 - 0.7 /CUMM) 0.2 
 
  Absolute Basophils (0.0 - 0.2 /CUMM) 0 
 
 
A/P: 68 y/o M with pmh sig for AFIB on Eliquis, hypertension, hyperlipidemia, 
myocardial infarction, chronic respiratory failure, history of cervical spine 
abscess now paraplegic, history of neurogenic bladder admitted with traumatic 
hematuria and mild acute blood loss anemia. 
 
Pt lizzette be admitted to general medicine floor. Pt will receive CBI. Monitor H&H. 
Type and Cross. Hold Eliquis and consult cardiology with hx of Afib. 
Urology shouujacinta see the pt. 
Continue the rest of the medications. 
DVT px; ALPS. 
DNR/I. 
 
 
 
Senait Kellogg MD 05/12/18 2055:
Resident Review Statement
Resident Statement: examined this patient, discussed with intern, agreed with 
intern
Other Findings:
68yo M w/ PMH of COPD (on 2L O2/Bipap), BLE edema, GARRY, HTN, HLD, A-fib on 
eliquis, neurogenic bladder with chronic indwelling marquez, depression, CAD s/p 
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 
suppressive therapy of rifampin and doxycycline presents to the ER with 
traumatic hematuria since last night.  He came into the ER and was given CBI 
with temporary clearing of the urine but hematuria resumed at home after a few 
hours and he reported back to the ER and was admitted.
 
Assessment
1.  Traumatic hematuria secondary to anticoagulation with Eliquis
2.  A. fib on eloquent
3.  Neurogenic bladder with chronic indwelling Marquez's catheter
4.  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
and doxycycline
5.  Hypertension, hyperlipidemia, CAD
 
Plan
-Admit to the general medicine floor
-Serial CBCs
-Type and cross 2 units of blood
-Neurologic consult
-Continue continuous CBI
-Hold Eliquis for now
-Cardiology consult on management of anticoagulation
-TRC nebs
-Guaifenesin for cough
-Continue BiPAP and oxygen as needed
-Continue his other important home medications
-DNR/DNI
-Heart healthy diet
-Alps for DVT prophylaxis
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
## 2018-05-12 NOTE — ED GI/GU/ABDOMINAL COMPLAINT
History of Present Illness
 
General
Chief Complaint: Male Genitourinary Problems
Stated Complaint: BLD IN PRADO
Source: patient
Exam Limitations: no limitations
 
Vital Signs & Intake/Output
Vital Signs & Intake/Output
 Vital Signs
 
 
Date Time Temp Pulse Resp B/P B/P Pulse O2 O2 Flow FiO2
 
     Mean Ox Delivery Rate 
 
05/12 1222 97.0 80 20 126/63  98 Nasal 2.0L 
 
       Cannula  
 
05/12 1043 97.0 84 20 115/56  97 Nasal 2.0L 
 
       Cannula  
 
05/12 0915      94 Nasal 2.0L 
 
       Cannula  
 
05/12 0857 97.1 90 16 123/75  94 Room Air Room Air 
 
 
 
Allergies
Coded Allergies:
heparin (Intermediate, SWELLING, RASH 04/16/18)
vancomycin (Intermediate, HIVES, SKIN CRACKES, TURNS RED, SWELLS AND PEELS 04/16
/18)
warfarin (From COUMADIN) (Intermediate, RASH 04/16/18)
 
Reconcile Medications
Albuterol Sulfate (Ventolin Hfa) 90 MCG HFA.AER.AD   2 PUF INH AD PRN COPD  (
Reported)
Apixaban (Eliquis) 5 MG TABLET   5 MG PO BID atrial fibrillation
Ascorbate Calcium (Vitamin C) 500 MG TABLET   1 TAB PO BID SUPPLEMENT  (Reported
)
Atomoxetine HCl (Strattera) 40 MG CAPSULE   1 CAP PO QAM MENTAL HEALTH  (
Reported)
Baclofen 20 MG TABLET   1 TAB PO BID MUSCLE RELAXER  (Reported)
Bisacodyl (Dulcolax) 10 MG SUPP.RECT   1 SUP RC Q48 GI  (Reported)
Budesonide/Formoterol Fumarate (Symbicort 80-4.5 Mcg Inhaler) 80 MCG-4.5 MCG/
ACTUATION HFA.AER.AD   2 PUF INH BID COPD  (Reported)
Cefuroxime Axetil (Cefuroxime) 500 MG TABLET   1 TAB PO BID UROLOGIC
Docusate Sodium (Stool Softener) 100 MG CAPSULE   1 CAP PO DAILY STOOL SOFTENER 
(Reported)
Doxycycline Hyclate 100 MG CAPSULE   1 CAP PO BID infection  (Reported)
Evolocumab (Repatha Sureclick) 140 MG/ML PEN.INJCTR   1 ML SC Q 2 WEEKS 
CHOLESTEROL  (Reported)
Furosemide 20 MG TABLET   3 TAB PO Q48 DIURETIC  (Reported)
Gabapentin 300 MG CAPSULE   2 CAP PO BID NERVE PAIN  (Reported)
Guaifenesin (Mucinex) 600 MG TAB.ER.12H   1 TAB PO BID CONGESTION  (Reported)
Hydroxyzine HCl (hydrOXYzine HCl) 10 MG TABLET   1 TAB PO BID PRN ANXIETY  (
Reported)
Lactobacillus Acidophilus (Acidophilus) 1 EACH CAPSULE   1 CAP PO BID PROBIOTIC 
(Reported)
Magnesium Oxide (Magnesium) 400 MG CAPSULE   1 CAP PO 0600 SUPPLEMENT  (Reported
)
Metoprolol Succ XL (Toprol XL) 25 MG TAB   1 TAB PO DAILY HEART  (Reported)
Montelukast Sodium (Singulair) 10 MG TABLET   1 TAB PO DAILY ALLERGIES  (
Reported)
Multivitamin (Daily Value) 1 EACH TABLET   1 TAB PO DAILY VITAMIN SUPPORT  (
Reported)
Nortriptyline HCl 10 MG CAPSULE   1 CAP PO DAILY mental health  (Reported)
Omeprazole 20 MG CAPSULE.DR   1 CAP PO DAILY ACID REFLUX  (Reported)
Oxycodone HCl/Acetaminophen (Oxycodone-Acetaminophen 5-325) 5 MG-325 MG TABLET  
1 TAB PO Q4H PRN PAIN  (Reported)
Potassium Chloride 20 MEQ TAB.ER.PRT   1 TAB PO DAILY SUPPLEMENT  (Reported)
Rifampin (Rifadin) 300 MG CAPSULE   300 MG PO BID MRSA  (Reported)
Sotalol (Betapace) 80 MG TABLET   80 MG PO BID AARYTHMIAS
Tiotropium Bromide (Spiriva) 18 MCG CAP.W.DEV   1 CAP INH DAILY BREATHING 
PROBLEMS  (Reported)
 
Triage Note:
TRIAGE:
 66 Y/O MALE BIBA FROM HOME C/O BLOOD CLOT TO PRADO
 CATHETER. WAS JUST AT [place] THIS MORNING FOR
 SAME - REPORTS HAD CBI "ALL NIGHT."
Triage Nurses Notes Reviewed? yes
Onset: Abrupt
Duration: day(s):
Timing: recent history
No Modifying Factors: none
HPI:
59-year-old male comes into emergency room with problems with his Prado 
catheter.  Patient was seen here this morning.  He has had a indwelling Prado 
catheter for about one year.  He is on eliquis for atrial fibrillation.  He 
reports that last night his home health aide accidentally yanked on the 
catheter.  He started to experience some fleeting the hours later and lower 
abdominal pressure.  He was seen here last night and had a continuous bladder 
irrigation and the Prado catheter was irrigated.  It was draining appropriately.
 There were clots last night with the clots resolved and he went home.  Patient 
reports that he started to feel pressure and pain this morning again and the 
catheter was not draining.  He comes in for further evaluation.
(Salvatore Bush)
 
Past History
 
Travel History
Traveled to [place] past 21 day No
 
Medical History
Any Pertinent Medical History? see below for history
Neurological: C6-7 ABSCESS PARAPLEGIA
EENT: NONE
Cardiovascular: AFIB, hypertension, hyperlipidemia, myocardial infarction
Respiratory: asthma, COPD, OXYGEN DEPEND 2L
Gastrointestinal: NONE
Hepatic: NONE
Renal: neurogenic bladder
Musculoskeletal: PARAPLEGIA R/T ABSCESS
Psychiatric: anxiety
Endocrine: NONE
Blood Disorders: NONE
Cancer(s): NONE
GYN/Reproductive: NONE
History of MRSA: Yes
History of VRE: Yes
History of CDIFF: No
 
Surgical History
Surgical History: non-contributory
 
Psychosocial History
Who do you live with Significant Other
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
What is your primary language English
Tobacco Use: Quit >30 days ago
ETOH Use: denies use
Illicit Drug Use: denies illicit drug use
 
Family History
Family History, If Any:
Relation not specified for:
  *No pertinent family history
 
Hx Contributory? No
(Salvatore Bush)
 
Review of Systems
 
Review of Systems
Constitutional:
Reports: no symptoms. 
EENTM:
Reports: no symptoms. 
Respiratory:
Reports: no symptoms. 
Cardiovascular:
Reports: no symptoms. 
GI:
Reports: no symptoms. 
Genitourinary:
Reports: see HPI. 
Musculoskeletal:
Reports: no symptoms. 
Skin:
Reports: no symptoms. 
Neurological/Psychological:
Reports: no symptoms. 
Hematologic/Endocrine:
Reports: no symptoms. 
Immunologic/Allergic:
Reports: no symptoms. 
All Other Systems: Reviewed and Negative
(Salvatore Bush)
 
Physical Exam
 
Physical Exam
General Appearance: alert, awake
Head: atraumatic
Eyes:
Bilateral: normal appearance. 
Ears, Nose, Throat, Mouth: hearing grossly normal, moist mucous membrane
Neck: normal inspection
Respiratory: no respiratory distress
Gastrointestinal: soft
Back: decreased range of motion
Neurologic/Psych: awake, alert
Skin: intact
 
Core Measures
ACS in differential dx? No
Sepsis Present: No
Sepsis Focused Exam Completed? No
(Salvatore Bush)
 
Progress
Differential Diagnosis: ureterolithiasis, urinary retention, urethritis, UTI/
pyelo, anemia
Initial ED EKG: none
(Salvatore Bush)
Plan of Care:
 Orders
 
 
Procedure Date/time Status
 
Add-on Test (ER Only) 05/12 1342 Active
 
CBC WITHOUT DIFFERENTIAL 05/12 0924 Complete
 
BASIC METABOLIC PANEL 05/12 0924 Complete
 
 
 Current Medications
 
 
  Sig/Viet Start time  Last
 
Medication Dose  Stop Time Status Admin
 
Phenazopyridine HCl 200 MG ONCE ONE 05/12 1400 UNVr 
 
(Pyridium)   05/12 1401  
 
 
 Laboratory Tests
 
 
 
05/12/18 0931:
CBC w Diff NO MAN DIFF REQ, RBC 3.60  L, MCV 89.6, MCH 28.8, MCHC 32.2  L, RDW 
14.0, MPV 8.4, Gran % 74.9, Lymphocytes % 16.4  L, Monocytes % 6.7, Eosinophils 
% 1.8, Basophils % 0.2, Absolute Granulocytes 9.4  H, Absolute Lymphocytes 2.1, 
Absolute Monocytes 0.8  H, Absolute Eosinophils 0.2, Absolute Basophils 0
 
05/12/18 0924:
Anion Gap 6, Estimated GFR > 60, BUN/Creatinine Ratio 33.3  H, Glucose 146  H, 
Calcium 8.8
 
(Alexandra JONES,Alexander PERSON)
 
Departure
 
Departure
Disposition: STILL A PATIENT
Condition: Stable
Clinical Impression
Primary Impression: Clot hematuria
Referrals:
Ramsey Jacobson MD (PCP/Family)
 
Departure Forms:
Customer Survey
General Discharge Information
 
Admission Note
Spoke With:
Tiara Canela MD
Documentation of Exam:
Documentation of any treatments & extenuating circumstances including Concerns 
Regarding Discharge (functional status, medication knowledge or non-compliance, 
living conditions, etc.) that warrant an admission rather than observation:  
Patient will require serial CBCs.  Urology consultation.  Patient has failed 
outpatient treatment.  Patient will continue to require catheter care with 
continuous bladder irrigation as well as irrigation of the bladder directly with
briana syringe for clots.
 
(Salvatore Bush)
 
PA/NP Co-Sign Statement
Statement:
ED Attending supervision documentation-
 
[x] I saw and evaluated the patient. I have also reviewed all the pertinent lab 
results and diagnostic results. I agree with the findings and the plan of care 
as documented in the PA's/NP's documentation.  Patient presents for evaluation 
of blood in his urine.  Physical examination reveals a comfortable appearing 
gentleman with a benign abdominal exam.
 
[] I have reviewed the ED Record and agree with the PA's/NP's documentation.
 
[] Additions or exceptions (if any) to the PAs/NP's note and plan are 
summarized below:
[]
 
(Alexandra JONES,Alexander PERSON)

## 2018-05-13 VITALS — DIASTOLIC BLOOD PRESSURE: 68 MMHG | SYSTOLIC BLOOD PRESSURE: 133 MMHG

## 2018-05-13 VITALS — SYSTOLIC BLOOD PRESSURE: 130 MMHG | DIASTOLIC BLOOD PRESSURE: 68 MMHG

## 2018-05-13 VITALS — SYSTOLIC BLOOD PRESSURE: 132 MMHG | DIASTOLIC BLOOD PRESSURE: 74 MMHG

## 2018-05-13 LAB
ABSOLUTE GRANULOCYTE CT: 5.4 /CUMM (ref 1.4–6.5)
BASOPHILS # BLD: 0 /CUMM (ref 0–0.2)
BASOPHILS NFR BLD: 0 % (ref 0–2)
EOSINOPHIL # BLD: 0.3 /CUMM (ref 0–0.7)
EOSINOPHIL NFR BLD: 4.1 % (ref 0–5)
ERYTHROCYTE [DISTWIDTH] IN BLOOD BY AUTOMATED COUNT: 14.8 % (ref 11.5–14.5)
GRANULOCYTES NFR BLD: 67.1 % (ref 42.2–75.2)
HCT VFR BLD CALC: 31.3 % (ref 42–52)
LYMPHOCYTES # BLD: 1.6 /CUMM (ref 1.2–3.4)
MCH RBC QN AUTO: 28.9 PG (ref 27–31)
MCHC RBC AUTO-ENTMCNC: 32.2 G/DL (ref 33–37)
MCV RBC AUTO: 90 FL (ref 80–94)
MONOCYTES # BLD: 0.7 /CUMM (ref 0.1–0.6)
PLATELET # BLD: 228 /CUMM (ref 130–400)
PMV BLD AUTO: 8.4 FL (ref 7.4–10.4)
RED BLOOD CELL CT: 3.48 /CUMM (ref 4.7–6.1)
WBC # BLD AUTO: 8 /CUMM (ref 4.8–10.8)

## 2018-05-13 NOTE — PN- HOUSESTAFF
Vanita JONES,Yaritza 18 0611:
Subjective
Follow-up For:
Hematuria secondary due to trauma
Subjective:
Patient seen and examined at bedside.  No overnight events.  No complaints.  He 
is draining clear urine.  Denies lower abdominal pain, fever, chills.
 
Review of Systems
Constitutional:
Reports: no symptoms, see HPI. 
 
Objective
Last 24 Hrs of Vital Signs/I&O
 Vital Signs
 
 
Date Time Temp Pulse Resp B/P B/P Pulse O2 O2 Flow FiO2
 
     Mean Ox Delivery Rate 
 
 1139      96 Nasal 2.0L 
 
       Cannula  
 
 0945  74  132/74     
 
 0604 98.0 74 20 132/74  91   
 
 0559      97 Nasal 2.0L 
 
       Cannula  
 
 0558      98   
 
 0401      98   
 
 0034  86    95   
 
 0000       BIPAP  
 
 2300 98.3 93 20 153/73  100 Nasal 2.0L 
 
       Cannula  
 
 1910       Nasal 2.0L 
 
       Cannula  
 
 1700 98.5 85 18 132/84  96   
 
 1635      93 Nasal 2.0L 
 
       Cannula  
 
 1635      93 Nasal 2.0L 
 
       Cannula  
 
 1622 98.1 85 22 151/69  99 Nasal 2.0L 
 
       Cannula  
 
 1410 99.0 88 20 128/68  97 Nasal 2.0L 
 
       Cannula  
 
 1222 97.0 80 20 126/63  98 Nasal 2.0L 
 
       Cannula  
 
 
 Intake & Output
 
 
  1600  0800  0000
 
Intake Total  920 640
 
Output Total  600 700
 
Balance  320 -60
 
    
 
Intake, IV  800 400
 
Intake, Oral  120 240
 
Number  0 0
 
Bowel   
 
Movements   
 
Output, Urine  600 700
 
Patient   223 lb
 
Weight   
 
Weight   Reported by Patient
 
Measurement   
 
Method   
 
 
 
 
Physical Exam
General Appearance: Alert, Oriented X3, Cooperative, No Acute Distress
Cardiovascular: Regular Rate, Normal S1, Normal S2, No Murmurs
Lungs: Normal Air Movement
Abdomen: Soft, No Tenderness, No Hepatospenomegaly
Neurological: Normal Speech, Strength at 5/5 X4 Ext, Sensation Intact
Extremities: No Cyanosis, No Edema, Normal Pulses
Current Medications:
 Current Medications
 
 
  Sig/Viet Start time  Last
 
Medication Dose Route Stop Time Status Admin
 
Acetaminophen 650 MG Q6P PRN  1500 AC /
 
  PO   2140
 
Albuterol Sulfate 2 PUF Q4-PRN PRN  2215 AC 
 
  INH   
 
Albuterol Sulfate 3 ML EVERY 4 HRS/AWAKE  2000 AC 
 
  INH   1136
 
Albuterol Sulfate 3 ML ONCE ONE  1330 DC 
 
  INH  1331  1343
 
Baclofen 20 MG BID  2100 AC 
 
  PO   0944
 
Bisacodyl 10 MG Q48  0900 AC 
 
  VA   
 
Budesonide/ 2 PUF BID  2100 AC 
 
Formoterol Fumarate  INH   0945
 
Docusate Sodium 100 MG DAILY  09 AC 
 
  PO   
 
Docusate Sodium 100 MG DAILY  1602 CAN 
 
  PO  2359  
 
Doxycycline Hyclate 100 MG BID  2100 AC 
 
  PO   0944
 
Gabapentin 600 MG BID  2100 AC 
 
  PO   0944
 
Guaifenesin 10 ML Q6P PRN  2115 AC 
 
  PO   
 
Guaifenesin 600 MG BID  2100 AC 
 
  PO   0944
 
Hydroxyzine HCl 10 MG BID PRN  1615 AC 
 
  PO   
 
Lidocaine 1 LARISA ONCE ONE  1315 DC 
 
  TOP  1316  1245
 
Lidocaine 0 .STK-MED ONE  1249 DC 
 
  TOP   
 
Metoprolol Succinate 25 MG DAILY  0900 AC 
 
  PO   0945
 
Metoprolol Succinate 25 MG DAILY  1607 CAN 
 
  PO   
 
Montelukast Sodium 10 MG 2100  2300 AC 
 
  PO   2335
 
Montelukast Sodium 10 MG DAILY  1607 CAN 
 
  PO   
 
Nortriptyline HCl 10 MG DAILY  0900 AC 
 
  PO   0944
 
Omeprazole 20 MG DAILY  0900 AC 
 
  PO   0944
 
Oxycodone/ 1 TAB ONCE ONE  0930 DC 
 
Acetaminophen  PO  0931  0942
 
Oxycodone/ 1 TAB ONCE ONE  1700 DC 
 
Acetaminophen  PO  1701  1723
 
Oxycodone/ 0 .STK-MED ONE  1308 DC 
 
Acetaminophen  PO   
 
Oxycodone/ 2 TAB ONCE ONE  1300 DC 
 
Acetaminophen  PO  1301  1305
 
Phenazopyridine HCl 0 .STK-MED ONE  1413 DC 
 
  PO   
 
Phenazopyridine HCl 200 MG ONCE ONE  1400 DC 05/12
 
  PO  1401  1409
 
Rifampin 300 MG BID  2100 AC 
 
  PO   0944
 
Sodium Chloride 1,000 ML .Q10H  1500 AC 
 
  IV   0130
 
Sotalol HCl 80 MG BID  2100 AC 
 
  PO   0945
 
Tiotropium [place] 1 PUF DAILY  0900 AC 
 
  INH   0943
 
Tiotropium [place] 1 PUF DAILY  1608 CAN 
 
  INH   
 
 
 
 
Last 24 Hrs of Lab/Stalin Results
Last 24 Hrs of Labs/Mics:
 Laboratory Tests
 
18 0815:
Anion Gap 6, Estimated GFR > 60, BUN/Creatinine Ratio 28.0  H, CBC w Diff NO MAN
DIFF REQ, RBC 3.48  L, MCV 90.0, MCH 28.9, MCHC 32.2  L, RDW 14.8  H, MPV 8.4, 
Gran % 67.1, Lymphocytes % 20.3  L, Monocytes % 8.5, Eosinophils % 4.1, 
Basophils % 0, Absolute Granulocytes 5.4, Absolute Lymphocytes 1.6, Absolute 
Monocytes 0.7  H, Absolute Eosinophils 0.3, Absolute Basophils 0
 Microbiology
 09  LOWER RESP: Respiratory Culture - ORD
954  LOWER RESP: Gram Stain - ORD
 06  URINE ROUT: Urine Culture - RECD
 
 
 
Assessment/Plan
Assessment:
68yo M w/ PMH of COPD (on 2L O2/Bipap), BLE edema, GARRY, HTN, HLD, A-fib on 
eliquis, neurogenic bladder status post Castro catheter, depression, CAD s/p 
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 
w/ CC of blood in urine.  Patient admitted to [facility] for further evaluation and
management
 
Assessment and plan
1.  Hematuria secondary due to trauma
2.  Chronic medical conditions-COPD, GARRY, HTN, HLD, A-fib
 
* Patient draining clear urine.  Seen by urology who suggested to discontinue 
continuous bladder irrigation.
* CBC monitoring every 12.
* Patient apixaban held as per urology.  We will get cardiology on board to help
us with Anticoagulation
* Diet-heart healthy diet
* Continue all his home medication except apixaban.
* Code-DNR/DNI
Problem List:
 1. Hematuria
 
Pain Ratin
Pain Location:
none
Pain Goal: Remain pain free
Pain Plan:
tylenol
Tomorrow's Labs & Rationales:
gurpreet Canela MD,Tiara 18 1349:
Attending MD Review Statement
 
Attending Statement
Attending MD Statement: examined this patient, discuss w/resident/PA/NP, agreed 
w/resident/PA/NP, reviewed EMR data (avail), discussed with nursing, discussed 
with case mgmt, amended to note
Attending Assessment/Plan:
Patient seen and examined, denies any complaints.  His CBI is discontinued as of
this morning.  So far his urine is dark orange in color.  There is no 
significant amount of hematuria going on.  His vital signs are stable and his H&
H as well as creatinine remained stable. His Elqiuis is still on hold.  We have 
requested cardiology consult the cause of his history of A. fib and and 
coagulation now that is being held.  Urology recommends to continue to hold his
" for another 24 hours to his hematuria is completely cleared.  If there is any 
recurrence of hematuria, CBI should be restarted. 
I have stopped his IV fluids as his creatinine and blood pressure are stable.  
Continue the rest of the management.
If hematuria remained stable to nightly can be discharged home tomorrow.

## 2018-05-13 NOTE — PN- UROLOGY
Subjective
Subjective:
Patient comfortable.  No distress
 
 
 
Objective
Vital Signs and I&Os
Vital Signs
 
 
Date Time Temp Pulse Resp B/P B/P Pulse O2 O2 Flow FiO2
 
     Mean Ox Delivery Rate 
 
05/13 0604 98.0 74 20 132/74  91   
 
05/13 0559      97 Nasal 2.0L 
 
       Cannula  
 
05/13 0558      98   
 
05/13 0401      98   
 
05/13 0034  86    95   
 
05/13 0000       BIPAP  
 
05/12 2300 98.3 93 20 153/73  100 Nasal 2.0L 
 
       Cannula  
 
05/12 1910       Nasal 2.0L 
 
       Cannula  
 
05/12 1700 98.5 85 18 132/84  96   
 
05/12 1635      93 Nasal 2.0L 
 
       Cannula  
 
05/12 1635      93 Nasal 2.0L 
 
       Cannula  
 
05/12 1622 98.1 85 22 151/69  99 Nasal 2.0L 
 
       Cannula  
 
05/12 1410 99.0 88 20 128/68  97 Nasal 2.0L 
 
       Cannula  
 
05/12 1222 97.0 80 20 126/63  98 Nasal 2.0L 
 
       Cannula  
 
05/12 1043 97.0 84 20 115/56  97 Nasal 2.0L 
 
       Cannula  
 
 
 Intake & Output
 
 
 05/13 1600 05/13 0800 05/13 0000 05/12 1600 05/12 0800 05/12 0000
 
Intake Total  920 640   
 
Output Total    
 
Balance  320 -60 -1000  
 
       
 
Intake, IV  800 400   
 
Intake, Oral  120 240   
 
Number  0 0   
 
Bowel      
 
Movements      
 
Output, Urine    
 
Patient   223 lb 223 lb  
 
Weight      
 
Weight   Reported by Patient Reported by Patient  
 
Measurement      
 
Method      
 
 
 
Nurse irrigated clots from bladder last night and this AM
 
Abd:  soft and non tender
Genitalia:  3-way marquez in place.  CBI on very slow rate.  Drainage is clear
 
 Laboratory Tests
 
 
 05/13 05/12 05/12
 
 0815 0931 0924
 
Chemistry   
 
  Sodium (137 - 145 mmol/L) Pending  137
 
  Potassium (3.5 - 5.1 mmol/L) Pending  4.1
 
  Chloride (98 - 107 mmol/L) Pending  98
 
  Carbon Dioxide (22 - 30 mmol/L) Pending  33  H
 
  Anion Gap (5 - 16) Pending  6
 
  BUN (9 - 20 mg/dL) Pending  20
 
  Creatinine (0.7 - 1.2 mg/dL) Pending  0.6  L
 
  Estimated GFR (>60 ml/min)   > 60
 
  BUN/Creatinine Ratio (7 - 25 %) Pending  33.3  H
 
  Glucose (65 - 99 mg/dL)   146  H
 
  Calcium (8.4 - 10.2 mg/dL)   8.8
 
Hematology   
 
  CBC w Diff Pending NO MAN DIFF REQ 
 
  WBC (4.8 - 10.8 /CUMM) Pending 12.5  H 
 
  RBC (4.70 - 6.10 /CUMM) Pending 3.60  L 
 
  Hgb (14.0 - 18.0 G/DL) Pending 10.4  L 
 
  Hct (42 - 52 %) Pending 32.2  L 
 
  MCV (80.0 - 94.0 FL) Pending 89.6 
 
  MCH (27.0 - 31.0 PG) Pending 28.8 
 
  MCHC (33.0 - 37.0 G/DL) Pending 32.2  L 
 
  RDW (11.5 - 14.5 %) Pending 14.0 
 
  Plt Count (130 - 400 /CUMM) Pending 258 
 
  MPV (7.4 - 10.4 FL) Pending 8.4 
 
  Gran % (42.2 - 75.2 %)  74.9 
 
  Lymphocytes % (20.5 - 51.1 %)  16.4  L 
 
  Monocytes % (1.7 - 9.3 %)  6.7 
 
  Eosinophils % (0 - 5 %)  1.8 
 
  Basophils % (0.0 - 2.0 %)  0.2 
 
  Absolute Granulocytes (1.4 - 6.5 /CUMM)  9.4  H 
 
  Absolute Lymphocytes (1.2 - 3.4 /CUMM)  2.1 
 
  Absolute Monocytes (0.10 - 0.60 /CUMM)  0.8  H 
 
  Absolute Eosinophils (0.0 - 0.7 /CUMM)  0.2 
 
  Absolute Basophils (0.0 - 0.2 /CUMM)  0 
 
 
 
 
Assessment/Plan
Assessment/Plan
 
Imp:
     1.  Gross hematuria due to marquez trauma in the setting of anticoagulation. 
Significantly improved
 
     2.  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
     3.  Multiple comorbidities
 
 
 
 
Plan:
     1.  Stop CBI.  Continue marquez to gravity drainage.  If urine becomes bloody
may restart CBI.  If catheter not draining irrigate marquez.  (There may be some 
old clots remaining in bladder)
 
     2.  Continue to hold anticoagulation today if safe to do so.  If urine 
clear today and tomorrow AM would start anticoagulation tomorrow

## 2018-05-14 VITALS — DIASTOLIC BLOOD PRESSURE: 60 MMHG | SYSTOLIC BLOOD PRESSURE: 112 MMHG

## 2018-05-14 VITALS — SYSTOLIC BLOOD PRESSURE: 118 MMHG | DIASTOLIC BLOOD PRESSURE: 62 MMHG

## 2018-05-14 LAB
ABSOLUTE GRANULOCYTE CT: 7.9 /CUMM (ref 1.4–6.5)
BASOPHILS # BLD: 0 /CUMM (ref 0–0.2)
BASOPHILS NFR BLD: 0.1 % (ref 0–2)
EOSINOPHIL # BLD: 0.5 /CUMM (ref 0–0.7)
EOSINOPHIL NFR BLD: 4 % (ref 0–5)
ERYTHROCYTE [DISTWIDTH] IN BLOOD BY AUTOMATED COUNT: 15 % (ref 11.5–14.5)
GRANULOCYTES NFR BLD: 66.8 % (ref 42.2–75.2)
HCT VFR BLD CALC: 30.4 % (ref 42–52)
LYMPHOCYTES # BLD: 2.3 /CUMM (ref 1.2–3.4)
MCH RBC QN AUTO: 28.4 PG (ref 27–31)
MCHC RBC AUTO-ENTMCNC: 31.3 G/DL (ref 33–37)
MCV RBC AUTO: 90.8 FL (ref 80–94)
MONOCYTES # BLD: 1.1 /CUMM (ref 0.1–0.6)
PLATELET # BLD: 248 /CUMM (ref 130–400)
PMV BLD AUTO: 8.5 FL (ref 7.4–10.4)
RED BLOOD CELL CT: 3.35 /CUMM (ref 4.7–6.1)
WBC # BLD AUTO: 11.8 /CUMM (ref 4.8–10.8)

## 2018-05-14 NOTE — PN- HOUSESTAFF
Subjective
Follow-up For:
Hematuria secondary due to trauma
Subjective:
Patient seen and examined at bedside.  No overnight events.No complaints.  He is
draining clear urine.  Denies lower abdominal pain, fever, chills.
 
Review of Systems
Constitutional:
Reports: no symptoms, see HPI. 
 
Objective
Last 24 Hrs of Vital Signs/I&O
 Vital Signs
 
 
Date Time Temp Pulse Resp B/P B/P Pulse O2 O2 Flow FiO2
 
     Mean Ox Delivery Rate 
 
 0957  97  112/60     
 
 0825      95 Nasal 2.0L 
 
       Cannula  
 
 0800      97 Nasal 2.0L 
 
       Cannula  
 
 0555 97.9 74 20 118/62  95   
 
 0530      98 Nasal 2.0L 
 
       Cannula  
 
 0030  76    95   
 
 0000       Nasal 2.0L 
 
       Cannula  
 
 2240 98.1 87 20 133/68  97 Nasal 2.0L 
 
       Cannula  
 
 2207      96   
 
2010      95 Nasal 2.0L 
 
       Cannula  
 
 1617       Nasal 2.0L 
 
       Cannula  
 
 1600       Nasal 2.0L 
 
       Cannula  
 
 
 Intake & Output
 
 
  1600  0800  0000
 
Intake Total  100 300
 
Output Total  325 1150
 
Balance  -225 -850
 
    
 
Intake, Oral  100 300
 
Number   1
 
Bowel   
 
Movements   
 
Output, Urine  325 1150
 
 
 
 
Physical Exam
General Appearance: Alert, Oriented X3, Cooperative
Cardiovascular: Regular Rate, Normal S1, Normal S2, No Murmurs
Lungs: Clear to Auscultation
Abdomen: Normal Bowel Sounds
Neurological: Normal Speech, Normal Tone, Sensation Intact
Other Physical Findings:
Patient draining clear urine.
Current Medications:
 Current Medications
 
 
  Sig/Viet Start time  Last
 
Medication Dose Route Stop Time Status Admin
 
Acetaminophen 650 MG Q6P PRN  1500 DCD 
 
  PO   2140
 
Albuterol Sulfate 2 PUF Q4-PRN PRN  2215 DCD 
 
  INH   1001
 
Albuterol Sulfate 3 ML EVERY 4 HRS/AWAKE  2000 DCD 
 
  INH   1124
 
Apixaban 5 MG BID  0900 DCD 
 
  PO   1004
 
Baclofen 20 MG BID  2100 DCD 
 
  PO   0957
 
Bisacodyl 10 MG Q48 05/15 0900 DCD 
 
  CT   
 
Bisacodyl 10 MG Q48  0900 DC 
 
  CT   
 
Bisacodyl 10 MG ONCE ONE  1600 DC 
 
  CT  1601  1842
 
Bisacodyl 10 MG ONCE ONE  1600 CAN 
 
  CT  1601  
 
Budesonide/ 2 PUF BID 2100 DCD 
 
Formoterol Fumarate  INH   0958
 
Docusate Sodium 100 MG DAILY  09 DCD 
 
  PO   0957
 
Doxycycline Hyclate 100 MG BID 2100 DCD 
 
  PO   0957
 
Gabapentin 600 MG BID 2100 DCD 
 
  PO   0957
 
Guaifenesin 10 ML Q6P PRN 2115 DCD 
 
  PO   
 
Guaifenesin 600 MG BID 2100 DCD 
 
  PO   0957
 
Hydroxyzine HCl 10 MG BID PRN  1615 DCD 
 
  PO   
 
Metoprolol Succinate 25 MG DAILY  09 DCD 
 
  PO   0957
 
Montelukast Sodium 10 MG 0 DCD 
 
  PO   2223
 
Nortriptyline HCl 10 MG DAILY  09 DCD 
 
  PO   0956
 
Omeprazole 20 MG DAILY  0900 DCD 
 
  PO   0957
 
Oxycodone/ 1 TAB ONCE ONE  0530 DC 
 
Acetaminophen  PO  0531  0520
 
Oxycodone/ 1 TAB ONCE ONE  1900 DC 
 
Acetaminophen  PO  1901  1902
 
Rifampin 300 MG BID 2100 DCD 
 
  PO   0957
 
Sotalol HCl 80 MG BID 2100 DCD 
 
  PO   0957
 
Tiotropium [place] 1 PUF DAILY  09 DCD 
 
  INH   0957
 
 
 
 
Last 24 Hrs of Lab/Stalin Results
Last 24 Hrs of Labs/Mics:
 Laboratory Tests
 
18 0615:
CBC w Diff NO MAN DIFF REQ, RBC 3.35  L, MCV 90.8, MCH 28.4, MCHC 31.3  L, RDW 
15.0  H, MPV 8.5, Gran % 66.8, Lymphocytes % 19.4  L, Monocytes % 9.7  H, 
Eosinophils % 4.0, Basophils % 0.1, Absolute Granulocytes 7.9  H, Absolute 
Lymphocytes 2.3, Absolute Monocytes 1.1  H, Absolute Eosinophils 0.5, Absolute 
Basophils 0
 
 
Assessment/Plan
Assessment:
66yo M w/ PMH of COPD (on 2L O2/Bipap), BLE edema, GARRY, HTN, HLD, A-fib on 
eliquis, neurogenic bladder status post Castro catheter, depression, CAD s/p 
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 
w/ CC of blood in urine.  Patient admitted to [facility] for further evaluation and
management
 
Assessment and plan
1.  Hematuria secondary due to trauma
2.  Chronic medical conditions-COPD, GARRY, HTN, HLD, A-fib
 
* Patient draining clear urine.  Seen by urology who suggested to discontinue 
continuous bladder irrigation and restart him on apixaban
* CBC monitoring every 12.  Patient hemoglobin today is 9.5
* Pt seen by cardiology who suggested to continue current management and follow 
up with cardiologist as outpatient. 
* Diet-heart healthy diet
* Continue all his home medication except apixaban.
* Code-DNR/DNI
Problem List:
 1. Hematuria
 
Pain Ratin
Pain Location:
none
Pain Goal: Remain pain free
Pain Plan:
tylenol
Tomorrow's Labs & Rationales:
none

## 2018-05-14 NOTE — PN- UROLOGY
Subjective
Subjective:
Comfortable
 
Objective
Vital Signs and I&Os
Vital Signs
 
 
Date Time Temp Pulse Resp B/P B/P Pulse O2 O2 Flow FiO2
 
     Mean Ox Delivery Rate 
 
05/14 0555 97.9 74 20 118/62  95   
 
05/14 0530      98 Nasal 2.0L 
 
       Cannula  
 
05/14 0030  76    95   
 
05/14 0000       Nasal 2.0L 
 
       Cannula  
 
05/13 2240 98.1 87 20 133/68  97 Nasal 2.0L 
 
       Cannula  
 
05/13 2207      96   
 
05/13 2010      95 Nasal 2.0L 
 
       Cannula  
 
05/13 1617       Nasal 2.0L 
 
       Cannula  
 
05/13 1600       Nasal 2.0L 
 
       Cannula  
 
05/13 1453 98.0 84 20 130/68  98 Nasal  
 
       Cannula  
 
05/13 1139      96 Nasal 2.0L 
 
       Cannula  
 
05/13 0945  74  132/74     
 
05/13 0800      96 Nasal 2.0L 
 
       Cannula  
 
 
 Intake & Output
 
 
 05/14 0800 05/14 0000 05/13 1600 05/13 0800 05/13 0000 05/12 1600
 
Intake Total  300 1100 920 640 
 
Output Total  1150 450 
 
Balance  -850 650 320 -60 -1000
 
       
 
Intake, IV   500 800 400 
 
Intake, Oral  300 600 120 240 
 
Number  1  0 0 
 
Bowel      
 
Movements      
 
Output, Urine  1150 450 
 
Patient     223 lb 223 lb
 
Weight      
 
Weight     Reported by Patient Reported by Patient
 
Measurement      
 
Method      
 
 
 
Abd:  soft.
Genitalia:  Marquez in place draining clear urine.  No clots
 
 Laboratory Tests
 
 
 05/13 0815
 
Chemistry 
 
  Sodium (137 - 145 mmol/L) 138
 
  Potassium (3.5 - 5.1 mmol/L) 4.3
 
  Chloride (98 - 107 mmol/L) 100
 
  Carbon Dioxide (22 - 30 mmol/L) 33  H
 
  Anion Gap (5 - 16) 6
 
  BUN (9 - 20 mg/dL) 14
 
  Creatinine (0.7 - 1.2 mg/dL) 0.5  L
 
  Estimated GFR (>60 ml/min) > 60
 
  BUN/Creatinine Ratio (7 - 25 %) 28.0  H
 
Hematology 
 
  CBC w Diff NO MAN DIFF REQ
 
  WBC (4.8 - 10.8 /CUMM) 8.0
 
  RBC (4.70 - 6.10 /CUMM) 3.48  L
 
  Hgb (14.0 - 18.0 G/DL) 10.1  L
 
  Hct (42 - 52 %) 31.3  L
 
  MCV (80.0 - 94.0 FL) 90.0
 
  MCH (27.0 - 31.0 PG) 28.9
 
  MCHC (33.0 - 37.0 G/DL) 32.2  L
 
  RDW (11.5 - 14.5 %) 14.8  H
 
  Plt Count (130 - 400 /CUMM) 228
 
  MPV (7.4 - 10.4 FL) 8.4
 
  Gran % (42.2 - 75.2 %) 67.1
 
  Lymphocytes % (20.5 - 51.1 %) 20.3  L
 
  Monocytes % (1.7 - 9.3 %) 8.5
 
  Eosinophils % (0 - 5 %) 4.1
 
  Basophils % (0.0 - 2.0 %) 0
 
  Absolute Granulocytes (1.4 - 6.5 /CUMM) 5.4
 
  Absolute Lymphocytes (1.2 - 3.4 /CUMM) 1.6
 
  Absolute Monocytes (0.10 - 0.60 /CUMM) 0.7  H
 
  Absolute Eosinophils (0.0 - 0.7 /CUMM) 0.3
 
  Absolute Basophils (0.0 - 0.2 /CUMM) 0
 
 
 
 
Assessment/Plan
Assessment/Plan
 
Imp:
     1.  Gross hematuria due to marquez trauma in setting of anticoagulation.  
Resolved
     2.  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
     3.  Multiple comorbidities
 
 
Plan:
     1.  OK to restart anticoagulation
     2.  If urine remains clear, from urologic point of view may be discharged 
this PM or tomorrow AM
 
     3.  Marquez to be changed at home by [facility]
     4.  Patient should also f/u with Dr Dykes

## 2018-05-14 NOTE — PN- ATT ADDEND
Attending Addendum
Attending Brief Note
Patient seen and examined, denies any complaints.  Hematuria is clearing up.  H&
H remained stable.  Patient does have mild acute blood loss anemia secondary to 
hematuria but H&H is now stable.  He was seen by urology in from their 
standpoint he came at discharge.  His antegrade granulation with Eliquis will be
resumed today.  He will be continued on the rest of his home medications and 
otherwise medically stable for discharge home today.

## 2018-05-14 NOTE — PATIENT DISCHARGE INSTRUCTIONS
Discharge Instructions
 
General Discharge Information
You were seen/treated for:
Traumatic hematuria
Watch for these problems:
In case of blood in urine, nausea, vomiting, abdominal pain please go to the 
nearest emergency room
Special Instructions:
Please follow-up with your primary care provider/cardiologist/urologist within 1
-2 weeks of discharge
 
Diet
Continue normal diet: No
Recommended Diet: Heart Healthy
 
Activity
Full Activity/No Limits: No
Activity Self Limited: Yes
 
Acute Coronary Syndrome
 
Inclusion Criteria
At DC or during hospital stay patient has or had the following:
ACS DIAGNOSIS No
 
Discharge Core Measures
Meds if any: Prescribed or Continued at Discharge
Meds if any: NOT Prescribed or Continued at Discharge
 
Congestive Heart Failure
 
Inclusion Criteria
At DC or during hospital stay patient has or had the following:
CHF DIAGNOSIS No
 
Discharge Core Measures
Meds if any: Prescribed or Continued at Discharge
Meds if any: NOT Prescribed or Continued at Discharge
 
Cerebrovascular accident
 
Inclusion Criteria
At DC or during hospital stay patient has or had the following:
CVA/TIA Diagnosis No
 
Discharge Core Measures
Meds if any: Prescribed or Continued at Discharge
Meds if any: NOT Prescribed or Continued at Discharge
 
Venous thromboembolism
 
Inclusion Criteria
VTE Diagnosis No
VTE Type NONE
VTE Confirmed by (Test) NONE
 
Discharge Core Measures
- Per Current guidelines, there needs to be overlap
- treatment for the first 5 days of Warfarin therapy.
- If discharged on Warfarin prior to 5 days of
- overlap therapy, the patient will need to be
- assessed for post discharge needs including
- *Post discharge parental anticoagulation
- *Warfarin and/or parental anticoagulation education
- *Follow up date to check INR post discharge
At least 5 days overlap therapy as Inpatient No
Meds if any: Prescribed or Continued at Discharge
Note: Overlap Therapy is Warfarin and Anticoagulant
Meds if any: NOT Prescribed or Continued at Discharge

## 2018-05-20 ENCOUNTER — HOSPITAL ENCOUNTER (EMERGENCY)
Dept: HOSPITAL 68 - ERH | Age: 68
End: 2018-05-20
Payer: COMMERCIAL

## 2018-05-20 VITALS — WEIGHT: 223 LBS | BODY MASS INDEX: 33.03 KG/M2 | HEIGHT: 69 IN

## 2018-05-20 VITALS — DIASTOLIC BLOOD PRESSURE: 78 MMHG | SYSTOLIC BLOOD PRESSURE: 134 MMHG

## 2018-05-20 DIAGNOSIS — T83.098A: Primary | ICD-10-CM

## 2018-05-20 NOTE — ED GI/GU/ABDOMINAL COMPLAINT
History of Present Illness
 
General
Chief Complaint: Male Genitourinary Problems
Stated Complaint: GRAY CATH CLOGGED
Source: patient, old records
Exam Limitations: no limitations
 
Vital Signs & Intake/Output
Vital Signs & Intake/Output
reviewed
Allergies
Coded Allergies:
heparin (Intermediate, SWELLING, RASH 04/16/18)
vancomycin (Intermediate, HIVES, SKIN CRACKES, TURNS RED, SWELLS AND PEELS 04/16
/18)
warfarin (From COUMADIN) (Intermediate, RASH 04/16/18)
 
Reconcile Medications
Albuterol Sulfate (Ventolin Hfa) 90 MCG HFA.AER.AD   2 PUF INH AD PRN COPD  (
Reported)
Apixaban (Eliquis) 5 MG TABLET   5 MG PO BID atrial fibrillation
Ascorbate Calcium (Vitamin C) 500 MG TABLET   1 TAB PO BID SUPPLEMENT  (Reported
)
Atomoxetine HCl (Strattera) 40 MG CAPSULE   1 CAP PO QAM MENTAL HEALTH  (
Reported)
Baclofen 20 MG TABLET   1 TAB PO BID MUSCLE RELAXER  (Reported)
Bisacodyl (Dulcolax) 10 MG SUPP.RECT   1 SUP RC Q48 GI  (Reported)
Budesonide/Formoterol Fumarate (Symbicort 80-4.5 Mcg Inhaler) 80 MCG-4.5 MCG/
ACTUATION HFA.AER.AD   2 PUF INH BID COPD  (Reported)
Docusate Sodium (Stool Softener) 100 MG CAPSULE   1 CAP PO DAILY STOOL SOFTENER 
(Reported)
Doxycycline Hyclate 100 MG CAPSULE   1 CAP PO BID infection  (Reported)
Evolocumab (Repatha Sureclick) 140 MG/ML PEN.INJCTR   1 ML SC Q 2 WEEKS 
CHOLESTEROL  (Reported)
Furosemide 20 MG TABLET   3 TAB PO Q48 DIURETIC  (Reported)
Gabapentin 300 MG CAPSULE   2 CAP PO BID NERVE PAIN  (Reported)
Guaifenesin (Mucinex) 600 MG TAB.ER.12H   1 TAB PO BID CONGESTION  (Reported)
Hydroxyzine HCl (hydrOXYzine HCl) 10 MG TABLET   1 TAB PO BID PRN ANXIETY  (
Reported)
Lactobacillus Acidophilus (Acidophilus) 1 EACH CAPSULE   1 CAP PO BID PROBIOTIC 
(Reported)
Magnesium Oxide (Magnesium) 400 MG CAPSULE   1 CAP PO 0600 SUPPLEMENT  (Reported
)
Metoprolol Succ XL (Toprol XL) 25 MG TAB   1 TAB PO DAILY HEART  (Reported)
Montelukast Sodium (Singulair) 10 MG TABLET   1 TAB PO DAILY ALLERGIES  (
Reported)
Multivitamin (Daily Value) 1 EACH TABLET   1 TAB PO DAILY VITAMIN SUPPORT  (
Reported)
Nortriptyline HCl 10 MG CAPSULE   1 CAP PO DAILY mental health  (Reported)
Omeprazole 20 MG CAPSULE.DR   1 CAP PO DAILY ACID REFLUX  (Reported)
Oxycodone HCl/Acetaminophen (Oxycodone-Acetaminophen 5-325) 5 MG-325 MG TABLET  
1 TAB PO Q4H PRN PAIN  (Reported)
Potassium Chloride 20 MEQ TAB.ER.PRT   1 TAB PO DAILY SUPPLEMENT  (Reported)
Rifampin (Rifadin) 300 MG CAPSULE   300 MG PO BID MRSA  (Reported)
Sotalol (Betapace) 80 MG TABLET   80 MG PO BID AARYTHMIAS
Tiotropium Bromide (Spiriva) 18 MCG CAP.W.DEV   1 CAP INH DAILY BREATHING 
PROBLEMS  (Reported)
 
Triage Nurses Notes Reviewed? yes
HPI:
Patient presents for evaluation of a dysfunctional Gray catheter.  Patient 
states that about 2 hours ago that was of gradual onset of worsening sediment in
his Gray catheter output followed by increasing fullness in the suprapubic 
region.
 
Past History
 
Travel History
Traveled to [place] past 21 day No
 
Medical History
Any Pertinent Medical History? see below for history
Neurological: C6-7 ABSCESS PARAPLEGIA
EENT: NONE
Cardiovascular: AFIB, hypertension, hyperlipidemia, myocardial infarction
Respiratory: asthma, COPD, OXYGEN DEPEND 2L
Gastrointestinal: NONE
Hepatic: NONE
Renal: neurogenic bladder
Musculoskeletal: PARAPLEGIA R/T ABSCESS
Psychiatric: anxiety
Endocrine: NONE
Blood Disorders: NONE
Cancer(s): NONE
GYN/Reproductive: NONE
History of MRSA: Yes
History of VRE: No
History of CDIFF: No
 
Surgical History
Surgical History: non-contributory
 
Psychosocial History
Who do you live with Significant Other
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
What is your primary language English
 
Family History
Family History, If Any:
Relation not specified for:
  *No pertinent family history
 
Hx Contributory? No
 
Review of Systems
 
Review of Systems
Constitutional:
Reports: no symptoms. 
EENTM:
Reports: no symptoms. 
Respiratory:
Reports: no symptoms. 
Cardiovascular:
Reports: no symptoms. 
GI:
Reports: no symptoms. 
Genitourinary:
Reports: see HPI. 
Musculoskeletal:
Reports: no symptoms. 
Skin:
Reports: no symptoms. 
Neurological/Psychological:
Reports: no symptoms. 
Hematologic/Endocrine:
Reports: no symptoms. 
Immunologic/Allergic:
Reports: no symptoms. 
All Other Systems: Reviewed and Negative
 
Physical Exam
 
Physical Exam
Gastrointestinal: SEE BELOW
Comments:
Gen.: Well-nourished, well-developed, no acute respiratory distress.
Head: Normocephalic, atraumatic.
Eyes: Normal inspection bilaterally
Ears: Normal inspection bilaterally
Nose: Normal inspection
Throat/mouth : Moist mucosa 
Neck: Supple, full range of motion, no goiter
Lungs: Quiet respirations
Abdomen: Soft and nontender
Back: Normal range of motion
Extremities: Normal range of motion grossly, no cyanosis clubbing or edema of 
the upper extremities
Neurologic: Cranial nerves grossly intact, speech is clear
Skin: warm and dry
Psychiatric: Calm, cooperative, no apparent delusions or hallucinations
 
Core Measures
ACS in differential dx? No
Sepsis Present: No
Sepsis Focused Exam Completed? No
 
Progress
Differential Diagnosis: DYSFUNCTIONAL Gray CATHETER
Plan of Care:
cont current care
Initial ED EKG: none
Comments:
5/20/2018 3:44:10 AM Gray catheter replaced by a nurse with resolution of 
patient's suprapubic fullness.  The replacement catheter is draining well.
 
Departure
 
Departure
Disposition: HOME OR SELF CARE
Condition: Stable
Clinical Impression
Primary Impression: Gray catheter problem
Qualifiers:  Encounter type: initial encounter Qualified Code: T83.9XXA - 
Unspecified complication of genitourinary prosthetic device, implant and graft, 
initial encounter
Referrals:
Ramsey Jacobson MD (PCP/Family)
 
Additional Instructions:
continue current care.
Departure Forms:
Customer Survey
General Discharge Information

## 2018-07-13 ENCOUNTER — HOSPITAL ENCOUNTER (INPATIENT)
Dept: HOSPITAL 68 - ERH | Age: 68
LOS: 6 days | Discharge: HOME HEALTH | DRG: 699 | End: 2018-07-19
Admitting: INTERNAL MEDICINE
Payer: COMMERCIAL

## 2018-07-13 VITALS — BODY MASS INDEX: 32.95 KG/M2 | WEIGHT: 222.5 LBS | HEIGHT: 69 IN

## 2018-07-13 DIAGNOSIS — L89.150: ICD-10-CM

## 2018-07-13 DIAGNOSIS — Z99.81: ICD-10-CM

## 2018-07-13 DIAGNOSIS — J44.9: ICD-10-CM

## 2018-07-13 DIAGNOSIS — I25.2: ICD-10-CM

## 2018-07-13 DIAGNOSIS — E66.9: ICD-10-CM

## 2018-07-13 DIAGNOSIS — Z98.61: ICD-10-CM

## 2018-07-13 DIAGNOSIS — J96.11: ICD-10-CM

## 2018-07-13 DIAGNOSIS — Z79.01: ICD-10-CM

## 2018-07-13 DIAGNOSIS — Z79.51: ICD-10-CM

## 2018-07-13 DIAGNOSIS — N31.9: ICD-10-CM

## 2018-07-13 DIAGNOSIS — G82.20: ICD-10-CM

## 2018-07-13 DIAGNOSIS — B18.8: ICD-10-CM

## 2018-07-13 DIAGNOSIS — T83.511A: Primary | ICD-10-CM

## 2018-07-13 DIAGNOSIS — Z66: ICD-10-CM

## 2018-07-13 DIAGNOSIS — F41.9: ICD-10-CM

## 2018-07-13 DIAGNOSIS — E78.5: ICD-10-CM

## 2018-07-13 DIAGNOSIS — G47.33: ICD-10-CM

## 2018-07-13 DIAGNOSIS — L89.312: ICD-10-CM

## 2018-07-13 DIAGNOSIS — Z88.1: ICD-10-CM

## 2018-07-13 DIAGNOSIS — I10: ICD-10-CM

## 2018-07-13 DIAGNOSIS — Z88.8: ICD-10-CM

## 2018-07-13 LAB
ABSOLUTE GRANULOCYTE CT: 15.3 /CUMM (ref 1.4–6.5)
BASOPHILS # BLD: 0 /CUMM (ref 0–0.2)
BASOPHILS NFR BLD: 0 % (ref 0–2)
EOSINOPHIL # BLD: 0.3 /CUMM (ref 0–0.7)
EOSINOPHIL NFR BLD: 1.7 % (ref 0–5)
ERYTHROCYTE [DISTWIDTH] IN BLOOD BY AUTOMATED COUNT: 14.9 % (ref 11.5–14.5)
GRANULOCYTES NFR BLD: 77.8 % (ref 42.2–75.2)
HCT VFR BLD CALC: 37.4 % (ref 42–52)
LYMPHOCYTES # BLD: 2.4 /CUMM (ref 1.2–3.4)
MCH RBC QN AUTO: 28.5 PG (ref 27–31)
MCHC RBC AUTO-ENTMCNC: 32.7 G/DL (ref 33–37)
MCV RBC AUTO: 87.2 FL (ref 80–94)
MONOCYTES # BLD: 1.6 /CUMM (ref 0.1–0.6)
PLATELET # BLD: 311 /CUMM (ref 130–400)
PMV BLD AUTO: 8.8 FL (ref 7.4–10.4)
RED BLOOD CELL CT: 4.29 /CUMM (ref 4.7–6.1)
WBC # BLD AUTO: 19.7 /CUMM (ref 4.8–10.8)

## 2018-07-13 PROCEDURE — 72148 MRI LUMBAR SPINE W/O DYE: CPT

## 2018-07-13 PROCEDURE — 72146 MRI CHEST SPINE W/O DYE: CPT

## 2018-07-13 NOTE — ED GENERAL ADULT
History of Present Illness
 
General
Chief Complaint: Male Genitourinary Problems
Stated Complaint: BIBA BLEEDING FROM PENIS
Source: patient
Exam Limitations: no limitations
 
Vital Signs & Intake/Output
Vital Signs & Intake/Output
 Vital Signs
 
 
Date Time Temp Pulse Resp B/P B/P Pulse O2 O2 Flow FiO2
 
     Mean Ox Delivery Rate 
 
 0033 99.0 90 18 130/62  98 Room Air  
 
 2254  90 18 136/63  95 Room Air  
 
3  89 18 148/95  96 Room Air  
 
 1930       Room Air  
 
 192      98 Nasal 2.0L 
 
       Cannula  
 
 1843 99.1 88 18 160/74  97 Room Air  
 
 
 ED Intake and Output
 
 
  0000  1200
 
Intake Total  
 
Output Total  
 
Balance  
 
   
 
Patient 250 lb 
 
Weight  
 
Weight Estimated 
 
Measurement  
 
Method  
 
 
 
Allergies
Coded Allergies:
heparin (Intermediate, SWELLING, RASH 18)
vancomycin (Intermediate, HIVES, SKIN CRACKES, TURNS RED, SWELLS AND PEELS )
warfarin (From COUMADIN) (Intermediate, NOT EFFECTIVE CAN NOT GET LEVELS ABOVE 1
18)
 
Reconcile Medications
Albuterol Sulfate (Ventolin Hfa) 90 MCG HFA.AER.AD   2 PUF INH AD PRN COPD  (
Reported)
Albuterol Sulfate 2.5 MG/3 ML (0.083 %) VIAL.NEB   1 Vial INH/SOL Q4P PRN COPD  
(Reported)
Apixaban (Eliquis) 5 MG TABLET   5 MG PO BID atrial fibrillation
Ascorbate Calcium (Vitamin C) 500 MG TABLET   1 TAB PO BID SUPPLEMENT  (Reported
)
Atomoxetine HCl (Strattera) 40 MG CAPSULE   1 CAP PO QAM MENTAL HEALTH  (
Reported)
Baclofen 20 MG TABLET   1 TAB PO BID MUSCLE RELAXER  (Reported)
Bisacodyl (Dulcolax) 10 MG SUPP.RECT   1 SUP RC Q48 GI  (Reported)
Budesonide/Formoterol Fumarate (Symbicort 80-4.5 Mcg Inhaler) 80 MCG-4.5 MCG/
ACTUATION HFA.AER.AD   2 PUF INH BID COPD  (Reported)
Docusate Sodium (Stool Softener) 100 MG CAPSULE   1 CAP PO DAILY STOOL SOFTENER 
(Reported)
Evolocumab (Repatha Sureclick) 140 MG/ML PEN.INJCTR   1 ML SC Q 2 WEEKS 
CHOLESTEROL  (Reported)
Furosemide 20 MG TABLET   3 TAB PO Q48 DIURETIC  (Reported)
Gabapentin 300 MG CAPSULE   2 CAP PO BID NERVE PAIN  (Reported)
Guaifenesin (Mucinex) 600 MG TAB.ER.12H   1 TAB PO BID CONGESTION  (Reported)
Hydroxyzine HCl (hydrOXYzine HCl) 10 MG TABLET   1 TAB PO BID PRN ANXIETY  (
Reported)
Lactobacillus Acidophilus (Acidophilus) 1 EACH CAPSULE   1 CAP PO BID PROBIOTIC 
(Reported)
Magnesium Oxide (Magnesium) 400 MG CAPSULE   1 CAP PO 0600 SUPPLEMENT  (Reported
)
Metoprolol Succ XL (Toprol XL) 25 MG TAB   1 TAB PO DAILY HEART  (Reported)
Montelukast Sodium (Singulair) 10 MG TABLET   1 TAB PO DAILY ALLERGIES  (
Reported)
Multivitamin (Daily Value) 1 EACH TABLET   1 TAB PO DAILY VITAMIN SUPPORT  (
Reported)
Nortriptyline HCl 10 MG CAPSULE   1 CAP PO DAILY mental health  (Reported)
Omeprazole 20 MG CAPSULE.DR   1 CAP PO DAILY ACID REFLUX  (Reported)
Oxycodone HCl/Acetaminophen (Oxycodone-Acetaminophen 5-325) 5 MG-325 MG TABLET  
1 TAB PO Q4H PRN PAIN  (Reported)
Potassium Chloride 20 MEQ TAB.ER.PRT   1 TAB PO DAILY SUPPLEMENT  (Reported)
Rifampin (Rifadin) 300 MG CAPSULE   300 MG PO BID MRSA  (Reported)
Sotalol (Betapace) 80 MG TABLET   80 MG PO BID AARYTHMIAS
Tiotropium Bromide (Spiriva) 18 MCG CAP.W.DEV   1 CAP INH DAILY BREATHING 
PROBLEMS  (Reported)
 
Triage Note:
PT COMING FROM HOME BIBA C/O OF WHEN HOME NURSE
 TOOK PRADO OUT THERE WAS A LOT SEDIMENT, HARD TO
 FLUSH. WHEN IT WAS TAKEN OUT PT URINATED AND URINE
 WAS VERY BLOODY. PT DOES NOT WALK, HX NEUROGENIC
 BLADDER
 HAS 2 OPEN
 SORES ON BOTTOM BEING TREATED. ON CIPRO FOR UTI.
Triage Nurses Notes Reviewed? yes
HPI:
59-year-old male with neurogenic bladder with indwelling Prado catheter, A. fib 
on Eliquis, COPD on 2 L nasal cannula, presents to the emergency department by 
ambulance reporting this morning he began feeling burning with urination at 
which point he had called his urologist Dr. Dykes who had started him on Cipro.
 Patient reports taking 1 pill this morning of the cipro but nothing since then.
 He states that nursing had tried to flush out his Prado catheter after which 
bloody discharge came out of his penis. He denies any pain. Has not had prado 
catheter in place since earlier today.  Upon arrival at the ED patient 
requesting breathing treatment due to "feeling junky". He reports having minimal
shortness of breath that just started prior to arrival. No chest pain, f/c/n/v/
d.
(Teresita Engel)
 
Past History
 
Travel History
Traveled to [place] past 21 day No
 
Medical History
Any Pertinent Medical History? see below for history
Neurological: C6-7 ABSCESS PARAPLEGIA
EENT: NONE
Cardiovascular: AFIB, hypertension, hyperlipidemia, myocardial infarction
Respiratory: asthma, COPD, OXYGEN DEPEND 2L
Gastrointestinal: NONE
Hepatic: NONE
Renal: neurogenic bladder
Musculoskeletal: PARAPLEGIA R/T ABSCESS
Psychiatric: anxiety
Endocrine: NONE
Blood Disorders: NONE
Cancer(s): NONE
GYN/Reproductive: NONE
History of MRSA: Yes
History of VRE: No
History of CDIFF: No
 
Surgical History
Surgical History: non-contributory
 
Psychosocial History
Who do you live with Significant Other
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
What is your primary language English
Tobacco Use: Quit >30 days ago
ETOH Use: denies use
 
Family History
Family History, If Any:
Relation not specified for:
  *No pertinent family history
 
Hx Contributory? No
(Teresita Engel)
 
Review of Systems
 
Review of Systems
Constitutional:
Reports: see HPI. 
EENTM:
Reports: no symptoms. 
Respiratory:
Reports: see HPI. 
Cardiovascular:
Reports: see HPI. 
GI:
Reports: no symptoms. 
Genitourinary:
Reports: see HPI. 
Musculoskeletal:
Reports: no symptoms. 
Skin:
Reports: no symptoms. 
Neurological/Psychological:
Reports: no symptoms. 
Hematologic/Endocrine:
Reports: no symptoms. 
Immunologic/Allergic:
Reports: no symptoms. 
All Other Systems: Reviewed and Negative
(Terestia Engel)
 
Physical Exam
 
Physical Exam
General Appearance: well developed/nourished, no apparent distress, alert, awake
, comfortable
Head: atraumatic, normal appearance
Eyes:
Bilateral: normal appearance. 
Ears, Nose, Throat: hearing grossly normal
Neck: normal inspection
Respiratory: chest non-tender, accessory muscle use, rhonchi, wheezing
Cardiovascular: regular rate/rhythm, normal peripheral pulses
Peripheral Pulses:
3+ radial (R), 3+ radial (L)
Gastrointestinal: soft, non-tender
Extremities: normal inspection, normal capillary refill, normal range of motion
Neurologic/Psych: no motor/sensory deficits, awake, alert, oriented x 3, normal 
mood/affect
Comments:
2L Nasal cannula 
: blood clot at head of penis, non-tender
 
Core Measures
ACS in differential dx? No
CVA/TIA Diagnosis: No
Sepsis Present: No
Sepsis Focused Exam Completed? No
(Teresita Engel)
 
Progress
Differential Diagnoses
I considered the following diagnoses in my evaluation of the patient: [UTI/
cystitis, PNA, CHF, PE, COPD exacerbation]
 
Plan of Care:
 Orders
 
 
Procedure Date/time Status
 
Heart Healthy Diet  B Active
 
CBC WITHOUT DIFFERENTIAL  06 Active
 
BASIC ELECTROLYTES PLUS BUN&CR  06 Active
 
CULTURE,URINE  0002 Active
 
TRC EVALUATION (GEN)  2347 Active
 
Pathway - chart  2347 Active
 
Patient Data  2347 Active
 
Code Status  2347 Active
 
ED Holding Orders  2334 Active
 
Admit to inpatient  2334 Active
 
Vital Signs  2334 Active
 
Code Status  2334 Complete
 
Patient Data  2320 Active
 
Skin/Pressure Ulcer Assess (Sk  2202 Active
 
TROPONIN LEVEL 1925 Complete
 
D-DIMER 1919 Complete
 
Continuous Bladder Irrigation 1918 Active
 
EKG 1915 Active
 
AEROSOL (GEN) 1913 Complete
 
Prado, Insertion/Removal/Asses 1911 Active
 
CULTURE,URINE 1911 Active
 
URINALYSIS 1911 Complete
 
LACTIC ACID 1911 Complete
 
COMPREHENSIVE METABOLIC PANEL 1911 Complete
 
CBC WITHOUT DIFFERENTIAL 1911 Complete
 
B-TYPE NATRIURETIC PEP (BNP) 1911 Complete
 
Intake & Output  1844 Active
 
House Staff   UNK Active
 
VTE Mechanical Prophylaxis   UNK Active
 
 
 Current Medications
 
 
  Sig/Viet Start time  Last
 
Medication Dose  Stop Time Status Admin
 
Bisacodyl 10 MG Q48 07/15 0900 AC 
 
(Dulcolax Supp)     
 
Furosemide 60 MG Q48 07/15 0900 AC 
 
(Lasix)     
 
Apixaban 5 MG  AC 
 
(Eliquis)     
 
Baclofen 20 MG  AC 
 
(Lioresal 10MG      
 
Tablet)     
 
Budesonide/ 2 PUF  AC 
 
Formoterol Fumarate     
 
(SYMBICORT)     
 
Ceftriaxone Sodium 1,000 MG DAILY 900 AC 
 
(Rocephin)     
 
Sodium Chloride 100 ML    
 
(Normal Saline 0.9%)     
 
Docusate Sodium 100 MG DAILY  09 AC 
 
(Colace)     
 
Gabapentin 600 MG BID  09 AC 
 
(Neurontin)     
 
Guaifenesin 600 MG  AC 
 
(Mucinex)     
 
Magnesium Oxide 400 MG DAILY  0900 UNVr 
 
(Mag-Ox)     
 
Metoprolol Succinate 25 MG DAILY  09 AC 
 
(Toprol XL)     
 
Montelukast Sodium 10 MG DAILY  09 AC 
 
(Singulair)     
 
Nortriptyline HCl 10 MG DAILY 900 AC 
 
(Aventyl 10MG -      
 
Pamelor Cap)     
 
Omeprazole 20 MG DAILY 900 AC 
 
(Prilosec)     
 
Potassium Chloride 20 MEQ DAILY 900 AC 
 
(K-Dur)     
 
Sotalol HCl 80 MG  AC 
 
(Betapace)     
 
Tiotropium Bromide 1 PUF DAILY 900 AC 
 
(Spiriva)     
 
Acetaminophen 650 MG Q6P PRN  2345 AC 
 
(Tylenol)     
 
Albuterol Sulfate 3 ML Q4P PRN  2345 AC 
 
(Proventil)     
 
Albuterol Sulfate 2 PUF Q6P PRN  2345 AC 
 
(Ventolin)     
 
Hydroxyzine HCl 10 MG BID PRN  2345 AC 
 
(Atarax)     
 
Lactated Ringer's 1,000 ML ONCE ONE  2345 AC 
 
(Lactated Ringers)    0744  0032
 
Oxycodone/ 1 TAB Q4H PRN  2345 AC 
 
Acetaminophen     
 
(Percocet)     
 
 
 Laboratory Tests
 
 
 
18 2211:
Lactic Acid Cancelled
 
18:
Urine Color YEL, Urine Clarity CLDY  H, Urine pH 6.5, Ur Specific Gravity 1.010,
Urine Protein 30  H, Urine Ketones 15  H, Urine Nitrite NEG, Urine Bilirubin NEG
, Urine Urobilinogen 0.2, Ur Leukocyte Esterase LARGE  H, Ur Microscopic 
SEDIMENT EXAMINED, Urine RBC 5-10  H, Urine WBC > 75  H, Ur Epithelial Cells 
RARE, Urine Bacteria FEW  H, Urine Hemoglobin LARGE  H, Urine Glucose NEG
 
18 1925:
Anion Gap 9, Estimated GFR > 60, BUN/Creatinine Ratio 35.0  H, Glucose 109  H, 
Lactic Acid 0.9, Calcium 9.2, Total Bilirubin 0.6, AST 31, ALT 44, Alkaline 
Phosphatase 94, Troponin I < 0.01, Pro-B-Natriuretic Pept 286  H, Total Protein 
6.9, Albumin 3.3  L, Globulin 3.6, Albumin/Globulin Ratio 0.9  L, D-Dimer High 
Sensitivty < 200, CBC w Diff MAN DIFF ORDERED, RBC 4.29  L, MCV 87.2, MCH 28.5, 
MCHC 32.7  L, RDW 14.9  H, MPV 8.8, Gran % 77.8  H, Lymphocytes % 12.4  L, 
Monocytes % 8.1, Eosinophils % 1.7, Basophils % 0, Absolute Granulocytes 15.3  H
, Segmented Neutrophils 76  H, Absolute Lymphocytes 2.4, Lymphocytes 12  L, 
Monocytes 9, Absolute Monocytes 1.6  H, Eosinophils 3, Absolute Eosinophils 0.3,
Absolute Basophils 0, Platelet Estimate VERIFIED BY SMEAR, Normocytic RBCs 
VERIFIED, Normochromic RBCs VERIFIED, Fld Total RBCs Counted 100
 
18 1915:
Troponin I Cancelled
 Microbiology
 0051  URINE ROUT: Urine Culture - RECD
2106  URINE ROUT: Urine Culture - RECD
 
59-year-old male with neurogenic bladder and recent onset bloody discharge from 
penis also reporting shortness of breath on 2L NC baseline, afib on eliquis.
-Prado catheter placed, per nurse no blood in urine
-Leukocytosis, pyuria, lower lobe pneumonia.
-Discussed with Dr. Cano, Dr. Bassett, and MOD, will admit patient to the 
floors. Starting on Rocephin and azithromycin.
-Patient signed off to Dr. Cano at 12 am
Diagnostic Imaging:
Viewed by Me: Radiology Read.  Discussed w/RAD: Radiology Read. 
Radiology Impression: PATIENT: DAYDAY HOOVER  MEDICAL RECORD NO: [id number] PRESENT
AGE: 67  PATIENT ACCOUNT NO: [id number] : [birth year]  LOCATION: [facility] ORDERING 
PHYSICIAN: Teresita DOWNEY     SERVICE DATE:  EXAM TYPE: RAD - XRY-
CHEST XRAY, TWO VIEWS EXAMINATION: XR CHEST CLINICAL INFORMATION: Shortness of 
breath. Presumptive diagnosis: Pneumonia vs. COPD. COMPARISON: 2018 and CT
chest dated 2017 TECHNIQUE: AP and lateral views of the chest FINDINGS: 
There is volume loss in the lower lobes bilaterally with associated linear 
opacities which are most compatible with atelectasis. Small areas of 
consolidation are possible. Probable small left pleural effusion. No 
pneumothorax. Heart is normal in size. Thoracic aorta is tortuous with mild 
calcific atherosclerosis. No acute findings. No appreciable pulmonary edema. 
Chronic changes of COPD are evident in the upper lobes. Degenerative disc 
disease is present in the thoracic spine. Bones are osteopenic. Osteoarthritis 
is present in the acromioclavicular and glenohumeral joints. IMPRESSION: 1. 
Chronic changes of COPD. 2. Low lung volumes with bibasilar atelectasis and a 
small left pleural effusion. Superimposed consolidation in the lower lobes is 
possible. DICTATED BY: Walter Arroyo MD  DATE/TIME DICTATED:18 
:PAPITO  DATE/TIME TRANSCRIBED:18 CONFIDENTIAL, 
DO NOT COPY WITHOUT APPROPRIATE AUTHORIZATION.  <Electronically signed in Other 
Vendor System>                                                                  
                     SIGNED BY: Walter Arroyo MD 18
Initial ED EKG: NSR, no changes from prior EKG
Hand-Off
   Endorsed To:
Alexander Cano DO
   Endorsed Time: 17
   Pending: other (admit)
(Teresita Engel)
Differential Diagnoses
I considered the following diagnoses in my evaluation of the patient: 
 
(Alexander Cano DO)
 
Departure
 
Departure
Disposition: STILL A PATIENT
Condition: Stable
Clinical Impression
Primary Impression: Leukocytosis
Qualifiers:  Leukocytosis type: unspecified Qualified Code: D72.829 - Elevated 
white blood cell count, unspecified
Secondary Impressions: 
Pneumonia
Qualifiers:  Pneumonia type: due to unspecified organism Laterality: bilateral 
Lung location: lower lobe of lung Qualified Code: J18.1 - Lobar pneumonia, 
unspecified organism
 
Referrals:
Ramsey Jacobson MD (PCP/Family)
 
Departure Forms:
Customer Survey
General Discharge Information
 
Admission Note
Spoke With:
Lee Bassett MD
Documentation of Exam:
Documentation of any treatments & extenuating circumstances including Concerns 
Regarding Discharge (functional status, medication knowledge or non-compliance, 
living conditions, etc.) that warrant an admission rather than observation: [
leukocytosis, b/l lower lobe pneumonia, bacteriuria, COPD on 2L NC, bloody 
penile discharge on Eliquis for afib]
(Teresita Engel)
 
Admission Note
Documentation of Exam:
Documentation of any treatments & extenuating circumstances including Concerns 
Regarding Discharge (functional status, medication knowledge or non-compliance, 
living conditions, etc.) that warrant an admission rather than observation: 
 
 
PA/NP Co-Sign Statement
Statement:
ED Attending supervision documentation-
 
[X] I saw and evaluated the patient. I have also reviewed all the pertinent lab 
results and diagnostic results. I agree with the findings and the plan of care 
as documented in the PA's/NP's documentation. 
 
[] I have reviewed the ED Record and agree with the PA's/NP's documentation.
 
[] Additions or exceptions (if any) to the PAs/NP's note and plan are 
summarized below:
[]
 
 
I saw and evaluated the patient.  He had bilateral rhonchi on my exam.  Urine 
was clearing.  He had significant leukocytosis.  He is being admitted to the 
hospital for hematuria, pyelonephritis, pneumonia.
 
 
18
 
(Alexander Cano DO)
 
Critical Care Note
 
Critical Care Note
Critical Care Time: non-applicable
(Teresita Engel)

## 2018-07-13 NOTE — RADIOLOGY REPORT
EXAMINATION:
XR CHEST
 
CLINICAL INFORMATION:
Shortness of breath. Presumptive diagnosis: Pneumonia vs. COPD.
 
COMPARISON:
02/24/2018 and CT chest dated 08/17/2017
 
TECHNIQUE:
AP and lateral views of the chest
 
FINDINGS:
There is volume loss in the lower lobes bilaterally with associated linear
opacities which are most compatible with atelectasis. Small areas of
consolidation are possible. Probable small left pleural effusion. No
pneumothorax.
 
Heart is normal in size. Thoracic aorta is tortuous with mild calcific
atherosclerosis. No acute findings. No appreciable pulmonary edema. Chronic
changes of COPD are evident in the upper lobes.
 
Degenerative disc disease is present in the thoracic spine. Bones are
osteopenic. Osteoarthritis is present in the acromioclavicular and
glenohumeral joints.
 
IMPRESSION:
1. Chronic changes of COPD.
2. Low lung volumes with bibasilar atelectasis and a small left pleural
effusion. Superimposed consolidation in the lower lobes is possible.

## 2018-07-13 NOTE — HISTORY & PHYSICAL
Schwaber MD,[facility] 07/13/18 1952:
General Information and HPI
History of Present Illness:
Mr. Mandujano is a 59-year-old male with past medical history of atrial fibrillation
on apixaban, MI status post 2 stents, COPD on 2 L nasal cannula, and paraplegia 
with an indwelling Castro followed by Dr. Dykes who presents with a clogged 
Castro and dysuria of several day duration.  He received ciprofloxacin for Dr. Dykes and took 1 dose today.  He also has some productive cough that is 
chronic.  He denies any fever, chills, chest pain, dull pain, nausea, vomiting, 
diarrhea.  He is a former smoker, denies alcohol or drug use.
 
Past History
 
Travel History
Traveled to [place] past 21 day No
 
Medical History
Neurological: C6-7 ABSCESS PARAPLEGIA
EENT: NONE
Cardiovascular: AFIB, hypertension, hyperlipidemia, myocardial infarction
Respiratory: asthma, COPD, OXYGEN DEPEND 2L
Gastrointestinal: NONE
Hepatic: NONE
Renal: neurogenic bladder
Musculoskeletal: PARAPLEGIA R/T ABSCESS
Psychiatric: anxiety
Endocrine: NONE
Blood Disorders: NONE
Cancer(s): NONE
GYN/Reproductive: NONE
History of MRSA: Yes
History of VRE: No
History of CDIFF: No
 
Surgical History
Surgical History: non-contributory
 
Past Family/Social History
 
Family History
Relations & Conditions if any
Relation not specified for:
  *No pertinent family history
 
 
Psychosocial History
Who Do You Live With? partner
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
Primary Language: English
ETOH Use: denies use
Living Will? yes
 
Functional Ability
ADLs
Needs Assist: dressing, eating, toileting, bathing. 
Ambulation: wheelchair
IADLs
Independent: shopping, housework, finances, food prep, telephone, transportation
, medication admin. 
 
Review of Systems
 
Review of Systems
Constitutional:
Reports: no symptoms. 
EENTM:
Reports: no symptoms. 
Cardiovascular:
Reports: no symptoms. 
Respiratory:
Reports: no symptoms. 
GI:
Reports: no symptoms. 
Genitourinary:
Reports: see HPI. 
Musculoskeletal:
Reports: no symptoms. 
Skin:
Reports: no symptoms. 
Neurological/Psychological:
Reports: no symptoms. 
Hematologic/Endocrine:
Reports: no symptoms. 
Immunologic/Allergic:
Reports: no symptoms. 
All Other Systems: Reviewed and Negative
 
Exam & Diagnostic Data
Last 24 Hrs of Vital Signs/I&O
 Vital Signs
 
 
Date Time Temp Pulse Resp B/P B/P Pulse O2 O2 Flow FiO2
 
     Mean Ox Delivery Rate 
 
07/13 2254  90 18 136/63  95 Room Air  
 
07/13 2023  89 18 148/95  96 Room Air  
 
07/13 1930       Room Air  
 
07/13 1921      98 Nasal 2.0L 
 
       Cannula  
 
07/13 1843 99.1 88 18 160/74  97 Room Air  
 
 
 
 
Physical Exam
General Appearance Alert, Oriented X3, Cooperative, No Acute Distress
Cardiovascular Regular Rate, Normal S1, Normal S2
Lungs Clear to Auscultation
Abdomen Normal Bowel Sounds, Soft, No Tenderness
Extremities No Edema, Normal Pulses, No Tenderness/Swelling
Last 24 Hrs of Labs/Stalin:
 Laboratory Tests
 
07/13/18 2211:
Lactic Acid Cancelled
 
07/13/18 2106:
Urine Color YEL, Urine Clarity CLDY  H, Urine pH 6.5, Ur Specific Gravity 1.010,
Urine Protein 30  H, Urine Ketones 15  H, Urine Nitrite NEG, Urine Bilirubin NEG
, Urine Urobilinogen 0.2, Ur Leukocyte Esterase LARGE  H, Ur Microscopic 
SEDIMENT EXAMINED, Urine RBC 5-10  H, Urine WBC > 75  H, Ur Epithelial Cells 
RARE, Urine Bacteria FEW  H, Urine Hemoglobin LARGE  H, Urine Glucose NEG
 
07/13/18 1925:
Anion Gap 9, Estimated GFR > 60, BUN/Creatinine Ratio 35.0  H, Glucose 109  H, 
Lactic Acid 0.9, Calcium 9.2, Total Bilirubin 0.6, AST 31, ALT 44, Alkaline 
Phosphatase 94, Troponin I < 0.01, Pro-B-Natriuretic Pept 286  H, Total Protein 
6.9, Albumin 3.3  L, Globulin 3.6, Albumin/Globulin Ratio 0.9  L, D-Dimer High 
Sensitivty < 200, CBC w Diff MAN DIFF ORDERED, RBC 4.29  L, MCV 87.2, MCH 28.5, 
MCHC 32.7  L, RDW 14.9  H, MPV 8.8, Gran % 77.8  H, Lymphocytes % 12.4  L, 
Monocytes % 8.1, Eosinophils % 1.7, Basophils % 0, Absolute Granulocytes 15.3  H
, Segmented Neutrophils 76  H, Absolute Lymphocytes 2.4, Lymphocytes 12  L, 
Monocytes 9, Absolute Monocytes 1.6  H, Eosinophils 3, Absolute Eosinophils 0.3,
Absolute Basophils 0, Platelet Estimate VERIFIED BY SMEAR, Normocytic RBCs 
VERIFIED, Normochromic RBCs VERIFIED, Fld Total RBCs Counted 100
 
07/13/18 1915:
Troponin I Cancelled
 Microbiology
07/13 2106  URINE ROUT: Urine Culture - RECD
 
 
 
Assessment/Plan
Assessment:
Mr. Mandujano is a 59-year-old male with past medical history of atrial fibrillation
on apixaban, MI status post 2 stents, COPD on 2 L nasal cannula, and paraplegia 
with an indwelling Castro followed by Dr. Dykes who presents with a clogged 
Castro and dysuria of several day duration.  
 
On presentation, vital signs were T 99.1, HR 88, RR 18, /74, saturating 97
% on room air.. 
 
General: Patient appears stated age, alert and orientedx3.
HEENT: PERRL. No goiter. No palpable lymph nodes.
Cardiovascular: Regular rate and rhythm, no murmurs, rubs, or gallops.
Lungs: Clear to auscultation bilaterally.
Abdomen: Normal bowel sounds. Nontender to palpation in all four quadrants.  
Castro draining yellow urine.
Ext: No edema, pulses normal.
 
Laboratories were significant for white blood cell count 19.7, hemoglobin 12.2, 
sodium 136, chloride 94, carbon dioxide 33, BUN 21, .  Urinalysis showed 
greater than 75 WBC.  Chest x-ray showed atelectasis.
 
He will be admitted to general medicine and treated for the following problems:
1.  Catheter associated urinary tract infection
 
#Catheter associated urinary tract infection: Patient has grown Proteus 
resistant to ciprofloxacin in the past.  He is now presenting with dysuria and a
clogged Castro in the setting of leukocytosis without fever.
-Ceftriaxone
-Urine culture
-Urology consult
-Gentle IV fluid hydration
 
#Chronic medical problems
-Continue home medications
-Please confirm home meds in the morning with pharmacy.
 
DVT prophylaxis with apixaban
Heart healthy diet
DNR/DNI
 
As Ranked By This Provider
Problem List:
 1. UTI (urinary tract infection)
 
 
Core Measures/Misc (9/17)
 
Acute Coronary Syndrome
ACS Diagnosis: No
 
Congestive Heart Failure
Congestive Heart Failure Diagnosis No
 
Cerebrovascular Accident
CVA/TIA Diagnosis: No
 
VTE (View Protocol)
VTE Risk Factors Age>40
No Mechanical VTE Prophylaxis d/t N/A MechProphylax Ordered
No VTE Pharm Prophylaxis d/t Supratherapeutic INR
 
Sepsis (View protocol)
Sepsis Present: No
If YES complete Sepsis Event Note If YES complete Sepsis Event Note
 
Danyelle JONESMesa 07/14/18 0142:
General Information and HPI
MD Statement:
I have seen and personally examined DAYDAY MANDUJANO and documented this H&P.
 
The patient is a 67 year old M who presented with a patient stated chief 
complaint of [UTI].
 
Source of Information: patient
Exam Limitations: no limitations
 
Allergies/Medications
Allergies:
Coded Allergies:
heparin (Intermediate, SWELLING, RASH 04/16/18)
vancomycin (Intermediate, HIVES, SKIN CRACKES, TURNS RED, SWELLS AND PEELS 04/16
/18)
warfarin (From COUMADIN) (Intermediate, NOT EFFECTIVE CAN NOT GET LEVELS ABOVE 1
06/03/18)
 
Home Med list
Albuterol Sulfate (Ventolin Hfa) 90 MCG HFA.AER.AD   2 PUF INH AD PRN COPD  (
Reported)
Albuterol Sulfate 2.5 MG/3 ML (0.083 %) VIAL.NEB   1 Vial INH/SOL Q4P PRN COPD  
(Reported)
Apixaban (Eliquis) 5 MG TABLET   5 MG PO BID atrial fibrillation
Ascorbate Calcium (Vitamin C) 500 MG TABLET   1 TAB PO BID SUPPLEMENT  (Reported
)
Atomoxetine HCl (Strattera) 40 MG CAPSULE   1 CAP PO QAM MENTAL HEALTH  (
Reported)
Baclofen 20 MG TABLET   1 TAB PO BID MUSCLE RELAXER  (Reported)
Bisacodyl (Dulcolax) 10 MG SUPP.RECT   1 SUP RC Q48 GI  (Reported)
Budesonide/Formoterol Fumarate (Symbicort 80-4.5 Mcg Inhaler) 80 MCG-4.5 MCG/
ACTUATION HFA.AER.AD   2 PUF INH BID COPD  (Reported)
Docusate Sodium (Stool Softener) 100 MG CAPSULE   1 CAP PO DAILY STOOL SOFTENER 
(Reported)
Evolocumab (Repatha Sureclick) 140 MG/ML PEN.INJCTR   1 ML SC Q 2 WEEKS 
CHOLESTEROL  (Reported)
Furosemide 20 MG TABLET   3 TAB PO Q48 DIURETIC  (Reported)
Gabapentin 300 MG CAPSULE   2 CAP PO BID NERVE PAIN  (Reported)
Guaifenesin (Mucinex) 600 MG TAB.ER.12H   1 TAB PO BID CONGESTION  (Reported)
Hydroxyzine HCl (hydrOXYzine HCl) 10 MG TABLET   1 TAB PO BID PRN ANXIETY  (
Reported)
Lactobacillus Acidophilus (Acidophilus) 1 EACH CAPSULE   1 CAP PO BID PROBIOTIC 
(Reported)
Magnesium Oxide (Magnesium) 400 MG CAPSULE   1 CAP PO 0600 SUPPLEMENT  (Reported
)
Metoprolol Succ XL (Toprol XL) 25 MG TAB   1 TAB PO DAILY HEART  (Reported)
Montelukast Sodium (Singulair) 10 MG TABLET   1 TAB PO DAILY ALLERGIES  (
Reported)
Multivitamin (Daily Value) 1 EACH TABLET   1 TAB PO DAILY VITAMIN SUPPORT  (
Reported)
Nortriptyline HCl 10 MG CAPSULE   1 CAP PO DAILY mental health  (Reported)
Omeprazole 20 MG CAPSULE.DR   1 CAP PO DAILY ACID REFLUX  (Reported)
Oxycodone HCl/Acetaminophen (Oxycodone-Acetaminophen 5-325) 5 MG-325 MG TABLET  
1 TAB PO Q4H PRN PAIN  (Reported)
Potassium Chloride 20 MEQ TAB.ER.PRT   1 TAB PO DAILY SUPPLEMENT  (Reported)
Rifampin (Rifadin) 300 MG CAPSULE   300 MG PO BID MRSA  (Reported)
Sotalol (Betapace) 80 MG TABLET   80 MG PO BID AARYTHMIAS
Tiotropium Bromide (Spiriva) 18 MCG CAP.W.DEV   1 CAP INH DAILY BREATHING 
PROBLEMS  (Reported)
 
 
Past History
 
Medical History
Cardiovascular: AFIB, hypertension, hyperlipidemia, myocardial infarction
Respiratory: COPD, OXYGEN DEPEND 2L
Renal: neurogenic bladder
Psychiatric: anxiety
 
Past Family/Social History
 
Psychosocial History
ETOH Use: denies use
 
Review of Systems
Comments
12 point review of systems positive documented in HPI
 
Exam & Diagnostic Data
Last 24 Hrs of Vital Signs/I&O
 Vital Signs
 
 
Date Time Temp Pulse Resp B/P B/P Pulse O2 O2 Flow FiO2
 
     Mean Ox Delivery Rate 
 
07/14 0033 99.0 90 18 130/62  98 Room Air  
 
07/13 2254  90 18 136/63  95 Room Air  
 
07/13 2023  89 18 148/95  96 Room Air  
 
07/13 1930       Room Air  
 
07/13 1921      98 Nasal 2.0L 
 
       Cannula  
 
07/13 1843 99.1 88 18 160/74  97 Room Air  
 
 
 Intake & Output
 
 
 07/14 0800 07/14 0000 07/13 1600
 
Intake Total   
 
Output Total   
 
Balance   
 
    
 
Patient  250 lb 
 
Weight   
 
Weight  Estimated 
 
Measurement   
 
Method   
 
 
 
 
Physical Exam
General Appearance Alert, Oriented X3, Cooperative, No Acute Distress
HEENT Atraumatic, PERRLA, EOMI
Neck Supple, No JVD
Cardiovascular Regular Rate, Normal S1, Normal S2
Lungs Clear to Auscultation
Abdomen Normal Bowel Sounds, Soft, No Tenderness
Extremities No Clubbing, No Edema, Normal Pulses, No Tenderness/Swelling
Last 24 Hrs of Labs/Stalin:
 Laboratory Tests
 
07/13/18 2211:
Lactic Acid Cancelled
 
07/13/18 2106:
Urine Color YEL, Urine Clarity CLDY  H, Urine pH 6.5, Ur Specific Gravity 1.010,
Urine Protein 30  H, Urine Ketones 15  H, Urine Nitrite NEG, Urine Bilirubin NEG
, Urine Urobilinogen 0.2, Ur Leukocyte Esterase LARGE  H, Ur Microscopic 
SEDIMENT EXAMINED, Urine RBC 5-10  H, Urine WBC > 75  H, Ur Epithelial Cells 
RARE, Urine Bacteria FEW  H, Urine Hemoglobin LARGE  H, Urine Glucose NEG
 
07/13/18 1925:
Anion Gap 9, Estimated GFR > 60, BUN/Creatinine Ratio 35.0  H, Glucose 109  H, 
Lactic Acid 0.9, Calcium 9.2, Total Bilirubin 0.6, AST 31, ALT 44, Alkaline 
Phosphatase 94, Troponin I < 0.01, Pro-B-Natriuretic Pept 286  H, Total Protein 
6.9, Albumin 3.3  L, Globulin 3.6, Albumin/Globulin Ratio 0.9  L, D-Dimer High 
Sensitivty < 200, CBC w Diff MAN DIFF ORDERED, RBC 4.29  L, MCV 87.2, MCH 28.5, 
MCHC 32.7  L, RDW 14.9  H, MPV 8.8, Gran % 77.8  H, Lymphocytes % 12.4  L, 
Monocytes % 8.1, Eosinophils % 1.7, Basophils % 0, Absolute Granulocytes 15.3  H
, Segmented Neutrophils 76  H, Absolute Lymphocytes 2.4, Lymphocytes 12  L, 
Monocytes 9, Absolute Monocytes 1.6  H, Eosinophils 3, Absolute Eosinophils 0.3,
Absolute Basophils 0, Platelet Estimate VERIFIED BY SMEAR, Normocytic RBCs 
VERIFIED, Normochromic RBCs VERIFIED, Fld Total RBCs Counted 100
 
07/13/18 1915:
Troponin I Cancelled
 Microbiology
07/14 0051  URINE ROUT: Urine Culture - RECD
07/13 2106  URINE ROUT: Urine Culture - RECD
 
 
 
 
Core Measures/Misc (9/17)
 
Sepsis (View protocol)
If YES complete Sepsis Event Note If YES complete Sepsis Event Note
 
Attending MD Review Statement
 
Attending Statement
Attending MD Statement: examined this patient, discuss w/resident/PA/NP, agreed 
w/resident/PA/NP, amended to note
Attending Assessment/Plan:
This patient is a 59-year-old male with past medical history of atrial 
fibrillation on apixaban, MI status post 2 stents, COPD on 2 L nasal cannula, 
and paraplegia with an indwelling Castro followed by Dr. Dykes who presents with
a clogged Castro and dysuria of several day duration.  Found to have a low grade 
fever and an elevated WBC count 19.7. Starting gentle hydration and CTX.

## 2018-07-14 VITALS — DIASTOLIC BLOOD PRESSURE: 70 MMHG | SYSTOLIC BLOOD PRESSURE: 122 MMHG

## 2018-07-14 VITALS — DIASTOLIC BLOOD PRESSURE: 60 MMHG | SYSTOLIC BLOOD PRESSURE: 140 MMHG

## 2018-07-14 VITALS — DIASTOLIC BLOOD PRESSURE: 86 MMHG | SYSTOLIC BLOOD PRESSURE: 154 MMHG

## 2018-07-14 VITALS — DIASTOLIC BLOOD PRESSURE: 60 MMHG | SYSTOLIC BLOOD PRESSURE: 134 MMHG

## 2018-07-14 VITALS — SYSTOLIC BLOOD PRESSURE: 130 MMHG | DIASTOLIC BLOOD PRESSURE: 70 MMHG

## 2018-07-14 LAB
ABSOLUTE GRANULOCYTE CT: 11.4 /CUMM (ref 1.4–6.5)
BASOPHILS # BLD: 0 /CUMM (ref 0–0.2)
BASOPHILS NFR BLD: 0.3 % (ref 0–2)
EOSINOPHIL # BLD: 0.5 /CUMM (ref 0–0.7)
EOSINOPHIL NFR BLD: 2.9 % (ref 0–5)
ERYTHROCYTE [DISTWIDTH] IN BLOOD BY AUTOMATED COUNT: 14.5 % (ref 11.5–14.5)
GRANULOCYTES NFR BLD: 73.8 % (ref 42.2–75.2)
HCT VFR BLD CALC: 34.7 % (ref 42–52)
LYMPHOCYTES # BLD: 2.6 /CUMM (ref 1.2–3.4)
MCH RBC QN AUTO: 28.6 PG (ref 27–31)
MCHC RBC AUTO-ENTMCNC: 32.6 G/DL (ref 33–37)
MCV RBC AUTO: 87.8 FL (ref 80–94)
MONOCYTES # BLD: 1 /CUMM (ref 0.1–0.6)
PLATELET # BLD: 298 /CUMM (ref 130–400)
PMV BLD AUTO: 8.7 FL (ref 7.4–10.4)
RED BLOOD CELL CT: 3.95 /CUMM (ref 4.7–6.1)
WBC # BLD AUTO: 15.5 /CUMM (ref 4.8–10.8)

## 2018-07-14 PROCEDURE — 5A09457 ASSISTANCE WITH RESPIRATORY VENTILATION, 24-96 CONSECUTIVE HOURS, CONTINUOUS POSITIVE AIRWAY PRESSURE: ICD-10-PCS

## 2018-07-14 NOTE — PN- HOUSESTAFF
**See Addendum**
Subjective
Follow-up For:
Catheter associated UTI
Subjective:
Patient seen and examined at bedside.  Patient states that he is feeling okay, 
did not have any acute events overnight.  Patient states that he does not really
have too much burning with urination.  Patient states that he has not had a 
fever, although does feel warm.  Patient denied chest pain/shortness of breath/
abdominal pain/lower extremity edema.
 
Review of Systems
Constitutional:
Reports: see HPI. 
 
Objective
Last 24 Hrs of Vital Signs/I&O
 Vital Signs
 
 
Date Time Temp Pulse Resp B/P B/P Pulse O2 O2 Flow FiO2
 
     Mean Ox Delivery Rate 
 
 1423 98.8 85 22 130/70  98   
 
 0805  80  154/86     
 
 0800       Nasal 2.0L 
 
       Cannula  
 
 0724 98.5 86 20 154/86  97   
 
 0255  88    98   
 
 0254      98 Nasal 2.0L 
 
       Cannula  
 
 0150 98.3 85 24 122/70  96 Nasal 2.0L 
 
       Cannula  
 
 0115      96 Nasal 2.0L 
 
       Cannula  
 
 0033 99.0 90 18 130/62  98 Room Air  
 
 2254  90 18 136/63  95 Room Air  
 
 2023  89 18 148/95  96 Room Air  
 
 1930       Room Air  
 
 1921      98 Nasal 2.0L 
 
       Cannula  
 
 1843 99.1 88 18 160/74  97 Room Air  
 
 
 Intake & Output
 
 
  1600  0800  0000
 
Intake Total 2000  
 
Output Total 650 300 
 
Balance 1350 -300 
 
    
 
Intake,   
 
Intake, Oral 1500  
 
Number 0  
 
Bowel   
 
Movements   
 
Output, Urine 650 300 
 
Patient  223 lb 250 lb
 
Weight   
 
Weight  Bed scale Estimated
 
Measurement   
 
Method   
 
 
 
 
Physical Exam
General Appearance: Alert, Oriented X3, Cooperative, No Acute Distress
Skin Temp/Moisture Exam: Hot/Diaphoretic
Cardiovascular: Regular Rate, Normal S1, Normal S2
Lungs: Clear to Auscultation, Normal Air Movement
Abdomen: Soft, No Tenderness
Extremities: No Edema, Normal Pulses
Reproductive (MALE) indwelling marquez in place
Current Medications:
 Current Medications
 
 
  Sig/Viet Start time  Last
 
Medication Dose Route Stop Time Status Admin
 
Acetaminophen 650 MG Q6P PRN  2345 AC 
 
  PO   1419
 
Albuterol Sulfate 3 ML ONCE ONE  0245 DC 
 
  INH  0246  0238
 
Albuterol Sulfate 3 ML Q4P PRN  2345 AC 
 
  INH   0637
 
Albuterol Sulfate 2 PUF Q6P PRN  2345 AC 
 
  INH   
 
Albuterol Sulfate 3 ML ONCE ONE  1900 DC 
 
  INH  190  191
 
Apixaban 5 MG BID  0900 AC 
 
  PO   0804
 
Azithromycin 500 MG ONCE ONE  2345 DC 
 
Sodium Chloride 250 ML IV  0044  0032
 
Baclofen 20 MG BID  0900 AC 
 
  PO   0804
 
Bisacodyl 10 MG Q48 07/15 0900 AC 
 
  DC   
 
Budesonide/ 2 PUF BID  0900 AC 
 
Formoterol Fumarate  INH   0800
 
Ceftriaxone Sodium 1,000 MG DAILY@0000 07/15 0000 AC 
 
  IV   
 
Ceftriaxone Sodium 1,000 MG DAILY  0900 DC 
 
Sodium Chloride 100 ML IV   
 
Ceftriaxone Sodium 0 .STK-MED ONE  2359 DC 
 
  .ROUTE   
 
Ceftriaxone Sodium 1,000 MG ONCE ONE  2345 DC 
 
  IV  2346  0032
 
Docusate Sodium 100 MG DAILY  0900 AC 
 
  PO   0804
 
Furosemide 60 MG Q48 07/15 0900 AC 
 
  PO   
 
Gabapentin 600 MG BID  0900 AC 
 
  PO   0804
 
Guaifenesin 600 MG BID  0900 AC 
 
  PO   0805
 
Hydroxyzine HCl 10 MG BID PRN  2345 AC 
 
  PO   
 
Ipratropium Bromide 2.5 ML ONCE ONE  1900 DC 
 
  INH  190  191
 
Lactated Ringer's 1,000 ML ONCE ONE  2345 DC 
 
  IV  0744  0032
 
Magnesium Oxide 400 MG DAILY  0900 AC 
 
  PO   
 
Metoprolol Succinate 25 MG DAILY  0900 AC 
 
  PO   0805
 
Montelukast Sodium 10 MG DAILY  0900 AC 
 
  PO   0805
 
Nortriptyline HCl 10 MG DAILY  0900 AC 
 
  PO   0805
 
Omeprazole 20 MG DAILY  0900 AC 
 
  PO   0804
 
Oxycodone/ 1 TAB Q4H PRN  2345 AC 
 
Acetaminophen  PO   
 
Potassium Chloride 20 MEQ DAILY  0900 AC 
 
  PO   0804
 
Sotalol HCl 80 MG  AC 
 
  PO   08
 
Tiotropium [place] 1 PUF DAILY 900 AC 
 
  INH   0801
 
 
 
 
Last 24 Hrs of Lab/Stalin Results
Last 24 Hrs of Labs/Mics:
 Laboratory Tests
 
18 0610:
Anion Gap 8, Estimated GFR > 60, BUN/Creatinine Ratio 34.0  H, Magnesium 1.9, 
CBC w Diff NO MAN DIFF REQ, RBC 3.95  L, MCV 87.8, MCH 28.6, MCHC 32.6  L, RDW 
14.5, MPV 8.7, Gran % 73.8, Lymphocytes % 16.5  L, Monocytes % 6.5, Eosinophils 
% 2.9, Basophils % 0.3, Absolute Granulocytes 11.4  H, Absolute Lymphocytes 2.6,
Absolute Monocytes 1.0  H, Absolute Eosinophils 0.5, Absolute Basophils 0
 
18:
Lactic Acid Cancelled
 
18:
Urine Color YEL, Urine Clarity CLDY  H, Urine pH 6.5, Ur Specific Gravity 1.010,
Urine Protein 30  H, Urine Ketones 15  H, Urine Nitrite NEG, Urine Bilirubin NEG
, Urine Urobilinogen 0.2, Ur Leukocyte Esterase LARGE  H, Ur Microscopic 
SEDIMENT EXAMINED, Urine RBC 5-10  H, Urine WBC > 75  H, Ur Epithelial Cells 
RARE, Urine Bacteria FEW  H, Urine Hemoglobin LARGE  H, Urine Glucose NEG
 
18 1925:
Anion Gap 9, Estimated GFR > 60, BUN/Creatinine Ratio 35.0  H, Glucose 109  H, 
Lactic Acid 0.9, Calcium 9.2, Total Bilirubin 0.6, AST 31, ALT 44, Alkaline 
Phosphatase 94, Troponin I < 0.01, Pro-B-Natriuretic Pept 286  H, Total Protein 
6.9, Albumin 3.3  L, Globulin 3.6, Albumin/Globulin Ratio 0.9  L, D-Dimer High 
Sensitivty < 200, CBC w Diff MAN DIFF ORDERED, RBC 4.29  L, MCV 87.2, MCH 28.5, 
MCHC 32.7  L, RDW 14.9  H, MPV 8.8, Gran % 77.8  H, Lymphocytes % 12.4  L, 
Monocytes % 8.1, Eosinophils % 1.7, Basophils % 0, Absolute Granulocytes 15.3  H
, Segmented Neutrophils 76  H, Absolute Lymphocytes 2.4, Lymphocytes 12  L, 
Monocytes 9, Absolute Monocytes 1.6  H, Eosinophils 3, Absolute Eosinophils 0.3,
Absolute Basophils 0, Platelet Estimate VERIFIED BY SMEAR, Normocytic RBCs 
VERIFIED, Normochromic RBCs VERIFIED, Fld Total RBCs Counted 100
 
18 1915:
Troponin I Cancelled
 Microbiology
 0051  URINE ROUT: Urine Culture - RECD
2106  URINE ROUT: Urine Culture - RES
 
 
 
Assessment/Plan
Assessment:
Mr. Mandujano is a 59-year-old male with past medical history of atrial fibrillation
on apixaban, MI status post 2 stents, COPD on 2 L nasal cannula, and paraplegia 
with an indwelling Marquez followed by Dr. Dykes who presents with a clogged 
Marquez and dysuria of several day duration.  Being treated for catheter 
associated UTI
 
#Catheter associated UTI




-Leukocytosis 19.7 trended down to 15.5 today, will follow up with his CBCs 
tomorrow morning.
 
#Bedsores (prior to admission)

 
DVT prophylaxis
IV access
Tolerating regular diet
Disposition
 
 
 
Problem List:
 1. Clot hematuria
 
 2. Leukocytosis
 
Pain Ratin
Pain Location:
suprapubic
Pain Goal: Pain 7 or less
Pain Plan:
Pain pathway
Tomorrow's Labs & Rationales:
CBC monitor leukocytosis

## 2018-07-15 VITALS — DIASTOLIC BLOOD PRESSURE: 58 MMHG | SYSTOLIC BLOOD PRESSURE: 118 MMHG

## 2018-07-15 VITALS — DIASTOLIC BLOOD PRESSURE: 90 MMHG | SYSTOLIC BLOOD PRESSURE: 136 MMHG

## 2018-07-15 VITALS — DIASTOLIC BLOOD PRESSURE: 60 MMHG | SYSTOLIC BLOOD PRESSURE: 130 MMHG

## 2018-07-15 LAB
ABSOLUTE GRANULOCYTE CT: 6.8 /CUMM (ref 1.4–6.5)
BASOPHILS # BLD: 0 /CUMM (ref 0–0.2)
BASOPHILS NFR BLD: 0.2 % (ref 0–2)
EOSINOPHIL # BLD: 0.5 /CUMM (ref 0–0.7)
EOSINOPHIL NFR BLD: 5.1 % (ref 0–5)
ERYTHROCYTE [DISTWIDTH] IN BLOOD BY AUTOMATED COUNT: 14.9 % (ref 11.5–14.5)
GRANULOCYTES NFR BLD: 64.5 % (ref 42.2–75.2)
HCT VFR BLD CALC: 33.3 % (ref 42–52)
LYMPHOCYTES # BLD: 2.2 /CUMM (ref 1.2–3.4)
MCH RBC QN AUTO: 28.5 PG (ref 27–31)
MCHC RBC AUTO-ENTMCNC: 32.3 G/DL (ref 33–37)
MCV RBC AUTO: 88.1 FL (ref 80–94)
MONOCYTES # BLD: 1 /CUMM (ref 0.1–0.6)
PLATELET # BLD: 270 /CUMM (ref 130–400)
PMV BLD AUTO: 7.9 FL (ref 7.4–10.4)
RED BLOOD CELL CT: 3.78 /CUMM (ref 4.7–6.1)
WBC # BLD AUTO: 10.6 /CUMM (ref 4.8–10.8)

## 2018-07-15 NOTE — CT SCAN REPORT
EXAMINATION:
CT ABDOMEN AND PELVIS WITHOUT CONTRAST
 
CLINICAL INFORMATION:
Presented with clogged Castro and dysuria. Rule out pyelonephritis or acute
process.
 
COMPARISON:
CT scan of the chest dated 08/17/2017. CT scan of the abdomen and pelvis
dated 11/15/2010.
 
TECHNIQUE:
Multidetector volumetric imaging was performed from the superior aspect of
the liver through the pubic symphysis. Sagittal and coronal reformatted
images were obtained on the technologist workstation.
 
DLP:
1145.29 mGy-cm.
 
FINDINGS:
 
LUNG BASES: There is a small solid noncalcified pleural-based nodule in the
right lower lobe (series 2, image 1), measuring 6 mm, unchanged from
07/20/2017 and 2 mm larger compared to 11/15/2010. This is of doubtful
clinical significance. Some scattered areas of dependent parenchymal opacity
is seen in both lower lobes, most consistent with atelectatic changes. No
significant pleural effusion is seen.
 
LIVER, GALLBLADDER, AND BILIARY TREE: The liver is normal in size, shape, and
attenuation. No focal hepatic lesion on noncontrast imaging. No biliary
ductal dilatation is present. This is likely a faintly calcified gallstone in
the gallbladder neck region. The gallbladder is otherwise unremarkable.
 
PANCREAS: Diffusely atrophic with fatty infiltration seen. Some calcific
densities are noted within the pancreatic head, possibly vascular versus
sequelae of previous chronic calcific pancreatitis similar findings were seen
on prior imaging dating back to 2010. No discrete pancreatic mass. No
peripancreatic stranding.
 
SPLEEN, ADRENAL GLANDS: A 1 cm accessory splenule is seen in the splenic
hilar region. Spleen and adrenal glands unremarkable on noncontrast imaging.
 
 
KIDNEYS AND URETERS: The kidneys are normal in size, shape, and attenuation.
No hydronephrosis or hydroureter seen.
 
There are 2 tiny punctate calcific densities seen in the mid right kidney,
most likely vascular in etiology. In the lower pole of the left kidney, a 0.4
cm nonobstructing calcification is seen new from prior exam, likely a
nonobstructing stone. In addition, several punctate linearly oriented
calcifications is seen in the upper pole, most likely vascular in etiology.
No perinephric stranding.
 
In the mid to upper left kidney, a partially exophytic 2.8 x 3.6 cm fluid
attenuation mass is seen, consistent with a cyst, new compared to 2010. There
is a 1.6 cm lower pole right renal cyst, larger compared to 1.2 cm in 2010.
 
BLADDER: The bladder is 0.4 cm nonobstructing calculus is seen, new compared
to the prior exam. Decompressed by Castro catheter, likely accounting for the
diffusely thick-walled appearance. No definite bladder calculi are seen.
Evaluation of the bladder is, however, limited.
 
PELVIC VISCERA: Unremarkable.
 
GASTROINTESTINAL TRACT: The small and large bowel are unremarkable. The
appendix is nonvisualized.
 
ABDOMINAL WALL: There is extensive soft tissue edema and stranding seen in
the posterior medial right buttock and to a lesser extent in the medial left
buttock. Both of these areas of edema and stranding extend to the ischial
tuberosity bilaterally. Close clinical correlation is requested to exclude
pressure type injury with evolving decubitus ulcers or
inflammation/cellulitis with extension to the underlying bone. No definite
focal lytic lesion is seen involving the ischial tuberosities.
 
There is a small fat-containing umbilical hernia.
 
LYMPH NODES, VASCULAR: Moderate atherosclerotic calcifications of the aorta
and branch vessels, including the coronary arteries is seen. No periaortic
collections. An IVC filter is in place with tip at the distal left renal
vein.
 
OSSEOUS STRUCTURES: Diffuse osteopenia is seen. Mild wedge compression
deformity of the L1 vertebral body is seen, unchanged from the 2010 exam.
There is mild superior endplate compression of the L2 and L3 vertebral
bodies, new/progressive compared to the prior exam. Moderate facet
arthropathy is seen in the lower lumbar spine. There is a convex right lumbar
curvature, likely positional. There is subtle sclerotic density in the
femoral heads bilaterally, not significantly changed since 2010.
 
IMPRESSION:
 
1. Nonobstructing mid left renal calcification, new from prior study.
2. Right renal calcifications are most likely vascular.
3. Bilaterally, no obstructing stone is seen.
4. Bilateral small renal cysts.
5. Other incidental findings include:
a. A 6 mm pleural-based nodule in the right lower lobe, unchanged from
07/20/2017, most consistent with a benign etiology
b. Probable faintly calcified gallstone in the gallbladder neck region.
Gallbladder otherwise unremarkable.
c. Atrophic fatty infiltrated pancreas.
d. Question of evolving decubitus ulcers versus cellulitis/soft tissue edema
and inflammation in the posterior buttocks, with the inflammatory reaction
extending down to the ischial tuberosities bilaterally.
e. Osteopenia with several compression deformities in the spine.

## 2018-07-15 NOTE — PN- HOUSESTAFF
**See Addendum**
Subjective
Follow-up For:
UTI
Subjective:
Patient seen and evaluated bedside today.  Patient states that he feels somewhat
better, still some pain at the meatus of the penis.  Patient states that he 
slept very well overnight.  Patient states that he would like to see Dr. Hull if she is in the hospital.  No acute events overnight.  Patient denied 
fever/chills/night sweats/chest pain/shortness of breath/abdominal pain/lower 
extremity edema
 
Review of Systems
Constitutional:
Reports: see HPI. 
 
Objective
Last 24 Hrs of Vital Signs/I&O
 Vital Signs
 
 
Date Time Temp Pulse Resp B/P B/P Pulse O2 O2 Flow FiO2
 
     Mean Ox Delivery Rate 
 
07/15 1400 98.0 79 17 118/58  94 Nasal 2.0L 
 
       Cannula  
 
07/15 0845      95 Nasal 2.0L 
 
       Cannula  
 
07/15 0803  75  128/64     
 
07/15 0800       Nasal 2.0L 
 
       Cannula  
 
07/15 0644 98.3 68 18 130/60  97 Nasal 2.0L 
 
       Cannula  
 
07/15 0052  76    96   
 
07/15 0000      96 BIPAP  
 
 2211  78    96   
 
 2023 98.4 84 16 134/60  94 Nasal 2.0L 
 
       Cannula  
 
 1845  84  140/60     
 
 
 Intake & Output
 
 
 07/15 1600 15 0800 07/15 0000
 
Intake Total 830 310 480
 
Output Total 800 700 850
 
Balance 30 -390 -370
 
    
 
Intake,  10 
 
Intake, Oral 700 300 480
 
Number 0  1
 
Bowel   
 
Movements   
 
Output, Urine 800 700 850
 
 
 
 
Physical Exam
General Appearance: Alert, Oriented X3, Cooperative, No Acute Distress
Skin Temp/Moisture Exam: Warm/Dry
Cardiovascular: Regular Rate, Normal S1, Normal S2
Lungs: Clear to Auscultation, Normal Air Movement
Abdomen: Soft, No Tenderness
Neurological: Sensation Intact
Extremities: No Edema
Reproductive (MALE) 3 way marquez in place, mild pain at meatus, no erythema noted
 
Assessment/Plan
Assessment:
Mr. Mandujano is a 59-year-old male with past medical history of atrial fibrillation
on apixaban, MI status post 2 stents, COPD on 2 L nasal cannula, and paraplegia 
with an indwelling Marquez followed by Dr. Dykes who presents with a clogged 
Marquez and dysuria of several day duration.  Being treated for catheter 
associated UTI
 
#Catheter associated UTI


Likely Pseudomonas, will have to switch antibiotics

scan today, will f/u on results

-Leukocytosis 15.5 trended down to 10.6 today, leukocytosis is resolved
 
#Bedsores (prior to admission)


 
DVT prophylaxis
IV access
Tolerating regular diet
Disposition
DNR/DNI
 
Problem List:
 1. Clot hematuria
 
 2. Leukocytosis
 
Pain Ratin
Pain Location:
na
Pain Goal: Remain pain free
Pain Plan:
pain pathway
Tomorrow's Labs & Rationales:
na

## 2018-07-15 NOTE — CONS- UROLOGY
General Information and HPI
 
Consulting Request
Date of Consult: 18
Requested By:
Rojelio JONES,Sandra
 
Reason for Consult:
UTI
Source of Information: patient
Exam Limitations: no limitations
History of Present Illness:
67 yr old with urinary retention and frequent UTIs: again presents with UTI 
despite suppression abx.  As previously discussed with pt, TURP-to facilitate 
voiding or SPT for continuous drainage avoiding penile/perineal area discussed 
with pt.  Pt to have UTI tx presently and will return when UTI resolved for 
whatever procedure pt decides he can live with. 
 
Allergies/Medications
Allergies:
Coded Allergies:
heparin (Intermediate, SWELLING, RASH 18)
vancomycin (Intermediate, HIVES, SKIN CRACKES, TURNS RED, SWELLS AND PEELS )
warfarin (From COUMADIN) (Intermediate, NOT EFFECTIVE CAN NOT GET LEVELS ABOVE 1
18)
 
Home Med List:
Albuterol Sulfate (Ventolin Hfa) 90 MCG HFA.AER.AD   2 PUF INH AD PRN COPD  (
Reported)
Albuterol Sulfate 2.5 MG/3 ML (0.083 %) VIAL.NEB   1 Vial INH/SOL Q4P PRN COPD  
(Reported)
Apixaban (Eliquis) 5 MG TABLET   5 MG PO BID atrial fibrillation
Ascorbate Calcium (Vitamin C) 500 MG TABLET   1 TAB PO BID SUPPLEMENT  (Reported
)
Atomoxetine HCl (Strattera) 40 MG CAPSULE   1 CAP PO QAM MENTAL HEALTH  (
Reported)
Baclofen 20 MG TABLET   1 TAB PO BID MUSCLE RELAXER  (Reported)
Bisacodyl (Dulcolax) 10 MG SUPP.RECT   1 SUP RC Q48 GI  (Reported)
Budesonide/Formoterol Fumarate (Symbicort 160-4.5 Mcg Inhaler) 160 MCG-4.5 MCG/
ACTUATION HFA.AER.AD   2 PUF INH BID copd  (Reported)
Docusate Sodium (Stool Softener) 100 MG CAPSULE   1 CAP PO DAILY STOOL SOFTENER 
(Reported)
Evolocumab (Repatha Sureclick) 140 MG/ML PEN.INJCTR   1 ML SC Q 2 WEEKS 
CHOLESTEROL  (Reported)
Furosemide 20 MG TABLET   3 TAB PO Q48 DIURETIC  (Reported)
Gabapentin 300 MG CAPSULE   2 CAP PO BID NERVE PAIN  (Reported)
Guaifenesin (Mucinex) 600 MG TAB.ER.12H   1 TAB PO BID CONGESTION  (Reported)
Hydroxyzine HCl (hydrOXYzine HCl) 10 MG TABLET   1 TAB PO BID PRN ANXIETY  (
Reported)
Lactobacillus Acidophilus (Acidophilus) 1 EACH CAPSULE   1 CAP PO BID PROBIOTIC 
(Reported)
Magnesium Oxide (Magnesium) 400 MG CAPSULE   1 CAP PO 0600 SUPPLEMENT  (Reported
)
Metoprolol Succ XL (Toprol XL) 25 MG TAB   1 TAB PO DAILY HEART  (Reported)
Montelukast Sodium (Singulair) 10 MG TABLET   1 TAB PO DAILY ALLERGIES  (
Reported)
Multivitamin (Daily Value) 1 EACH TABLET   1 TAB PO DAILY VITAMIN SUPPORT  (
Reported)
Nortriptyline HCl 10 MG CAPSULE   1 CAP PO DAILY mental health  (Reported)
Omeprazole 20 MG CAPSULE.DR   1 CAP PO DAILY ACID REFLUX  (Reported)
Oxycodone HCl/Acetaminophen (Oxycodone-Acetaminophen 5-325) 5 MG-325 MG TABLET  
1 TAB PO Q4H PRN PAIN  (Reported)
Potassium Chloride 20 MEQ TAB.ER.PRT   1 TAB PO DAILY SUPPLEMENT  (Reported)
Rifampin (Rifadin) 300 MG CAPSULE   300 MG PO BID MRSA  (Reported)
Sotalol (Betapace) 80 MG TABLET   80 MG PO BID AARYTHMIAS
Tiotropium Bromide (Spiriva) 18 MCG CAP.W.DEV   1 CAP INH DAILY BREATHING 
PROBLEMS  (Reported)
Trazodone HCl 50 MG TABLET   1 TAB PO QHS agitation  (Reported)
 
Current Medications:
 Current Medications
 
 
  Sig/[place] Start time  Last
 
Medication Dose Route Stop Time Status Admin
 
Acetaminophen 650 MG .STK-MED ONE 2147 DC 
 
  PO  214  
 
Acetaminophen 650 MG Q6P PRN  2345 AC 
 
  PO   2149
 
Albuterol Sulfate 3 ML EVERY 4 HRS/AWAKE  2000 AC 
 
  INH   1145
 
Albuterol Sulfate 2 PUF Q6P PRN  2345 AC 
 
  INH   
 
Apixaban 5 MG BID  0900 AC 
 
  PO   0817
 
Baclofen 20 MG BID  0900 AC 
 
  PO   0817
 
Bisacodyl 10 MG Q48  1645 AC 
 
  GA   1000
 
Budesonide/ 2 PUF BID  0900 AC 
 
Formoterol Fumarate  INH   0815
 
Ceftriaxone Sodium 1,000 MG DAILY@0000 07/15 0000 DC 
 
  IV   2332
 
Docusate Sodium 100 MG DAILY  0900 AC 
 
  PO   0817
 
Furosemide 60 MG Q48  1800 AC 
 
  PO   0807
 
Gabapentin 600 MG  AC 
 
  PO   0817
 
Guaifenesin 600 MG  AC 
 
  PO   0817
 
Hydroxyzine HCl 10 MG BID PRN  2345 AC 
 
  PO   
 
Magnesium Oxide 400 MG DAILY 900 AC 
 
  PO   0817
 
Metoprolol Succinate 25 MG DAILY 900 AC 
 
  PO   0817
 
Montelukast Sodium 10 MG DAILY 900 AC 
 
  PO   0817
 
Nortriptyline HCl 10 MG DAILY 900 AC 
 
  PO   0816
 
Omeprazole 20 MG DAILY 900 AC 
 
  PO   0816
 
Oxycodone/ 1 TAB Q4H PRN  2345 AC 
 
Acetaminophen  PO   1156
 
Patient Medication  1 ED ONE ONE 1945 DC 
 
Teaching  ED 1946  
 
Potassium Chloride 20 MEQ DAILY 900 AC 
 
  PO   0817
 
Sotalol HCl 80 MG  AC 
 
  PO   0817
 
Tiotropium [place] 1 PUF DAILY 900 AC 
 
  INH   0816
 
 
 
 
Past History
 
Medical History
Blood Transfusion Hx: Yes
Neurological: C6-7 ABSCESS PARAPLEGIA
EENT: NONE, cataracts
Cardiovascular: AFIB, hypertension, hyperlipidemia, myocardial infarction
Respiratory: COPD, OXYGEN DEPEND 2L
Gastrointestinal: NONE
Hepatic: NONE, hepatitis A
Renal: neurogenic bladder
Musculoskeletal: PARAPLEGIA R/T ABSCESS
Psychiatric: anxiety
Endocrine: NONE
Blood Disorders: NONE
Cancer(s): NONE
GYN/Reproductive: NONE
 
Surgical History
Pertinent Surgical History: non-contributory, appendectomy
 
Family History
Relations & Conditions If Any:
Relation not specified for:
  *No pertinent family history
 
 
Psychosocial History
Where Do You Live? Home
Who Do You Live With? partner
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
Primary Language: English
Smoking Status: Former Smoker
ETOH Use: denies use
Living Will? yes
 
Functional Ability
ADLs
Needs Assist: dressing, eating, toileting, bathing. 
Ambulation: wheelchair
IADLs
Independent: shopping, housework, finances, food prep, telephone, transportation
, medication admin. 
 
Review of Systems
 
Review of Systems
Constitutional:
Reports: chills. 
EENTM:
Denies: no symptoms. 
Cardiovascular:
Denies: no symptoms. 
Respiratory:
Denies: no symptoms. 
GI:
Reports: bloating. 
Genitourinary:
Reports: dysuria, frequency. 
Musculoskeletal:
Denies: no symptoms. 
Skin:
Denies: no symptoms. 
 
Exam & Diagnostic Data
Vital Signs and I&O
Vital Signs
 
 
Date Time Temp Pulse Resp B/P B/P Pulse O2 O2 Flow FiO2
 
     Mean Ox Delivery Rate 
 
 1148      98 Nasal 2.0L 
 
       Cannula  
 
 0817  75  130/82     
 
 0800       Nasal 2.0L 
 
       Cannula  
 
 0658      95 Nasal 2.0L 
 
       Cannula  
 
 0657 97.8 63 18 126/77  100   
 
 0037  65    97   
 
 0000      97 Nasal 2.0L 
 
       Cannula  
 
 2228 98.0 82 18 130/60  97 Nasal 2.0L 
 
       Cannula  
 
 2213  75    98   
 
 1635      96 Nasal 2.0L 
 
       Cannula  
 
 1600       Nasal 2.0L 
 
       Cannula  
 
 1500 98.7 76 18 100/60  98 Nasal 2.0L 
 
       Cannula  
 
 
 Intake & Output
 
 
  1600  0800  0000  1600  0800  0000
 
Intake Total  240  740 150 480
 
Output Total   300 650
 
Balance  -260 -350 -1260 -150 -170
 
       
 
Intake, IV     30 
 
Intake, Oral  240  740 120 480
 
Number    1  
 
Bowel      
 
Movements      
 
Output, Urine   300 650
 
 
 
 
Physical Exam
General Appearance: well developed/nourished, obese
Head: atraumatic
Eyes:
Bilateral: normal appearance. 
Ears, Nose, Throat: normal pharynx
Respiratory: normal breath sounds
Cardiovascular: regular rate/rhythm
Gastrointestinal: normal bowel sounds, soft
Back: no vertebral tenderness
Extremities: normal inspection
Skin: intact
Reproductive: Normal male genitalia
Imaging Results:
PATIENT: DAYDAY HOOVER  MEDICAL RECORD NO: [id number]
PRESENT AGE: 67  PATIENT ACCOUNT NO: [id number]
: [birth year]  LOCATION: 2NB
ORDERING PHYSICIAN: Gretchen Palumbo MD  
 
  SERVICE DATE: 07/15/
EXAM TYPE: CAT - CT ABD & PELVIS W/O IV CONTRAS
 
EXAMINATION:
CT ABDOMEN AND PELVIS WITHOUT CONTRAST
 
CLINICAL INFORMATION:
Presented with clogged Castro and dysuria. Rule out pyelonephritis or acute
process.
 
COMPARISON:
CT scan of the chest dated 2017. CT scan of the abdomen and pelvis
dated 11/15/2010.
 
TECHNIQUE:
Multidetector volumetric imaging was performed from the superior aspect of
the liver through the pubic symphysis. Sagittal and coronal reformatted
images were obtained on the technologist workstation.
 
DLP:
1145.29 mGy-cm.
 
FINDINGS:
 
LUNG BASES: There is a small solid noncalcified pleural-based nodule in the
right lower lobe (series 2, image 1), measuring 6 mm, unchanged from
2017 and 2 mm larger compared to 11/15/2010. This is of doubtful
clinical significance. Some scattered areas of dependent parenchymal opacity
is seen in both lower lobes, most consistent with atelectatic changes. No
significant pleural effusion is seen.
 
LIVER, GALLBLADDER, AND BILIARY TREE: The liver is normal in size, shape, and
attenuation. No focal hepatic lesion on noncontrast imaging. No biliary
ductal dilatation is present. This is likely a faintly calcified gallstone in
the gallbladder neck region. The gallbladder is otherwise unremarkable.
 
PANCREAS: Diffusely atrophic with fatty infiltration seen. Some calcific
densities are noted within the pancreatic head, possibly vascular versus
sequelae of previous chronic calcific pancreatitis similar findings were seen
on prior imaging dating back to . No discrete pancreatic mass. No
peripancreatic stranding.
 
SPLEEN, ADRENAL GLANDS: A 1 cm accessory splenule is seen in the splenic
hilar region. Spleen and adrenal glands unremarkable on noncontrast imaging.
 
 
KIDNEYS AND URETERS: The kidneys are normal in size, shape, and attenuation.
No hydronephrosis or hydroureter seen.
 
There are 2 tiny punctate calcific densities seen in the mid right kidney,
most likely vascular in etiology. In the lower pole of the left kidney, a 0.4
cm nonobstructing calcification is seen new from prior exam, likely a
nonobstructing stone. In addition, several punctate linearly oriented
calcifications is seen in the upper pole, most likely vascular in etiology.
No perinephric stranding.
 
In the mid to upper left kidney, a partially exophytic 2.8 x 3.6 cm fluid
attenuation mass is seen, consistent with a cyst, new compared to 2010. There
is a 1.6 cm lower pole right renal cyst, larger compared to 1.2 cm in 2010.
 
BLADDER: The bladder is 0.4 cm nonobstructing calculus is seen, new compared
to the prior exam. Decompressed by Castro catheter, likely accounting for the
diffusely thick-walled appearance. No definite bladder calculi are seen.
Evaluation of the bladder is, however, limited.
 
PELVIC VISCERA: Unremarkable.
 
GASTROINTESTINAL TRACT: The small and large bowel are unremarkable. The
appendix is nonvisualized.
 
ABDOMINAL WALL: There is extensive soft tissue edema and stranding seen in
the posterior medial right buttock and to a lesser extent in the medial left
buttock. Both of these areas of edema and stranding extend to the ischial
tuberosity bilaterally. Close clinical correlation is requested to exclude
pressure type injury with evolving decubitus ulcers or
inflammation/cellulitis with extension to the underlying bone. No definite
focal lytic lesion is seen involving the ischial tuberosities.
 
There is a small fat-containing umbilical hernia.
 
LYMPH NODES, VASCULAR: Moderate atherosclerotic calcifications of the aorta
and branch vessels, including the coronary arteries is seen. No periaortic
collections. An IVC filter is in place with tip at the distal left renal
vein.
 
OSSEOUS STRUCTURES: Diffuse osteopenia is seen. Mild wedge compression
deformity of the L1 vertebral body is seen, unchanged from the 2010 exam.
There is mild superior endplate compression of the L2 and L3 vertebral
bodies, new/progressive compared to the prior exam. Moderate facet
arthropathy is seen in the lower lumbar spine. There is a convex right lumbar
curvature, likely positional. There is subtle sclerotic density in the
femoral heads bilaterally, not significantly changed since 2010.
 
IMPRESSION:
 
1. Nonobstructing mid left renal calcification, new from prior study.
2. Right renal calcifications are most likely vascular.
3. Bilaterally, no obstructing stone is seen.
4. Bilateral small renal cysts.
5. Other incidental findings include:
a. A 6 mm pleural-based nodule in the right lower lobe, unchanged from
2017, most consistent with a benign etiology
b. Probable faintly calcified gallstone in the gallbladder neck region.
Gallbladder otherwise unremarkable.
c. Atrophic fatty infiltrated pancreas.
d. Question of evolving decubitus ulcers versus cellulitis/soft tissue edema
and inflammation in the posterior buttocks, with the inflammatory reaction
extending down to the ischial tuberosities bilaterally.
e. Osteopenia with several compression deformities in the spine.
 
DICTATED BY: Carmen Ortiz MD 
DATE/TIME DICTATED:07/15/18 / 1533
:RAD.TESFAYE 
DATE/TIME TRANSCRIBED:07/15/18 / 1533
 
Assessment/Plan
Assessment/Plan
CALLED IN FOR SUPSECTED UTI/UROSEPSIS/PYELONEPHRITIS:  CULTURES PENDING, 
RECOMMEND CT ABD/PELVIS OR AT LEAST RENAL US/KUB TO IMAGE  UPPERTRACTS
 
Copies To:
Donis JONES,Justice
 
Consult Acknowledgment
- Thank you for your consult request.
 
Attending MD Review Statement
 
Attending Statement
Attending MD Statement: examined this patient, discuss w/resident/PA/NP
Attending Assessment/Plan:
CALLED IN FOR SUPSECTED UTI/UROSEPSIS/PYELONEPHRITIS:  CULTURES PENDING, 
RECOMMEND CT ABD/PELVIS OR AT LEAST RENAL US/KUB TO IMAGE  UPPERTRACTS

## 2018-07-16 VITALS — SYSTOLIC BLOOD PRESSURE: 100 MMHG | DIASTOLIC BLOOD PRESSURE: 60 MMHG

## 2018-07-16 VITALS — SYSTOLIC BLOOD PRESSURE: 130 MMHG | DIASTOLIC BLOOD PRESSURE: 60 MMHG

## 2018-07-16 VITALS — SYSTOLIC BLOOD PRESSURE: 120 MMHG | DIASTOLIC BLOOD PRESSURE: 73 MMHG

## 2018-07-16 NOTE — CONS- INFECT DISEASE
General Information and HPI
 
Consulting Request
Date of Consult: 07/16/18
Requested By:
Sandra Serrato MD
 
Reason for Consult:
Rule out ischial osteomyelitis
Source of Information: patient, family, old records
History of Present Illness:
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
epidural abscess, maintained on Rifampin and Doxycycline suppression, DVT, 
status post IVC filter, with a history of hypertension, atrial fibrillation, 
maintained on Eliquis, coronary artery disease, status post stents, COPD, 
maintained on 2 L of oxygen, obstructive sleep apnea, hospitalized multiple 
times over the past year for COPD exacerbations, treated with multiple courses 
of antibiotics, with a neurogenic bladder, for which he was previously straight 
cathed but with a Castro catheter for the past year, last hospitalized 2 months 
prior to admission with hematuria related to trauma, begun on Ciprofloxacin on 
the day of admission, apparently because of increased sediment, admitted on July
13 with a clogged Castro and hematuria following removal of the Castro catheter by
his home health aide, with no associated fevers, chills or other symptoms.  On 
admission he was afebrile.  Laboratory data revealed a white blood cell count of
20,000, BUN/creatinine 21 and 0.6, with normal liver enzymes.  Urinalysis 5-10 
RBC/greater than 75 WBCs.  Chest x-ray revealed low lung volumes with bibasilar 
atelectasis and a small left pleural effusion.  A Castro catheter was inserted in
the emergency room.  He was given Ceftriaxone and Azithromycin and was then 
continued on Ceftriaxone alone.  Of note the Rifampin and Doxycycline were not 
continued.  A CT of the abdomen and pelvis was performed on July 15, revealing a
question of inflammation in the posterior buttocks extending to the ischial 
tuberosities.  He has remained afebrile since admission.  At present he does not
offer any complaints. 
 
 
Allergies/Medications
Allergies:
Coded Allergies:
heparin (Intermediate, SWELLING, RASH 04/16/18)
vancomycin (Intermediate, HIVES, SKIN CRACKES, TURNS RED, SWELLS AND PEELS 04/16
/18)
warfarin (From COUMADIN) (Intermediate, NOT EFFECTIVE CAN NOT GET LEVELS ABOVE 1
06/03/18)
 
Home Med List:
Albuterol Sulfate (Ventolin Hfa) 90 MCG HFA.AER.AD   2 PUF INH AD PRN COPD  (
Reported)
Albuterol Sulfate 2.5 MG/3 ML (0.083 %) VIAL.NEB   1 Vial INH/SOL Q4P PRN COPD  
(Reported)
Apixaban (Eliquis) 5 MG TABLET   5 MG PO BID atrial fibrillation
Ascorbate Calcium (Vitamin C) 500 MG TABLET   1 TAB PO BID SUPPLEMENT  (Reported
)
Atomoxetine HCl (Strattera) 40 MG CAPSULE   1 CAP PO QAM MENTAL HEALTH  (
Reported)
Baclofen 20 MG TABLET   1 TAB PO BID MUSCLE RELAXER  (Reported)
Bisacodyl (Dulcolax) 10 MG SUPP.RECT   1 SUP RC Q48 GI  (Reported)
Budesonide/Formoterol Fumarate (Symbicort 160-4.5 Mcg Inhaler) 160 MCG-4.5 MCG/
ACTUATION HFA.AER.AD   2 PUF INH BID copd  (Reported)
Docusate Sodium (Stool Softener) 100 MG CAPSULE   1 CAP PO DAILY STOOL SOFTENER 
(Reported)
Evolocumab (Repatha Sureclick) 140 MG/ML PEN.INJCTR   1 ML SC Q 2 WEEKS 
CHOLESTEROL  (Reported)
Furosemide 20 MG TABLET   3 TAB PO Q48 DIURETIC  (Reported)
Gabapentin 300 MG CAPSULE   2 CAP PO BID NERVE PAIN  (Reported)
Guaifenesin (Mucinex) 600 MG TAB.ER.12H   1 TAB PO BID CONGESTION  (Reported)
Hydroxyzine HCl (hydrOXYzine HCl) 10 MG TABLET   1 TAB PO BID PRN ANXIETY  (
Reported)
Lactobacillus Acidophilus (Acidophilus) 1 EACH CAPSULE   1 CAP PO BID PROBIOTIC 
(Reported)
Magnesium Oxide (Magnesium) 400 MG CAPSULE   1 CAP PO 0600 SUPPLEMENT  (Reported
)
Metoprolol Succ XL (Toprol XL) 25 MG TAB   1 TAB PO DAILY HEART  (Reported)
Montelukast Sodium (Singulair) 10 MG TABLET   1 TAB PO DAILY ALLERGIES  (
Reported)
Multivitamin (Daily Value) 1 EACH TABLET   1 TAB PO DAILY VITAMIN SUPPORT  (
Reported)
Nortriptyline HCl 10 MG CAPSULE   1 CAP PO DAILY mental health  (Reported)
Omeprazole 20 MG CAPSULE.DR   1 CAP PO DAILY ACID REFLUX  (Reported)
Oxycodone HCl/Acetaminophen (Oxycodone-Acetaminophen 5-325) 5 MG-325 MG TABLET  
1 TAB PO Q4H PRN PAIN  (Reported)
Potassium Chloride 20 MEQ TAB.ER.PRT   1 TAB PO DAILY SUPPLEMENT  (Reported)
Rifampin (Rifadin) 300 MG CAPSULE   300 MG PO BID MRSA  (Reported)
Sotalol (Betapace) 80 MG TABLET   80 MG PO BID AARYTHMIAS
Tiotropium Bromide (Spiriva) 18 MCG CAP.W.DEV   1 CAP INH DAILY BREATHING 
PROBLEMS  (Reported)
Trazodone HCl 50 MG TABLET   1 TAB PO QHS agitation  (Reported)
 
 
Past History
 
Travel History
Traveled to [place] past 21 day No
 
Medical History
Blood Transfusion Hx: Yes
Neurological: T6-7 EPIDURAL ABSCESS PARAPLEGIA
EENT: cataracts
Cardiovascular: AFIB, CAD (S/P STENT), hypertension, hyperlipidemia, myocardial 
infarction
Respiratory: COPD, OXYGEN DEPEND 2L
Gastrointestinal: NONE
Hepatic: hepatitis A
Renal: neurogenic bladder
Psychiatric: anxiety
Endocrine: NONE
Blood Disorders: DVT
Cancer(s): NONE
GYN/Reproductive: NONE
History of MRSA: Yes
History of VRE: Yes
History of CDIFF: No
Isolation History: Contact
 
Surgical History
Surgical History: appendectomy
 
Family History
Relations & Conditions If Any:
Relation not specified for:
  *No pertinent family history
 
 
Psychosocial History
Where Do You Live? Home
Who Do You Live With? partner
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
Primary Language: English
Smoking Status: Former Smoker
ETOH Use: denies use
Living Will? yes
 
Functional Ability
ADLs
Needs Assist: dressing, eating, toileting, bathing. 
Ambulation: wheelchair
IADLs
Independent: shopping, housework, finances, food prep, telephone, transportation
, medication admin. 
 
Review of Systems
 
Review of Systems
GI:
Reports: constipation. 
All Other Systems: Reviewed and Negative
 
Exam & Diagnostic Data
Last 24 Hrs of Vital Signs/I&O
 Vital Signs
 
 
Date Time Temp Pulse Resp B/P B/P Pulse O2 O2 Flow FiO2
 
     Mean Ox Delivery Rate 
 
07/16 0921       Nasal  
 
       Cannula  
 
07/16 0810  65  120/73     
 
07/16 0800      97 Nasal 2.0L 
 
       Cannula  
 
07/16 0639 97.5 65 20 120/73  97 BIPAP  
 
07/16 0200      96 BIPAP  
 
07/16 0007  79    97   
 
07/15 2221  89    96   
 
07/15 2040      97 Nasal 2.0L 
 
       Cannula  
 
07/15 2030 98.0 85 18 106/54  98 Nasal 2.0L 
 
       Cannula  
 
07/15 1600       Nasal 2.0L 
 
       Cannula  
 
 
 Intake & Output
 
 
 07/16 1600 07/16 0800 07/16 0000
 
Intake Total 740 150 480
 
Output Total 2000 300 650
 
Balance -1260 -150 -170
 
    
 
Intake, IV  30 
 
Intake, Oral 740 120 480
 
Number 1  
 
Bowel   
 
Movements   
 
Output, Urine 2000 300 650
 
 
 
 
Physical Exam
Other Physical Findings:
He is awake and alert in no acute distress.  He is afebrile.  Skin reveals no 
rash.  HEENT exam is negative.  Neck is supple with no adenopathy.  Lungs are 
clear.  Heart regular rhythm with no murmur.  Abdomen is obese, soft, nontender 
with positive bowel sounds.  Back no CVA tenderness; stage II decubitus over the
coccyx and a stage II decubitus over the right buttock, with several 
excoriations.  Extremities no cyanosis, clubbing or edema.  Neuro paraplegia.  
 Castro catheter is in place, with clear urine.
 
Last 24 Hours of Lab Results:
 Laboratory Tests
 
 
 07/15
 
 0712
 
Hematology 
 
  CBC w Diff NO MAN DIFF REQ
 
  WBC (4.8 - 10.8 /CUMM) 10.6
 
  RBC (4.70 - 6.10 /CUMM) 3.78  L
 
  Hgb (14.0 - 18.0 G/DL) 10.8  L
 
  Hct (42 - 52 %) 33.3  L
 
  MCV (80.0 - 94.0 FL) 88.1
 
  MCH (27.0 - 31.0 PG) 28.5
 
  MCHC (33.0 - 37.0 G/DL) 32.3  L
 
  RDW (11.5 - 14.5 %) 14.9  H
 
  Plt Count (130 - 400 /CUMM) 270
 
  MPV (7.4 - 10.4 FL) 7.9
 
  Gran % (42.2 - 75.2 %) 64.5
 
  Lymphocytes % (20.5 - 51.1 %) 20.7
 
  Monocytes % (1.7 - 9.3 %) 9.5  H
 
  Eosinophils % (0 - 5 %) 5.1  H
 
  Basophils % (0.0 - 2.0 %) 0.2
 
  Absolute Granulocytes (1.4 - 6.5 /CUMM) 6.8  H
 
  Absolute Lymphocytes (1.2 - 3.4 /CUMM) 2.2
 
  Absolute Monocytes (0.10 - 0.60 /CUMM) 1.0  H
 
  Absolute Eosinophils (0.0 - 0.7 /CUMM) 0.5
 
  Absolute Basophils (0.0 - 0.2 /CUMM) 0
 
 
 
Last 24 Hours of Stalin Results:
Urine culture July 13 approximately 10,000 colonies of Proteus resistant to 
Ciprofloxacin and Nitrofurantoin 
 
Urine culture July 14 negative
 
 
Diagnostic Data
Recent Imaging Findings:
Chest x-ray July 13 reveals chronic changes of COPD, low lung volumes with 
bibasilar atelectasis and a small left pleural effusion
 
CT of the abdomen and pelvis without contrast July 15 reveals extensive soft 
tissue edema and stranding in the posterior medial right buttock and, to a  
lesser extent, in the medial left buttock, extending to the ischial tuberosity 
bilaterally, with no definite focal lytic lesion seen involving the ischial 
tuberosities; nonobstructing mid left renal calcification
 
 
Assessment/Plan
 
Assessment/Plan
Impression:
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
epidural abscess, maintained on Rifampin and Doxycycline suppression, 
hospitalized multiple times over the past year for COPD exacerbations, with a 
neurogenic bladder, for which he has had a Castro catheter for the past year, 
begun on Ciprofloxacin on the day of admission, apparently because of increased 
sediment, admitted on July 13 with a clogged Castro and hematuria following 
removal of the Castro catheter, found to be afebrile with a leukocytosis and 
pyuria, with a CT of the abdomen pelvis revealing a question of inflammation in 
the posterior buttocks extending to both ischial tuberosities.
 
The significance of the CT findings is unclear.  He does have stage II decubiti 
over the coccyx and right buttock, with several scattered excoriations, but 
these are apparently new and do not appear to be deep and, therefore, are 
unlikely to extend to the bone.  The role of suppressive antibiotics, which have
not been reordered since admission, is unclear and further imaging, in 
particular an MRI of the thoracic spine, has been recommended in the past to 
evaluate for any residual collection, for which continued antibiotic suppression
might be reasonable.  His positive urine culture, with only 10,000 colonies, is 
of unclear significance with the chronic indwelling Castro catheter and, though 
he did have a leukocytosis on admission, this may have been in part secondary to
hemoconcentration and, therefore, feel that the Ceftriaxone can be discontinued.
 Have discussed alternatives to the Castro catheter, including suprapubic 
cystostomy, with the patient, but he is not agreeable to this at this time. 
 
Suggestion:
1.  MRI of the thoracic, lumbar and sacral spine with contrast
2.  Would consider alternatives to the Castro catheter, including suprapubic 
cystostomy or resuming a straight catheterization protocol, if patient agreeable
3.  Discontinue Ceftriaxone and follow off antibiotics 
 
 
 
Consult Acknowledgment
- Thank you for your consult request.

## 2018-07-16 NOTE — PN- HOUSESTAFF
**See Addendum**
Subjective
Follow-up For:
UTI, Sacral Decubiti present prior to admission
Subjective:
Pt seen and examined at bedside. States he is doing better. States mild amount 
of pain at meatus. States productive cough which is looser than yesterday. 
Denies shortness of breath. Denies back pain. States decubiti ulcer was present 
approximately 2 weeks prior to admission. States wound nurse has been dressing 
it. No acute events overnight. Denies fevers/chills/night sweats/chest pain/
abdominal pain/lower extremity edema.
 
Review of Systems
Constitutional:
Reports: see HPI. 
 
Objective
Last 24 Hrs of Vital Signs/I&O
 Vital Signs
 
 
Date Time Temp Pulse Resp B/P B/P Pulse O2 O2 Flow FiO2
 
     Mean Ox Delivery Rate 
 
 0921       Nasal  
 
       Cannula  
 
 0810  65  120/73     
 
 0800      97 Nasal 2.0L 
 
       Cannula  
 
 0639 97.5 65 20 120/73  97 BIPAP  
 
 0200      96 BIPAP  
 
 0007  79    97   
 
07/15 2221  89    96   
 
07/15 2040      97 Nasal 2.0L 
 
       Cannula  
 
07/15 2030 98.0 85 18 106/54  98 Nasal 2.0L 
 
       Cannula  
 
07/15 1600       Nasal 2.0L 
 
       Cannula  
 
07/15 1400 98.0 79 17 118/58  94 Nasal 2.0L 
 
       Cannula  
 
 
 Intake & Output
 
 
  1600  0800  0000
 
Intake Total  150 480
 
Output Total  300 650
 
Balance  -150 -170
 
    
 
Intake, IV  30 
 
Intake, Oral  120 480
 
Output, Urine  300 650
 
 
 
 
Physical Exam
General Appearance: Alert, Oriented X3, Cooperative, No Acute Distress
Skin: sacral decubiti stage 3, dressing clean/dry/intact, no drainage noted; 
skin pink
Cardiovascular: Regular Rate, Normal S1, Normal S2
Lungs: Clear to Auscultation, Normal Air Movement
Abdomen: Soft, No Tenderness
Neurological: Normal Speech
Extremities: No Edema
Current Medications:
 Current Medications
 
 
  Sig/[place] Start time  Last
 
Medication Dose Route Stop Time Status Admin
 
Acetaminophen 650 MG Q6P PRN  2345 AC 
 
  PO   1419
 
Albuterol Sulfate 3 ML EVERY 4 HRS/AWAKE 2000 AC 
 
  INH   0917
 
Albuterol Sulfate 2 PUF Q6P PRN  2345 AC 
 
  INH   
 
Apixaban 5 MG BID  09 AC 
 
  PO   0807
 
Baclofen 20 MG BID  0900 AC 
 
  PO   0809
 
Bisacodyl 10 MG Q48  1645 AC 
 
  AR   1845
 
Budesonide/ 2 PUF BID  0900 AC 
 
Formoterol Fumarate  INH   0811
 
Ceftriaxone Sodium 1,000 MG DAILY@0000 07/15 0000 AC 07/15
 
  IV   2345
 
Docusate Sodium 100 MG DAILY  0900 AC 
 
  PO   0807
 
Furosemide 60 MG Q48  1800 AC 
 
  PO   0807
 
Gabapentin 600 MG BID  0900 AC 
 
  PO   0809
 
Guaifenesin 600 MG BID  0900 AC 
 
  PO   0807
 
Hydroxyzine HCl 10 MG BID PRN  2345 AC 
 
  PO   
 
Magnesium Oxide 400 MG DAILY  0900 AC 
 
  PO   0809
 
Metoprolol Succinate 25 MG DAILY  0900 AC 
 
  PO   0810
 
Montelukast Sodium 10 MG DAILY  0900 AC 
 
  PO   0809
 
Nortriptyline HCl 10 MG DAILY  0900 AC 
 
  PO   0812
 
Omeprazole 20 MG DAILY  0900 AC 
 
  PO   0810
 
Oxycodone/ 1 TAB Q4H PRN  2345 AC 
 
Acetaminophen  PO   0535
 
Potassium Chloride 20 MEQ DAILY  0900 AC 
 
  PO   0807
 
Sotalol HCl 80 MG BID  0900 AC 
 
  PO   0810
 
Tiotropium [place] 1 PUF DAILY  0900 AC 
 
  INH   0811
 
 
 
 
Assessment/Plan
Assessment:
Mr. Mandujano is a 67-year-old male with past medical history of atrial fibrillation
on apixaban, MI status post 2 stents, COPD on 2 L nasal cannula, and paraplegia 
with an indwelling Castro followed by Dr. Dykes who presents with a clogged 
Castro and dysuria of several day duration.  Being treated for catheter 
associated UTI
 
#Catheter associated UTI


culture and sensitivity

scan yesterday, no evidence of pyelo or reflux

-Leukocytosis 15.5 trended down to 10.6 yesterday, leukocytosis is resolved
 
#Bedsores (prior to admission)


on 7/15. Will get official wound care consult today.
-Will get ID consult, in light of abnormal CT findings with inflammation to 
ischial tuberosities to help guide treatment if cellulitis/osteomyelitis.
-On decubiti precautions
 
DVT prophylaxis
IV access
Tolerating regular diet
Disposition
DNR/DNI
 
Problem List:
 1. Castro catheter problem
 
 2. Hematuria
 
Pain Ratin
Pain Location:
na
Pain Goal: Remain pain free
Pain Plan:
pathway
Tomorrow's Labs & Rationales:
na

## 2018-07-16 NOTE — PN- STUDENT
Subjective
Subjective:
Hospital day 2:
Patient was interviewed and examined at bed side. Patient's chief complaint was 
dysuria for several days with clogged Castro. Patient stated he was better 
compared to yesterday. He had mild dysuria, mild productive cough. His sacral 
decubitus ulcer was present 2 weeks prior to his admission. Patient denied back 
pain, fever, chills, sweating, SOB. 
 
Objective
Objective:
Physical exam:
 
- Vital signs: T: 97.5F // P: 65 // RR: 20 // BP: 120/73 // SpO2: 97
- HEENT: PEERLA, EOMI. 
- Neck: unremarkable. 
- Cardiac: normal S1, S2, no addiitonal murmur nor gallop. 
- Lungs: CTA bilaterally. 
- Skin: stage 3 sacral decubitus ulcer, no abcess, no discharge, pink with no 
tenderness, negative CVA.  
- Extremities: no edema. 
 
Labs:
- His leukocytosis went down from 19.7 to 10.6. 
- Urine culture growed Gram negative rods. 
 
Imaging:
- CT scan abd and pelvic showed bilateral renal cysts, non-obstructive 
calcification to his left kidney, a small nodule 6mm on RLL that did not changed
from 7/20/2018 and increase 2mm compared to 11/15/2010. 
 
Results
Results:
Laboratory Tests
 
07/15/18 0712:
CBC w Diff NO MAN DIFF REQ, RBC 3.78  L, MCV 88.1, MCH 28.5, MCHC 32.3  L, RDW 
14.9  H, MPV 7.9, Gran % 64.5, Lymphocytes % 20.7, Monocytes % 9.5  H, 
Eosinophils % 5.1  H, Basophils % 0.2, Absolute Granulocytes 6.8  H, Absolute 
Lymphocytes 2.2, Absolute Monocytes 1.0  H, Absolute Eosinophils 0.5, Absolute 
Basophils 0
 
07/14/18 0610:
Anion Gap 8, Estimated GFR > 60, BUN/Creatinine Ratio 34.0  H, Magnesium 1.9, 
CBC w Diff NO MAN DIFF REQ, RBC 3.95  L, MCV 87.8, MCH 28.6, MCHC 32.6  L, RDW 
14.5, MPV 8.7, Gran % 73.8, Lymphocytes % 16.5  L, Monocytes % 6.5, Eosinophils 
% 2.9, Basophils % 0.3, Absolute Granulocytes 11.4  H, Absolute Lymphocytes 2.6,
Absolute Monocytes 1.0  H, Absolute Eosinophils 0.5, Absolute Basophils 0
 
07/13/18 2211:
Lactic Acid Cancelled
 
07/13/18 2106:
Urine Color YEL, Urine Clarity CLDY  H, Urine pH 6.5, Ur Specific Gravity 1.010,
Urine Protein 30  H, Urine Ketones 15  H, Urine Nitrite NEG, Urine Bilirubin NEG
, Urine Urobilinogen 0.2, Ur Leukocyte Esterase LARGE  H, Ur Microscopic 
SEDIMENT EXAMINED, Urine RBC 5-10  H, Urine WBC > 75  H, Ur Epithelial Cells 
RARE, Urine Bacteria FEW  H, Urine Hemoglobin LARGE  H, Urine Glucose NEG
 
07/13/18 1925:
Anion Gap 9, Estimated GFR > 60, BUN/Creatinine Ratio 35.0  H, Glucose 109  H, 
Lactic Acid 0.9, Calcium 9.2, Total Bilirubin 0.6, AST 31, ALT 44, Alkaline 
Phosphatase 94, Troponin I < 0.01, Pro-B-Natriuretic Pept 286  H, Total Protein 
6.9, Albumin 3.3  L, Globulin 3.6, Albumin/Globulin Ratio 0.9  L, D-Dimer High 
Sensitivty < 200, CBC w Diff MAN DIFF ORDERED, RBC 4.29  L, MCV 87.2, MCH 28.5, 
MCHC 32.7  L, RDW 14.9  H, MPV 8.8, Gran % 77.8  H, Lymphocytes % 12.4  L, 
Monocytes % 8.1, Eosinophils % 1.7, Basophils % 0, Absolute Granulocytes 15.3  H
, Segmented Neutrophils 76  H, Absolute Lymphocytes 2.4, Lymphocytes 12  L, 
Monocytes 9, Absolute Monocytes 1.6  H, Eosinophils 3, Absolute Eosinophils 0.3,
Absolute Basophils 0, Platelet Estimate VERIFIED BY SMEAR, Normocytic RBCs 
VERIFIED, Normochromic RBCs VERIFIED, Fld Total RBCs Counted 100
 
07/13/18 1915:
Troponin I Cancelled
 Microbiology
07/14 1746  URINE ROUT: Urine Culture - CAN
                Cancelled: DUPLICATE
07/14 0051  URINE ROUT: Urine Culture - RES
07/13 2106  URINE ROUT: Urine Culture - RES
                GRAM NEGATIVE RODS
 
 
 
Assessment/Plan
Assessment:
 
Summary:
67-year-old male with PMH of A-fib, MI status post 2 stents, COPD on 2L nasal 
canula and paraplegia with an indwelling Castro catheter followed by Dr. Dykes, 
presented with dysuria for several days and clogged Castro. His leukocytosis went
down to 10.6 which was resolved. Urine culture growed Gram negative rods. CT 
scan showed bilateral renal cysts, non-obstructive calcification on left kidney 
and small nodules 6mm on RLL. 
 
Problem list:
- Catheter-related UTI.
- Sacral decubitus ulcer. 
- A-fib.
- COPD. 
- Paraplegia. 
Plan:
 
Catheter-related UTI:
- Continue IV Ceftriaxone. 
- Follow up on urine culture and change to different antibiotcs based on 
sensitivity. 
- Follow up recommendations from urology. 
 
Sacral decubitus ulcer:
- Wound care consult today. 
- ID consult for possible of cellulitis/osteomyelitis. 
 
- Patient is DVT prophylaxis.
- Patient is DNR/DNI.

## 2018-07-17 VITALS — DIASTOLIC BLOOD PRESSURE: 58 MMHG | SYSTOLIC BLOOD PRESSURE: 110 MMHG

## 2018-07-17 VITALS — DIASTOLIC BLOOD PRESSURE: 50 MMHG | SYSTOLIC BLOOD PRESSURE: 110 MMHG

## 2018-07-17 VITALS — SYSTOLIC BLOOD PRESSURE: 126 MMHG | DIASTOLIC BLOOD PRESSURE: 77 MMHG

## 2018-07-17 NOTE — PN- INFECT DX
Subjective
Subjective:
Afebrile without complaints.
 
Objective
Last 24 Hrs of Vital Signs/I&O
 Vital Signs
 
 
Date Time Temp Pulse Resp B/P B/P Pulse O2 O2 Flow FiO2
 
     Mean Ox Delivery Rate 
 
07/17 1148      98 Nasal 2.0L 
 
       Cannula  
 
07/17 0817  75  130/82     
 
07/17 0800       Nasal 2.0L 
 
       Cannula  
 
07/17 0658      95 Nasal 2.0L 
 
       Cannula  
 
07/17 0657 97.8 63 18 126/77  100   
 
07/17 0037  65    97   
 
07/17 0000      97 Nasal 2.0L 
 
       Cannula  
 
07/16 2228 98.0 82 18 130/60  97 Nasal 2.0L 
 
       Cannula  
 
07/16 2213  75    98   
 
07/16 1635      96 Nasal 2.0L 
 
       Cannula  
 
07/16 1600       Nasal 2.0L 
 
       Cannula  
 
07/16 1500 98.7 76 18 100/60  98 Nasal 2.0L 
 
       Cannula  
 
 
 Intake & Output
 
 
 07/17 1600 07/17 0800 07/17 0000
 
Intake Total  240 
 
Output Total  500 350
 
Balance  -260 -350
 
    
 
Intake, Oral  240 
 
Output, Urine  500 350
 
 
 
 
Physical Exam
Other Physical Findings:
He appears comfortable in no acute distress
Lungs decreased breath sounds bilaterally with no wheezes or rhonchi
Heart regular rhythm with no murmur
Extremities no cyanosis, clubbing or edema
 Castro catheter remains in place
 
 
Results
Last 24 Hours of Lab Results:
No labs from today
 
Last 24 Hours of Stalin Results:
No new cultures
 
 
Assessment/Plan ID
Impression:
Stable, with temperatures and white blood cell count remaining normal, now off 
antibiotics, with his urine culture in the setting of a Castro catheter positive 
for approximately 10,000 colonies of Proteus, likely representing colonization 
or contamination.  As previously noted he was on a straight cath schedule in the
past and is willing to try this again in place of the Castro catheter.  With 
regard to his CT findings of extensive soft tissue edema and stranding in the 
buttocks, extending to the ischial tuberosities, is unclear and he is scheduled 
for an MRI in the a.m. to evaluate the ischial tuberosities as well as to rule 
out any residual epidural collection in the T6-T7 area, for which he has been on
antibiotic suppression (with Doxycycline and Rifampin) for approximately 10 
years.  Of note these were not continued on admission and, therefore, he remains
off of them at this time.
 
Suggestion:
1.  Await MRI of the thoracic, lumbar and sacral spine
2.  Remove Castro catheter and initiate straight cath protocol
3.  Continue to follow off antibiotics

## 2018-07-17 NOTE — PN- HOUSESTAFF
Efraín Hernandez 18 0823:
Subjective
Follow-up For:
Catheter associated UTI; Sacral Decubiti
Subjective:
Patient seen and examined at bedside.  Patient denies any complaints.  Patient 
states his cough is getting much better.  Patient also states that he has less 
pain at the meatus.  Patient overall thinks that he is doing much better.  
Patient is amenable to MRI, states that he has a shellfish allergy, is unsure if
the gadolinium dye will react with that.  Denies fever/chills/night sweats/chest
pain/abdominal pain/urinary symptoms/lower extremity edema.  States that there 
is some drainage from the sacral ulcer.
 
Review of Systems
Constitutional:
Reports: see HPI. 
 
Objective
Last 24 Hrs of Vital Signs/I&O
 Vital Signs
 
 
Date Time Temp Pulse Resp B/P B/P Pulse O2 O2 Flow FiO2
 
     Mean Ox Delivery Rate 
 
 1148      98 Nasal 2.0L 
 
       Cannula  
 
 0817  75  130/82     
 
 0800       Nasal 2.0L 
 
       Cannula  
 
 0658      95 Nasal 2.0L 
 
       Cannula  
 
 0657 97.8 63 18 126/77  100   
 
 0037  65    97   
 
 0000      97 Nasal 2.0L 
 
       Cannula  
 
 2228 98.0 82 18 130/60  97 Nasal 2.0L 
 
       Cannula  
 
 2213  75    98   
 
 1635      96 Nasal 2.0L 
 
       Cannula  
 
 1600       Nasal 2.0L 
 
       Cannula  
 
 1500 98.7 76 18 100/60  98 Nasal 2.0L 
 
       Cannula  
 
 
 Intake & Output
 
 
  1600  0800  0000
 
Intake Total  240 
 
Output Total  500 350
 
Balance  -260 -350
 
    
 
Intake, Oral  240 
 
Output, Urine  500 350
 
 
 
 
Physical Exam
General Appearance: Alert, Oriented X3, Cooperative, No Acute Distress
Skin: sacral decubiti stage 3, dressing c/d/i, no drainage noted, wound pink
Skin Temp/Moisture Exam: Warm/Dry
Cardiovascular: Regular Rate, Normal S1, Normal S2
Lungs: Clear to Auscultation, Normal Air Movement
Abdomen: Soft, No Tenderness
Neurological: Normal Speech
Extremities: No Edema
Reproductive (MALE) marquez in place
Current Medications:
 Current Medications
 
 
  Sig/[place] Start time  Last
 
Medication Dose Route Stop Time Status Admin
 
Acetaminophen 650 MG .STK-MED ONE 2147 DC 
 
  PO 2148  
 
Acetaminophen 650 MG Q6P PRN 5 AC 
 
  PO   2149
 
Albuterol Sulfate 3 ML EVERY 4 HRS/AWAKE  2000 AC 
 
  INH   1145
 
Albuterol Sulfate 2 PUF Q6P PRN  2345 AC 
 
  INH   
 
Apixaban 5 MG BID  0900 AC 
 
  PO   0817
 
Baclofen 20 MG BID  0900 AC 
 
  PO   0817
 
Bisacodyl 10 MG Q48  1645 AC 
 
  LA   1000
 
Budesonide/ 2 PUF BID  0900 AC 
 
Formoterol Fumarate  INH   0815
 
Ceftriaxone Sodium 1,000 MG DAILY@0000 07/15 0000 DC 
 
  IV   2332
 
Docusate Sodium 100 MG DAILY  0900 AC 
 
  PO   0817
 
Furosemide 60 MG Q48  1800 AC 
 
  PO   0807
 
Gabapentin 600 MG BID  0900 AC 
 
  PO   0817
 
Guaifenesin 600 MG BID  0900 AC 
 
  PO   0817
 
Hydroxyzine HCl 10 MG BID PRN  2345 AC 
 
  PO   
 
Magnesium Oxide 400 MG DAILY  0900 AC 
 
  PO   0817
 
Metoprolol Succinate 25 MG DAILY  0900 AC 
 
  PO   0817
 
Montelukast Sodium 10 MG DAILY  0900 AC 
 
  PO   0817
 
Nortriptyline HCl 10 MG DAILY  0900 AC 
 
  PO   0816
 
Omeprazole 20 MG DAILY  0900 AC 
 
  PO   0816
 
Oxycodone/ 1 TAB Q4H PRN  2345 AC 
 
Acetaminophen  PO   1156
 
Patient Medication  1 ED ONE ONE 1945 OK 
 
Teaching  ED 1946  
 
Potassium Chloride 20 MEQ DAILY  0900 AC 
 
  PO   0817
 
Sotalol HCl 80 MG BID  0900 AC 
 
  PO   0817
 
Tiotropium [place] 1 PUF DAILY  0900 AC 
 
  INH   0816
 
 
 
 
Assessment/Plan
Assessment:
Mr. Mandujano is a 67-year-old male with past medical history of atrial fibrillation
on apixaban, MI status post 2 stents, COPD on 2 L nasal cannula, and paraplegia 
with an indwelling Marquez followed by Dr. Dykes who presents with a clogged 
Marquez and dysuria of several day duration.  Being treated for catheter 
associated UTI
 
#Catheter associated UTI - resolved

outpatient.

have resolved, patient was being adequately treated for it.

negative for pyelonephritis or reflux.

-Leukocytosis 15.5 trended down to 10.6 2 days ago, will check 1 more CBC 
tomorrow
 
#Bedsores (prior to admission)sacral ulcer, unstageable on coccyx but presumed 
stage III, and stage II pressure injury to right buttock


on 7/15. 
-ID consult appreciated.  ID recommended MRI thorax and lumbar and sacral spine 
for possible cellulitis, rule out osteomyelitis, as well as residual collection 
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
ago.

this.  In addition due to patient's shellfish allergy we will premedicate to 
avoid allergic reaction.  Pharmacy states that prednisone 50 mg p.o. should be 
given 13 hours prior, 7 hours prior, and 1 hour prior to the scan.  
Diphenhydramine 50 mg should also be given either IV or p.o. 1 hour prior to the
scan.  In addition, patient can receive 2 mg of Ativan to help with nerves for 
the MRI.  Will follow up in ascertaining what time patient will go for MRI 
tomorrow.
-On decubiti precautions
 
DVT prophylaxis
IV access
Tolerating regular diet
Disposition
DNR/DNI
Problem List:
 1. Clot hematuria
 
 2. UTI (urinary tract infection)
 
Pain Ratin
Pain Location:
na
Pain Goal: Pain 4 or less
Pain Plan:
pain pathway
Tomorrow's Labs & Rationales:
CBC in the morning monitor leukocytosis
 
 
Sandra Serrato 18 1145:
Attending MD Review Statement
 
Attending Statement
Attending MD Statement: examined this patient, discuss w/resident/PA/NP, agreed 
w/resident/PA/NP, discussed with family, reviewed EMR data (avail), discussed 
with nursing, discussed with case mgmt, reviewed images, amended to note
Attending Assessment/Plan:
Patient admitted here for possible UTI and leukocytosis improved on ceftriaxone.
Urology consulted and recommend CT abd/pelvis to evaluate for pyelonephritis 
which is negative for acute pathology. However CT demonstartes abnormal findings
of sacral area with swelling representing soft tissue edema and inflammation 
extending upon ischical tuberosities. 
 
Patient seen/examined bedside. No new complaints. Vital stable, WBC improved on 
ceftriaxone. For MRI he needs to be sedated as he is claustrophobic and for 
contrast ask pharmacy to premeditate with steroids/benadryl as per protocol.
 
Sacral ulcer stage 3/non stageable: Patient reports some drainage/soiling of 
clothes at home. Present on admisison and worsening in past 2 weeks. ID 
consulted and recommend to obtain MRI spine/back for possible cellultiis rule 
out sacral osteomyelitis and/or residual collection from previous abscess. 
 
Wound consult sacral/decubiti precautions. 
 
gi/dvt prophyalxis

## 2018-07-18 VITALS — DIASTOLIC BLOOD PRESSURE: 75 MMHG | SYSTOLIC BLOOD PRESSURE: 131 MMHG

## 2018-07-18 VITALS — SYSTOLIC BLOOD PRESSURE: 137 MMHG | DIASTOLIC BLOOD PRESSURE: 78 MMHG

## 2018-07-18 VITALS — DIASTOLIC BLOOD PRESSURE: 71 MMHG | SYSTOLIC BLOOD PRESSURE: 138 MMHG

## 2018-07-18 LAB
ABSOLUTE GRANULOCYTE CT: 8.1 /CUMM (ref 1.4–6.5)
BASOPHILS # BLD: 0 /CUMM (ref 0–0.2)
BASOPHILS NFR BLD: 0.2 % (ref 0–2)
EOSINOPHIL # BLD: 0 /CUMM (ref 0–0.7)
EOSINOPHIL NFR BLD: 0.2 % (ref 0–5)
ERYTHROCYTE [DISTWIDTH] IN BLOOD BY AUTOMATED COUNT: 14.5 % (ref 11.5–14.5)
GRANULOCYTES NFR BLD: 83 % (ref 42.2–75.2)
HCT VFR BLD CALC: 35.8 % (ref 42–52)
LYMPHOCYTES # BLD: 1.5 /CUMM (ref 1.2–3.4)
MCH RBC QN AUTO: 28.5 PG (ref 27–31)
MCHC RBC AUTO-ENTMCNC: 32.2 G/DL (ref 33–37)
MCV RBC AUTO: 88.3 FL (ref 80–94)
MONOCYTES # BLD: 0.1 /CUMM (ref 0.1–0.6)
PLATELET # BLD: 334 /CUMM (ref 130–400)
PMV BLD AUTO: 7.9 FL (ref 7.4–10.4)
RED BLOOD CELL CT: 4.06 /CUMM (ref 4.7–6.1)
WBC # BLD AUTO: 9.7 /CUMM (ref 4.8–10.8)

## 2018-07-18 NOTE — MRI REPORT
EXAMINATION:
MRI OF THE THORACIC AND LUMBAR SPINE WITHOUT CONTRAST
 
CLINICAL INFORMATION:
Paraplegic with incidental CT findings. Demonstrating a question of evolving
decubitus ulcers versus cellulitis/soft tissue edema and inflammation in the
posterior buttocks, with the inflammatory reaction extending down to the
ischial tuberosities bilaterally.
 
COMPARISON:
None
 
TECHNIQUE:
Multiplanar multisequence MR imaging of the thoracic and lumbar spine is
obtained without contrast. The patient could not tolerate the postcontrast
portion of this study and many of the obtained thoracolumbar series are
degraded by motion artifact.
 
FINDINGS:
 
THORACIC SPINE MRI:
Stable midthoracic kyphosis centered at T6. Unchanged vertebral plana at T6.
There is increased signal on T1 and T2-weighted imaging within between the T5
and T7 vertebral bodies corresponding to minimal residual T6 vertebral body
in this location/vertebral plana. Stable chronic vertebral body height loss
at the T5 and T7 levels. There is also stable mild chronic superior endplate
height loss at the T11 and T12 levels.  The remaining thoracic vertebral body
heights are maintained. No marrow edema to suggest acute fractures. There is
no evidence of osteomyelitis discitis nor septic facet arthritis. Thoracic
cord morphology is normal. Nondiagnostic assessment for cord signal changes
given the degree of artifact in the axial series. No thoracic cord
compression is appreciated on the sagittal series. No epidural collections.
 
T6 vertebral plana unchanged. There is stable appearing severe right and
moderate left foraminal stenosis at T6-T7 secondary to disc osteophyte and
facet arthropathy. Disc osteophyte and facet arthropathy result in similar
moderate bilateral foraminal stenosis at T5-T6.
 
LUMBAR SPINE MRI:
Sacral decubitus ulcer identified on the previous CT is redemonstrated with
adjacent soft tissue extending to the dorsal margin of the sacrococcygeal
junction. The marrow within the sacrum and coccyx is preserved without
evidence of osteomyelitis affecting these areas. There are 5 nonrib-bearing
lumbar-type vertebral bodies. Chronic upper endplate compression deformities
at the L1, L2, and L3 levels. No bone marrow edema to suggest acute fracture
nor osteomyelitis discitis. No evidence of septic facet arthritis. The conus
terminates at the L1 level. There is a Castro catheter within the bladder.
Gluteus muscular atrophy bilaterally. The bladder is thick-walled with a
Castro catheter in place which can be correlated with urinalysis.
 
L1-L2: There is a small annular disc bulge without central canal stenosis and
without foraminal stenosis.
 
L2-L3: Small annular disc bulge and moderate bilateral facet arthropathy and
ligamentum flavum thickening. There is no central canal stenosis and there is
no foraminal stenosis.
 
L3-L4: There is a diffuse annular disc bulge and there is moderate bilateral
facet arthropathy and ligamentum flavum thickening. No significant central
canal stenosis. Mild foraminal narrowing bilaterally.
 
L4-L5: Diffuse annular disc bulge and moderate to severe bilateral facet
arthropathy. No central canal stenosis. While foraminal narrowing
bilaterally.
 
L5-S1: Diffuse disc osteophyte complex and severe bilateral facet
arthropathy. No central canal stenosis. Disc osteophyte and facet arthropathy
result in severe right-sided foraminal stenosis with mass effect on the
exiting right L5 nerve root. There is mild to moderate left foraminal
stenosis.
 
IMPRESSION:
- This is a very limited study. The patient could not tolerate postcontrast
imaging and many of the sequences are motion degraded, particularly the axial
T2 series of the thoracic spine which is essentially nondiagnostic.
 
- Redemonstrated sacral decubitus ulcer with adjacent soft tissue extending
to the posterior margin of the sacrococcygeal junction. Normal fatty marrow
within the sacrum and coccyx is maintained without MRI evidence of
osteomyelitis. The level of questioned inflammatory changes beneath ischial
tuberosities on both sides is not included on this exam. The imaged ischial
tuberosities exhibit preserved marrow signal.
 
- Within the thoracic spine there is a stable appearing chronic midthoracic
kyphosis in the setting of chronic vertebra plana at T6. Multiple chronic
compression deformities throughout the thoracic spine are stable as are
multilevel degenerative changes that result in severe right and moderate left
foraminal stenosis at T6-T7 and moderate bilateral foraminal stenosis at
T5-T6. There is no severe central canal stenosis within the thoracic spine.
No evidence of infection within the thoracic spine.
 
- Multilevel lumbar spondylosis, greatest at L5-S1 were multifactorial
degenerative changes result in moderate to severe right-sided foraminal
stenosis with mass effect on the exiting right L5 nerve root. There is no
evidence of infection within the lumbar spine. Chronic compression
deformities within the lumbar spine as described. No acute fractures.
 
- The bladder is thick-walled with a Castro catheter in place which can be
correlated with urinalysis.

## 2018-07-18 NOTE — PN- HOUSESTAFF
**See Addendum**
Subjective
Follow-up For:
Catheter associated UTI, r/o Osteomyelitis
Subjective:
Patient seen and examined at bedside.  Patient states he feels "good, I've been 
on steroids all night."  Patient denies any current urinary symptoms.  Patient 
denies any weeping from the sacral ulcer.  Patient agreeable to go to MRI today.
 Patient has been premedicated, given his shellfish allergy which is a rash and 
hives on the extremities but not centrally, and no throat closure.  Patient will
receive 2 mg of Ativan to control anxiety, with 2 mg as needed in case it does 
not work.  Patient denies fever/chills/night sweats/chest pain/abdominal pain/
lower extremity edema.
 
Review of Systems
Constitutional:
Reports: see HPI. 
 
Objective
Last 24 Hrs of Vital Signs/I&O
 Vital Signs
 
 
Date Time Temp Pulse Resp B/P B/P Pulse O2 O2 Flow FiO2
 
     Mean Ox Delivery Rate 
 
 0803      96 Nasal 2.0L 
 
       Cannula  
 
 0800       Nasal 2.0L 
 
       Cannula  
 
 0700 98.0 66 20 138/71  94 Nasal 2.0L 
 
       Cannula  
 
 0531  67    96   
 
 0027  65    99   
 
 0000       BIPAP  
 
 2210  69    98   
 
 2200 98.0 76 18 110/58  98 Nasal 2.0L 
 
       Cannula  
 
 1600       Nasal 2.0L 
 
       Cannula  
 
 1455 97.8 74 20 110/50  95 Nasal  
 
       Cannula  
 
 1148      98 Nasal 2.0L 
 
       Cannula  
 
 
 Intake & Output
 
 
  1600  0800  0000
 
Intake Total  730 
 
Output Total  1150 750
 
Balance  -420 -750
 
    
 
Intake, IV  10 
 
Intake, Oral  720 
 
Number  0 
 
Bowel   
 
Movements   
 
Output, Urine  1150 750
 
 
 
 
Physical Exam
General Appearance: Alert, Oriented X3, Cooperative, No Acute Distress
Skin: sacral decubiti, dressing c/d/i, no drainage noted, wound pink
Skin Temp/Moisture Exam: Warm/Dry
Cardiovascular: Regular Rate, Normal S1, Normal S2
Lungs: Clear to Auscultation, Normal Air Movement
Abdomen: Soft, No Tenderness
Neurological: Normal Speech
Extremities: No Edema
Current Medications:
 Current Medications
 
 
  Sig/[place] Start time  Last
 
Medication Dose Route Stop Time Status Admin
 
Acetaminophen 650 MG Q6P PRN  2345 AC 
 
  PO   2149
 
Albuterol Sulfate 3 ML EVERY 4 HRS/AWAKE  2000 AC 
 
  INH   0757
 
Albuterol Sulfate 2 PUF Q6P PRN  2345 AC 
 
  INH   
 
Apixaban 5 MG BID  0900 AC 
 
  PO   0911
 
Baclofen 20 MG BID  0900 AC 
 
  PO   0910
 
Bisacodyl 10 MG Q48  1645 AC 
 
  CO   1000
 
Budesonide/ 2 PUF BID  0900 AC 
 
Formoterol Fumarate  INH   0908
 
Diphenhydramine HCl 50 MG ONCE ONE  1200 DC 
 
  PO  1201  
 
Diphenhydramine HCl 50 MG ONCE ONE  0915 DC 
 
  PO  0916  0908
 
Docusate Sodium 100 MG DAILY  0900 AC 
 
  PO   0911
 
Furosemide 60 MG Q48  1800 AC 
 
  PO   0910
 
Gabapentin 600 MG BID  0900 AC 
 
  PO   0910
 
Guaifenesin 600 MG BID  0900 AC 
 
  PO   0910
 
Hydroxyzine HCl 10 MG BID PRN  2345 AC 
 
  PO   
 
Lorazepam 2 MG ONE PRN  0945 AC 
 
  IV   
 
Lorazepam 2 MG ONE ONE  0930 DC 
 
  IV  0931  0942
 
Magnesium Oxide 400 MG DAILY  0900 AC 
 
  PO   0910
 
Metoprolol Succinate 25 MG DAILY  0900 AC 
 
  PO   0911
 
Montelukast Sodium 10 MG DAILY  0900 AC 
 
  PO   0911
 
Nortriptyline HCl 10 MG DAILY  0900 AC 
 
  PO   0908
 
Omeprazole 20 MG DAILY  0900 AC 
 
  PO   0910
 
Oxycodone/ 1 TAB Q4H PRN  2345 AC 
 
Acetaminophen  PO   0538
 
Potassium Chloride 20 MEQ DAILY  0900 AC 
 
  PO   0911
 
Prednisone 50 MG ONCE ONE  1200 DC 
 
  PO  1201  
 
Prednisone 50 MG ONCE ONE  0915 DC 
 
  PO  0916  0908
 
Prednisone 50 MG ONCE ONE  0600 DC 
 
  PO  0601  0538
 
Prednisone 50 MG ONCE ONE  0000 DC 
 
  PO  0001  0016
 
Sotalol HCl 80 MG  AC 
 
  PO   0911
 
Tiotropium [place] 1 PUF DAILY 900 AC 
 
  INH   0908
 
 
 
 
Last 24 Hrs of Lab/Stalin Results
Last 24 Hrs of Labs/Mics:
 Laboratory Tests
 
18 0700:
CBC w Diff NO MAN DIFF REQ, RBC 4.06  L, MCV 88.3, MCH 28.5, MCHC 32.2  L, RDW 
14.5, MPV 7.9, Gran % 83.0  H, Lymphocytes % 15.3  L, Monocytes % 1.3  L, 
Eosinophils % 0.2, Basophils % 0.2, Absolute Granulocytes 8.1  H, Absolute 
Lymphocytes 1.5, Absolute Monocytes 0.1, Absolute Eosinophils 0, Absolute 
Basophils 0
 
 
Assessment/Plan
Assessment:
Mr. Mandujano is a 67-year-old male with past medical history of atrial fibrillation
on apixaban, MI status post 2 stents, COPD on 2 L nasal cannula, and paraplegia 
with an indwelling Castro followed by Dr. Dykes who presents with a clogged 
Castro and dysuria of several day duration.  Being treated for catheter 
associated UTI
 
#Catheter associated UTI - resolved

outpatient.

have resolved, patient was being adequately treated for it.

negative for pyelonephritis or reflux.

-Leukocytosis 15.5 trended down to 10.6 2 days ago, will check 1 more CBC 
tomorrow

MRI, and initiate straight cath protocol.
 
#Bedsores (prior to admission)sacral ulcer, unstageable on coccyx and stage II 
pressure injury to right buttockresolving


on 7/15. 
-ID consult appreciated.  ID recommended MRI thorax and lumbar and sacral spine 
for possible cellulitis, rule out osteomyelitis, as well as residual collection 
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
ago.

yesterday, patient was started on the protocol at midnight.  Upon talking with 
MRI today, patient to go at 10 AM.  Spoke with pharmacy, who said that the 
premedication can be adjusted to 1 hour prior.  Patient did receive 3 doses of 
steroids, 1 dose of Benadryl, and the Ativan for anxiety.  We will follow-up on 
results.  If no osteomyelitis, or residual abscess, patient can be safely 
discharged 
-On decubiti precautions
 
#Chronic hypoxic respiratory failure secondary to COPD (on 2 L home oxygen)
stable at baseline


 
DVT prophylaxis
IV access
Tolerating regular diet
Disposition
DNR/DNI
Problem List:
 1. Clot hematuria
 
Pain Ratin
Pain Location:
na
Pain Goal: Pain 4 or less
Pain Plan:
per pathway
Tomorrow's Labs & Rationales:
na

## 2018-07-18 NOTE — PN- INFECT DX
Subjective
Subjective:
Afebrile without complaints
 
Objective
Last 24 Hrs of Vital Signs/I&O
 Vital Signs
 
 
Date Time Temp Pulse Resp B/P B/P Pulse O2 O2 Flow FiO2
 
     Mean Ox Delivery Rate 
 
07/18 1244      96 Nasal 2.0L 
 
       Cannula  
 
07/18 0803      96 Nasal 2.0L 
 
       Cannula  
 
07/18 0800       Nasal 2.0L 
 
       Cannula  
 
07/18 0700 98.0 66 20 138/71  94 Nasal 2.0L 
 
       Cannula  
 
07/18 0531  67    96   
 
07/18 0027  65    99   
 
07/18 0000       BIPAP  
 
07/17 2210  69    98   
 
07/17 2200 98.0 76 18 110/58  98 Nasal 2.0L 
 
       Cannula  
 
07/17 1600       Nasal 2.0L 
 
       Cannula  
 
07/17 1455 97.8 74 20 110/50  95 Nasal  
 
       Cannula  
 
 
 Intake & Output
 
 
 07/18 1600 07/18 0800 07/18 0000
 
Intake Total 750 730 
 
Output Total 0 1150 750
 
Balance 750 -420 -750
 
    
 
Intake, IV 50 10 
 
Intake, Oral 700 720 
 
Number 0 0 
 
Bowel   
 
Movements   
 
Output, Urine 0 1150 750
 
 
 
 
Physical Exam
Other Physical Findings:
He is lethargic, status post MRI earlier today, but in no acute distress
Exam is without change
 Castro catheter remains in place
 
 
Results
Last 24 Hours of Lab Results:
 Laboratory Tests
 
 
 07/18 0700
 
Hematology 
 
  CBC w Diff NO MAN DIFF REQ
 
  WBC (4.8 - 10.8 /CUMM) 9.7
 
  RBC (4.70 - 6.10 /CUMM) 4.06  L
 
  Hgb (14.0 - 18.0 G/DL) 11.5  L
 
  Hct (42 - 52 %) 35.8  L
 
  MCV (80.0 - 94.0 FL) 88.3
 
  MCH (27.0 - 31.0 PG) 28.5
 
  MCHC (33.0 - 37.0 G/DL) 32.2  L
 
  RDW (11.5 - 14.5 %) 14.5
 
  Plt Count (130 - 400 /CUMM) 334
 
  MPV (7.4 - 10.4 FL) 7.9
 
  Gran % (42.2 - 75.2 %) 83.0  H
 
  Lymphocytes % (20.5 - 51.1 %) 15.3  L
 
  Monocytes % (1.7 - 9.3 %) 1.3  L
 
  Eosinophils % (0 - 5 %) 0.2
 
  Basophils % (0.0 - 2.0 %) 0.2
 
  Absolute Granulocytes (1.4 - 6.5 /CUMM) 8.1  H
 
  Absolute Lymphocytes (1.2 - 3.4 /CUMM) 1.5
 
  Absolute Monocytes (0.10 - 0.60 /CUMM) 0.1
 
  Absolute Eosinophils (0.0 - 0.7 /CUMM) 0
 
  Absolute Basophils (0.0 - 0.2 /CUMM) 0
 
 
 
Last 24 Hours of [place] Results:
No recent cultures
 
Recent Imaging Studies:
MRI of the spine reveals a sacral decubitus ulcer with adjacent soft tissue 
extending to the posterior margin of the sacrococcygeal junction, with no 
evidence of osteomyelitis; multiple compression deformities throughout the 
thoracic spine, with no evidence of discitis, osteomyelitis or epidural 
collections
 
 
Assessment/Plan ID
Impression:
Stable, with temperatures and white blood cell count remaining normal, off 
antibiotics, with the recent MRI of the spine negative for any evidence of 
osteomyelitis related to his sacral or buttock decubitI and with no evidence of 
any residual epidural collections in the thoracic spine.  Given this result do 
not feel that the suppressive antibiotic treatment with Doxycycline and Rifampin
needs to be resumed.  As previously noted he was on a straight cath schedule in 
the past and is willing to try this again in place of the Castro catheter.  
 
Suggestion:
1.  Remove Castro catheter and initiate straight cath protocol
2.  Continue to follow off antibiotics

## 2018-07-18 NOTE — DISCHARGE SUMMARY
Visit Information
 
Visit Dates
Admission Date:
07/13/18
 
Discharge Date:
07/18/18
 
 
Hospital Course
 
Course
Attending Physician:
Sandra Serrato MD
 
Primary Care Physician:
Ramsey Jacobson MD
 
Hospital Course:
Mr. Mandujano is a 59-year-old male with past medical history of atrial fibrillation
on apixaban, MI status post 2 stents, COPD on 2 L nasal cannula, and paraplegia 
2/2 MRSA spinal epidural abscess with an indwelling Castro followed by Dr. Dykes
who presents with a clogged Castro and dysuria of several day duration.  
 
On presentation to ED, vital signs were T 99.1, HR 88, RR 18, /74, 
saturating 97% on room air.. 
 
General: Patient appears stated age, alert and orientedx3.
HEENT: PERRL. No goiter. No palpable lymph nodes.
Cardiovascular: Regular rate and rhythm, no murmurs, rubs, or gallops.
Lungs: Clear to auscultation bilaterally.
Abdomen: Normal bowel sounds. Nontender to palpation in all four quadrants.  
Castro draining yellow urine.
Ext: No edema, pulses normal.
 
Laboratories were significant for white blood cell count 19.7, hemoglobin 12.2, 
sodium 136, chloride 94, carbon dioxide 33, BUN 21, .  Urinalysis showed 
greater than 75 WBC.  Chest x-ray showed atelectasis.
 
He was admitted to general medicine and treated for the following problems:
1.  Catheter associated urinary tract infection
2.  Sacral Decubiti
3.  Chronic Hypoxic Respiratory Failure
 
#Catheter associated urinary tract infection - RESOLVED: Patient has grown 
Proteus resistant to ciprofloxacin in the past.  He presented with dysuria and a
clogged Castro in the setting of leukocytosis without fever.  His abdominal/
pelvis CAT scan showed no hydronephrosis or pyelonephritis.  His urine culture 
grew Proteus, resistant to Cipro and nitrofurantoin.  He was given ceftriaxone 
for 4 days, with good clinical improvement.  His Castro was discontinued, and he 
was initiated on a straight cath protocol which he will follow outpatient.
 
#Sacral decubiti - Resolving, please continue with bandage changes and see wound
care note: Patient has an unstageable sacral decubiti near the coccyx, present 
since prior to admission per patient.  Wound care had seen him and recommended 
dressing changes.  On a CAT scan recommended by urology for his catheter 
associated urinary tract infection, inflammation was seen near the ischial 
tuberosity.  Patient underwent an MRI, which showed no evidence of osteomyelitis
, and also showed no residual from his spinal epidural abscess 10 years prior.  
Thus, patient can now discontinue his suppressive therapy of rifampin and 
doxycycline for the spinal epidural abscess.
 
#Chronic hypoxic respiratory failure - stable at baseline: Patient has COPD, on 
2 L at home baseline.  Patient did not have any exacerbations during this 
admission.  Patient was stable, tolerated BiPAP at night. No need for steroids 
during this admission.
 
#Afib - controlled : had no episodes of tachycardia, on apixaban for 
anticoagulation.  Rate controlled with sotalol, metoprolol.
 
He will be discharged to home with home health services, and will start self-
catheterization. He will have follow up with PCP and Urologist upon discharge.
 
Allergies:
Coded Allergies:
heparin (Intermediate, SWELLING, RASH 04/16/18)
vancomycin (Intermediate, HIVES, SKIN CRACKES, TURNS RED, SWELLS AND PEELS 04/16
/18)
warfarin (From COUMADIN) (Intermediate, NOT EFFECTIVE CAN NOT GET LEVELS ABOVE 1
06/03/18)
shellfish derived ("HIVES, RASH, TONGUE SWELLS" 07/17/18)
 
Significant Procedures:
MRI 7/18/18
MRI OF THE THORACIC AND LUMBAR SPINE WITHOUT CONTRAST
 
CLINICAL INFORMATION:
Paraplegic with incidental CT findings. Demonstrating a question of evolving
decubitus ulcers versus cellulitis/soft tissue edema and inflammation in the
posterior buttocks, with the inflammatory reaction extending down to the
ischial tuberosities bilaterally.
 
COMPARISON:
None
 
TECHNIQUE:
Multiplanar multisequence MR imaging of the thoracic and lumbar spine is
obtained without contrast. The patient could not tolerate the postcontrast
portion of this study and many of the obtained thoracolumbar series are
degraded by motion artifact.
 
FINDINGS:
 
THORACIC SPINE MRI:
Stable midthoracic kyphosis centered at T6. Unchanged vertebral plana at T6.
There is increased signal on T1 and T2-weighted imaging within between the T5
and T7 vertebral bodies corresponding to minimal residual T6 vertebral body
in this location/vertebral plana. Stable chronic vertebral body height loss
at the T5 and T7 levels. There is also stable mild chronic superior endplate
height loss at the T11 and T12 levels.  The remaining thoracic vertebral body
heights are maintained. No marrow edema to suggest acute fractures. There is
no evidence of osteomyelitis discitis nor septic facet arthritis. Thoracic
cord morphology is normal. Nondiagnostic assessment for cord signal changes
given the degree of artifact in the axial series. No thoracic cord
compression is appreciated on the sagittal series. No epidural collections.
 
T6 vertebral plana unchanged. There is stable appearing severe right and
moderate left foraminal stenosis at T6-T7 secondary to disc osteophyte and
facet arthropathy. Disc osteophyte and facet arthropathy result in similar
moderate bilateral foraminal stenosis at T5-T6.
 
LUMBAR SPINE MRI:
Sacral decubitus ulcer identified on the previous CT is redemonstrated with
adjacent soft tissue extending to the dorsal margin of the sacrococcygeal
junction. The marrow within the sacrum and coccyx is preserved without
evidence of osteomyelitis affecting these areas. There are 5 nonrib-bearing
lumbar-type vertebral bodies. Chronic upper endplate compression deformities
at the L1, L2, and L3 levels. No bone marrow edema to suggest acute fracture
nor osteomyelitis discitis. No evidence of septic facet arthritis. The conus
terminates at the L1 level. There is a Castro catheter within the bladder.
Gluteus muscular atrophy bilaterally. The bladder is thick-walled with a
Castro catheter in place which can be correlated with urinalysis.
 
L1-L2: There is a small annular disc bulge without central canal stenosis and
without foraminal stenosis.
 
L2-L3: Small annular disc bulge and moderate bilateral facet arthropathy and
ligamentum flavum thickening. There is no central canal stenosis and there is
no foraminal stenosis.
 
L3-L4: There is a diffuse annular disc bulge and there is moderate bilateral
facet arthropathy and ligamentum flavum thickening. No significant central
canal stenosis. Mild foraminal narrowing bilaterally.
 
L4-L5: Diffuse annular disc bulge and moderate to severe bilateral facet
arthropathy. No central canal stenosis. While foraminal narrowing
bilaterally.
 
L5-S1: Diffuse disc osteophyte complex and severe bilateral facet
arthropathy. No central canal stenosis. Disc osteophyte and facet arthropathy
result in severe right-sided foraminal stenosis with mass effect on the
exiting right L5 nerve root. There is mild to moderate left foraminal
stenosis.
 
IMPRESSION:
- This is a very limited study. The patient could not tolerate postcontrast
imaging and many of the sequences are motion degraded, particularly the axial
T2 series of the thoracic spine which is essentially nondiagnostic.
 
- Redemonstrated sacral decubitus ulcer with adjacent soft tissue extending
to the posterior margin of the sacrococcygeal junction. Normal fatty marrow
within the sacrum and coccyx is maintained without MRI evidence of
osteomyelitis. The level of questioned inflammatory changes beneath ischial
tuberosities on both sides is not included on this exam. The imaged ischial
tuberosities exhibit preserved marrow signal.
 
- Within the thoracic spine there is a stable appearing chronic midthoracic
kyphosis in the setting of chronic vertebra plana at T6. Multiple chronic
compression deformities throughout the thoracic spine are stable as are
multilevel degenerative changes that result in severe right and moderate left
foraminal stenosis at T6-T7 and moderate bilateral foraminal stenosis at
T5-T6. There is no severe central canal stenosis within the thoracic spine.
No evidence of infection within the thoracic spine.
 
- Multilevel lumbar spondylosis, greatest at L5-S1 were multifactorial
degenerative changes result in moderate to severe right-sided foraminal
stenosis with mass effect on the exiting right L5 nerve root. There is no
evidence of infection within the lumbar spine. Chronic compression
deformities within the lumbar spine as described. No acute fractures.
 
- The bladder is thick-walled with a Castro catheter in place which can be
correlated with urinalysis.
 
=====================================================================
CT Abd/Pelvis 7/15/18
CT ABDOMEN AND PELVIS WITHOUT CONTRAST
 
CLINICAL INFORMATION:
Presented with clogged Castro and dysuria. Rule out pyelonephritis or acute
process.
 
COMPARISON:
CT scan of the chest dated 08/17/2017. CT scan of the abdomen and pelvis
dated 11/15/2010.
 
TECHNIQUE:
Multidetector volumetric imaging was performed from the superior aspect of
the liver through the pubic symphysis. Sagittal and coronal reformatted
images were obtained on the technologist workstation.
 
DLP:
1145.29 mGy-cm.
 
FINDINGS:
 
LUNG BASES: There is a small solid noncalcified pleural-based nodule in the
right lower lobe (series 2, image 1), measuring 6 mm, unchanged from
07/20/2017 and 2 mm larger compared to 11/15/2010. This is of doubtful
clinical significance. Some scattered areas of dependent parenchymal opacity
is seen in both lower lobes, most consistent with atelectatic changes. No
significant pleural effusion is seen.
 
LIVER, GALLBLADDER, AND BILIARY TREE: The liver is normal in size, shape, and
attenuation. No focal hepatic lesion on noncontrast imaging. No biliary
ductal dilatation is present. This is likely a faintly calcified gallstone in
the gallbladder neck region. The gallbladder is otherwise unremarkable.
 
PANCREAS: Diffusely atrophic with fatty infiltration seen. Some calcific
densities are noted within the pancreatic head, possibly vascular versus
sequelae of previous chronic calcific pancreatitis similar findings were seen
on prior imaging dating back to 2010. No discrete pancreatic mass. No
peripancreatic stranding.
 
SPLEEN, ADRENAL GLANDS: A 1 cm accessory splenule is seen in the splenic
hilar region. Spleen and adrenal glands unremarkable on noncontrast imaging.
 
 
KIDNEYS AND URETERS: The kidneys are normal in size, shape, and attenuation.
No hydronephrosis or hydroureter seen.
 
There are 2 tiny punctate calcific densities seen in the mid right kidney,
most likely vascular in etiology. In the lower pole of the left kidney, a 0.4
cm nonobstructing calcification is seen new from prior exam, likely a
nonobstructing stone. In addition, several punctate linearly oriented
calcifications is seen in the upper pole, most likely vascular in etiology.
No perinephric stranding.
 
In the mid to upper left kidney, a partially exophytic 2.8 x 3.6 cm fluid
attenuation mass is seen, consistent with a cyst, new compared to 2010. There
is a 1.6 cm lower pole right renal cyst, larger compared to 1.2 cm in 2010.
 
BLADDER: The bladder is 0.4 cm nonobstructing calculus is seen, new compared
to the prior exam. Decompressed by Castro catheter, likely accounting for the
diffusely thick-walled appearance. No definite bladder calculi are seen.
Evaluation of the bladder is, however, limited.
 
PELVIC VISCERA: Unremarkable.
 
GASTROINTESTINAL TRACT: The small and large bowel are unremarkable. The
appendix is nonvisualized.
 
ABDOMINAL WALL: There is extensive soft tissue edema and stranding seen in
the posterior medial right buttock and to a lesser extent in the medial left
buttock. Both of these areas of edema and stranding extend to the ischial
tuberosity bilaterally. Close clinical correlation is requested to exclude
pressure type injury with evolving decubitus ulcers or
inflammation/cellulitis with extension to the underlying bone. No definite
focal lytic lesion is seen involving the ischial tuberosities.
 
There is a small fat-containing umbilical hernia.
 
LYMPH NODES, VASCULAR: Moderate atherosclerotic calcifications of the aorta
and branch vessels, including the coronary arteries is seen. No periaortic
collections. An IVC filter is in place with tip at the distal left renal
vein.
 
OSSEOUS STRUCTURES: Diffuse osteopenia is seen. Mild wedge compression
deformity of the L1 vertebral body is seen, unchanged from the 2010 exam.
There is mild superior endplate compression of the L2 and L3 vertebral
bodies, new/progressive compared to the prior exam. Moderate facet
arthropathy is seen in the lower lumbar spine. There is a convex right lumbar
curvature, likely positional. There is subtle sclerotic density in the
femoral heads bilaterally, not significantly changed since 2010.
 
IMPRESSION:
 
1. Nonobstructing mid left renal calcification, new from prior study.
2. Right renal calcifications are most likely vascular.
3. Bilaterally, no obstructing stone is seen.
4. Bilateral small renal cysts.
5. Other incidental findings include:
a. A 6 mm pleural-based nodule in the right lower lobe, unchanged from
07/20/2017, most consistent with a benign etiology
b. Probable faintly calcified gallstone in the gallbladder neck region.
Gallbladder otherwise unremarkable.
c. Atrophic fatty infiltrated pancreas.
d. Question of evolving decubitus ulcers versus cellulitis/soft tissue edema
and inflammation in the posterior buttocks, with the inflammatory reaction
extending down to the ischial tuberosities bilaterally.
e. Osteopenia with several compression deformities in the spine.
Pertinent Lab Results:
Urine Culture:
> URINE CULTURE  Final                                             07/16/
      
        Approx. 10,000 colonies per ml of:
         
        PROTEUS MIRABILIS
 
       1. PROTEUS MIRABILIS
                                      RX     AB 
                                      ------ -- 
       AMPICILLIN                     S         
       CEFAZOLIN                      S         
       AMOXICILLIN/CLAVULINIC ACID    S         
       AMPICILLIN/SULBACTAM           S         
       CIPROFLOXACIN                  R         
       GENTAMICIN                     S         
       NITROFURANTOIN                 R         
       TRIMETHOPRIM/SULFAMETHOXAZOLE  S         
 
Disposition Summary
 
Disposition
Principal Diagnosis:
Catheter Associated UTI
Additional Diagnosis:
Sacral Decubiti present prior to admission; Chronic Hypoxic Respiratory Failure;
AFIB
Discharge Disposition: home or self care
 
Discharge Instructions
 
General Discharge Information
Code Status: Do Not Resucitate/Intubat
Patient's Diet:
As tolerated
Patient's Activity:
As tolerated
Follow-Up Instructions/Appts:
- Please follow up with your primary care physician within 1-2 weeks of 
discharge. Inform your primary care physician of this admission to [facility].
- Continue your current medications per discharge instructions. DISCONTINUE 
RIFAMPIN AND DOXYCYCLINE as prophylaxis.
- Please watch for these problems: Fever, Chills, Nausea, Vomiting, Shortness of
Breath, Productive Cough, Chest Pain/Discomfort, Abdominal Pain, Active Bleeding
or Bloody urine/stool.
 
Medications at Discharge
Discharge Medications:
Stop taking the following medications:
Rifampin (Rifadin) 300 MG CAPSULE ORAL TWICE DAILY Qty = 60
 
Continue taking these medications:
Furosemide (Furosemide) 20 MG TABLET
    3 Tablet ORAL EVERY 48 HOURS (Every 2 days)
    Qty = 90
    Comments:
       NOT GIVEN IN HOSPITAL
 
Lactobacillus Acidophilus (Acidophilus) 1 EACH CAPSULE
    1 Capsule ORAL TWICE DAILY
    Comments:
       NOT GIVEN WHILE IN HOSPITAL    
 
Magnesium Oxide (Magnesium) 400 MG CAPSULE
    1 Capsule ORAL 0600
    Comments:
       NOT GIVEN IN HOSPITAL
        
             
 
Multivitamin (Daily Value) 1 EACH TABLET
    1 Tablet ORAL DAILY
    Comments:
       NOT GIVEN WHILE IN HOSPITAL
 
Ascorbate Calcium (Vitamin C) 500 MG TABLET
    1 Tablet ORAL TWICE DAILY
    Comments:
       NOT GIVEN WHILE IN HOSPITAL
 
Oxycodone HCl/Acetaminophen (Oxycodone-Acetaminophen 5-325) 5 MG-325 MG TABLET
    1 Tablet ORAL Q4H as needed for PAIN
    Qty = 120
    Comments:
       Last Taken: 5/14/18
             Time: 5AM
 
Baclofen (Baclofen) 20 MG TABLET
    1 Tablet ORAL TWICE DAILY
    Qty = 150
    Comments:
       Last Taken: 5/14/18
             Time: 10AM
 
Bisacodyl (Dulcolax) 10 MG SUPP.RECT
    1 Suppository RECTAL EVERY 48 HOURS (Every 2 days)
    Comments:
       NOT GIVEN IN HOSPITAL
 
Potassium Chloride (Potassium Chloride) 20 MEQ TAB.ER.PRT
    1 Tablet ORAL DAILY
    Qty = 90
    Comments:
       NOT GIVEN WHILE IN HOSPITAL
 
Sotalol (Betapace) 80 MG TABLET
    80 Milligram ORAL TWICE DAILY
    Qty = 60
    Comments:
       Last Taken: 5/1418
             Time: 10AM
 
Apixaban (Eliquis) 5 MG TABLET
    5 Milligram ORAL TWICE DAILY
    Qty = 60
    Comments:
       Last Taken: 5/14/18
             Time: 10AM
 
Albuterol Sulfate (Ventolin Hfa) 90 MCG HFA.AER.AD
    2 Puff Inhale through mouth As Directed as needed for COPD
    Qty = 18
    Comments:
       Last Taken: 5/14/18
             Time: 10AM  
 
Budesonide/Formoterol Fumarate (Symbicort 160-4.5 Mcg Inhaler) 160 MCG-4.5 MCG/
ACTUATION HFA.AER.AD
    2 Puff Inhale through mouth TWICE DAILY
    Comments:
       Last Taken: 5/14/18
             Time: 10AM
 
Metoprolol Succ XL (Toprol XL) 25 MG TAB
    1 Tablet ORAL DAILY
    Comments:
       Last Taken: 5/14/18
             Time: 10AM
 
Nortriptyline HCl (Nortriptyline HCl) 10 MG CAPSULE
    1 Capsule ORAL DAILY
    Comments:
       Last Taken: 5/14/18
             Time: 10AM
 
Omeprazole (Omeprazole) 20 MG CAPSULE.DR
    1 Capsule ORAL DAILY
    Comments:
       Last Taken: 5/14/18
             Time: 10AM
 
Evolocumab (Repatha Sureclick) 140 MG/ML PEN.INJCTR
    1 Milliliters SC EVERY 2 WEEKS
    Comments:
       NOT GIVEN IN HOSPITAL
 
Montelukast Sodium (Singulair) 10 MG TABLET
    1 Tablet ORAL DAILY
    Comments:
       Last Taken: 5/13/18
             Time: 10PM
 
Tiotropium Bromide (Spiriva) 18 MCG CAP.W.DEV
    1 Capsule Inhale through mouth DAILY
    Comments:
       Last Taken: 5/14/18
             Time: 10AM
 
Atomoxetine HCl (Strattera) 40 MG CAPSULE
    1 Capsule ORAL Every Morning
    Comments:
       NOT GIVEN WHILE IN HOSPITAL
             
 
Docusate Sodium (Stool Softener) 100 MG CAPSULE
    1 Capsule ORAL DAILY
    Comments:
       Last Taken: 5/14/18
             Time: 10AM
 
Guaifenesin (Mucinex) 600 MG TAB.ER.12H
    1 Tablet ORAL TWICE DAILY
    Comments:
       Last Taken: 5/14/18
             Time: 10AM
 
Hydroxyzine HCl (hydrOXYzine HCl) 10 MG TABLET
    1 Tablet ORAL TWICE DAILY as needed for ANXIETY
    Qty = 20
    Comments:
       NOT GIVEN IN HOSPITAL
 
Gabapentin (Gabapentin) 300 MG CAPSULE
    2 Capsule ORAL TWICE DAILY
    Qty = 60
    Comments:
       Last Taken: 5/14/18
             Time: 10AM
 
Albuterol Sulfate (Albuterol Sulfate) 2.5 MG/3 ML (0.083 %) VIAL.NEB
    1 Vial Inhale Solution EVERY 4 HOURS AS NEEDED as needed for COPD
    Qty = 150
 
Trazodone HCl (Trazodone HCl) 50 MG TABLET
    1 Tablet ORAL TAKE AT BEDTIME
 
 
Copies To:
Indira JONES,Ramsey YANG

## 2018-07-19 VITALS — SYSTOLIC BLOOD PRESSURE: 126 MMHG | DIASTOLIC BLOOD PRESSURE: 72 MMHG

## 2018-07-19 VITALS — DIASTOLIC BLOOD PRESSURE: 52 MMHG | SYSTOLIC BLOOD PRESSURE: 108 MMHG

## 2018-07-19 NOTE — PATIENT DISCHARGE INSTRUCTIONS
**See Addendum**
Discharge Instructions
 
General Discharge Information
You were seen/treated for:
Catheter Associated UTI
Special Instructions:
- Please follow up with your primary care physician within 1-2 weeks of 
discharge. Inform your primary care physician of this admission to [facility].
- Continue your current medications per discharge instructions.
- Please watch for these problems: Fever, Chills, Nausea, Vomiting, Shortness of
Breath, Productive Cough, Chest Pain/Discomfort, Abdominal Pain, Active Bleeding
or Bloody urine/stool.
 
Acute Coronary Syndrome
 
Inclusion Criteria
At DC or during hospital stay patient has or had the following:
ACS DIAGNOSIS No
 
Discharge Core Measures
Meds if any: Prescribed or Continued at Discharge
Meds if any: NOT Prescribed or Continued at Discharge
 
Congestive Heart Failure
 
Inclusion Criteria
At DC or during hospital stay patient has or had the following:
CHF DIAGNOSIS No
 
Discharge Core Measures
Meds if any: Prescribed or Continued at Discharge
Meds if any: NOT Prescribed or Continued at Discharge
 
Cerebrovascular accident
 
Inclusion Criteria
At DC or during hospital stay patient has or had the following:
CVA/TIA Diagnosis No
 
Discharge Core Measures
Meds if any: Prescribed or Continued at Discharge
Meds if any: NOT Prescribed or Continued at Discharge
 
Venous thromboembolism
 
Inclusion Criteria
VTE Diagnosis No
VTE Type NONE
VTE Confirmed by (Test) NONE
 
Discharge Core Measures
- Per Current guidelines, there needs to be overlap
- treatment for the first 5 days of Warfarin therapy.
- If discharged on Warfarin prior to 5 days of
- overlap therapy, the patient will need to be
- assessed for post discharge needs including
- *Post discharge parental anticoagulation
- *Warfarin and/or parental anticoagulation education
- *Follow up date to check INR post discharge
At least 5 days overlap therapy as Inpatient No
Meds if any: Prescribed or Continued at Discharge
Note: Overlap Therapy is Warfarin and Anticoagulant
Meds if any: NOT Prescribed or Continued at Discharge

## 2018-07-19 NOTE — PN- HOUSESTAFF
**See Addendum**
Subjective
Follow-up For:
Catheter associated UTI, Sacral Ulcer
Subjective:
Patient seen and a bedside.  Patient was informed his MRI results, he was 
pleased with his results.  Patient states he is ready for discharge today.  
Patient has been tolerating straight catheterization, states that he has been 
leaking intermittently.  Patient states that he needs to increase the frequency 
of straight catheterization.  Patient states that he BladderScan last night had 
80 mL post residual.  Patient states at home he usually self catheterizes every 
3 hours minimum.  Patient denied fever/chills/night sweats/chest pain/abdominal 
pain/urinary symptoms/lower extremity edema.
 
Review of Systems
Constitutional:
Reports: see HPI. 
 
Objective
Last 24 Hrs of Vital Signs/I&O
 Vital Signs
 
 
Date Time Temp Pulse Resp B/P B/P Pulse O2 O2 Flow FiO2
 
     Mean Ox Delivery Rate 
 
 0913  66  110/60     
 
 0811      100 Nasal 2.0L 
 
       Cannula  
 
 0616 97.5 59 18 126/72  92 Nasal 2.0L 
 
       Cannula  
 
 0024  65    97   
 
 0000       BIPAP 30% 
 
 2233 97.7 98 20 131/75  96 Nasal 2.0L 
 
       Cannula  
 
 2213  84    98   
 
 2040      95 Nasal 2.0L 
 
       Cannula  
 
 1600      98 Nasal 2.0L 
 
       Cannula  
 
 1443 98.4 76 20 137/78  100 Nasal 2.0L 
 
       Cannula  
 
 1244      96 Nasal 2.0L 
 
       Cannula  
 
 
 Intake & Output
 
 
  1600  0800  0000
 
Intake Total  200 610
 
Output Total   
 
Balance  200 610
 
    
 
Intake, IV   10
 
Intake, Oral  200 600
 
Number  2 1
 
Bowel   
 
Movements   
 
Patient   222 lb
 
Weight   
 
 
 
 
Physical Exam
General Appearance: Alert, Oriented X3, Cooperative, No Acute Distress
Skin: sacral decubiti
Skin Temp/Moisture Exam: Warm/Dry
Cardiovascular: Regular Rate, Normal S1, Normal S2
Lungs: Clear to Auscultation, Normal Air Movement
Abdomen: Soft, No Tenderness
Neurological: Normal Speech
Extremities: No Edema
Current Medications:
 Current Medications
 
 
  Sig/[place] Start time  Last
 
Medication Dose Route Stop Time Status Admin
 
Acetaminophen 650 MG Q6P PRN  2345 AC 
 
  PO   2149
 
Albuterol Sulfate 3 ML EVERY 4 HRS/AWAKE 2000 AC 
 
  INH   0801
 
Albuterol Sulfate 2 PUF Q6P PRN  2345 AC 
 
  INH   
 
Apixaban 5 MG BID  0900 AC 
 
  PO   0913
 
Baclofen 20 MG BID  0900 AC 
 
  PO   0913
 
Bisacodyl 10 MG Q48  1645 AC 
 
  OR   1442
 
Budesonide/ 2 PUF BID  0900 AC 
 
Formoterol Fumarate  INH   0914
 
Docusate Sodium 100 MG DAILY  0900 AC 
 
  PO   0913
 
Furosemide 60 MG Q48  1800 AC 
 
  PO   0910
 
Gabapentin 600 MG BID  0900 AC 
 
  PO   0913
 
Guaifenesin 600 MG BID  0900 AC 
 
  PO   0913
 
Hydroxyzine HCl 10 MG BID PRN  2345 AC 
 
  PO   
 
Lorazepam 2 MG ONE PRN  0945 AC 
 
  IV   1111
 
Magnesium Oxide 400 MG DAILY  0900 AC 
 
  PO   0913
 
Metoprolol Succinate 25 MG DAILY  0900 AC 
 
  PO   0913
 
Montelukast Sodium 10 MG DAILY  0900 AC 
 
  PO   0913
 
Nortriptyline HCl 10 MG DAILY  0900 AC 
 
  PO   0913
 
Omeprazole 20 MG DAILY  0900 AC 
 
  PO   0913
 
Oxycodone/ 1 TAB Q4H PRN  2345 AC 
 
Acetaminophen  PO   1102
 
Potassium Chloride 20 MEQ DAILY  0900 AC 
 
  PO   0913
 
Sotalol HCl 80 MG BID  0900 AC 
 
  PO   0913
 
Tiotropium [place] 1 PUF DAILY  0900 AC 
 
  INH   0914
 
 
 
 
Assessment/Plan
Assessment:
Mr. Mandujano is a 67-year-old male with past medical history of atrial fibrillation
on apixaban, MI status post 2 stents, COPD on 2 L nasal cannula, and paraplegia 
with an indwelling Castro followed by Dr. Dykes who presents with a clogged 
Castro and dysuria of several day duration.  Being treated for catheter 
associated UTI
 
#Bedsores (prior to admission)sacral ulcer, unstageable on coccyx and stage II 
pressure injury to right buttockresolving


on 7/15. 
-ID consult appreciated.  ID recommended MRI thorax and lumbar and sacral spine 
for possible cellulitis, rule out osteomyelitis, as well as residual collection 
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
ago.

home today, off rifampin and doxycycline ppx.
-On decubiti precautions
 
#Catheter associated UTI - resolved

outpatient.

have resolved, patient was being adequately treated for it.

negative for pyelonephritis or reflux.

-Leukocytosis 15.5 trended down to 10.6 to 9.7 today. Resolved.

 
#Chronic hypoxic respiratory failure secondary to COPD (on 2 L home oxygen)
stable at baseline


 
DVT prophylaxis
IV access
Tolerating regular diet
Disposition-discharge today
DNR/DNI
Problem List:
 1. COPD (chronic obstructive pulmonary disease)
 
 2. UTI (urinary tract infection)
 
Pain Ratin
Pain Location:
na
Pain Goal: Pain 4 or less
Pain Plan:
pathway
Tomorrow's Labs & Rationales:
na

## 2023-02-15 NOTE — PN- ATT ADDEND
Attending Addendum
Attending Brief Note
Patient seen and examined in the emergency room.  Plan of care discussed with 
the medical team and the patient.  Available lab work and radiology test reports
were reviewed.  Patient appears comfortable and pleasant and denies any chest 
pain fever chills nausea or vomiting.  He continues to have a moderate cough 
with scant sputum production and mild to moderate Difficulty breathing.
   Vital Signs
 
 
Date Time Temp Pulse Resp B/P B/P Pulse O2 O2 Flow FiO2
 
     Mean Ox Delivery Rate 
 
07/19 1038      95 Nasal 2.0L 
 
       Cannula  
 
07/19 0806 98.1 90 20 177/77  99 Nasal 2.0L 
 
       Cannula  
 
07/19 0550 97.2 95 17 178/83  95 Nasal 2.0L 
 
       Cannula  
 
07/19 0149 97.8 95 16 177/77  94 Nasal 2.0L 
 
       Cannula  
 
07/18 2249 97.7 106 20 153/67  96 Nasal 2.0L 
 
       Cannula  
 
07/18 2143      96 Nasal 2.0L 
 
       Cannula  
 
07/18 2058 98.9 104 20 177/81  98 Nasal 2.0L 
 
       Cannula  
 
07/18 1742 97.9 100 18 148/69  98 Nasal 2.0L 
 
       Cannula  
 
 
 Intake & Output
 
 
 07/19 1600 07/19 0800 07/19 0000
 
Intake Total 250  
 
Output Total 300  225
 
Balance -50  -225
 
    
 
Intake,   
 
Output, Urine 300  225
 
Patient   252 lb
 
Weight   
 
Weight   Bed scale
 
Measurement   
 
Method   
 
 
 
Exam:
General: Patient awake alert oriented without any distress 
CVS: S1 plus S2 without any murmur or gallops 
Chest: Few scattered crepitation with expiratory prolongation and mild wheeze.  
There is no respiratory distress. 
Abdomen: Soft nontender, bowel sound present, no guarding or rebound 
CNS: Awake alert oriented with paraparesis follows command  appropriately 
Extremities: No edema; no clubbing or cyanosis noted 
 
 Laboratory Tests
 
 
 07/19 07/18
 
 0557 1958
 
Chemistry  
 
  Sodium (137 - 145 mmol/L) 139 134  L
 
  Potassium (3.5 - 5.1 mmol/L) 4.6 4.2
 
  Chloride (98 - 107 mmol/L) 99 96  L
 
  Carbon Dioxide (22 - 30 mmol/L) 34  H 33  H
 
  Anion Gap (5 - 16) 6 5
 
  BUN (9 - 20 mg/dL) 14 14
 
  Creatinine (0.7 - 1.2 mg/dL) 0.5  L 0.6  L
 
  Estimated GFR (>60 ml/min) > 60 > 60
 
  BUN/Creatinine Ratio (7 - 25 %) 28.0  H 23.3
 
  Glucose (65 - 99 mg/dL)  93
 
  Calcium (8.4 - 10.2 mg/dL)  9.7
 
  Total Bilirubin (0.2 - 1.3 mg/dL)  0.5
 
  AST (17 - 59 U/L)  28
 
  ALT (21 - 72 U/L)  45
 
  Alkaline Phosphatase (< 127 U/L)  82
 
  Total Protein (6.3 - 8.2 g/dL)  6.7
 
  Albumin (3.5 - 5.0 g/dL)  3.3  L
 
  Globulin (1.9 - 4.2 gm/dL)  3.4
 
  Albumin/Globulin Ratio (1.1 - 2.2 %)  1.0  L
 
Hematology  
 
  CBC w Diff NO MAN DIFF REQ 
 
  WBC (4.8 - 10.8 /CUMM) 14.4  H 
 
  RBC (4.70 - 6.10 /CUMM) 4.11  L 
 
  Hgb (14.0 - 18.0 G/DL) 11.9  L 
 
  Hct (42 - 52 %) 37.3  L 
 
  MCV (80.0 - 94.0 FL) 90.7 
 
  MCH (27.0 - 31.0 PG) 28.9 
 
  RDW (11.5 - 14.5 %) 15.9  H 
 
  Plt Count (130 - 400 /CUMM) 382 
 
  MPV (7.4 - 10.4 FL) 7.2  L 
 
  Gran % (42.2 - 75.2 %) 84.6  H 
 
  Lymphocytes % (20.5 - 51.1 %) 12.0  L 
 
  Monocytes % (1.7 - 9.3 %) 3.1 
 
  Eosinophils % (0 - 5 %) 0.2 
 
  Basophils % (0.0 - 2.0 %) 0.1 
 
  Absolute Granulocytes (1.4 - 6.5 /CUMM) 12.2  H 
 
  Absolute Lymphocytes (1.2 - 3.4 /CUMM) 1.7 
 
  Absolute Monocytes (0.10 - 0.60 /CUMM) 0.4 
 
  Absolute Eosinophils (0.0 - 0.7 /CUMM) 0 
 
  Absolute Basophils (0.0 - 0.2 /CUMM) 0 
 
  PUBS MCHC (33.0 - 37.0 G/DL) 31.9  L 
 
 
 
 
 07/18 07/18
 
 1917 1800
 
Hematology  
 
  CBC w Diff NO MAN DIFF REQ 
 
  WBC (4.8 - 10.8 /CUMM) 13.5  H 
 
  RBC (4.70 - 6.10 /CUMM) 4.27  L 
 
  Hgb (14.0 - 18.0 G/DL) 12.3  L 
 
  Hct (42 - 52 %) 38.5  L 
 
  MCV (80.0 - 94.0 FL) 90.3 
 
  MCH (27.0 - 31.0 PG) 28.9 
 
  RDW (11.5 - 14.5 %) 15.2  H 
 
  Plt Count (130 - 400 /CUMM) 382 
 
  MPV (7.4 - 10.4 FL) 7.5 
 
  Gran % (42.2 - 75.2 %) 75.6  H 
 
  Lymphocytes % (20.5 - 51.1 %) 16.0  L 
 
  Monocytes % (1.7 - 9.3 %) 5.6 
 
  Eosinophils % (0 - 5 %) 2.7 
 
  Basophils % (0.0 - 2.0 %) 0.1 
 
  Absolute Granulocytes (1.4 - 6.5 /CUMM) 10.2  H 
 
  Absolute Lymphocytes (1.2 - 3.4 /CUMM) 2.2 
 
  Absolute Monocytes (0.10 - 0.60 /CUMM) 0.8  H 
 
  Absolute Eosinophils (0.0 - 0.7 /CUMM) 0.4 
 
  Absolute Basophils (0.0 - 0.2 /CUMM) 0 
 
  PUBS MCHC (33.0 - 37.0 G/DL) 32.0  L 
 
Urines  
 
  Urine Color (YEL,AMB,STR)  YEL
 
  Urine Clarity (CLEAR)  CLEAR
 
  Urine pH (5.0 - 8.0)  6.0
 
  Ur Specific Gravity (1.001 - 1.035)  >= 1.030
 
  Urine Protein (NEG,<30 MG/DL)  TRACE  H
 
  Urine Ketones (NEG)  TRACE  H
 
  Urine Nitrite (NEG)  NEG
 
  Urine Bilirubin (NEG)  NEG
 
  Urine Urobilinogen (0.1  -  1.0 EU/dl)  0.2
 
  Ur Leukocyte Esterase (NEG)  SMALL  H
 
  Ur Microscopic  SEDIMENT EXAMINED
 
  Urine RBC (0 - 5 /HPF)  1-3
 
  Urine WBC (0 - 2 /HPF)  10-15  H
 
  Ur Epithelial Cells (NONE,FEW)  MOD  H
 
  Hyaline Casts (0/LPF)  FEW  H
 
  Urine Mucus (FEW,NONE)  MOD  H
 
  Urine Hemoglobin (NEG)  TRACE-LYSED  H
 
  Urine Glucose (N MG/DL)  NEG
 
 
 Microbiology
 
 
Date/Time Procedure - Status
 
Source Growth
 
07/19 0728 Legionella Antigen - COLB
 
URINE ROUT 
 
07/19 0728 Streptococcus pneumoniae Antigen (M - [facility]B
 
URINE ROUT 
 
07/18 2023 Blood Culture - RECD
 
BLOOD 
 
07/18 1957 Blood Culture - RECD
 
BLOOD 
 
07/18 1935 Respiratory Culture - ORD
 
LOWER RESP 
 
07/18 1935 Gram Stain - ORD
 
LOWER RESP 
 
07/18 1800 Urine Culture - RES
 
URINE ROUT 
 
 
Chest x-ray
Retrocardiac opacification and obscuration of the left hemidiaphragm
suspicious for a left lower lobe infiltrate.
 
Recommendation is for a followup chest series to be obtained following
treatment and/or resolution of symptoms to assure resolution of this
appearance.
 
Assessment
* Community acquired pneumonia 
* COPD exacerbation
* Hypoxia
* Paraparesis
* Neurogenic bladder
* History of atrial fibrillation
* His hyperlipidemia
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
Plan
* Continue Ceftaz and azithromycin
* Continue prednisone taper
* Await culture reports
* Pulmonary consult
* Continue straight cath protocol
 
* Manual Repair Warning Statement: We plan on removing the manually selected variable below in favor of our much easier automatic structured text blocks found in the previous tab. We decided to do this to help make the flow better and give you the full power of structured data. Manual selection is never going to be ideal in our platform and I would encourage you to avoid using manual selection from this point on, especially since I will be sunsetting this feature. It is important that you do one of two things with the customized text below. First, you can save all of the text in a word file so you can have it for future reference. Second, transfer the text to the appropriate area in the Library tab. Lastly, if there is a flap or graft type which we do not have you need to let us know right away so I can add it in before the variable is hidden. No need to panic, we plan to give you roughly 6 months to make the change.